# Patient Record
Sex: FEMALE | Race: WHITE | NOT HISPANIC OR LATINO | Employment: FULL TIME | URBAN - METROPOLITAN AREA
[De-identification: names, ages, dates, MRNs, and addresses within clinical notes are randomized per-mention and may not be internally consistent; named-entity substitution may affect disease eponyms.]

---

## 2017-11-15 LAB — MAMMOGRAM: NORMAL

## 2018-10-19 ENCOUNTER — OFFICE VISIT (OUTPATIENT)
Dept: DERMATOLOGY | Facility: CLINIC | Age: 53
End: 2018-10-19
Payer: COMMERCIAL

## 2018-10-19 DIAGNOSIS — L57.0 AK (ACTINIC KERATOSIS): ICD-10-CM

## 2018-10-19 DIAGNOSIS — D22.9 BENIGN NEVUS OF SKIN: ICD-10-CM

## 2018-10-19 DIAGNOSIS — D23.9 DERMATOFIBROMA: ICD-10-CM

## 2018-10-19 DIAGNOSIS — D48.5 NEOPLASM OF UNCERTAIN BEHAVIOR OF SKIN: ICD-10-CM

## 2018-10-19 DIAGNOSIS — D18.01 CHERRY ANGIOMA: ICD-10-CM

## 2018-10-19 DIAGNOSIS — L73.8 SENILE SEBACEOUS GLAND HYPERPLASIA: Primary | ICD-10-CM

## 2018-10-19 RX ORDER — ESTERIFIED ESTROGENS AND METHYLTESTOSTERONE .625; 1.25 MG/1; MG/1
1 TABLET ORAL DAILY
Refills: 3 | COMMUNITY
Start: 2018-10-10 | End: 2020-12-03

## 2018-10-19 RX ORDER — LOSARTAN POTASSIUM 50 MG/1
50 TABLET ORAL DAILY
Refills: 3 | COMMUNITY
Start: 2018-08-01 | End: 2020-12-03

## 2018-10-19 RX ORDER — LIDOCAINE HYDROCHLORIDE AND EPINEPHRINE 10; 10 MG/ML; UG/ML
1 INJECTION, SOLUTION INFILTRATION; PERINEURAL ONCE
Qty: 1 ML | Refills: 0 | OUTPATIENT
Start: 2018-10-19 | End: 2018-10-19

## 2018-10-19 RX ORDER — ESTRADIOL 0.5 MG/1
TABLET ORAL
Refills: 2 | COMMUNITY
Start: 2018-02-12 | End: 2020-12-03

## 2018-10-19 RX ORDER — TRETINOIN 0.25 MG/G
CREAM TOPICAL
Qty: 45 G | Refills: 3 | Status: SHIPPED | OUTPATIENT
Start: 2018-10-19 | End: 2020-10-23

## 2018-10-19 ASSESSMENT — PAIN SCALES - GENERAL
PAINLEVEL: NO PAIN (0)
PAINLEVEL: NO PAIN (0)

## 2018-10-19 NOTE — MR AVS SNAPSHOT
After Visit Summary   10/19/2018    Tia Pablo    MRN: [id number]           Patient Information     Date Of Birth          1965        Visit Information        Provider Department      10/19/2018 10:00 AM Brigid Del Toro PA-C [facility] Dermatology        Today's Diagnoses     Senile sebaceous gland hyperplasia    -  1    Benign nevus of skin        Neoplasm of uncertain behavior of skin        AK (actinic keratosis)          Care Instructions    Wound Care After a Biopsy    What is a skin biopsy?  A skin biopsy allows the doctor to examine a very small piece of tissue under the microscope to determine the diagnosis and the best treatment for the skin condition. A local anesthetic (numbing medicine)  is injected with a very small needle into the skin area to be tested. A small piece of skin is taken from the area. Sometimes a suture (stitch) is used.     What are the risks of a skin biopsy?  I will experience scar, bleeding, swelling, pain, crusting and redness. I may experience incomplete removal or recurrence. Risks of this procedure are excessive bleeding, bruising, infection, nerve damage, numbness, thick (hypertrophic or keloidal) scar and non-diagnostic biopsy.    How should I care for my wound for the first 24 hours?    Keep the wound dry and covered for 24 hours    If it bleeds, hold direct pressure on the area for 15 minutes. If bleeding does not stop then go to the emergency room    Avoid strenuous exercise the first 1-2 days or as your doctor instructs you    How should I care for the wound after 24 hours?    After 24 hours, remove the bandage    You may bathe or shower as normal    If you had a scalp biopsy, you can shampoo as usual and can use shower water to clean the biopsy site daily    Clean the wound twice a day with gentle soap and water    Do not scrub, be gentle    Apply white petroleum/Vaseline after cleaning the wound with a cotton swab or a clean finger, and keep the site  covered with a Bandaid /bandage. Bandages are not necessary with a scalp biopsy    If you are unable to cover the site with a Bandaid /bandage, re-apply ointment 2-3 times a day to keep the site moist. Moisture will help with healing    Avoid strenuous activity for first 1-2 days    Avoid lakes, rivers, pools, and oceans until the stitches are removed or the site is healed    How do I clean my wound?    Wash hands thoroughly with soap or use hand  before all wound care    Clean the wound with gentle soap and water    Apply white petroleum/Vaseline  to wound after it is clean    Replace the Bandaid /bandage to keep the wound covered for the first few days or as instructed by your doctor    If you had a scalp biopsy, warm shower water to the area on a daily basis should suffice    What should I use to clean my wound?     Cotton-tipped applicators (Qtips )    White petroleum jelly (Vaseline ). Use a clean new container and use Q-tips to apply.    Bandaids   as needed    Gentle soap     How should I care for my wound long term?    Do not get your wound dirty    Keep up with wound care for one week or until the area is healed.    A small scab will form and fall off by itself when the area is completely healed. The area will be red and will become pink in color as it heals. Sun protection is very important for how your scar will turn out. Sunscreen with an SPF 30 or greater is recommended once the area is healed.    You should have some soreness but it should be mild and slowly go away over several days. Talk to your doctor about using tylenol for pain,    When should I call my doctor?  If you have increased:     Pain or swelling    Pus or drainage (clear or slightly yellow drainage is ok)    Temperature over 100F    Spreading redness or warmth around wound    When will I hear about my results?  The biopsy results can take 2-3 weeks to come back. The clinic will call you with the results, send you a mychart  message, or have you schedule a follow-up clinic or phone time to discuss the results. Contact our clinics if you do not hear from us in 3 weeks.     Who should I call with questions?    [facility]: [phone]     [facility]: [phone]    For urgent needs outside of business hours call the [facility] at [phone] and ask for the dermatology resident on call    Cryotherapy    What is it?    Use of a very cold liquid, such as liquid nitrogen, to freeze and destroy abnormal skin cells that need to be removed    What should I expect?    Tenderness and redness    A small blister that might grow and fill with dark purple blood. There may be crusting.    More than one treatment may be needed if the lesions do not go away.    How do I care for the treated area?    Gently wash the area with your hands when bathing.    Use a thin layer of Vaseline to help with healing. You may use a Band-Aid.     The area should heal within 7-10 days and may leave behind a pink or lighter color.     Do not use an antibiotic or Neosporin ointment.     You may take acetaminophen (Tylenol) for pain.     Call your Doctor if you have:    Severe pain    Signs of infection (warmth, redness, cloudy yellow drainage, and or a bad smell)    Questions or concerns    Who should I call with questions?       [facility]: [phone]       [facility]: [phone]       For urgent needs outside of business hours call the [facility] at [phone]        and ask for the dermatology resident on call       OR             Follow-ups after your visit        Who to contact     Please call your clinic at [phone] to:    Ask questions about your health    Make or cancel appointments    Discuss your medicines    Learn about your test results    Speak to your doctor            Additional Information About Your  Visit        [employer] Information     [employer] is an electronic gateway that provides easy, online access to your medical records. With [employer], you can request a clinic appointment, read your test results, renew a prescription or communicate with your care team.     To sign up for [employer] visit the website at [email or web address]   You will be asked to enter the access code listed below, as well as some personal information. Please follow the directions to create your username and password.     Your access code is: [email or web address]  Expires: 2019  6:30 AM     Your access code will  in 90 days. If you need help or a new code, please contact your [facility] or call [phone] for assistance.        Care EveryWhere ID     This is your Care EveryWhere ID. This could be used by other organizations to access your [place] medical records  [id number]         Blood Pressure from Last 3 Encounters:   16 138/87   16 (!) 156/91    Weight from Last 3 Encounters:   16 70.1 kg (154 lb 9.6 oz)   16 68.5 kg (151 lb)              We Performed the Following     BIOPSY SKIN/SUBQ/MUC MEM, SINGLE LESION     Dermatological path order and indications     DESTRUCT PREMALIGNANT LESION, 2-14     DESTRUCT PREMALIGNANT LESION, FIRST          Today's Medication Changes          These changes are accurate as of 10/19/18 10:39 AM.  If you have any questions, ask your nurse or doctor.               Start taking these medicines.        Dose/Directions    lidocaine 1% with EPINEPHrine 1:100,000 1 %-1:650445 injection   Used for:  Neoplasm of uncertain behavior of skin   Started by:  Brigid Del Toro PA-C        Dose:  1 mL   Inject 1 mL into the skin once for 1 dose   Quantity:  1 mL   Refills:  0       tretinoin 0.025 % cream   Commonly known as:  RETIN-A   Used for:  Senile sebaceous gland hyperplasia   Started by:  Brigid Del Toro PA-C        Use every night   Quantity:   45 g   Refills:  3            Where to get your medicines      These medications were sent to [id number] [place] - [address]  [address], [place] [address]     Phone:  [phone]     tretinoin 0.025 % cream         Some of these will need a paper prescription and others can be bought over the counter.  Ask your nurse if you have questions.     You don't need a prescription for these medications     lidocaine 1% with EPINEPHrine 1:100,000 1 %-1:872341 injection                Primary Care Provider    None Specified       No primary provider on file.        Equal Access to Services     [facility]: Hadii ottoniel Thompson, waaxkori luana maríaadaha, qaybadilson kaalmakori gonsalves, juan alberto escalante . So [facility] [phone].    ATENCIÓN: Si habla español, tiene a moore disposición servicios gratuitos de asistencia lingüística. [facility] [phone].    We comply with applicable federal civil rights laws and [place] laws. We do not discriminate on the basis of race, color, national origin, age, disability, sex, sexual orientation, or gender identity.            Thank you!     Thank you for choosing [facility] DERMATOLOGY  for your care. Our goal is always to provide you with excellent care. Hearing back from our patients is one way we can continue to improve our services. Please take a few minutes to complete the written survey that you may receive in the mail after your visit with us. Thank you!             Your Updated Medication List - Protect others around you: Learn how to safely use, store and throw away your medicines at www.disposemymeds.org.          This list is accurate as of 10/19/18 10:39 AM.  Always use your most recent med list.                   Brand Name Dispense Instructions for use Diagnosis    DULoxetine 30 MG EC capsule    CYMBALTA     TAKE 1 CAPSULE BY MOUTH ONCE DAILY IN THE MORNING.        EST ESTROGENS-METHYLTEST HS 0.625-1.25 MG per tablet   Generic  drug:  estrogens-methylTESTOSTERone      Take 1 tablet by mouth daily        estradiol 0.5 MG tablet    ESTRACE     TAKE 1 TABLET BY MOUTH ONCE DAILY.        IBUPROFEN PO      Take 200 mg by mouth        lidocaine 1% with EPINEPHrine 1:100,000 1 %-1:858595 injection     1 mL    Inject 1 mL into the skin once for 1 dose    Neoplasm of uncertain behavior of skin       LORazepam 1 MG tablet    ATIVAN     TAKE ONE TABLET BY MOUTH ONCE DAILY AS NEEDED FOR ANXIETY/PANIC. MAX 1 TAB PER 24 HOURS.        losartan 50 MG tablet    COZAAR     Take 50 mg by mouth daily        RELPAX 40 MG tablet   Generic drug:  eletriptan           sertraline 50 MG tablet    ZOLOFT     Take 50 mg by mouth        traZODone 50 MG tablet    DESYREL     TAKE 1 TO 1 & 1/2 TABLETS BY MOUTH EVERY NIGHT AS NEEDED FOR SLEEP.        tretinoin 0.025 % cream    RETIN-A    45 g    Use every night    Senile sebaceous gland hyperplasia

## 2018-10-19 NOTE — NURSING NOTE
Dermatology Rooming Note    Tia Pablo's goals for this visit include:   Chief Complaint   Patient presents with     Skin Check     Tia is here today for a skin check- notes some areas of concern on her face.      MYNOR Elliott

## 2018-10-19 NOTE — PATIENT INSTRUCTIONS
Wound Care After a Biopsy    What is a skin biopsy?  A skin biopsy allows the doctor to examine a very small piece of tissue under the microscope to determine the diagnosis and the best treatment for the skin condition. A local anesthetic (numbing medicine)  is injected with a very small needle into the skin area to be tested. A small piece of skin is taken from the area. Sometimes a suture (stitch) is used.     What are the risks of a skin biopsy?  I will experience scar, bleeding, swelling, pain, crusting and redness. I may experience incomplete removal or recurrence. Risks of this procedure are excessive bleeding, bruising, infection, nerve damage, numbness, thick (hypertrophic or keloidal) scar and non-diagnostic biopsy.    How should I care for my wound for the first 24 hours?    Keep the wound dry and covered for 24 hours    If it bleeds, hold direct pressure on the area for 15 minutes. If bleeding does not stop then go to the emergency room    Avoid strenuous exercise the first 1-2 days or as your doctor instructs you    How should I care for the wound after 24 hours?    After 24 hours, remove the bandage    You may bathe or shower as normal    If you had a scalp biopsy, you can shampoo as usual and can use shower water to clean the biopsy site daily    Clean the wound twice a day with gentle soap and water    Do not scrub, be gentle    Apply white petroleum/Vaseline after cleaning the wound with a cotton swab or a clean finger, and keep the site covered with a Bandaid /bandage. Bandages are not necessary with a scalp biopsy    If you are unable to cover the site with a Bandaid /bandage, re-apply ointment 2-3 times a day to keep the site moist. Moisture will help with healing    Avoid strenuous activity for first 1-2 days    Avoid lakes, rivers, pools, and oceans until the stitches are removed or the site is healed    How do I clean my wound?    Wash hands thoroughly with soap or use hand  before all  wound care    Clean the wound with gentle soap and water    Apply white petroleum/Vaseline  to wound after it is clean    Replace the Bandaid /bandage to keep the wound covered for the first few days or as instructed by your doctor    If you had a scalp biopsy, warm shower water to the area on a daily basis should suffice    What should I use to clean my wound?     Cotton-tipped applicators (Qtips )    White petroleum jelly (Vaseline ). Use a clean new container and use Q-tips to apply.    Bandaids   as needed    Gentle soap     How should I care for my wound long term?    Do not get your wound dirty    Keep up with wound care for one week or until the area is healed.    A small scab will form and fall off by itself when the area is completely healed. The area will be red and will become pink in color as it heals. Sun protection is very important for how your scar will turn out. Sunscreen with an SPF 30 or greater is recommended once the area is healed.    You should have some soreness but it should be mild and slowly go away over several days. Talk to your doctor about using tylenol for pain,    When should I call my doctor?  If you have increased:     Pain or swelling    Pus or drainage (clear or slightly yellow drainage is ok)    Temperature over 100F    Spreading redness or warmth around wound    When will I hear about my results?  The biopsy results can take 2-3 weeks to come back. The clinic will call you with the results, send you a [employer] message, or have you schedule a follow-up clinic or phone time to discuss the results. Contact our clinics if you do not hear from us in 3 weeks.     Who should I call with questions?    [facility]: [phone]     [facility]: [phone]    For urgent needs outside of business hours call the [facility] at [phone] and ask for the dermatology resident on call    Cryotherapy    What is it?    Use  of a very cold liquid, such as liquid nitrogen, to freeze and destroy abnormal skin cells that need to be removed    What should I expect?    Tenderness and redness    A small blister that might grow and fill with dark purple blood. There may be crusting.    More than one treatment may be needed if the lesions do not go away.    How do I care for the treated area?    Gently wash the area with your hands when bathing.    Use a thin layer of Vaseline to help with healing. You may use a Band-Aid.     The area should heal within 7-10 days and may leave behind a pink or lighter color.     Do not use an antibiotic or Neosporin ointment.     You may take acetaminophen (Tylenol) for pain.     Call your Doctor if you have:    Severe pain    Signs of infection (warmth, redness, cloudy yellow drainage, and or a bad smell)    Questions or concerns    Who should I call with questions?       [facility]: [phone]       [facility]: [phone]       For urgent needs outside of business hours call the [facility] at [phone]        and ask for the dermatology resident on call       OR

## 2018-10-19 NOTE — PROGRESS NOTES
[facility] Dermatology Note      Dermatology Problem List:  1.Family Hx of skin cancer - unknown type  2. Actinic keratosis cryo on 10/19/2018 on the right jaw line, nose.   3. Sebaceous hyperplasia -Tretinoin 0.025% cream  4. NUB mid chest - DDx. BCC vs Follicular papule bx on 10/19/2018      Encounter Date: Oct 19, 2018    CC:  Chief Complaint   Patient presents with     Skin Check     Tia is here today for a skin check- notes some areas of concern on her face.          History of Present Illness:  Ms. Tia Pablo is a 52 year old female who is new to the dermatology clinic that is here for a some lesions of concern on the face as well as a full body skin check. At today's visit the patient says that she has two lesions on the chin that just showed up on the chin a year ago, then a couple more bumps around the nose, eyelid, and cheeks that have been there for around 5 years. None of these are painful or cause sx. She does mention a scaly lesion on the bridge of her nose that does not really go away with moisturizers. Patient was wondering what the best moisturizer to treat dryness and oiliness of her skin. She does use sunscreen.     Patient states that she doesn't have a history of skin cancer but has a family history of skin cancer (both parents - unsure what type). Patient reports that there are no painful, itchiness, or tenderness areas.      Past Medical History:   Patient Active Problem List   Diagnosis   (none) - all problems resolved or deleted     History reviewed. No pertinent past medical history.  History reviewed. No pertinent surgical history.    Social History:   reports that she has never smoked. She has never used smokeless tobacco. She reports that she drinks alcohol. She reports that she does not use illicit drugs.    Family History:  Family History   Problem Relation Age of Onset     Hypertension Mother      Hypertension Father      Skin Cancer Father      Melanoma No  family hx of        Medications:  Current Outpatient Prescriptions   Medication Sig Dispense Refill     EST ESTROGENS-METHYLTEST HS 0.625-1.25 MG per tablet Take 1 tablet by mouth daily  3     estradiol (ESTRACE) 0.5 MG tablet TAKE 1 TABLET BY MOUTH ONCE DAILY.  2     IBUPROFEN PO Take 200 mg by mouth       losartan (COZAAR) 50 MG tablet Take 50 mg by mouth daily  3     RELPAX 40 MG tablet   2     sertraline (ZOLOFT) 50 MG tablet Take 50 mg by mouth       DULoxetine (CYMBALTA) 30 MG capsule TAKE 1 CAPSULE BY MOUTH ONCE DAILY IN THE MORNING.  0     LORazepam (ATIVAN) 1 MG tablet TAKE ONE TABLET BY MOUTH ONCE DAILY AS NEEDED FOR ANXIETY/PANIC. MAX 1 TAB PER 24 HOURS.  0     traZODone (DESYREL) 50 MG tablet TAKE 1 TO 1 & 1/2 TABLETS BY MOUTH EVERY NIGHT AS NEEDED FOR SLEEP.  1       No Known Allergies    Review of Systems:  -Constitutional: The patient denies fatigue, fevers, chills, unintended weight loss, and night sweats.  -Skin: As above in HPI. No additional skin concerns.  -Heme/Lymph: no concerning bumps, no bleeding or bruising problems     Physical exam:  Vitals: There were no vitals taken for this visit.  GEN: This is a well developed, well-nourished female in no acute distress, in a pleasant mood.    SKIN: Full skin, which includes the head/face, both arms, chest, back, abdomen,both legs, genitalia and/or groin buttocks, digits and/or nails, was examined.  -Metz's skin type II, around 100 nevi   -There are erythematous macules with overyling adherent scale on the right jaw line, nose.   -There are yellow oily papules with central umbilication located on the left cheek, glabella, and forehead.  -There is a firm tan/flesh colored papule that dimples with lateral pressure on the right calf and left medial thigh.  -5 mm shinny pink papule on the mid chest with central telangiectasia    -There are dome shaped bright red papules on the trunk and extremities.   -No other lesions of concern on areas examined.        Impression/Plan:  1. Actinic keratosis - right jaw line and nose x 2    Cryotherapy procedure note: After verbal consent and discussion of risks and benefits including but no limited to dyspigmentation/scar, blister, and pain, 2 was(were) treated with 1-2mm freeze border for 2 cycles with liquid nitrogen. Post cryotherapy instructions were provided.     We will clinically monitor this area    ABCD's of melanoma were reviewed with patient.     Sunscreen: Apply 20 minutes prior to going outdoors and reapply every two hours, when wet or sweating. We recommend using an SPF 30 or higher, and to use one that is water resistant.       2. Dermal Nevi - face    Reassured the patient that these are benign     Explained the etiology    Discussed removal options with possibility of scar, patient deferred this for now       3. Sebaceous hyperplasia -face    Start Tretinoin 0.025% cream, apply every night  - side effects discussed    Explained the etiology     4. Dermatofibroma x 2 - lower legs    Reassured the patient that these are benign    Explained the etiology     5. Cherry Angioma     Reassured the patient that these are benign    No further intervention required     5.   Neoplasm of Uncertain Behavior: mid chest -DDx. BCC vs Follicular papule     Shave biopsy:  After discussion of benefits and risks including but not limited to bleeding/bruising, pain/swelling, infection, scar, incomplete removal, nerve damage/numbness, recurrence, and non-diagnostic biopsy, written consent, verbal consent and photographs were obtained. Time-out was performed. The area was cleaned with isopropyl alcohol.  was injected to obtain adequate anesthesia of the lesion on the mid chest. 0.5ml of 1% lidocaine with 1:100,000 epinephrine was injected to obtain adequate anesthesia. A  shave biopsy was performed. Hemostasis was achieved with aluminium chloride. Vaseline and a sterile dressing were applied. The patient tolerated the procedure and  no complications were noted. The patient was provided with verbal and written post care instructions.      We will clinically monitor this area.           CC Dr. Blanco on close of this encounter.  Follow-up in 1 year, earlier for new or changing lesions.       Staff Involved:    Scribe Disclosure  I, Beth Ivey, am serving as a scribe to document services personally performed by Brigid Del Toro PA-C, based on data collection and the provider's statements to me.     Provider Disclosure:   The documentation recorded by the scribe accurately reflects the services I personally performed and the decisions made by me.    All risks, benefits and alternatives were discussed with patient.  Patient is in agreement and understands the assessment and plan.  All questions were answered.    Brigid Del Toro PA-C  [facility]: Phone: [phone], Fax: [phone]

## 2018-10-19 NOTE — NURSING NOTE
Lidocaine-epinephrine 1-1:556121 % injection   0.5mL once for one use, starting 10/19/2018 ending 10/19/2018,  2mL disp, R-0, injection  Injected by MYNOR Elliott

## 2018-10-19 NOTE — LETTER
10/19/2018       RE: Tia Pablo  [address] [facility] [address]     Dear Colleague,    Thank you for referring your patient, Tia Pablo, to the [facility] DERMATOLOGY at [facility]. Please see a copy of my visit note below.    [facility] Dermatology Note      Dermatology Problem List:  1.Family Hx of skin cancer - unknown type  2. Actinic keratosis cryo on 10/19/2018 on the right jaw line, nose.   3. Sebaceous hyperplasia -Tretinoin 0.025% cream  4. NUB mid chest - DDx. BCC vs Follicular papule bx on 10/19/2018      Encounter Date: Oct 19, 2018    CC:  Chief Complaint   Patient presents with     Skin Check     Tia is here today for a skin check- notes some areas of concern on her face.          History of Present Illness:  Ms. Tia Pablo is a 52 year old female who is new to the dermatology clinic that is here for a some lesions of concern on the face as well as a full body skin check. At today's visit the patient says that she has two lesions on the chin that just showed up on the chin a year ago, then a couple more bumps around the nose, eyelid, and cheeks that have been there for around 5 years. None of these are painful or cause sx. She does mention a scaly lesion on the bridge of her nose that does not really go away with moisturizers. Patient was wondering what the best moisturizer to treat dryness and oiliness of her skin. She does use sunscreen.     Patient states that she doesn't have a history of skin cancer but has a family history of skin cancer (both parents - unsure what type). Patient reports that there are no painful, itchiness, or tenderness areas.      Past Medical History:   Patient Active Problem List   Diagnosis   (none) - all problems resolved or deleted     History reviewed. No pertinent past medical history.  History reviewed. No pertinent surgical history.    Social History:   reports that she has never smoked. She has  never used smokeless tobacco. She reports that she drinks alcohol. She reports that she does not use illicit drugs.    Family History:  Family History   Problem Relation Age of Onset     Hypertension Mother      Hypertension Father      Skin Cancer Father      Melanoma No family hx of        Medications:  Current Outpatient Prescriptions   Medication Sig Dispense Refill     EST ESTROGENS-METHYLTEST HS 0.625-1.25 MG per tablet Take 1 tablet by mouth daily  3     estradiol (ESTRACE) 0.5 MG tablet TAKE 1 TABLET BY MOUTH ONCE DAILY.  2     IBUPROFEN PO Take 200 mg by mouth       losartan (COZAAR) 50 MG tablet Take 50 mg by mouth daily  3     RELPAX 40 MG tablet   2     sertraline (ZOLOFT) 50 MG tablet Take 50 mg by mouth       DULoxetine (CYMBALTA) 30 MG capsule TAKE 1 CAPSULE BY MOUTH ONCE DAILY IN THE MORNING.  0     LORazepam (ATIVAN) 1 MG tablet TAKE ONE TABLET BY MOUTH ONCE DAILY AS NEEDED FOR ANXIETY/PANIC. MAX 1 TAB PER 24 HOURS.  0     traZODone (DESYREL) 50 MG tablet TAKE 1 TO 1 & 1/2 TABLETS BY MOUTH EVERY NIGHT AS NEEDED FOR SLEEP.  1       No Known Allergies    Review of Systems:  -Constitutional: The patient denies fatigue, fevers, chills, unintended weight loss, and night sweats.  -Skin: As above in HPI. No additional skin concerns.  -Heme/Lymph: no concerning bumps, no bleeding or bruising problems     Physical exam:  Vitals: There were no vitals taken for this visit.  GEN: This is a well developed, well-nourished female in no acute distress, in a pleasant mood.    SKIN: Full skin, which includes the head/face, both arms, chest, back, abdomen,both legs, genitalia and/or groin buttocks, digits and/or nails, was examined.  -Metz's skin type II, around 100 nevi   -There are erythematous macules with overyling adherent scale on the right jaw line, nose.   -There are yellow oily papules with central umbilication located on the left cheek, glabella, and forehead.  -There is a firm tan/flesh colored  papule that dimples with lateral pressure on the right calf and left medial thigh.  -5 mm shinny pink papule on the mid chest with central telangiectasia    -There are dome shaped bright red papules on the trunk and extremities.   -No other lesions of concern on areas examined.       Impression/Plan:  1. Actinic keratosis - right jaw line and nose x 2    Cryotherapy procedure note: After verbal consent and discussion of risks and benefits including but no limited to dyspigmentation/scar, blister, and pain, 2 was(were) treated with 1-2mm freeze border for 2 cycles with liquid nitrogen. Post cryotherapy instructions were provided.     We will clinically monitor this area    ABCD's of melanoma were reviewed with patient.     Sunscreen: Apply 20 minutes prior to going outdoors and reapply every two hours, when wet or sweating. We recommend using an SPF 30 or higher, and to use one that is water resistant.       2. Dermal Nevi - face    Reassured the patient that these are benign     Explained the etiology    Discussed removal options with possibility of scar, patient deferred this for now       3. Sebaceous hyperplasia -face    Start Tretinoin 0.025% cream, apply every night  - side effects discussed    Explained the etiology     4. Dermatofibroma x 2 - lower legs    Reassured the patient that these are benign    Explained the etiology     5. Cherry Angioma     Reassured the patient that these are benign    No further intervention required     5.   Neoplasm of Uncertain Behavior: mid chest -DDx. BCC vs Follicular papule     Shave biopsy:  After discussion of benefits and risks including but not limited to bleeding/bruising, pain/swelling, infection, scar, incomplete removal, nerve damage/numbness, recurrence, and non-diagnostic biopsy, written consent, verbal consent and photographs were obtained. Time-out was performed. The area was cleaned with isopropyl alcohol.  was injected to obtain adequate anesthesia of the  lesion on the mid chest. 0.5ml of 1% lidocaine with 1:100,000 epinephrine was injected to obtain adequate anesthesia. A  shave biopsy was performed. Hemostasis was achieved with aluminium chloride. Vaseline and a sterile dressing were applied. The patient tolerated the procedure and no complications were noted. The patient was provided with verbal and written post care instructions.      We will clinically monitor this area.    CC Dr. Blanco on close of this encounter.  Follow-up in 1 year, earlier for new or changing lesions.       Staff Involved:    Scribe Disclosure  I, Beth Ivey, am serving as a scribe to document services personally performed by Brigid Del Toro PA-C, based on data collection and the provider's statements to me.     Provider Disclosure:   The documentation recorded by the scribe accurately reflects the services I personally performed and the decisions made by me.    All risks, benefits and alternatives were discussed with patient.  Patient is in agreement and understands the assessment and plan.  All questions were answered.    Brigid Del Toro PA-C  [facility]: Phone: [phone], Fax: [phone]

## 2018-10-24 LAB — COPATH REPORT: NORMAL

## 2019-05-03 ENCOUNTER — TRANSFERRED RECORDS (OUTPATIENT)
Dept: HEALTH INFORMATION MANAGEMENT | Facility: CLINIC | Age: 54
End: 2019-05-03

## 2019-05-24 ENCOUNTER — TRANSFERRED RECORDS (OUTPATIENT)
Dept: HEALTH INFORMATION MANAGEMENT | Facility: CLINIC | Age: 54
End: 2019-05-24

## 2019-08-27 ENCOUNTER — TRANSFERRED RECORDS (OUTPATIENT)
Dept: HEALTH INFORMATION MANAGEMENT | Facility: CLINIC | Age: 54
End: 2019-08-27

## 2019-10-04 ENCOUNTER — TRANSFERRED RECORDS (OUTPATIENT)
Dept: HEALTH INFORMATION MANAGEMENT | Facility: CLINIC | Age: 54
End: 2019-10-04

## 2019-10-05 ENCOUNTER — TRANSFERRED RECORDS (OUTPATIENT)
Dept: HEALTH INFORMATION MANAGEMENT | Facility: CLINIC | Age: 54
End: 2019-10-05

## 2019-10-28 ENCOUNTER — DOCUMENTATION ONLY (OUTPATIENT)
Dept: CARE COORDINATION | Facility: CLINIC | Age: 54
End: 2019-10-28

## 2019-12-02 ENCOUNTER — OFFICE VISIT (OUTPATIENT)
Dept: DERMATOLOGY | Facility: CLINIC | Age: 54
End: 2019-12-02
Payer: COMMERCIAL

## 2019-12-02 DIAGNOSIS — L73.8 SENILE SEBACEOUS GLAND HYPERPLASIA: ICD-10-CM

## 2019-12-02 DIAGNOSIS — L82.1 SEBORRHEIC KERATOSIS: ICD-10-CM

## 2019-12-02 DIAGNOSIS — L82.0 SEBORRHEIC KERATOSES, INFLAMED: Primary | ICD-10-CM

## 2019-12-02 DIAGNOSIS — D18.01 CHERRY ANGIOMA: ICD-10-CM

## 2019-12-02 DIAGNOSIS — Z12.83 SKIN CANCER SCREENING: ICD-10-CM

## 2019-12-02 RX ORDER — TRETINOIN 0.25 MG/G
CREAM TOPICAL
Qty: 45 G | Refills: 3 | Status: SHIPPED | OUTPATIENT
Start: 2019-12-02 | End: 2020-10-23

## 2019-12-02 RX ORDER — BENZONATATE 100 MG/1
CAPSULE ORAL
Refills: 0 | COMMUNITY
Start: 2019-09-28 | End: 2020-12-03

## 2019-12-02 RX ORDER — HYDROCODONE BITARTRATE AND ACETAMINOPHEN 5; 325 MG/1; MG/1
TABLET ORAL
Refills: 0 | COMMUNITY
Start: 2019-11-21 | End: 2020-12-03

## 2019-12-02 RX ORDER — CHLORTHALIDONE 25 MG/1
TABLET ORAL
COMMUNITY
Start: 2018-08-01 | End: 2020-12-03

## 2019-12-02 RX ORDER — ERYTHROMYCIN 5 MG/G
OINTMENT OPHTHALMIC
Refills: 1 | COMMUNITY
Start: 2019-11-21 | End: 2020-12-03

## 2019-12-02 RX ORDER — VORTIOXETINE 10 MG/1
TABLET, FILM COATED ORAL
Refills: 1 | COMMUNITY
Start: 2019-06-28 | End: 2020-12-03

## 2019-12-02 RX ORDER — ESTRADIOL 10 UG/1
INSERT VAGINAL
COMMUNITY
Start: 2019-08-15 | End: 2021-01-28

## 2019-12-02 RX ORDER — CLONAZEPAM 0.5 MG/1
TABLET ORAL
COMMUNITY
Start: 2018-06-06 | End: 2020-12-03

## 2019-12-02 RX ORDER — CHLORTHALIDONE 25 MG/1
25 TABLET ORAL DAILY
Refills: 1 | COMMUNITY
Start: 2019-10-27 | End: 2021-01-28

## 2019-12-02 RX ORDER — AZITHROMYCIN 500 MG/1
TABLET, FILM COATED ORAL
Refills: 0 | COMMUNITY
Start: 2019-09-28 | End: 2020-12-03

## 2019-12-02 RX ORDER — CYCLOBENZAPRINE HCL 10 MG
10 TABLET ORAL
COMMUNITY
Start: 2018-01-25 | End: 2020-12-03

## 2019-12-02 RX ORDER — INFLUENZA A VIRUS A/VICTORIA/2454/2019 IVR-207 (H1N1) ANTIGEN (PROPIOLACTONE INACTIVATED), INFLUENZA A VIRUS A/HONG KONG/2671/2019 IVR-208 (H3N2) ANTIGEN (PROPIOLACTONE INACTIVATED), INFLUENZA B VIRUS B/VICTORIA/705/2018 BVR-11 ANTIGEN (PROPIOLACTONE INACTIVATED), INFLUENZA B VIRUS B/PHUKET/3073/2013 BVR-1B ANTIGEN (PROPIOLACTONE INACTIVATED) 15; 15; 15; 15 UG/.5ML; UG/.5ML; UG/.5ML; UG/.5ML
INJECTION, SUSPENSION INTRAMUSCULAR
Refills: 0 | COMMUNITY
Start: 2019-08-27 | End: 2021-06-10

## 2019-12-02 ASSESSMENT — PAIN SCALES - GENERAL: PAINLEVEL: NO PAIN (0)

## 2019-12-02 NOTE — NURSING NOTE
Dermatology Rooming Note    Tia Pablo's goals for this visit include:   Chief Complaint   Patient presents with     Skin Check     Tia is here today for a skin check- Tia notes some areas of concern.      MYNOR Elliott

## 2019-12-02 NOTE — LETTER
Date:December 3, 2019      Patient was self referred, no letter generated. Do not send.        [facility] Information

## 2019-12-02 NOTE — PROGRESS NOTES
[facility] Dermatology Note      Dermatology Problem List:  1. Family Hx of skin cancer - unknown type  2. Actinic keratosis cryo on 10/19/2018 on the right jaw line, nose.   3. Sebaceous hyperplasia/SKs -Tretinoin 0.025% cream  4. Benign lichenoid keratosis mid chest s/p bx on 10/19/2018      Encounter Date: Dec 2, 2019    CC:  Chief Complaint   Patient presents with     Skin Check     Tia is here today for a skin check- Tia notes some areas of concern.          History of Present Illness:  Ms. Tia Pablo is a 54 year old female who returns for an annual skin check. She has a new growth on the left anterior neck that is irritating when she wears jewelry. Overall she is doing well. She has not been using tretinoin which had previously been prescribed for sebaceous hyperplasia of the face. She notes new spots showing up on the face and she would like to prevent them. She does use sunscreen. Patient reports that there are no painful, itchiness, or tenderness areas. Recently had eye lid surgery with oculoplastics. The patient denies painful, itching, tingling or bleeding lesions unless otherwise noted.      Past Medical History:   Patient Active Problem List   Diagnosis   (none) - all problems resolved or deleted     No past medical history on file.  No past surgical history on file.    Social History:   reports that she has never smoked. She has never used smokeless tobacco. She reports current alcohol use. She reports that she does not use drugs.    Family History:  Family History   Problem Relation Age of Onset     Hypertension Mother      Hypertension Father      Skin Cancer Father      Melanoma No family hx of        Medications:  Current Outpatient Medications   Medication Sig Dispense Refill     AFLURIA QUADRIVALENT 0.5 ML injection TO BE ADMINISTERED BY PHARMACIST FOR IMMUNIZATION  0     amoxicillin-clavulanate (AUGMENTIN) 875-125 MG tablet TAKE 1 TABLET (875 MG) BY MOUTH TWICE A DAY.   0     azithromycin (ZITHROMAX) 500 MG tablet TAKE 1 TABLET BY MOUTH EVERY DAY FOR 3 DAYS  0     benzonatate (TESSALON) 100 MG capsule TAKE 1 CAPSULE BY MOUTH THREE TIMES A DAY AS NEEDED *DO NOT BREAK CHEW, DISSOLVE, CUT, OR CRUSH*  0     chlorthalidone (HYGROTON) 25 MG tablet TAKE 1 TABLET BY MOUTH EVERY DAY       chlorthalidone (HYGROTON) 25 MG tablet Take 25 mg by mouth daily  1     clonazePAM (KLONOPIN) 0.5 MG tablet Take 1 tablet by mouth 3 times daily if needed for Anxiety.       cyclobenzaprine (FLEXERIL) 10 MG tablet Take 10 mg by mouth       DULoxetine (CYMBALTA) 30 MG capsule TAKE 1 CAPSULE BY MOUTH ONCE DAILY IN THE MORNING.  0     erythromycin (ROMYCIN) 5 MG/GM ophthalmic ointment   1     EST ESTROGENS-METHYLTEST HS 0.625-1.25 MG per tablet Take 1 tablet by mouth daily  3     estradiol (ESTRACE) 0.5 MG tablet TAKE 1 TABLET BY MOUTH ONCE DAILY.  2     estradiol (YUVAFEM) 10 MCG TABS vaginal tablet INSERT 1 TABLET INTO THE VAGINA EVERY MONDAY AND THURSDAY.       HYDROcodone-acetaminophen (NORCO) 5-325 MG tablet   0     IBUPROFEN PO Take 200 mg by mouth       LORazepam (ATIVAN) 1 MG tablet TAKE ONE TABLET BY MOUTH ONCE DAILY AS NEEDED FOR ANXIETY/PANIC. MAX 1 TAB PER 24 HOURS.  0     losartan (COZAAR) 50 MG tablet Take 50 mg by mouth daily  3     RELPAX 40 MG tablet   2     sertraline (ZOLOFT) 50 MG tablet Take 50 mg by mouth       traZODone (DESYREL) 50 MG tablet TAKE 1 TO 1 & 1/2 TABLETS BY MOUTH EVERY NIGHT AS NEEDED FOR SLEEP.  1     tretinoin (RETIN-A) 0.025 % cream Use every night 45 g 3     TRINTELLIX 10 MG tablet TAKE 10 MG BY MOUTH ONCE DAILY. DISCONTINUE SERTRALINE.  1       No Known Allergies    Review of Systems:  -Constitutional: The patient denies fatigue, fevers, chills, unintended weight loss, and night sweats.  -Skin: As above in HPI. No additional skin concerns.  -Heme/Lymph: no concerning bumps, no bleeding or bruising problems     Physical exam:  Vitals: There were no vitals taken for  this visit.  GEN: This is a well developed, well-nourished female in no acute distress, in a pleasant mood.    SKIN: Full skin, which includes the head/face, both arms, chest, back, abdomen,both legs, genitalia and/or groin buttocks, digits and/or nails, was examined.  -Metz's skin type I, around 100 nevi   -There are red dome shaped symmetric papules scattered on the abdomen  -There are scattered light brown macules on sun exposed areas including chest, neck and arms.   -There is a tan to brown waxy, stuck on papule located on the left neck with surrounding erythema  -No other lesions of concern on areas examined.       Impression/Plan:  1. Inflamed seborrheic keratosis left anterior neck  Cryotherapy procedure note: After verbal consent and discussion of risks and benefits including but no limited to dyspigmentation/scar, blister, and pain, 1 was(were) treated with 1-2mm freeze border for 2 cycles with liquid nitrogen. Post cryotherapy instructions were provided.   Reassured benign    2. Multiple clinically benign nevi including cherry angiomas and previously discussed dermatofibromas of the lower legs    Reassured the patient that these are benign     Discussed removal options with possibility of scar, patient deferred this for now     3. SKs and SH -face    Start Tretinoin 0.025% cream, apply every night  - side effects discussed    edu on potential side effects      CC Dr. Blanco on close of this encounter.  Follow-up in 1 year, earlier for new or changing lesions.       Staff Involved:    Scribe Disclosure  I, Orlando Solis, am serving as a scribe to document services personally performed by Brigid Del Toro PA-C, based on data collection and the provider's statements to me.     Provider Disclosure:   The documentation recorded by the scribe accurately reflects the services I personally performed and the decisions made by me.    All risks, benefits and alternatives were discussed with patient.  Patient  is in agreement and understands the assessment and plan.  All questions were answered.    Brigid Del Toro PA-C  [facility]: Phone: [phone], Fax: [phone]

## 2019-12-02 NOTE — LETTER
12/2/2019       RE: Tia Pablo  [address] [address] [facility] [address]     Dear Colleague,    Thank you for referring your patient, Tia Pablo, to the [facility] DERMATOLOGY at [facility]. Please see a copy of my visit note below.    [facility] Dermatology Note      Dermatology Problem List:  1. Family Hx of skin cancer - unknown type  2. Actinic keratosis cryo on 10/19/2018 on the right jaw line, nose.   3. Sebaceous hyperplasia/SKs -Tretinoin 0.025% cream  4. Benign lichenoid keratosis mid chest s/p bx on 10/19/2018      Encounter Date: Dec 2, 2019    CC:  Chief Complaint   Patient presents with     Skin Check     Tia is here today for a skin check- Tia notes some areas of concern.          History of Present Illness:  Ms. Tia Pablo is a 54 year old female who returns for an annual skin check. She has a new growth on the left anterior neck that is irritating when she wears jewelry. Overall she is doing well. She has not been using tretinoin which had previously been prescribed for sebaceous hyperplasia of the face. She notes new spots showing up on the face and she would like to prevent them. She does use sunscreen. Patient reports that there are no painful, itchiness, or tenderness areas. Recently had eye lid surgery with oculoplastics. The patient denies painful, itching, tingling or bleeding lesions unless otherwise noted.      Past Medical History:   Patient Active Problem List   Diagnosis   (none) - all problems resolved or deleted     No past medical history on file.  No past surgical history on file.    Social History:   reports that she has never smoked. She has never used smokeless tobacco. She reports current alcohol use. She reports that she does not use drugs.    Family History:  Family History   Problem Relation Age of Onset     Hypertension Mother      Hypertension Father      Skin Cancer Father      Melanoma No family hx of         Medications:  Current Outpatient Medications   Medication Sig Dispense Refill     AFLURIA QUADRIVALENT 0.5 ML injection TO BE ADMINISTERED BY PHARMACIST FOR IMMUNIZATION  0     amoxicillin-clavulanate (AUGMENTIN) 875-125 MG tablet TAKE 1 TABLET (875 MG) BY MOUTH TWICE A DAY.  0     azithromycin (ZITHROMAX) 500 MG tablet TAKE 1 TABLET BY MOUTH EVERY DAY FOR 3 DAYS  0     benzonatate (TESSALON) 100 MG capsule TAKE 1 CAPSULE BY MOUTH THREE TIMES A DAY AS NEEDED *DO NOT BREAK CHEW, DISSOLVE, CUT, OR CRUSH*  0     chlorthalidone (HYGROTON) 25 MG tablet TAKE 1 TABLET BY MOUTH EVERY DAY       chlorthalidone (HYGROTON) 25 MG tablet Take 25 mg by mouth daily  1     clonazePAM (KLONOPIN) 0.5 MG tablet Take 1 tablet by mouth 3 times daily if needed for Anxiety.       cyclobenzaprine (FLEXERIL) 10 MG tablet Take 10 mg by mouth       DULoxetine (CYMBALTA) 30 MG capsule TAKE 1 CAPSULE BY MOUTH ONCE DAILY IN THE MORNING.  0     erythromycin (ROMYCIN) 5 MG/GM ophthalmic ointment   1     EST ESTROGENS-METHYLTEST HS 0.625-1.25 MG per tablet Take 1 tablet by mouth daily  3     estradiol (ESTRACE) 0.5 MG tablet TAKE 1 TABLET BY MOUTH ONCE DAILY.  2     estradiol (YUVAFEM) 10 MCG TABS vaginal tablet INSERT 1 TABLET INTO THE VAGINA EVERY MONDAY AND THURSDAY.       HYDROcodone-acetaminophen (NORCO) 5-325 MG tablet   0     IBUPROFEN PO Take 200 mg by mouth       LORazepam (ATIVAN) 1 MG tablet TAKE ONE TABLET BY MOUTH ONCE DAILY AS NEEDED FOR ANXIETY/PANIC. MAX 1 TAB PER 24 HOURS.  0     losartan (COZAAR) 50 MG tablet Take 50 mg by mouth daily  3     RELPAX 40 MG tablet   2     sertraline (ZOLOFT) 50 MG tablet Take 50 mg by mouth       traZODone (DESYREL) 50 MG tablet TAKE 1 TO 1 & 1/2 TABLETS BY MOUTH EVERY NIGHT AS NEEDED FOR SLEEP.  1     tretinoin (RETIN-A) 0.025 % cream Use every night 45 g 3     TRINTELLIX 10 MG tablet TAKE 10 MG BY MOUTH ONCE DAILY. DISCONTINUE SERTRALINE.  1       No Known Allergies    Review of  Systems:  -Constitutional: The patient denies fatigue, fevers, chills, unintended weight loss, and night sweats.  -Skin: As above in HPI. No additional skin concerns.  -Heme/Lymph: no concerning bumps, no bleeding or bruising problems     Physical exam:  Vitals: There were no vitals taken for this visit.  GEN: This is a well developed, well-nourished female in no acute distress, in a pleasant mood.    SKIN: Full skin, which includes the head/face, both arms, chest, back, abdomen,both legs, genitalia and/or groin buttocks, digits and/or nails, was examined.  -Metz's skin type I, around 100 nevi   -There are red dome shaped symmetric papules scattered on the abdomen  -There are scattered light brown macules on sun exposed areas including chest, neck and arms.   -There is a tan to brown waxy, stuck on papule located on the left neck with surrounding erythema  -No other lesions of concern on areas examined.       Impression/Plan:  1. Inflamed seborrheic keratosis left anterior neck  Cryotherapy procedure note: After verbal consent and discussion of risks and benefits including but no limited to dyspigmentation/scar, blister, and pain, 1 was(were) treated with 1-2mm freeze border for 2 cycles with liquid nitrogen. Post cryotherapy instructions were provided.   Reassured benign    2. Multiple clinically benign nevi including cherry angiomas and previously discussed dermatofibromas of the lower legs    Reassured the patient that these are benign     Discussed removal options with possibility of scar, patient deferred this for now     3. SKs and SH -face    Start Tretinoin 0.025% cream, apply every night  - side effects discussed    edu on potential side effects      CC Dr. Blanco on close of this encounter.  Follow-up in 1 year, earlier for new or changing lesions.       Staff Involved:    Scribe Disclosure  I, Orlando Solis, am serving as a scribe to document services personally performed by Brigid Del Toro PA-C,  based on data collection and the provider's statements to me.     Provider Disclosure:   The documentation recorded by the scribe accurately reflects the services I personally performed and the decisions made by me.    All risks, benefits and alternatives were discussed with patient.  Patient is in agreement and understands the assessment and plan.  All questions were answered.    Brigid Del Toro PA-C  [facility] [place]: Phone: [phone], Fax: [phone]                  Again, thank you for allowing me to participate in the care of your patient.      Sincerely,    Brigid Del Toro PA-C

## 2019-12-02 NOTE — PATIENT INSTRUCTIONS
Cryotherapy    What is it?    Use of a very cold liquid, such as liquid nitrogen, to freeze and destroy abnormal skin cells that need to be removed    What should I expect?    Tenderness and redness    A small blister that might grow and fill with dark purple blood. There may be crusting.    More than one treatment may be needed if the lesions do not go away.    How do I care for the treated area?    Gently wash the area with your hands when bathing.    Use a thin layer of Vaseline to help with healing. You may use a Band-Aid.     The area should heal within 7-10 days and may leave behind a pink or lighter color.     Do not use an antibiotic or Neosporin ointment.     You may take acetaminophen (Tylenol) for pain.     Call your Doctor if you have:    Severe pain    Signs of infection (warmth, redness, cloudy yellow drainage, and or a bad smell)    Questions or concerns    Who should I call with questions?       [facility]: [phone]       [facility]: [phone]       For urgent needs outside of business hours call the [facility] at [phone]        and ask for the dermatology resident on call

## 2020-01-07 ENCOUNTER — TRANSFERRED RECORDS (OUTPATIENT)
Dept: HEALTH INFORMATION MANAGEMENT | Facility: CLINIC | Age: 55
End: 2020-01-07

## 2020-02-21 ENCOUNTER — TRANSFERRED RECORDS (OUTPATIENT)
Dept: HEALTH INFORMATION MANAGEMENT | Facility: CLINIC | Age: 55
End: 2020-02-21

## 2020-04-30 ENCOUNTER — TRANSFERRED RECORDS (OUTPATIENT)
Dept: HEALTH INFORMATION MANAGEMENT | Facility: CLINIC | Age: 55
End: 2020-04-30

## 2020-06-22 ENCOUNTER — THERAPY VISIT (OUTPATIENT)
Dept: CHIROPRACTIC MEDICINE | Facility: CLINIC | Age: 55
End: 2020-06-22
Payer: COMMERCIAL

## 2020-06-22 DIAGNOSIS — R51.9 HEAD ACHE: ICD-10-CM

## 2020-06-22 DIAGNOSIS — M54.2 CERVICALGIA: ICD-10-CM

## 2020-06-22 DIAGNOSIS — M62.838 SPASM OF MUSCLE: ICD-10-CM

## 2020-06-22 DIAGNOSIS — M99.02 THORACIC SEGMENT DYSFUNCTION: ICD-10-CM

## 2020-06-22 DIAGNOSIS — M99.01 CERVICAL SEGMENT DYSFUNCTION: Primary | ICD-10-CM

## 2020-06-22 PROCEDURE — 99203 OFFICE O/P NEW LOW 30 MIN: CPT | Mod: 25 | Performed by: CHIROPRACTOR

## 2020-06-22 PROCEDURE — 97810 ACUP 1/> WO ESTIM 1ST 15 MIN: CPT | Performed by: CHIROPRACTOR

## 2020-06-22 PROCEDURE — 98940 CHIROPRACT MANJ 1-2 REGIONS: CPT | Mod: AT | Performed by: CHIROPRACTOR
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
## 2020-07-09 ENCOUNTER — THERAPY VISIT (OUTPATIENT)
Dept: CHIROPRACTIC MEDICINE | Facility: CLINIC | Age: 55
End: 2020-07-09
Payer: COMMERCIAL

## 2020-07-09 DIAGNOSIS — M99.02 THORACIC SEGMENT DYSFUNCTION: ICD-10-CM

## 2020-07-09 DIAGNOSIS — M99.01 CERVICAL SEGMENT DYSFUNCTION: Primary | ICD-10-CM

## 2020-07-09 DIAGNOSIS — M43.6 NECK STIFFNESS: ICD-10-CM

## 2020-07-09 DIAGNOSIS — R51.9 HEAD ACHE: ICD-10-CM

## 2020-07-09 DIAGNOSIS — M54.2 NECK PAIN: ICD-10-CM

## 2020-07-09 PROCEDURE — 98940 CHIROPRACT MANJ 1-2 REGIONS: CPT | Mod: AT | Performed by: CHIROPRACTOR

## 2020-07-09 PROCEDURE — 97810 ACUP 1/> WO ESTIM 1ST 15 MIN: CPT | Performed by: CHIROPRACTOR

## 2020-07-09 NOTE — PROGRESS NOTES
Visit #:  2 of 8, based on treatment plan    Subjective:  Tia Pablo is a 54 year old female who is seen in f/u up for:        Cervical segment dysfunction  Neck pain  Thoracic segment dysfunction  Neck stiffness  Head ache.     Since last visit on 6/22/2020,  Tia Pablo reports the following changes: Pain immediately after last treatment: 6/10 and their pain level today 6/10.  Tia reports that her neck felt a little better after last visit but then started stiffening up over the past week or so.  Her headaches have been about the same as her botox is wearing off.  She is having some right sided neck stiffness and some jaw stiffness from clenching her jaw at night.    Area of chief complaint:  Cervical :  Symptoms are graded at 6/10. The quality is described as stiff, achey.  Motion has been good but decreased over the past week. Patient feels that they are feeling about the same .        Objective:  The following was observed:    P: palpatory tendernessTraps R>>L    A: static palpation demonstrates intersegmental asymmetry , cervical, thoracic    R: motion palpation notes restricted motion, C1 , C6 , C7 , T1  and T2     T: The following soft tissue hypotonicities were observed:  Traps: right, ache and stiff, referred pain: no      Assessment:    Segmental spinal dysfunction/restrictions found at:  C1   C6   C7   T1   T2     Diagnoses:      1. Cervical segment dysfunction    2. Neck pain    3. Thoracic segment dysfunction    4. Neck stiffness    5. Head ache        Patient's condition:  Patient had restrictions pre-manipulation    Treatment effectiveness:  Post manipulation there is better intersegmental movement and Patient claims to feel looser post manipulation      Procedures:  CMT:  55307 Chiropractic manipulative treatment 1-2 regions performed   Cervical: Activator, C1, C6, C7 , Prone  Thoracic: Activator, T1, T2, Prone    Modalities:  56219: Acupuncture, for 15 minutes:  Points: GV20, GV16, LI4, SI15,  GB36, GB20, GB39  For 15 minutes    Therapeutic procedures:  None      Prognosis: Good    Progress towards Goals: Patient is making progress towards the goal     Response to Treatment:   Reduction in symptoms as reported by patient      Recommendations:    Instructions:  ice 20 minutes every other hour as needed    Follow-up:   Return to care in one week

## 2020-07-13 ENCOUNTER — THERAPY VISIT (OUTPATIENT)
Dept: CHIROPRACTIC MEDICINE | Facility: CLINIC | Age: 55
End: 2020-07-13
Payer: COMMERCIAL

## 2020-07-13 DIAGNOSIS — M99.01 CERVICAL SEGMENT DYSFUNCTION: Primary | ICD-10-CM

## 2020-07-13 DIAGNOSIS — M99.02 THORACIC SEGMENT DYSFUNCTION: ICD-10-CM

## 2020-07-13 DIAGNOSIS — M62.838 SPASM OF MUSCLE: ICD-10-CM

## 2020-07-13 DIAGNOSIS — R51.9 HEAD ACHE: ICD-10-CM

## 2020-07-13 DIAGNOSIS — M54.2 CERVICALGIA: ICD-10-CM

## 2020-07-13 PROCEDURE — 97810 ACUP 1/> WO ESTIM 1ST 15 MIN: CPT | Performed by: CHIROPRACTOR

## 2020-07-13 PROCEDURE — 98940 CHIROPRACT MANJ 1-2 REGIONS: CPT | Mod: AT | Performed by: CHIROPRACTOR

## 2020-07-13 NOTE — PROGRESS NOTES
Visit #:  3 of 8, based on treatment plan    Subjective:  Tia Pablo is a 54 year old female who is seen in f/u up for:        Cervical segment dysfunction  Cervicalgia  Thoracic segment dysfunction  Spasm of muscle  Head ache.     Since last visit on 7/92020,  Tia Pablo reports the following changes: Pain immediately after last treatment: 6/10 and their pain level today 6/10.  Tia reports that her neck felt a little better after last visit but then started stiffening up after lifting a heavy groecery basket a few days ago. Her right arm, shoulder and neck have been feeling stiff and sore since.  She is still having a dull headache as well.    Area of chief complaint:  Cervical :  Symptoms are graded at 6/10. The quality is described as stiff, achey.  Motion has been good but decreased over the past week. Patient feels that they are feeling about the same .        Objective:  The following was observed:    P: palpatory tendernessTraps R>>L    A: static palpation demonstrates intersegmental asymmetry , cervical, thoracic    R: motion palpation notes restricted motion, C1 , C6 , C7 , T1  and T2     T: The following soft tissue hypotonicities were observed:  Traps: right, ache and stiff, referred pain: no      Assessment:    Segmental spinal dysfunction/restrictions found at:  C1   C6   C7   T1   T2     Diagnoses:      1. Cervical segment dysfunction    2. Cervicalgia    3. Thoracic segment dysfunction    4. Spasm of muscle    5. Head ache        Patient's condition:  Patient had restrictions pre-manipulation    Treatment effectiveness:  Post manipulation there is better intersegmental movement and Patient claims to feel looser post manipulation      Procedures:  CMT:  50017 Chiropractic manipulative treatment 1-2 regions performed   Cervical: Activator, C1, C6, C7 , Prone  Thoracic: Activator, T1, T2, Prone    Modalities:  70703: Acupuncture, for 15 minutes:  Points: GV20, GV16, LI4, SI15, GB36, GB20, GB39  For  15 minutes    Therapeutic procedures:  None      Prognosis: Good    Progress towards Goals: Patient is making progress towards the goal     Response to Treatment:   Reduction in symptoms as reported by patient      Recommendations:    Instructions:  ice 20 minutes every other hour as needed    Follow-up:   Return to care in one week

## 2020-08-05 ENCOUNTER — OFFICE VISIT (OUTPATIENT)
Dept: PALLIATIVE MEDICINE | Facility: CLINIC | Age: 55
End: 2020-08-05
Payer: COMMERCIAL

## 2020-08-05 VITALS — SYSTOLIC BLOOD PRESSURE: 135 MMHG | HEART RATE: 78 BPM | DIASTOLIC BLOOD PRESSURE: 76 MMHG

## 2020-08-05 DIAGNOSIS — M79.18 MYOFASCIAL PAIN: Primary | ICD-10-CM

## 2020-08-05 PROCEDURE — 20553 NJX 1/MLT TRIGGER POINTS 3/>: CPT | Performed by: PSYCHIATRY & NEUROLOGY

## 2020-08-05 RX ORDER — BUPIVACAINE HYDROCHLORIDE 5 MG/ML
8 INJECTION, SOLUTION EPIDURAL; INTRACAUDAL ONCE
Status: COMPLETED | OUTPATIENT
Start: 2020-08-05 | End: 2020-08-05

## 2020-08-05 RX ORDER — METHOCARBAMOL 750 MG/1
TABLET, FILM COATED ORAL
COMMUNITY
Start: 2020-05-29 | End: 2021-06-02

## 2020-08-05 RX ORDER — MULTIVITAMIN WITH IRON
1 TABLET ORAL DAILY
COMMUNITY
End: 2020-12-03

## 2020-08-05 RX ADMIN — BUPIVACAINE HYDROCHLORIDE 40 MG: 5 INJECTION, SOLUTION EPIDURAL; INTRACAUDAL at 16:20

## 2020-08-05 ASSESSMENT — PAIN SCALES - GENERAL: PAINLEVEL: SEVERE PAIN (7)

## 2020-08-05 NOTE — PATIENT INSTRUCTIONS
[facility]   Post Procedure Instructions    Today you had:  trigger point injections         Medications used:  lidocaine   bupivicaine             Go to the emergency room if you develop any shortness of breath    Monitor the injection sites for signs and symptoms of infection-fever, chills, redness, swelling, warmth, or drainage to areas.    You may have soreness at injection sites for up to 24 hours.    You may apply ice to the painful areas to help minimize the discomfort of the needle pokes.    Do not apply heat to sites for at least 12 hours.    You may use anti-inflammatory medications or Tylenol for pain control if necessary    Pain Clinic phone number during work hours Monday-Friday:  [phone]    After hours provider line: [phone]

## 2020-08-05 NOTE — PROGRESS NOTES
Pre procedure Diagnosis: myofascial pain   Post procedure Diagnosis: Same  Procedure performed: trigger point injections  Anesthesia: none  Complications: none  Operators: Marleen Ga MD     Indications:   Tia Pablo is a 54 year old female with a history of headache, muscle tension, and shoulder pain.  She previously has been a patient at [facility], and is also more recently seen at [facility].  She gets botox but is looking at changing locations due to change in location of where she is seen.  Exam shows myofascial pain of the muscle groups listed below and they have tried conservative treatment including medications, botox, trigger point injections.    Options/alternatives, benefits and risks were discussed with the patient including bleeding, infection, tissue trauma and pnuemothorax.  Questions were answered to her satisfaction and she agrees to proceed. Voluntary informed consent was obtained and signed.     Vitals were reviewed: Yes  Allergies were reviewed:  Yes   Medications were reviewed:  Yes   Pre-procedure pain score: 7/10    Procedure:  After getting informed consent, a Pause for the Cause was performed.    Trigger points were identified by patient, and marked when appropriate.  The area was prepped with Chloroprep.    Using clean technique, injections were completed using a 25G, 1.5 inch needle.  After negative aspiration, injection was completed.  A total of 10 locations were injected.  When possible, tissue was retracted from the chest wall to avoid lung injury.    Muscle groups injected:  Bilateral trapezius  Bilateral suboccipital   Bilateral levator scapulae     Injection solution contained:  5ml of 1% lidocaine and 8ml of 0.5% bupivacaine.    Hemostasis was achieved, the area was cleaned, and bandaids were placed when appropriate.  The patient tolerated the procedure well.     Post-procedure pain score: 4/10  Follow-up includes:   -repeat prn, ok to double book   -will look at notes-  likely would need to get botox in location with EMG guidance and that can do massaters.  Will wait to review her notes.    Marleen Ga MD  [facility]

## 2020-08-05 NOTE — Clinical Note
I will be reviewing her notes- but likely she will need to get botox elsewhere for EMG guidance and also ability to do the massaters.    Marleen Ga MD  [facility]

## 2020-08-13 ENCOUNTER — MYC MEDICAL ADVICE (OUTPATIENT)
Dept: PALLIATIVE MEDICINE | Facility: CLINIC | Age: 55
End: 2020-08-13

## 2020-08-13 NOTE — TELEPHONE ENCOUNTER
They have the referral and should be calling you.  I have no information on timing.  In the meantime, I would continue to work with your current providers if you need an interrim plan.     I'll make sure that they know you are waiting and urgently want to hear from them.     Marleen Ga MD  [facility]

## 2020-08-13 NOTE — TELEPHONE ENCOUNTER
From: Tia Pablo      Created: 8/13/2020 1:36 AM        Dr. Ga,  Do you know if I will be contacted soon by the office you are referring me to? The Botox injections no longer work after approx 7 weeks. I have been in a lot of pain. My insurance currently covers 12 weeks. It will be 12 weeks at the end of August. I need help with other options. These past few weeks have been painful for me. The pain woke me up tonight, it's 1:30 am. I took the prescription Robaxin at 10:00pm and the pain woke me up. Nothing seems to help me completely. Please let me know when I schedule with the Dr. you are referring me to. Thank you, Tia Crum. [phone]        ----------------------------------------------  I have spoken with Dr. Franz at [facility] dept, as this patient has botox for headache, dystonia, and TMJ issues.     She would be best managed there.     I will send that to them now, they can let me know if anything else is needed.    ---------------------------------------------------------------------------  Routing to Dr Ga to see if we need to place a referral for this.     Milli Shaw, RICKEYN, RN  Care Coordinator  [facility] [place]

## 2020-08-17 NOTE — TELEPHONE ENCOUNTER
From: Tia Pablo      Created: 8/17/2020 8:45 AM        I called the [facility] this morning. They have not received any information from your office yet, regarding me. From looking at the website it looks like Dr. Simi Vazquez specializes in the area I fit into. Please let me know if you're office will be sending my information soon. Thank you, Tia Crum   [phone]           No retinal holes or tears seen on exam. Recommended OBSERVATION. We reviewed the signs and symptoms of retinal tear/retinal detachment and the importance of prompt evaluation should there be increasing floaters, new flashing lights, or decreasing peripheral vision in either eye at any time. Patient understands condition, prognosis and need for follow up care.

## 2020-08-18 NOTE — TELEPHONE ENCOUNTER
Tia,  I apologize they didn't reach out.  Dr. Vazquez and Dr. Franz both do this work.  Dr. Franz is who I reached out to, and she forwarded your information to her RN Coordinator, so it should have been taken care of. I can see that she forwarded previously, so I don't know what happened.     I just called them, and they were working on opening spots, as they were booked out until November.  They are going to reach out to you (hopefully today) to offer with either provider.    If for some reason they do not by Thursday, you can reach out again, and I will again call their office.     Marleen Ga MD  [facility]     If she doesn't hear from them, please reach out to Agueda Main RN  [phone] [OFFICE]

## 2020-09-01 ENCOUNTER — VIRTUAL VISIT (OUTPATIENT)
Dept: PHYSICAL MEDICINE AND REHAB | Facility: CLINIC | Age: 55
End: 2020-09-01
Payer: COMMERCIAL

## 2020-09-01 DIAGNOSIS — G24.3 SPASMODIC TORTICOLLIS: ICD-10-CM

## 2020-09-01 DIAGNOSIS — G24.1 GENETIC TORSION DYSTONIA: ICD-10-CM

## 2020-09-01 DIAGNOSIS — G24.4 OROMANDIBULAR DYSTONIA: Primary | ICD-10-CM
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
## 2020-09-03 DIAGNOSIS — G24.4 OROMANDIBULAR DYSTONIA: ICD-10-CM

## 2020-09-03 DIAGNOSIS — G24.3 SPASMODIC TORTICOLLIS: Primary | ICD-10-CM

## 2020-09-03 DIAGNOSIS — G24.1 GENETIC TORSION DYSTONIA: ICD-10-CM

## 2020-10-02 ENCOUNTER — OFFICE VISIT (OUTPATIENT)
Dept: PHYSICAL MEDICINE AND REHAB | Facility: CLINIC | Age: 55
End: 2020-10-02
Payer: COMMERCIAL

## 2020-10-02 DIAGNOSIS — G24.4 IDIOPATHIC OROFACIAL DYSTONIA: ICD-10-CM

## 2020-10-02 DIAGNOSIS — G24.1 GENETIC TORSION DYSTONIA: ICD-10-CM

## 2020-10-02 DIAGNOSIS — G24.3 CERVICAL DYSTONIA: ICD-10-CM

## 2020-10-02 DIAGNOSIS — M25.50 MULTIPLE JOINT PAIN: Primary | ICD-10-CM

## 2020-10-02 PROCEDURE — 64616 CHEMODENERV MUSC NECK DYSTON: CPT | Performed by: PHYSICAL MEDICINE & REHABILITATION

## 2020-10-02 PROCEDURE — 64612 DESTROY NERVE FACE MUSCLE: CPT | Performed by: PHYSICAL MEDICINE & REHABILITATION

## 2020-10-02 PROCEDURE — 64642 CHEMODENERV 1 EXTREMITY 1-4: CPT | Performed by: PHYSICAL MEDICINE & REHABILITATION

## 2020-10-02 PROCEDURE — 64643 CHEMODENERV 1 EXTREM 1-4 EA: CPT | Performed by: PHYSICAL MEDICINE & REHABILITATION

## 2020-10-02 PROCEDURE — 95874 GUIDE NERV DESTR NEEDLE EMG: CPT | Performed by: PHYSICAL MEDICINE & REHABILITATION

## 2020-10-02 NOTE — LETTER
10/2/2020       RE: Tia Pablo  [id number] [facility] [id number]     Dear Colleague,    Thank you for referring your patient, Tia Pablo, to the [facility] PHYSICAL MEDICINE AND REHABILITATION CLINIC [place] at [facility]. Please see a copy of my visit note below.    PM&R PROCEDURE NOTE    There were no vitals taken for this visit.     Current Outpatient Medications:      AFLURIA QUADRIVALENT 0.5 ML injection, TO BE ADMINISTERED BY PHARMACIST FOR IMMUNIZATION, Disp: , Rfl: 0     amoxicillin-clavulanate (AUGMENTIN) 875-125 MG tablet, TAKE 1 TABLET (875 MG) BY MOUTH TWICE A DAY., Disp: , Rfl: 0     azithromycin (ZITHROMAX) 500 MG tablet, TAKE 1 TABLET BY MOUTH EVERY DAY FOR 3 DAYS, Disp: , Rfl: 0     benzonatate (TESSALON) 100 MG capsule, TAKE 1 CAPSULE BY MOUTH THREE TIMES A DAY AS NEEDED *DO NOT BREAK CHEW, DISSOLVE, CUT, OR CRUSH*, Disp: , Rfl: 0     chlorthalidone (HYGROTON) 25 MG tablet, TAKE 1 TABLET BY MOUTH EVERY DAY, Disp: , Rfl:      chlorthalidone (HYGROTON) 25 MG tablet, Take 25 mg by mouth daily, Disp: , Rfl: 1     clonazePAM (KLONOPIN) 0.5 MG tablet, Take 1 tablet by mouth 3 times daily if needed for Anxiety., Disp: , Rfl:      cyclobenzaprine (FLEXERIL) 10 MG tablet, Take 10 mg by mouth, Disp: , Rfl:      DULoxetine (CYMBALTA) 30 MG capsule, TAKE 1 CAPSULE BY MOUTH ONCE DAILY IN THE MORNING., Disp: , Rfl: 0     erythromycin (ROMYCIN) 5 MG/GM ophthalmic ointment, , Disp: , Rfl: 1     EST ESTROGENS-METHYLTEST HS 0.625-1.25 MG per tablet, Take 1 tablet by mouth daily, Disp: , Rfl: 3     estradiol (ESTRACE) 0.5 MG tablet, TAKE 1 TABLET BY MOUTH ONCE DAILY., Disp: , Rfl: 2     estradiol (YUVAFEM) 10 MCG TABS vaginal tablet, INSERT 1 TABLET INTO THE VAGINA EVERY MONDAY AND THURSDAY., Disp: , Rfl:      HYDROcodone-acetaminophen (NORCO) 5-325 MG tablet, , Disp: , Rfl: 0     IBUPROFEN PO, Take 200 mg by mouth, Disp: , Rfl:      LORazepam (ATIVAN) 1 MG  "tablet, TAKE ONE TABLET BY MOUTH ONCE DAILY AS NEEDED FOR ANXIETY/PANIC. MAX 1 TAB PER 24 HOURS., Disp: , Rfl: 0     losartan (COZAAR) 50 MG tablet, Take 50 mg by mouth daily, Disp: , Rfl: 3     magnesium 250 MG tablet, Take 1 tablet by mouth daily, Disp: , Rfl:      methocarbamol (ROBAXIN) 750 MG tablet, , Disp: , Rfl:      RELPAX 40 MG tablet, , Disp: , Rfl: 2     sertraline (ZOLOFT) 50 MG tablet, Take 50 mg by mouth, Disp: , Rfl:      traZODone (DESYREL) 50 MG tablet, TAKE 1 TO 1 & 1/2 TABLETS BY MOUTH EVERY NIGHT AS NEEDED FOR SLEEP., Disp: , Rfl: 1     tretinoin (RETIN-A) 0.025 % cream, Use every night (Patient not taking: Reported on 8/5/2020), Disp: 45 g, Rfl: 3     tretinoin (RETIN-A) 0.025 % external cream, Use every night (Patient not taking: Reported on 8/5/2020), Disp: 45 g, Rfl: 3     TRINTELLIX 10 MG tablet, TAKE 10 MG BY MOUTH ONCE DAILY. DISCONTINUE SERTRALINE., Disp: , Rfl: 1    Current Facility-Administered Medications:      botulinum toxin type A (BOTOX) 100 units injection 400 Units, 400 Units, Intramuscular, Q90 Days, Standal, Simi Mccloud MD      No Known Allergies     PHYSICAL EXAM  Left laterollis with mild right rotation  Right shoulder elevation  Bilateral masseter hypertrophy    HPI  Patient denies new medical diagnoses, illnesses, hospitalizations, emergency room visits, and injuries since the previous injection with botulinum neurotoxin. She does note chronic \"ear ringing\" and \"electric\" feeling throughout her body.    RESPONSE TO PREVIOUS TREATMENT:  Patient Tia Pablo received 500 units of Botox on 4/30/20 at [facility] pain clinic.    Problems following the previous series of neurotoxin injections included:  No problems reported. Has never had issues with neck weakness in the past    Benefits by Patient Report:  Pain Improvement:  Yes  % Pain Improvement 90 %  Duration of Benefit:  7 weeks.    Dystonia and Spasticity Improvement:  Yes  % Dystonia Improvement: Unclear  Duration of " Benefit:  7 weeks.        BOTULINUM NEUROTOXIN INJECTION PROCEDURES:  VERIFICATION OF PATIENT IDENTIFICATION AND PROCEDURE   Initials   Patient Name pg   Patient  pg   Procedure Verified by: pg     Prior to the start of the procedure and with procedural staff participation, I verbally confirmed the patient s identity using two indicators, relevant allergies, that the procedure was appropriate and matched the consent or emergent situation, and that the correct equipment/implants were available. Immediately prior to starting the procedure I conducted the Time Out with the procedural staff and re-confirmed the patient s name, procedure, and site/side. (The Joint Commission universal protocol was followed.)  Yes    Sedation (Moderate or Deep): None    Above assessments performed by:  aDfne Henning, MARTA Crawford, Resident MD          The attending provider was present for the entire procedure documented below.    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    Her baseline symptoms have been recalcitrant to oral medications and conservative therapy.  She is here today for reinjection with Botox.    GOAL OF PROCEDURE:  The goal of this procedure is to improve increase active range of motion and decrease pain  associated with dystonic movements and spasticity.    TOTAL DOSE: 300 UNITS BOTOX  Dose Administered:  275 UNITS BOTOX   175 units Botox (Botulinum Toxin Type A)       2:1 Dilution    100 units Botox (Botulinum Toxin Type A)       1:1 Dilution     Diluent Used:  Preservative Free Normal Saline  Total Volume of Diluent Used:  4.5 ml  Lot # /C3 with Expiration Date:  2023    Was there drug waste? Yes  Amount of drug  waste (units): 25 units Botox.  Reason for waste:  Single use vial.     CONSENT:  The risks, benefits, and treatment options were discussed with Betty Iverson and she agreed to proceed.      Written consent was obtained by pg.     EQUIPMENT USED:  Needle-37mm stimulating/recording  EMG/NCS Machine    SKIN PREPARATION:  Skin preparation was performed using chloroprep.    GUIDANCE DESCRIPTION:  Electro-myographic guidance was necessary throughout the procedure to accurately identify all areas of dystonic and spastic muscles while avoiding injection of non-dystonic muscles and non-spastic muscles, neighboring nerves and nearby vascular structures.     AREA/MUSCLE INJECTED: 275 UNITS BOTOX = TOTAL DOSE    1. Posterior Head, Neck Muscles: 145 units of Botox, 2:1 dilution    Left rhomboid - 15 units of Botox at 3 site/s.     Right middle trapezius - 5 units of Botox at 1 site/s.   Left middle trapezius - 10 units of Botox at 1 site/s.     Left lateral trapezius - 25 units of Botox at 5 site/s.     Right splenius - 5 units of Botox at 1 site/s.   Left splenius - 20 units of Botox at 2 site/s.     Right Auricularis - 15 units of Botox at 3 site/s.   Left Auricularis - 15 units of Botox at 3 site/s.     Right occipitalis - 20 units of Botox at 3 site/s.   Left occipitalis - 20 units of Botox at 3 site/s.       2. Extremity Muscles: 30 units of Botox, 1:1 dilution    Right levator scapula - 15 units of Botox at 1 site/s.   Left levator scapula - 15 units of Botox at 1 site/s.     3. Jaw Muscles: 100 units of Botox, 1:1 dilution    Right masseter - 30 units of Botox at 1 site/s.   Left masseter - 30 units of Botox at 1 site/s.    Right temporalis - 20 units of Botox at 1 site/s.   Left temporalis - 20 units of Botox at 1 site/s.    RESPONSE TO PROCEDURE:  Tia Pablo tolerated the procedure well. She briefly got lightheaded midway through the procedure and recovered after laying down. She again got lightheaded, so the  remainder of the procedure was completed with patient lying on her side.  She was allowed to recover for an appropriate period of time and was discharged home in stable condition.     There was a small hematoma left masseter which was stabilized.     Follow Up  Tia Pablo was asked to follow up by phone in 7-14 days with PM&R care coordinators, to report her response to this series of injections.  Based on the patient's previous response to this therapy, Tia Pablo was rescheduled for the next series of injections in 12 weeks.    Referral placed to Rheumatology as she had multiple joint complaints at today's visit including pain in her 1st CMC joints. She has a family history of autoimmune disorders and perhaps inflammatory arthritis.       Again, thank you for allowing me to participate in the care of your patient.  Sincerely,    Simi Vazquez MD

## 2020-10-02 NOTE — LETTER
10/2/2020       RE: Tia Pablo  [id number] [facility] [id number]     Dear Colleague,    Thank you for referring your patient, Tia Pablo, to the [facility] PHYSICAL MEDICINE AND REHABILITATION CLIN[facility] [place] [facility] Please see a copy of my visit note below.    PM&R PROCEDURE NOTE    There were no vitals taken for this visit.       Current Outpatient Medications:      AFLURIA QUADRIVALENT 0.5 ML injection, TO BE ADMINISTERED BY PHARMACIST FOR IMMUNIZATION, Disp: , Rfl: 0     amoxicillin-clavulanate (AUGMENTIN) 875-125 MG tablet, TAKE 1 TABLET (875 MG) BY MOUTH TWICE A DAY., Disp: , Rfl: 0     azithromycin (ZITHROMAX) 500 MG tablet, TAKE 1 TABLET BY MOUTH EVERY DAY FOR 3 DAYS, Disp: , Rfl: 0     benzonatate (TESSALON) 100 MG capsule, TAKE 1 CAPSULE BY MOUTH THREE TIMES A DAY AS NEEDED *DO NOT BREAK CHEW, DISSOLVE, CUT, OR CRUSH*, Disp: , Rfl: 0     chlorthalidone (HYGROTON) 25 MG tablet, TAKE 1 TABLET BY MOUTH EVERY DAY, Disp: , Rfl:      chlorthalidone (HYGROTON) 25 MG tablet, Take 25 mg by mouth daily, Disp: , Rfl: 1     clonazePAM (KLONOPIN) 0.5 MG tablet, Take 1 tablet by mouth 3 times daily if needed for Anxiety., Disp: , Rfl:      cyclobenzaprine (FLEXERIL) 10 MG tablet, Take 10 mg by mouth, Disp: , Rfl:      DULoxetine (CYMBALTA) 30 MG capsule, TAKE 1 CAPSULE BY MOUTH ONCE DAILY IN THE MORNING., Disp: , Rfl: 0     erythromycin (ROMYCIN) 5 MG/GM ophthalmic ointment, , Disp: , Rfl: 1     EST ESTROGENS-METHYLTEST HS 0.625-1.25 MG per tablet, Take 1 tablet by mouth daily, Disp: , Rfl: 3     estradiol (ESTRACE) 0.5 MG tablet, TAKE 1 TABLET BY MOUTH ONCE DAILY., Disp: , Rfl: 2     estradiol (YUVAFEM) 10 MCG TABS vaginal tablet, INSERT 1 TABLET INTO THE VAGINA EVERY MONDAY AND THURSDAY., Disp: , Rfl:      HYDROcodone-acetaminophen (NORCO) 5-325 MG tablet, , Disp: , Rfl: 0     IBUPROFEN PO, Take 200 mg by mouth, Disp: , Rfl:      LORazepam (ATIVAN) 1  "MG tablet, TAKE ONE TABLET BY MOUTH ONCE DAILY AS NEEDED FOR ANXIETY/PANIC. MAX 1 TAB PER 24 HOURS., Disp: , Rfl: 0     losartan (COZAAR) 50 MG tablet, Take 50 mg by mouth daily, Disp: , Rfl: 3     magnesium 250 MG tablet, Take 1 tablet by mouth daily, Disp: , Rfl:      methocarbamol (ROBAXIN) 750 MG tablet, , Disp: , Rfl:      RELPAX 40 MG tablet, , Disp: , Rfl: 2     sertraline (ZOLOFT) 50 MG tablet, Take 50 mg by mouth, Disp: , Rfl:      traZODone (DESYREL) 50 MG tablet, TAKE 1 TO 1 & 1/2 TABLETS BY MOUTH EVERY NIGHT AS NEEDED FOR SLEEP., Disp: , Rfl: 1     tretinoin (RETIN-A) 0.025 % cream, Use every night (Patient not taking: Reported on 8/5/2020), Disp: 45 g, Rfl: 3     tretinoin (RETIN-A) 0.025 % external cream, Use every night (Patient not taking: Reported on 8/5/2020), Disp: 45 g, Rfl: 3     TRINTELLIX 10 MG tablet, TAKE 10 MG BY MOUTH ONCE DAILY. DISCONTINUE SERTRALINE., Disp: , Rfl: 1    Current Facility-Administered Medications:      botulinum toxin type A (BOTOX) 100 units injection 400 Units, 400 Units, Intramuscular, Q90 Days, Standal, Simi Mccloud MD      No Known Allergies     PHYSICAL EXAM  Left laterollis with mild right rotation  Right shoulder elevation  Bilateral masseter hypertrophy    HPI    Patient denies new medical diagnoses, illnesses, hospitalizations, emergency room visits, and injuries since the previous injection with botulinum neurotoxin. She does note chronic \"ear ringing\" and \"electric\" feeling throughout her body.    RESPONSE TO PREVIOUS TREATMENT:    Patient Tia Pablo received 500 units of Botox on 4/30/20 at [facility] pain clinic.    Problems following the previous series of neurotoxin injections included:  No problems reported. Has never had issues with neck weakness in the past    Benefits by Patient Report:    Pain Improvement:  Yes  % Pain Improvement 90 %  Duration of Benefit:  7 weeks.    Dystonia and Spasticity Improvement:  Yes  % Dystonia Improvement: " Unclear  Duration of Benefit:  7 weeks.    BOTULINUM NEUROTOXIN INJECTION PROCEDURES:    VERIFICATION OF PATIENT IDENTIFICATION AND PROCEDURE     Initials   Patient Name pg   Patient  pg   Procedure Verified by: pg     Prior to the start of the procedure and with procedural staff participation, I verbally confirmed the patient s identity using two indicators, relevant allergies, that the procedure was appropriate and matched the consent or emergent situation, and that the correct equipment/implants were available. Immediately prior to starting the procedure I conducted the Time Out with the procedural staff and re-confirmed the patient s name, procedure, and site/side. (The Joint Commission universal protocol was followed.)  Yes    Sedation (Moderate or Deep): None    Above assessments performed by:  MARTA Prieto, Resident MD          The attending provider was present for the entire procedure documented below.    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    Her baseline symptoms have been recalcitrant to oral medications and conservative therapy.  She is here today for reinjection with Botox.    GOAL OF PROCEDURE:  The goal of this procedure is to improve increase active range of motion and decrease pain  associated with dystonic movements and spasticity.    TOTAL DOSE: 300 UNITS BOTOX  Dose Administered:  275 UNITS BOTOX   175 units Botox (Botulinum Toxin Type A)       2:1 Dilution    100 units Botox (Botulinum Toxin Type A)       1:1 Dilution     Diluent Used:  Preservative Free Normal Saline  Total Volume of Diluent Used:  4.5 ml  Lot # /C3 with Expiration Date:  2023    Was there drug waste?  Yes  Amount of drug waste (units): 25 units Botox.  Reason for waste:  Single use vial.     CONSENT:  The risks, benefits, and treatment options were discussed with Betty Iverson and she agreed to proceed.      Written consent was obtained by pg.     EQUIPMENT USED:  Needle-37mm stimulating/recording  EMG/NCS Machine    SKIN PREPARATION:  Skin preparation was performed using chloroprep.    GUIDANCE DESCRIPTION:  Electro-myographic guidance was necessary throughout the procedure to accurately identify all areas of dystonic and spastic muscles while avoiding injection of non-dystonic muscles and non-spastic muscles, neighboring nerves and nearby vascular structures.     AREA/MUSCLE INJECTED: 275 UNITS BOTOX = TOTAL DOSE    1. Posterior Head, Neck Muscles: 145 units of Botox, 2:1 dilution    Left rhomboid - 15 units of Botox at 3 site/s.     Right middle trapezius - 5 units of Botox at 1 site/s.   Left middle trapezius - 10 units of Botox at 1 site/s.     Left lateral trapezius - 25 units of Botox at 5 site/s.     Right splenius - 5 units of Botox at 1 site/s.   Left splenius - 20 units of Botox at 2 site/s.     Right Auricularis - 15 units of Botox at 3 site/s.   Left Auricularis - 15 units of Botox at 3 site/s.     Right occipitalis - 20 units of Botox at 3 site/s.   Left occipitalis - 20 units of Botox at 3 site/s.       2. Extremity Muscles: 30 units of Botox, 1:1 dilution    Right levator scapula - 15 units of Botox at 1 site/s.   Left levator scapula - 15 units of Botox at 1 site/s.     3. Jaw Muscles: 100 units of Botox, 1:1 dilution    Right masseter - 30 units of Botox at 1 site/s.   Left masseter - 30 units of Botox at 1 site/s.    Right temporalis - 20 units of Botox at 1 site/s.   Left temporalis - 20 units of Botox at 1 site/s.    RESPONSE TO PROCEDURE:  Tia Pablo tolerated the procedure well. She briefly got lightheaded midway through the procedure and recovered after laying down. She again got  lightheaded, so the remainder of the procedure was completed with patient lying on her side.  She was allowed to recover for an appropriate period of time and was discharged home in stable condition.     There was a small hematoma left masseter which was stabilized.     Follow Up  Tia Pablo was asked to follow up by phone in 7-14 days with PM&R care coordinators, to report her response to this series of injections.  Based on the patient's previous response to this therapy, Tia Pablo was rescheduled for the next series of injections in 12 weeks.    Referral placed to Rheumatology as she had multiple joint complaints at today's visit including pain in her 1st CMC joints. She has a family history of autoimmune disorders and perhaps inflammatory arthritis.       Again, thank you for allowing me to participate in the care of your patient.      Sincerely,    Simi Vazquez MD

## 2020-10-02 NOTE — PROGRESS NOTES
PM&R PROCEDURE NOTE    There were no vitals taken for this visit.       Current Outpatient Medications:      AFLURIA QUADRIVALENT 0.5 ML injection, TO BE ADMINISTERED BY PHARMACIST FOR IMMUNIZATION, Disp: , Rfl: 0     amoxicillin-clavulanate (AUGMENTIN) 875-125 MG tablet, TAKE 1 TABLET (875 MG) BY MOUTH TWICE A DAY., Disp: , Rfl: 0     azithromycin (ZITHROMAX) 500 MG tablet, TAKE 1 TABLET BY MOUTH EVERY DAY FOR 3 DAYS, Disp: , Rfl: 0     benzonatate (TESSALON) 100 MG capsule, TAKE 1 CAPSULE BY MOUTH THREE TIMES A DAY AS NEEDED *DO NOT BREAK CHEW, DISSOLVE, CUT, OR CRUSH*, Disp: , Rfl: 0     chlorthalidone (HYGROTON) 25 MG tablet, TAKE 1 TABLET BY MOUTH EVERY DAY, Disp: , Rfl:      chlorthalidone (HYGROTON) 25 MG tablet, Take 25 mg by mouth daily, Disp: , Rfl: 1     clonazePAM (KLONOPIN) 0.5 MG tablet, Take 1 tablet by mouth 3 times daily if needed for Anxiety., Disp: , Rfl:      cyclobenzaprine (FLEXERIL) 10 MG tablet, Take 10 mg by mouth, Disp: , Rfl:      DULoxetine (CYMBALTA) 30 MG capsule, TAKE 1 CAPSULE BY MOUTH ONCE DAILY IN THE MORNING., Disp: , Rfl: 0     erythromycin (ROMYCIN) 5 MG/GM ophthalmic ointment, , Disp: , Rfl: 1     EST ESTROGENS-METHYLTEST HS 0.625-1.25 MG per tablet, Take 1 tablet by mouth daily, Disp: , Rfl: 3     estradiol (ESTRACE) 0.5 MG tablet, TAKE 1 TABLET BY MOUTH ONCE DAILY., Disp: , Rfl: 2     estradiol (YUVAFEM) 10 MCG TABS vaginal tablet, INSERT 1 TABLET INTO THE VAGINA EVERY MONDAY AND THURSDAY., Disp: , Rfl:      HYDROcodone-acetaminophen (NORCO) 5-325 MG tablet, , Disp: , Rfl: 0     IBUPROFEN PO, Take 200 mg by mouth, Disp: , Rfl:      LORazepam (ATIVAN) 1 MG tablet, TAKE ONE TABLET BY MOUTH ONCE DAILY AS NEEDED FOR ANXIETY/PANIC. MAX 1 TAB PER 24 HOURS., Disp: , Rfl: 0     losartan (COZAAR) 50 MG tablet, Take 50 mg by mouth daily, Disp: , Rfl: 3     magnesium 250 MG tablet, Take 1 tablet by mouth daily, Disp: , Rfl:      methocarbamol (ROBAXIN) 750 MG tablet, , Disp: , Rfl:       "RELPAX 40 MG tablet, , Disp: , Rfl: 2     sertraline (ZOLOFT) 50 MG tablet, Take 50 mg by mouth, Disp: , Rfl:      traZODone (DESYREL) 50 MG tablet, TAKE 1 TO 1 & 1/2 TABLETS BY MOUTH EVERY NIGHT AS NEEDED FOR SLEEP., Disp: , Rfl: 1     tretinoin (RETIN-A) 0.025 % cream, Use every night (Patient not taking: Reported on 2020), Disp: 45 g, Rfl: 3     tretinoin (RETIN-A) 0.025 % external cream, Use every night (Patient not taking: Reported on 2020), Disp: 45 g, Rfl: 3     TRINTELLIX 10 MG tablet, TAKE 10 MG BY MOUTH ONCE DAILY. DISCONTINUE SERTRALINE., Disp: , Rfl: 1    Current Facility-Administered Medications:      botulinum toxin type A (BOTOX) 100 units injection 400 Units, 400 Units, Intramuscular, Q90 Days, Standal, Simi Mccloud MD      No Known Allergies     PHYSICAL EXAM  Left laterollis with mild right rotation  Right shoulder elevation  Bilateral masseter hypertrophy    HPI    Patient denies new medical diagnoses, illnesses, hospitalizations, emergency room visits, and injuries since the previous injection with botulinum neurotoxin. She does note chronic \"ear ringing\" and \"electric\" feeling throughout her body.    RESPONSE TO PREVIOUS TREATMENT:    Patient Tia Pablo received 500 units of Botox on 20 at [facility] pain clinic.    Problems following the previous series of neurotoxin injections included:  No problems reported. Has never had issues with neck weakness in the past    Benefits by Patient Report:    Pain Improvement:  Yes  % Pain Improvement 90 %  Duration of Benefit:  7 weeks.    Dystonia and Spasticity Improvement:  Yes  % Dystonia Improvement: Unclear  Duration of Benefit:  7 weeks.    BOTULINUM NEUROTOXIN INJECTION PROCEDURES:    VERIFICATION OF PATIENT IDENTIFICATION AND PROCEDURE     Initials   Patient Name jmb   Patient  jmb   Procedure Verified by: becky     Prior to the start of the procedure and with procedural staff participation, I verbally confirmed the patient s " identity using two indicators, relevant allergies, that the procedure was appropriate and matched the consent or emergent situation, and that the correct equipment/implants were available. Immediately prior to starting the procedure I conducted the Time Out with the procedural staff and re-confirmed the patient s name, procedure, and site/side. (The Joint Commission universal protocol was followed.)  Yes    Sedation (Moderate or Deep): None    Above assessments performed by:  Farzad Crawford, Resident CAIN          The attending provider was present for the entire procedure documented below.    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    Her baseline symptoms have been recalcitrant to oral medications and conservative therapy.  She is here today for reinjection with Botox.    GOAL OF PROCEDURE:  The goal of this procedure is to improve increase active range of motion and decrease pain  associated with dystonic movements and spasticity.    TOTAL DOSE: 300 UNITS BOTOX  Dose Administered:  275 UNITS BOTOX   175 units Botox (Botulinum Toxin Type A)       2:1 Dilution    100 units Botox (Botulinum Toxin Type A)       1:1 Dilution     Diluent Used:  Preservative Free Normal Saline  Total Volume of Diluent Used:  4.5 ml  Lot # /C3 with Expiration Date:  06/2023    Was there drug waste? Yes  Amount of drug waste (units): 25 units Botox.  Reason for waste:  Single use vial.     CONSENT:  The risks, benefits, and treatment options were discussed with Betty Iverson and she agreed to proceed.      Written consent was obtained by becky.     EQUIPMENT USED:  Needle-37mm stimulating/recording  EMG/NCS Machine    SKIN PREPARATION:  Skin  preparation was performed using chloroprep.    GUIDANCE DESCRIPTION:  Electro-myographic guidance was necessary throughout the procedure to accurately identify all areas of dystonic and spastic muscles while avoiding injection of non-dystonic muscles and non-spastic muscles, neighboring nerves and nearby vascular structures.     AREA/MUSCLE INJECTED: 275 UNITS BOTOX = TOTAL DOSE    1. Posterior Head, Neck Muscles: 145 units of Botox, 2:1 dilution    Left rhomboid - 15 units of Botox at 3 site/s.     Right middle trapezius - 5 units of Botox at 1 site/s.   Left middle trapezius - 10 units of Botox at 1 site/s.     Left lateral trapezius - 25 units of Botox at 5 site/s.     Right splenius - 5 units of Botox at 1 site/s.   Left splenius - 20 units of Botox at 2 site/s.     Right Auricularis - 15 units of Botox at 3 site/s.   Left Auricularis - 15 units of Botox at 3 site/s.     Right occipitalis - 20 units of Botox at 3 site/s.   Left occipitalis - 20 units of Botox at 3 site/s.       2. Extremity Muscles: 30 units of Botox, 1:1 dilution    Right levator scapula - 15 units of Botox at 1 site/s.   Left levator scapula - 15 units of Botox at 1 site/s.     3. Jaw Muscles: 100 units of Botox, 1:1 dilution    Right masseter - 30 units of Botox at 1 site/s.   Left masseter - 30 units of Botox at 1 site/s.    Right temporalis - 20 units of Botox at 1 site/s.   Left temporalis - 20 units of Botox at 1 site/s.    RESPONSE TO PROCEDURE:  Tia Pablo tolerated the procedure well. She briefly got lightheaded midway through the procedure and recovered after laying down. She again got lightheaded, so the remainder of the procedure was completed with patient lying on her side.  She was allowed to recover for an appropriate period of time and was discharged home in stable condition.     There was a small hematoma left masseter which was stabilized.     Follow Up  Tia Pablo was asked to follow up by phone in 7-14 days with PM&R care  coordinators, to report her response to this series of injections.  Based on the patient's previous response to this therapy, Tia Pablo was rescheduled for the next series of injections in 12 weeks.    Referral placed to Rheumatology as she had multiple joint complaints at today's visit including pain in her 1st CMC joints. She has a family history of autoimmune disorders and perhaps inflammatory arthritis.

## 2020-10-15 ENCOUNTER — TELEPHONE (OUTPATIENT)
Dept: RHEUMATOLOGY | Facility: CLINIC | Age: 55
End: 2020-10-15

## 2020-10-15 NOTE — LETTER
[facility]  [address], [address]                        [place] [address]  Ph: [phone]                     Fax: [phone]       Date: October 19, 2020  Name: Tia Pablo  YOB: 1965  MRN: [id number]     Dear Referring Provider,  Thank you for the referral for Tia Pablo, 1965. After careful review by the Rheumatologist, your patient does not meet the criteria for an appointment. We encourage you to refer your patient to one of our community sites:    [facility]    Provider of your choice  Thank you for your support and understanding.    Sincerely,  The Division of Arthritis and Autoimmune Disorders

## 2020-10-15 NOTE — TELEPHONE ENCOUNTER
[facility] Call Center    Phone Message    May a detailed message be left on voicemail: yes     Reason for Call: Other: The pt is being ref'd here by an internal MD. She had medical info faxed in 2019 from Dr Oconnor and [facility]. Please call the pt with any questions. Thanks.     Action Taken: Message routed to:  [facility]    Travel Screening: Not Applicable

## 2020-10-19 NOTE — TELEPHONE ENCOUNTER
Patient is referred by Simi Vazquez MD for joint pain    Patient complains of: joint pain    Per referring upon exam:    NA    Has patient had imaging that pertains to referral? No    Has patient had labs that pertains to referral reason: No    Clinical review of patient's chart has been performed. Patient does not meet the criteria for an appointment.  A letter has been sent the referring encouraging them to send the patient to one of our community sites. Patient needs to follow up with their referring or PCP for further direction.       Minerva Coyne CMA   10/19/2020 2:43 PM

## 2020-10-23 ENCOUNTER — OFFICE VISIT (OUTPATIENT)
Dept: DERMATOLOGY | Facility: CLINIC | Age: 55
End: 2020-10-23
Payer: COMMERCIAL

## 2020-10-23 VITALS
HEART RATE: 73 BPM | OXYGEN SATURATION: 97 % | DIASTOLIC BLOOD PRESSURE: 78 MMHG | SYSTOLIC BLOOD PRESSURE: 152 MMHG | RESPIRATION RATE: 16 BRPM

## 2020-10-23 DIAGNOSIS — D48.5 NEOPLASM OF UNCERTAIN BEHAVIOR OF SKIN: Primary | ICD-10-CM

## 2020-10-23 DIAGNOSIS — L82.1 SEBORRHEIC KERATOSIS: ICD-10-CM

## 2020-10-23 DIAGNOSIS — D18.01 CHERRY ANGIOMA: ICD-10-CM

## 2020-10-23 DIAGNOSIS — L73.8 SENILE SEBACEOUS GLAND HYPERPLASIA: ICD-10-CM

## 2020-10-23 DIAGNOSIS — L81.4 LENTIGO: ICD-10-CM

## 2020-10-23 DIAGNOSIS — L82.0 INFLAMED SEBORRHEIC KERATOSIS: ICD-10-CM

## 2020-10-23 PROCEDURE — 11103 TANGNTL BX SKIN EA SEP/ADDL: CPT | Mod: 59 | Performed by: PHYSICIAN ASSISTANT

## 2020-10-23 PROCEDURE — 99203 OFFICE O/P NEW LOW 30 MIN: CPT | Mod: 25 | Performed by: PHYSICIAN ASSISTANT

## 2020-10-23 PROCEDURE — 88305 TISSUE EXAM BY PATHOLOGIST: CPT | Performed by: PATHOLOGY

## 2020-10-23 PROCEDURE — 17110 DESTRUCTION B9 LES UP TO 14: CPT | Performed by: PHYSICIAN ASSISTANT

## 2020-10-23 PROCEDURE — 11102 TANGNTL BX SKIN SINGLE LES: CPT | Mod: 59 | Performed by: PHYSICIAN ASSISTANT

## 2020-10-23 RX ORDER — TRETINOIN 0.25 MG/G
CREAM TOPICAL
Qty: 45 G | Refills: 3 | Status: SHIPPED | OUTPATIENT
Start: 2020-10-23 | End: 2021-06-16

## 2020-10-23 NOTE — PROGRESS NOTES
Tia Pablo is a 54 year old year old female patient here today for evaluation of moles on body. She notes that she has a few areas that will get irritation on her body. She denies any pain or bleeding. No history of blistering sunburns. She did do some tanning bed usage as a teenager.  Patient has no other skin complaints today.  Remainder of the HPI, Meds, PMH, Allergies, FH, and SH was reviewed in chart.    Pertinent Hx: history of AKs. Family history of NMSC  No past medical history on file.    No past surgical history on file.     Family History   Problem Relation Age of Onset     Hypertension Mother      Hypertension Father      Skin Cancer Father      Melanoma No family hx of        Social History     Socioeconomic History     Marital status:      Spouse name: Not on file     Number of children: Not on file     Years of education: Not on file     Highest education level: Not on file   Occupational History     Not on file   Social Needs     Financial resource strain: Not on file     Food insecurity     Worry: Not on file     Inability: Not on file     Transportation needs     Medical: Not on file     Non-medical: Not on file   Tobacco Use     Smoking status: Never Smoker     Smokeless tobacco: Never Used   Substance and Sexual Activity     Alcohol use: Yes     Comment: Occasionally     Drug use: No     Sexual activity: Not on file   Lifestyle     Physical activity     Days per week: Not on file     Minutes per session: Not on file     Stress: Not on file   Relationships     Social connections     Talks on phone: Not on file     Gets together: Not on file     Attends Synagogue service: Not on file     Active member of club or organization: Not on file     Attends meetings of clubs or organizations: Not on file     Relationship status: Not on file     Intimate partner violence     Fear of current or ex partner: Not on file     Emotionally abused: Not on file     Physically abused: Not on file     Forced  sexual activity: Not on file   Other Topics Concern     Parent/sibling w/ CABG, MI or angioplasty before 65F 55M? Not Asked   Social History Narrative     Not on file       Outpatient Encounter Medications as of 10/23/2020   Medication Sig Dispense Refill     chlorthalidone (HYGROTON) 25 MG tablet TAKE 1 TABLET BY MOUTH EVERY DAY       DULoxetine (CYMBALTA) 30 MG capsule TAKE 1 CAPSULE BY MOUTH ONCE DAILY IN THE MORNING.  0     estradiol (ESTRACE) 0.5 MG tablet TAKE 1 TABLET BY MOUTH ONCE DAILY.  2     IBUPROFEN PO Take 200 mg by mouth       losartan (COZAAR) 50 MG tablet Take 50 mg by mouth daily  3     magnesium 250 MG tablet Take 1 tablet by mouth daily       RELPAX 40 MG tablet   2     AFLURIA QUADRIVALENT 0.5 ML injection TO BE ADMINISTERED BY PHARMACIST FOR IMMUNIZATION  0     amoxicillin-clavulanate (AUGMENTIN) 875-125 MG tablet TAKE 1 TABLET (875 MG) BY MOUTH TWICE A DAY.  0     azithromycin (ZITHROMAX) 500 MG tablet TAKE 1 TABLET BY MOUTH EVERY DAY FOR 3 DAYS  0     benzonatate (TESSALON) 100 MG capsule TAKE 1 CAPSULE BY MOUTH THREE TIMES A DAY AS NEEDED *DO NOT BREAK CHEW, DISSOLVE, CUT, OR CRUSH*  0     chlorthalidone (HYGROTON) 25 MG tablet Take 25 mg by mouth daily  1     clonazePAM (KLONOPIN) 0.5 MG tablet Take 1 tablet by mouth 3 times daily if needed for Anxiety.       cyclobenzaprine (FLEXERIL) 10 MG tablet Take 10 mg by mouth       erythromycin (ROMYCIN) 5 MG/GM ophthalmic ointment   1     EST ESTROGENS-METHYLTEST HS 0.625-1.25 MG per tablet Take 1 tablet by mouth daily  3     estradiol (YUVAFEM) 10 MCG TABS vaginal tablet INSERT 1 TABLET INTO THE VAGINA EVERY MONDAY AND THURSDAY.       HYDROcodone-acetaminophen (NORCO) 5-325 MG tablet   0     LORazepam (ATIVAN) 1 MG tablet TAKE ONE TABLET BY MOUTH ONCE DAILY AS NEEDED FOR ANXIETY/PANIC. MAX 1 TAB PER 24 HOURS.  0     methocarbamol (ROBAXIN) 750 MG tablet        sertraline (ZOLOFT) 50 MG tablet Take 50 mg by mouth       traZODone (DESYREL) 50 MG  tablet TAKE 1 TO 1 & 1/2 TABLETS BY MOUTH EVERY NIGHT AS NEEDED FOR SLEEP.  1     tretinoin (RETIN-A) 0.025 % cream Use every night (Patient not taking: Reported on 10/23/2020) 45 g 3     tretinoin (RETIN-A) 0.025 % external cream Use every night (Patient not taking: Reported on 10/23/2020) 45 g 3     TRINTELLIX 10 MG tablet TAKE 10 MG BY MOUTH ONCE DAILY. DISCONTINUE SERTRALINE.  1     Facility-Administered Encounter Medications as of 10/23/2020   Medication Dose Route Frequency Provider Last Rate Last Dose     botulinum toxin type A (BOTOX) 100 units injection 400 Units  400 Units Intramuscular Q90 Days Simi Vazquez MD                 Review Of Systems  Skin: As above  Eyes: negative  Ears/Nose/Throat: negative  Respiratory: No shortness of breath, dyspnea on exertion, cough, or hemoptysis  Cardiovascular: negative  Gastrointestinal: negative  Genitourinary: negative  Musculoskeletal: negative  Neurologic: negative  Psychiatric: negative  Hematologic/Lymphatic/Immunologic: negative  Endocrine: negative      O:   NAD, WDWN, Alert & Oriented, Mood & Affect wnl, Vitals stable   Here today alone   BP (!) 152/78 (BP Location: Left arm, Patient Position: Sitting, Cuff Size: Adult Regular)   Pulse 73   Resp 16   SpO2 97%    General appearance normal   Vitals stable   Alert, oriented and in no acute distress      0.6 cm Red papule on right occipital scalp   0.5 cm Red papule on right mid back   Yellow lobulated papules on face    Stuck on papules and brown macules on trunk and ext   Red papules on trunk  Brown papules and macules with regular pigment network and borders     The remainder of skin exam is normal       Eyes: Conjunctivae/lids:Normal     ENT: Lips: normal    MSK:Normal    Cardiovascular: peripheral edema none    Pulm: Breathing Normal    Neuro/Psych: Orientation:Alert and Orientedx3 ; Mood/Affect:normal   A/P:  1. R/O angoima on right occipital scalp, and right mid back   TANGENTIAL BIOPSY SENT  OUT:  After consent, anesthesia with LEC and prep, tangential excision performed and specimen sent out for permanent section histology.  No complications and routine wound care. Patient told to call our office in 1-2 weeks for result.      2. Inflamed seborrheic keratosis on neck, sternum, left thigh, and left knee  LN2:  Treated with LN2 for 5s for 1-2 cycles. Warned risks of blistering, pain, pigment change, scarring, and incomplete resolution.  Advised patient to return if lesions do not completely resolve.  Wound care sheet given.  3. Sebaceous hyperplasia  Continue tretinoin.   4. Seborrheic keratosis, lentigo, angioma, benign nevi   BENIGN LESIONS DISCUSSED WITH PATIENT:  I discussed the specifics of tumor, prognosis, and genetics of benign lesions.  I explained that treatment of these lesions would be purely cosmetic and not medically neccessary.  I discussed with patient different removal options including excision, cautery and /or laser.      Nature and genetics of benign skin lesions dicussed with patient.  Signs and Symptoms of skin cancer discussed with patient.  ABCDEs of melanoma reviewed with patient.  Patient encouraged to perform monthly skin exams.  UV precautions reviewed with patient.   Risks of non-melanoma skin cancer discussed with patient   Return to clinic pending biopsy results.   Tia to follow up with Primary Care provider regarding elevated blood pressure.

## 2020-10-23 NOTE — NURSING NOTE
"Initial BP (!) 152/78 (BP Location: Left arm, Patient Position: Sitting, Cuff Size: Adult Regular)   Pulse 73   Resp 16   SpO2 97%  Estimated body mass index is 24.95 kg/m  as calculated from the following:    Height as of 11/18/16: 1.676 m (5' 6\").    Weight as of 11/18/16: 70.1 kg (154 lb 9.6 oz). .  Milli Escalante, [facility]    "

## 2020-10-23 NOTE — PATIENT INSTRUCTIONS
Wound Care Instructions     FOR SUPERFICIAL WOUNDS     [facility] [phone]    [facility] [phone]                       AFTER 24 HOURS YOU SHOULD REMOVE THE BANDAGE AND BEGIN DAILY DRESSING CHANGES AS FOLLOWS:     1) Remove Dressing.     2) Clean and dry the area with tap water using a Q-tip or sterile gauze pad.     3) Apply Vaseline, Aquaphor, Polysporin ointment or Bacitracin ointment over entire wound.  Do NOT use Neosporin ointment.     4) Cover the wound with a band-aid, or a sterile non-stick gauze pad and micropore paper tape      REPEAT THESE INSTRUCTIONS AT LEAST ONCE A DAY UNTIL THE WOUND HAS COMPLETELY HEALED.    It is an old wives tale that a wound heals better when it is exposed to air and allowed to dry out. The wound will heal faster with a better cosmetic result if it is kept moist with ointment and covered with a bandage.    **Do not let the wound dry out.**      Supplies Needed:      *Cotton tipped applicators (Q-tips)    *Polysporin Ointment or Bacitracin Ointment (NOT NEOSPORIN)    *Band-aids or non-stick gauze pads and micropore paper tape.      PATIENT INFORMATION:    During the healing process you will notice a number of changes. All wounds develop a small halo of redness surrounding the wound.  This means healing is occurring. Severe itching with extensive redness usually indicates sensitivity to the ointment or bandage tape used to dress the wound.  You should call our office if this develops.      Swelling  and/or discoloration around your surgical site is common, particularly when performed around the eye.    All wounds normally drain.  The larger the wound the more drainage there will be.  After 7-10 days, you will notice the wound beginning to shrink and new skin will begin to grow.  The wound is healed when you can see skin has formed over the entire area.  A healed wound has a healthy, shiny look to the surface and is red to dark pink in color  to normalize.  Wounds may take approximately 4-6 weeks to heal.  Larger wounds may take 6-8 weeks.  After the wound is healed you may discontinue dressing changes.    You may experience a sensation of tightness as your wound heals. This is normal and will gradually subside.    Your healed wound may be sensitive to temperature changes. This sensitivity improves with time, but if you re having a lot of discomfort, try to avoid temperature extremes.    Patients frequently experience itching after their wound appears to have healed because of the continue healing under the skin.  Plain Vaseline will help relieve the itching.        POSSIBLE COMPLICATIONS    BLEEDIN. Leave the bandage in place.  2. Use tightly rolled up gauze or a cloth to apply direct pressure over the bandage for 30  minutes.  3. Reapply pressure for an additional 30 minutes if necessary  4. Use additional gauze and tape to maintain pressure once the bleeding has stopped.    WOUND CARE INSTRUCTIONS   FOR CRYOSURGERY   This area treated with liquid nitrogen should form a blister (areas treated may or may not blister-skin may just turn dark and slough off). You do not need to bandage the area unless a blister forms and breaks (which may be a few days). When the blister breaks, begin daily dressing changes as follows:  1) Clean and dry the area with tap water using clean Q-tip or sterile gauze pad.   2) Apply Polysporin ointment or Bacitracin ointment over entire wound. Do NOT use Neosporin ointment.   3) Cover the wound with a band-aid or sterile non-stick gauze pad and micropore paper tape.   REPEAT THESE INSTRUCTIONS AT LEAST ONCE A DAY UNTIL THE WOUND HAS COMPLETELY HEALED.   It is an old wives tale that a wound heals better when it is exposed to air and allowed to dry out. The wound will heal faster with a better cosmetic result if it is kept moist with ointment and covered with a bandage.   Do not let the wound dry out.   IMPORTANT  INFORMATION ON REVERSE SIDE   Supplies Needed:   *Cotton tipped applicators (Q-tips)   *Polysporin ointment or Bacitracin ointment (NOT NEOSPORIN)   *Band-aids, or non stick gauze pads and micropore paper tape   PATIENT INFORMATION   During the healing process you will notice a number of changes. All wounds develop a small halo of redness surrounding the wound. This means healing is occurring. Severe itching with extensive redness usually indicates sensitivity to the ointment or bandage tape used to dress the wound. You should call our office if this develops.   Swelling and/or discoloration around your surgical site is common, particularly when performed around the eye.   All wounds normally drain. The larger the wound the more drainage there will be. After 7-10 days, you will notice the wound beginning to shrink and new skin will begin to grow. The wound is healed when you can see skin has formed over the entire area. A healed wound has a healthy, shiny look to the surface and is red to dark pink in color to normalize. Wounds may take approximately 4-6 weeks to heal. Larger wounds may take 6-8 weeks. After the wound is healed you may discontinue dressing changes.   You may experience a sensation of tightness as your wound heals. This is normal and will gradually subside.   Your healed wound may be sensitive to temperature changes. This sensitivity improves with time, but if you re having a lot of discomfort, try to avoid temperature extremes.   Patients frequently experience itching after their wound appears to have healed because of the continue healing under the skin. Plain Vaseline will help relieve the itching.

## 2020-10-23 NOTE — LETTER
10/23/2020         RE: Tia Pablo  [address] [facility] [id number]        Dear Colleague,    Thank you for referring your patient, Tia Pablo, to the [facility]. Please see a copy of my visit note below.    Tia Pablo is a 54 year old year old female patient here today for evaluation of moles on body. She notes that she has a few areas that will get irritation on her body. She denies any pain or bleeding. No history of blistering sunburns. She did do some tanning bed usage as a teenager.  Patient has no other skin complaints today.  Remainder of the HPI, Meds, PMH, Allergies, FH, and SH was reviewed in chart.    Pertinent Hx: history of AKs. Family history of NMSC  No past medical history on file.    No past surgical history on file.     Family History   Problem Relation Age of Onset     Hypertension Mother      Hypertension Father      Skin Cancer Father      Melanoma No family hx of        Social History     Socioeconomic History     Marital status:      Spouse name: Not on file     Number of children: Not on file     Years of education: Not on file     Highest education level: Not on file   Occupational History     Not on file   Social Needs     Financial resource strain: Not on file     Food insecurity     Worry: Not on file     Inability: Not on file     Transportation needs     Medical: Not on file     Non-medical: Not on file   Tobacco Use     Smoking status: Never Smoker     Smokeless tobacco: Never Used   Substance and Sexual Activity     Alcohol use: Yes     Comment: Occasionally     Drug use: No     Sexual activity: Not on file   Lifestyle     Physical activity     Days per week: Not on file     Minutes per session: Not on file     Stress: Not on file   Relationships     Social connections     Talks on phone: Not on file     Gets together: Not on file     Attends [employer] service: Not on file     Active member of club or organization: Not on file      Attends meetings of clubs or organizations: Not on file     Relationship status: Not on file     Intimate partner violence     Fear of current or ex partner: Not on file     Emotionally abused: Not on file     Physically abused: Not on file     Forced sexual activity: Not on file   Other Topics Concern     Parent/sibling w/ CABG, MI or angioplasty before 65F 55M? Not Asked   Social History Narrative     Not on file       Outpatient Encounter Medications as of 10/23/2020   Medication Sig Dispense Refill     chlorthalidone (HYGROTON) 25 MG tablet TAKE 1 TABLET BY MOUTH EVERY DAY       DULoxetine (CYMBALTA) 30 MG capsule TAKE 1 CAPSULE BY MOUTH ONCE DAILY IN THE MORNING.  0     estradiol (ESTRACE) 0.5 MG tablet TAKE 1 TABLET BY MOUTH ONCE DAILY.  2     IBUPROFEN PO Take 200 mg by mouth       losartan (COZAAR) 50 MG tablet Take 50 mg by mouth daily  3     magnesium 250 MG tablet Take 1 tablet by mouth daily       RELPAX 40 MG tablet   2     AFLURIA QUADRIVALENT 0.5 ML injection TO BE ADMINISTERED BY PHARMACIST FOR IMMUNIZATION  0     amoxicillin-clavulanate (AUGMENTIN) 875-125 MG tablet TAKE 1 TABLET (875 MG) BY MOUTH TWICE A DAY.  0     azithromycin (ZITHROMAX) 500 MG tablet TAKE 1 TABLET BY MOUTH EVERY DAY FOR 3 DAYS  0     benzonatate (TESSALON) 100 MG capsule TAKE 1 CAPSULE BY MOUTH THREE TIMES A DAY AS NEEDED *DO NOT BREAK CHEW, DISSOLVE, CUT, OR CRUSH*  0     chlorthalidone (HYGROTON) 25 MG tablet Take 25 mg by mouth daily  1     clonazePAM (KLONOPIN) 0.5 MG tablet Take 1 tablet by mouth 3 times daily if needed for Anxiety.       cyclobenzaprine (FLEXERIL) 10 MG tablet Take 10 mg by mouth       erythromycin (ROMYCIN) 5 MG/GM ophthalmic ointment   1     EST ESTROGENS-METHYLTEST HS 0.625-1.25 MG per tablet Take 1 tablet by mouth daily  3     estradiol (YUVAFEM) 10 MCG TABS vaginal tablet INSERT 1 TABLET INTO THE VAGINA EVERY MONDAY AND THURSDAY.       HYDROcodone-acetaminophen (NORCO) 5-325 MG tablet   0      LORazepam (ATIVAN) 1 MG tablet TAKE ONE TABLET BY MOUTH ONCE DAILY AS NEEDED FOR ANXIETY/PANIC. MAX 1 TAB PER 24 HOURS.  0     methocarbamol (ROBAXIN) 750 MG tablet        sertraline (ZOLOFT) 50 MG tablet Take 50 mg by mouth       traZODone (DESYREL) 50 MG tablet TAKE 1 TO 1 & 1/2 TABLETS BY MOUTH EVERY NIGHT AS NEEDED FOR SLEEP.  1     tretinoin (RETIN-A) 0.025 % cream Use every night (Patient not taking: Reported on 10/23/2020) 45 g 3     tretinoin (RETIN-A) 0.025 % external cream Use every night (Patient not taking: Reported on 10/23/2020) 45 g 3     TRINTELLIX 10 MG tablet TAKE 10 MG BY MOUTH ONCE DAILY. DISCONTINUE SERTRALINE.  1     Facility-Administered Encounter Medications as of 10/23/2020   Medication Dose Route Frequency Provider Last Rate Last Dose     botulinum toxin type A (BOTOX) 100 units injection 400 Units  400 Units Intramuscular Q90 Days Simi Vazquez MD                 Review Of Systems  Skin: As above  Eyes: negative  Ears/Nose/Throat: negative  Respiratory: No shortness of breath, dyspnea on exertion, cough, or hemoptysis  Cardiovascular: negative  Gastrointestinal: negative  Genitourinary: negative  Musculoskeletal: negative  Neurologic: negative  Psychiatric: negative  Hematologic/Lymphatic/Immunologic: negative  Endocrine: negative      O:   NAD, WDWN, Alert & Oriented, Mood & Affect wnl, Vitals stable   Here today alone   BP (!) 152/78 (BP Location: Left arm, Patient Position: Sitting, Cuff Size: Adult Regular)   Pulse 73   Resp 16   SpO2 97%    General appearance normal   Vitals stable   Alert, oriented and in no acute distress      0.6 cm Red papule on right occipital scalp   0.5 cm Red papule on right mid back   Yellow lobulated papules on face    Stuck on papules and brown macules on trunk and ext   Red papules on trunk  Brown papules and macules with regular pigment network and borders     The remainder of skin exam is normal       Eyes: Conjunctivae/lids:Normal     ENT:  Lips: normal    MSK:Normal    Cardiovascular: peripheral edema none    Pulm: Breathing Normal    Neuro/Psych: Orientation:Alert and Orientedx3 ; Mood/Affect:normal   A/P:  1. R/O angoima on right occipital scalp, and right mid back   TANGENTIAL BIOPSY SENT OUT:  After consent, anesthesia with LEC and prep, tangential excision performed and specimen sent out for permanent section histology.  No complications and routine wound care. Patient told to call our office in 1-2 weeks for result.      2. Inflamed seborrheic keratosis on neck, sternum, left thigh, and left knee  LN2:  Treated with LN2 for 5s for 1-2 cycles. Warned risks of blistering, pain, pigment change, scarring, and incomplete resolution.  Advised patient to return if lesions do not completely resolve.  Wound care sheet given.  3. Sebaceous hyperplasia  Continue tretinoin.   4. Seborrheic keratosis, lentigo, angioma, benign nevi   BENIGN LESIONS DISCUSSED WITH PATIENT:  I discussed the specifics of tumor, prognosis, and genetics of benign lesions.  I explained that treatment of these lesions would be purely cosmetic and not medically neccessary.  I discussed with patient different removal options including excision, cautery and /or laser.      Nature and genetics of benign skin lesions dicussed with patient.  Signs and Symptoms of skin cancer discussed with patient.  ABCDEs of melanoma reviewed with patient.  Patient encouraged to perform monthly skin exams.  UV precautions reviewed with patient.   Risks of non-melanoma skin cancer discussed with patient   Return to clinic pending biopsy results.   Tia to follow up with Primary Care provider regarding elevated blood pressure.          Again, thank you for allowing me to participate in the care of your patient.        Sincerely,        Queenie Zelaya PA-C

## 2020-10-27 LAB — COPATH REPORT: NORMAL

## 2020-11-04 NOTE — TELEPHONE ENCOUNTER
The pt called to see the status of the review. The review was done on 11.19 but the pt wasn't notified. I explained that the review was done and a letter was sent to Dr Vazquez. The pt will call her for info. Thanks.

## 2020-11-30 ENCOUNTER — ALLIED HEALTH/NURSE VISIT (OUTPATIENT)
Dept: PHYSICAL MEDICINE AND REHAB | Facility: CLINIC | Age: 55
End: 2020-11-30
Payer: COMMERCIAL

## 2020-11-30 VITALS
RESPIRATION RATE: 16 BRPM | DIASTOLIC BLOOD PRESSURE: 83 MMHG | TEMPERATURE: 97.8 F | BODY MASS INDEX: 26.52 KG/M2 | HEART RATE: 79 BPM | HEIGHT: 66 IN | OXYGEN SATURATION: 97 % | SYSTOLIC BLOOD PRESSURE: 131 MMHG | WEIGHT: 165 LBS

## 2020-11-30 DIAGNOSIS — M79.18 MYOFASCIAL PAIN: ICD-10-CM

## 2020-11-30 DIAGNOSIS — G24.1 GENETIC TORSION DYSTONIA: Primary | ICD-10-CM

## 2020-11-30 DIAGNOSIS — N31.9 BLADDER DYSFUNCTION: ICD-10-CM

## 2020-11-30 PROCEDURE — 99212 OFFICE O/P EST SF 10 MIN: CPT | Mod: GC | Performed by: PHYSICAL MEDICINE & REHABILITATION

## 2020-11-30 PROCEDURE — 20553 NJX 1/MLT TRIGGER POINTS 3/>: CPT | Mod: GC | Performed by: PHYSICAL MEDICINE & REHABILITATION

## 2020-11-30 RX ORDER — TRIAMCINOLONE ACETONIDE 40 MG/ML
40 INJECTION, SUSPENSION INTRA-ARTICULAR; INTRAMUSCULAR ONCE
Status: COMPLETED | OUTPATIENT
Start: 2020-11-30 | End: 2020-11-30

## 2020-11-30 RX ORDER — BUPIVACAINE HYDROCHLORIDE 5 MG/ML
6 INJECTION, SOLUTION EPIDURAL; INTRACAUDAL ONCE
Status: COMPLETED | OUTPATIENT
Start: 2020-11-30 | End: 2020-11-30

## 2020-11-30 RX ADMIN — BUPIVACAINE HYDROCHLORIDE 30 MG: 5 INJECTION, SOLUTION EPIDURAL; INTRACAUDAL at 20:39

## 2020-11-30 RX ADMIN — TRIAMCINOLONE ACETONIDE 40 MG: 40 INJECTION, SUSPENSION INTRA-ARTICULAR; INTRAMUSCULAR at 20:40

## 2020-11-30 ASSESSMENT — MIFFLIN-ST. JEOR: SCORE: 1360.19

## 2020-11-30 ASSESSMENT — PAIN SCALES - GENERAL: PAINLEVEL: SEVERE PAIN (7)

## 2020-11-30 NOTE — PROGRESS NOTES
"PM&R PROCEDURE NOTE    /83   Pulse 79   Temp 97.8  F (36.6  C)   Resp 16   Ht 1.676 m (5' 6\")   Wt 74.8 kg (165 lb)   SpO2 97%   BMI 26.63 kg/m         Current Outpatient Medications:      AFLURIA QUADRIVALENT 0.5 ML injection, TO BE ADMINISTERED BY PHARMACIST FOR IMMUNIZATION, Disp: , Rfl: 0     amoxicillin-clavulanate (AUGMENTIN) 875-125 MG tablet, TAKE 1 TABLET (875 MG) BY MOUTH TWICE A DAY., Disp: , Rfl: 0     azithromycin (ZITHROMAX) 500 MG tablet, TAKE 1 TABLET BY MOUTH EVERY DAY FOR 3 DAYS, Disp: , Rfl: 0     benzonatate (TESSALON) 100 MG capsule, TAKE 1 CAPSULE BY MOUTH THREE TIMES A DAY AS NEEDED *DO NOT BREAK CHEW, DISSOLVE, CUT, OR CRUSH*, Disp: , Rfl: 0     chlorthalidone (HYGROTON) 25 MG tablet, TAKE 1 TABLET BY MOUTH EVERY DAY, Disp: , Rfl:      chlorthalidone (HYGROTON) 25 MG tablet, Take 25 mg by mouth daily, Disp: , Rfl: 1     clonazePAM (KLONOPIN) 0.5 MG tablet, Take 1 tablet by mouth 3 times daily if needed for Anxiety., Disp: , Rfl:      cyclobenzaprine (FLEXERIL) 10 MG tablet, Take 10 mg by mouth, Disp: , Rfl:      DULoxetine (CYMBALTA) 30 MG capsule, TAKE 1 CAPSULE BY MOUTH ONCE DAILY IN THE MORNING., Disp: , Rfl: 0     erythromycin (ROMYCIN) 5 MG/GM ophthalmic ointment, , Disp: , Rfl: 1     EST ESTROGENS-METHYLTEST HS 0.625-1.25 MG per tablet, Take 1 tablet by mouth daily, Disp: , Rfl: 3     estradiol (ESTRACE) 0.5 MG tablet, TAKE 1 TABLET BY MOUTH ONCE DAILY., Disp: , Rfl: 2     estradiol (YUVAFEM) 10 MCG TABS vaginal tablet, INSERT 1 TABLET INTO THE VAGINA EVERY MONDAY AND THURSDAY., Disp: , Rfl:      HYDROcodone-acetaminophen (NORCO) 5-325 MG tablet, , Disp: , Rfl: 0     IBUPROFEN PO, Take 200 mg by mouth, Disp: , Rfl:      LORazepam (ATIVAN) 1 MG tablet, TAKE ONE TABLET BY MOUTH ONCE DAILY AS NEEDED FOR ANXIETY/PANIC. MAX 1 TAB PER 24 HOURS., Disp: , Rfl: 0     losartan (COZAAR) 50 MG tablet, Take 50 mg by mouth daily, Disp: , Rfl: 3     magnesium 250 MG tablet, Take 1 tablet " "by mouth daily, Disp: , Rfl:      methocarbamol (ROBAXIN) 750 MG tablet, , Disp: , Rfl:      RELPAX 40 MG tablet, , Disp: , Rfl: 2     sertraline (ZOLOFT) 50 MG tablet, Take 50 mg by mouth, Disp: , Rfl:      traZODone (DESYREL) 50 MG tablet, TAKE 1 TO 1 & 1/2 TABLETS BY MOUTH EVERY NIGHT AS NEEDED FOR SLEEP., Disp: , Rfl: 1     tretinoin (RETIN-A) 0.025 % external cream, Use every night, Disp: 45 g, Rfl: 3     TRINTELLIX 10 MG tablet, TAKE 10 MG BY MOUTH ONCE DAILY. DISCONTINUE SERTRALINE., Disp: , Rfl: 1    Current Facility-Administered Medications:      botulinum toxin type A (BOTOX) 100 units injection 400 Units, 400 Units, Intramuscular, Q90 Days, Simi Vazquez MD      No Known Allergies     PHYSICAL EXAM  Left laterollis with mild right rotation  Right shoulder elevation  Bilateral masseter hypertrophy    HPI    Patient denies new medical diagnoses, illnesses, hospitalizations, emergency room visits, and injuries since the previous injection with botulinum neurotoxin.     She does note chronic \"ear ringing\" and an \"electric\" feeling throughout her body. She also reports a sensation of pain and \"jumping\" of her muscles in her left hip/lower back and anterior thigh. She does take Robaxin occasionally for this.     RESPONSE TO PREVIOUS TREATMENT:    Patient Tia Pablo received 500 units of Botox on 4/30/20 at [facility] pain clinic. She received 275 units of Botox on 10/02/2020 which she did not believe was as helpful as her prior higher dose.  She brought in self-reports that she has increased tightness on the right side of her neck, clicking at the base of the head, right shoulder, jaw, lower the ear soreness.      Additionally, she reports after having her hysterectomy in 11/02/2017, she notes discomfort in the front of her left thigh, lower back, and butt.  She is concerned that symptoms may be associated with her dystonia.      Problems following the previous series of neurotoxin injections " included:  No problems reported. Has never had issues with neck weakness in the past.  Interested in re-trial of trigger ponit injection.     Benefits by Patient Report:    Pain Improvement:  Yes  % Pain Improvement 90 %  Duration of Benefit:  8 weeks.    Dystonia and Spasticity Improvement:  Yes  % Dystonia Improvement: TBD  Duration of Benefit:  8 weeks.    TRIGGER POINT INJECTION PROCEDURES:    VERIFICATION OF PATIENT IDENTIFICATION AND PROCEDURE     Initials   Patient Name mn   Patient  mn   Procedure Verified by: mn     Prior to the start of the procedure and with procedural staff participation, I verbally confirmed the patient s identity using two indicators, relevant allergies, that the procedure was appropriate and matched the consent or emergent situation, and that the correct equipment/implants were available. Immediately prior to starting the procedure I conducted the Time Out with the procedural staff and re-confirmed the patient s name, procedure, and site/side. (The Joint Commission universal protocol was followed.)  Yes    Sedation (Moderate or Deep): None    Above assessments performed by:  Andrei Mckeon MD  The attending provider was present for the entire procedure documented below.    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    Her baseline symptoms have been recalcitrant to oral medications and conservative therapy.  She is here today for reinjection with Bupivacaine, Lidocaine, and Kenalog.    GOAL OF PROCEDURE:  The goal of this procedure is to improve increase active range of motion and decrease pain  associated with dystonic movements and spasticity.    CONSENT:  The risks, benefits, and treatment  options were discussed with Andrei Mckeon MD and she agreed to proceed.      Written consent was obtained by mn.     EQUIPMENT USED:  Needle-30 Gauge needle       DRUG:   Triamcinolone 40mg (Lot# [id number], Exp. 06/2022; NDC 45974-72069-3)  Lidocaine 1% 10mg/mL (Lot# [id number];Exp. 05/2023; NDC 63290-141-44)  Bupivacaine 0.5% 50mg/mL (Lot#[id number], Exp. 10/2023; NDC 89089-499-21)  1 mL Kenalog + 2 mL Lidocaine + 6 mL Bupivacaine = 9 mL mixed    SKIN PREPARATION:  Skin preparation was performed using chloroprep.    GUIDANCE DESCRIPTION:  Electro-myographic guidance was necessary throughout the procedure to accurately identify all areas of dystonic and spastic muscles while avoiding injection of non-dystonic muscles and non-spastic muscles, neighboring nerves and nearby vascular structures.     AREA/MUSCLE INJECTED:   Right trapezius upper - 1 mL in 4 sites  Left trapezius upper - 1 mL in 4 sites    Right trapezius lateral - 1.5 mL in multiple sites  Left trapezius lateral - 1 mL in multiple sites    Right auricular posterior - 0.5 mL in 5 sites    Right masseter - 1 mL in 3 sites  Left masseter - 1 mL in 3 sites    Left anterior thigh - 1 mL in 4 sites  Left lower posterior hip - 1 mL in 5 sites    RESPONSE TO PROCEDURE:  Tia Pablo tolerated the procedure well. She briefly got lightheaded midway through the procedure and recovered after laying down.  She was allowed to recover for an appropriate period of time and was discharged home in stable condition.     Follow Up  Tia Pablo was asked to follow up by phone in 7-14 days with PM&R care coordinators, to report her response to this series of injections.      Referral to Urology for Urodynamics may be considered in future.   Referral to Neurology for Movement disorder clinic to address ongoing muscle spasms and electric shock sensation.     I spent a total of 10 minutes, face-to-face and managing the care of Tia Pablo, excluding the procedure. Over 50% of my  time was spent counseling the patient and coordinating care. Please see note for details.

## 2020-11-30 NOTE — NURSING NOTE
Chief Complaint   Patient presents with     Trigger Point Injection     [facility] INJECTION     Isaak Henderson

## 2020-12-01 NOTE — TELEPHONE ENCOUNTER
RECORDS RECEIVED FROM: Internal   Date of Appt: 12/11/20   NOTES (FOR ALL VISITS) STATUS DETAILS   OFFICE NOTE from referring provider Internal Dr Simi Vazquez @ [facility]:  11/30/20  10/2/20  9/1/20   OFFICE NOTE from other specialist Received Dr Salcedo @ [facility]:  5/24/19  5/3/19  12/22/16  10/19/16   DISCHARGE SUMMARY from hospital N/A    DISCHARGE REPORT from the ER N/A    OPERATIVE REPORT N/A    MEDICATION LIST Internal    IMAGING  (FOR ALL VISITS)     EMG N/A    EEG N/A    LUMBAR PUNCTURE N/A    JOHN SCAN N/A    DEXA SCAN *NEUROSURGERY* N/A    ULTRASOUND (CAROTID BILAT) *VASCULAR* N/A    MRI (HEAD, NECK, SPINE) Received [facility]:  MRI Lumbar Spine 10/5/19  MRI Cervical Spine 10/5/19  MRI Head 12/14/16   XRAY (SPINE) *NEUROSURGERY* N/A    CT (HEAD, NECK, SPINE) Received [facility]:  CT Cervical Spine 2/3/20      Action 12/1/20 MV 1.43pm   Action Taken Imaging request faxed to [facility] for:  MRI Lumbar Spine 10/5/19  MRI Cervical Spine 10/5/19  MRI Head 12/14/16    Imaging request faxed to [facility] for:  CT Cervical Spine 2/3/20     Imaging Received  12/2/20 MV 10.29am  [facility]   Image Type (x): Disc:   PACS: x   Exam Date/Name CT Cervical Spine 2/3/20 Comments: images resolved in PACS     Phone Call:  12/7/20 MV 11.48am   Contact Name [facility] Fall Called and left a voicemail to see if images were sent.      Phone Call:  12/8/20 MV 9.10am   Contact Name [facility] Called and left 2nd voicemail to see if images were sent.    **Looked at [facility]'s website and it states their clinic has a system outage so most clinics and services are closed.      Phone Call:  12/21/20 MV 9.25am   Contact Name [facility] Fall Spoke with Ramya and asked to  images and fax records. Per Ramya, images will be ready for pickup in an hour.     Action 12.21.20 MJ   Action Taken Received Cd from . Sent to [facility] for processing.

## 2020-12-03 ENCOUNTER — OFFICE VISIT (OUTPATIENT)
Dept: FAMILY MEDICINE | Facility: CLINIC | Age: 55
End: 2020-12-03
Payer: COMMERCIAL

## 2020-12-03 VITALS
TEMPERATURE: 98.5 F | SYSTOLIC BLOOD PRESSURE: 132 MMHG | BODY MASS INDEX: 26.68 KG/M2 | DIASTOLIC BLOOD PRESSURE: 76 MMHG | HEART RATE: 70 BPM | HEIGHT: 66 IN | WEIGHT: 166 LBS

## 2020-12-03 DIAGNOSIS — I10 ESSENTIAL HYPERTENSION: ICD-10-CM

## 2020-12-03 DIAGNOSIS — R05.8 POST-VIRAL COUGH SYNDROME: ICD-10-CM

## 2020-12-03 DIAGNOSIS — Z00.00 ROUTINE GENERAL MEDICAL EXAMINATION AT A HEALTH CARE FACILITY: Primary | ICD-10-CM

## 2020-12-03 DIAGNOSIS — G24.1 GENETIC TORSION DYSTONIA: ICD-10-CM

## 2020-12-03 LAB
ANION GAP SERPL CALCULATED.3IONS-SCNC: 4 MMOL/L (ref 3–14)
BUN SERPL-MCNC: 12 MG/DL (ref 7–30)
CALCIUM SERPL-MCNC: 9.8 MG/DL (ref 8.5–10.1)
CHLORIDE SERPL-SCNC: 102 MMOL/L (ref 94–109)
CO2 SERPL-SCNC: 32 MMOL/L (ref 20–32)
CREAT SERPL-MCNC: 0.71 MG/DL (ref 0.52–1.04)
GFR SERPL CREATININE-BSD FRML MDRD: >90 ML/MIN/{1.73_M2}
GLUCOSE SERPL-MCNC: 95 MG/DL (ref 70–99)
POTASSIUM SERPL-SCNC: 4.2 MMOL/L (ref 3.4–5.3)
SODIUM SERPL-SCNC: 138 MMOL/L (ref 133–144)
TSH SERPL DL<=0.005 MIU/L-ACNC: 1.32 MU/L (ref 0.4–4)

## 2020-12-03 PROCEDURE — 80048 BASIC METABOLIC PNL TOTAL CA: CPT | Performed by: PHYSICIAN ASSISTANT

## 2020-12-03 PROCEDURE — 36415 COLL VENOUS BLD VENIPUNCTURE: CPT | Performed by: PHYSICIAN ASSISTANT

## 2020-12-03 PROCEDURE — 90471 IMMUNIZATION ADMIN: CPT | Performed by: PHYSICIAN ASSISTANT

## 2020-12-03 PROCEDURE — 90714 TD VACC NO PRESV 7 YRS+ IM: CPT | Performed by: PHYSICIAN ASSISTANT

## 2020-12-03 PROCEDURE — 84443 ASSAY THYROID STIM HORMONE: CPT | Performed by: PHYSICIAN ASSISTANT

## 2020-12-03 PROCEDURE — 99386 PREV VISIT NEW AGE 40-64: CPT | Mod: 25 | Performed by: PHYSICIAN ASSISTANT

## 2020-12-03 PROCEDURE — 99213 OFFICE O/P EST LOW 20 MIN: CPT | Mod: 25 | Performed by: PHYSICIAN ASSISTANT

## 2020-12-03 RX ORDER — LOSARTAN POTASSIUM 50 MG/1
50 TABLET ORAL DAILY
Qty: 90 TABLET | Refills: 3 | Status: SHIPPED | OUTPATIENT
Start: 2020-12-03 | End: 2021-05-19

## 2020-12-03 RX ORDER — CHLORTHALIDONE 25 MG/1
25 TABLET ORAL DAILY
Qty: 90 TABLET | Refills: 3 | Status: SHIPPED | OUTPATIENT
Start: 2020-12-03 | End: 2022-01-20

## 2020-12-03 RX ORDER — ALBUTEROL SULFATE 90 UG/1
2 AEROSOL, METERED RESPIRATORY (INHALATION) EVERY 6 HOURS
Qty: 1 INHALER | Refills: 0 | Status: SHIPPED | OUTPATIENT
Start: 2020-12-03 | End: 2021-01-28

## 2020-12-03 RX ORDER — ESTRADIOL 0.5 MG/1
0.5 TABLET ORAL DAILY
Qty: 90 TABLET | Refills: 3 | Status: SHIPPED | OUTPATIENT
Start: 2020-12-03 | End: 2021-06-16

## 2020-12-03 ASSESSMENT — PAIN SCALES - GENERAL: PAINLEVEL: NO PAIN (0)

## 2020-12-03 ASSESSMENT — MIFFLIN-ST. JEOR: SCORE: 1364.72

## 2020-12-03 NOTE — PROGRESS NOTES
SUBJECTIVE:   CC: Tia Pablo is an 55 year old woman who presents for preventive health visit.       Patient has been advised of split billing requirements and indicates understanding: Yes  Healthy Habits:    Do you get at least three servings of calcium containing foods daily (dairy, green leafy vegetables, etc.)? yes    Amount of exercise or daily activities, outside of work: None recently     Problems taking medications regularly No    Medication side effects: No    Have you had an eye exam in the past two years? yes    Do you see a dentist twice per year? yes    Do you have sleep apnea, excessive snoring or daytime drowsiness?no      -Patient is here today to Establish Care with you.     -Wanting a recheck on her lungs. She did have a positive Covid test on 11/10/20.  Still has a cough - not worse but will feel tight    Sees specialists within the [facility] system so decided to switch to have everything all here    Today's PHQ-2 Score:   PHQ-2 ( 1999 [employer]) 12/3/2020 12/2/2019   Q1: Little interest or pleasure in doing things 0 0   Q2: Feeling down, depressed or hopeless 0 0   PHQ-2 Score 0 0   Q1: Little interest or pleasure in doing things - Not at all   Q2: Feeling down, depressed or hopeless - Not at all   PHQ-2 Score - 0       Abuse: Current or Past(Physical, Sexual or Emotional)- No  Do you feel safe in your environment? Yes    Have you ever done Advance Care Planning? (For example, a Health Directive, POLST, or a discussion with a medical provider or your loved ones about your wishes): No, advance care planning information given to patient to review.  Patient plans to discuss their wishes with loved ones or provider.      Social History     Tobacco Use     Smoking status: Never Smoker     Smokeless tobacco: Never Used   Substance Use Topics     Alcohol use: Yes     Comment: Occasionally     If you drink alcohol do you typically have >3 drinks per day or >7 drinks per week? No               "       Reviewed orders with patient.  Reviewed health maintenance and updated orders accordingly - Yes  BP Readings from Last 3 Encounters:   12/03/20 132/76   11/30/20 131/83   10/23/20 (!) 152/78    Wt Readings from Last 3 Encounters:   12/03/20 75.3 kg (166 lb)   11/30/20 74.8 kg (165 lb)   11/18/16 70.1 kg (154 lb 9.6 oz)                    Mammogram Screening: Patient over age 50, mutual decision to screen reflected in health maintenance.    Pertinent mammograms are reviewed under the imaging tab.  History of abnormal Pap smear: NO - age 30-65 PAP every 5 years with negative HPV co-testing recommended     Reviewed and updated as needed this visit by clinical staff  Tobacco  Allergies  Meds   Med Hx  Surg Hx  Fam Hx  Soc Hx        Reviewed and updated as needed this visit by Provider                    ROS:  CONSTITUTIONAL: NEGATIVE for fever, chills, change in weight  INTEGUMENTARY/SKIN: NEGATIVE for worrisome rashes, moles or lesions  EYES: NEGATIVE for vision changes or irritation  ENT: NEGATIVE for ear, mouth and throat problems  RESP: NEGATIVE for significant cough or SOB  BREAST: NEGATIVE for masses, tenderness or discharge  CV: NEGATIVE for chest pain, palpitations or peripheral edema  GI: NEGATIVE for nausea, abdominal pain, heartburn, or change in bowel habits  : NEGATIVE for unusual urinary or vaginal symptoms. No vaginal bleeding.  MUSCULOSKELETAL: NEGATIVE for significant arthralgias or myalgia  NEURO: NEGATIVE for weakness, dizziness or paresthesias  PSYCHIATRIC: NEGATIVE for changes in mood or affect     OBJECTIVE:   /76   Pulse 70   Temp 98.5  F (36.9  C) (Tympanic)   Ht 1.676 m (5' 6\")   Wt 75.3 kg (166 lb)   BMI 26.79 kg/m    EXAM:  GENERAL: healthy, alert and no distress  EYES: Eyes grossly normal to inspection, PERRL and conjunctivae and sclerae normal  HENT: ear canals and TM's normal, nose and mouth without ulcers or lesions  NECK: no adenopathy, no asymmetry, masses, or " scars and thyroid normal to palpation  RESP: lungs clear to auscultation - no rales, rhonchi or wheezes  BREAST: normal without masses, tenderness or nipple discharge and no palpable axillary masses or adenopathy  CV: regular rate and rhythm, normal S1 S2, no S3 or S4, no murmur, click or rub, no peripheral edema and peripheral pulses strong  ABDOMEN: soft, nontender, no hepatosplenomegaly, no masses and bowel sounds normal  MS: no gross musculoskeletal defects noted, no edema  SKIN: no suspicious lesions or rashes  NEURO: Normal strength and tone, mentation intact and speech normal  PSYCH: mentation appears normal, affect normal/bright    Diagnostic Test Results:  Results for orders placed or performed in visit on 12/03/20   Basic metabolic panel  (Ca, Cl, CO2, Creat, Gluc, K, Na, BUN)     Status: None   Result Value Ref Range    Sodium 138 133 - 144 mmol/L    Potassium 4.2 3.4 - 5.3 mmol/L    Chloride 102 94 - 109 mmol/L    Carbon Dioxide 32 20 - 32 mmol/L    Anion Gap 4 3 - 14 mmol/L    Glucose 95 70 - 99 mg/dL    Urea Nitrogen 12 7 - 30 mg/dL    Creatinine 0.71 0.52 - 1.04 mg/dL    GFR Estimate >90 >60 mL/min/[1.73_m2]    GFR Estimate If Black >90 >60 mL/min/[1.73_m2]    Calcium 9.8 8.5 - 10.1 mg/dL   TSH with free T4 reflex     Status: None   Result Value Ref Range    TSH 1.32 0.40 - 4.00 mU/L       ASSESSMENT/PLAN:   (Z00.00) Routine general medical examination at a health care facility  (primary encounter diagnosis)  Comment:   Plan: estradiol (ESTRACE) 0.5 MG tablet,         chlorthalidone (HYGROTON) 25 MG tablet,         losartan (COZAAR) 50 MG tablet, Basic metabolic        panel  (Ca, Cl, CO2, Creat, Gluc, K, Na, BUN),         TSH with free T4 reflex            (R05) Post-viral cough syndrome  Comment:   Plan: albuterol (PROAIR HFA/PROVENTIL HFA/VENTOLIN         HFA) 108 (90 Base) MCG/ACT inhaler            (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    Weight as of this encounter: 75.3 kg (166 lb).        She reports that she has never smoked. She has never used smokeless tobacco.      Counseling Resources:  ATP IV Guidelines  Pooled Cohorts Equation Calculator  Breast Cancer Risk Calculator  BRCA-Related Cancer Risk Assessment: FHS-7 Tool  FRAX Risk Assessment  ICSI Preventive Guidelines  Dietary Guidelines for Americans, 2010  USDA's MyPlate  ASA Prophylaxis  Lung CA Screening    Gracie Aleman PA-C  [facility]  "

## 2020-12-09 ENCOUNTER — THERAPY VISIT (OUTPATIENT)
Dept: CHIROPRACTIC MEDICINE | Facility: CLINIC | Age: 55
End: 2020-12-09
Payer: COMMERCIAL

## 2020-12-09 DIAGNOSIS — M54.2 NECK PAIN: ICD-10-CM

## 2020-12-09 DIAGNOSIS — M99.05 SEGMENTAL DYSFUNCTION OF PELVIC REGION: ICD-10-CM

## 2020-12-09 DIAGNOSIS — M99.03 SEGMENTAL DYSFUNCTION OF LUMBAR REGION: ICD-10-CM

## 2020-12-09 DIAGNOSIS — M54.50 BILATERAL LOW BACK PAIN WITHOUT SCIATICA, UNSPECIFIED CHRONICITY: ICD-10-CM

## 2020-12-09 DIAGNOSIS — M99.02 THORACIC SEGMENT DYSFUNCTION: ICD-10-CM

## 2020-12-09 DIAGNOSIS — M99.01 CERVICAL SEGMENT DYSFUNCTION: Primary | ICD-10-CM

## 2020-12-09 DIAGNOSIS — M62.838 SPASM OF MUSCLE: ICD-10-CM

## 2020-12-09 PROCEDURE — 97810 ACUP 1/> WO ESTIM 1ST 15 MIN: CPT | Performed by: CHIROPRACTOR

## 2020-12-09 PROCEDURE — 98941 CHIROPRACT MANJ 3-4 REGIONS: CPT | Mod: AT | Performed by: CHIROPRACTOR

## 2020-12-09 NOTE — PROGRESS NOTES
Visit #:  4    Subjective:  Tia Pablo is a 54 year old female who is seen in f/u up for:        Cervical segment dysfunction  Neck pain  Thoracic segment dysfunction  Spasm of muscle  Segmental dysfunction of lumbar region  Bilateral low back pain without sciatica, unspecified chronicity  Segmental dysfunction of pelvic region.     Since last visit on 7/13/2020,  Tia Pablo reports the following changes: Pain immediately after last treatment: 6/10 and their pain level today 4/10.  Tia reports that her neck is feeling very stiff and sore.  She feels that her botox injections are wearing off and she is not scheduled to have this done until January.  In an effort to get some relief she had some trigger point injections done last week.  She is still having a lot of pain and stiffness in her neck and feels clicking when she rotates her neck.  Tia also notes that he low back is feeling a little stiff and sore.    Area of chief complaint:  Cervical and lumbar:  Symptoms are graded at 6/10. The quality is described as stiff, achey.  Motion has been good but decreased over the past week. Patient feels that they are feeling about the same .        Objective:  The following was observed:    P: palpatory tendernessTraps R>>L    A: static palpation demonstrates intersegmental asymmetry , cervical, thoracic    R: motion palpation notes restricted motion, C1 , C6 , C7 , T1  and T2 , L4, L5, RPSIS    T: The following soft tissue hypotonicities were observed:  Traps: right, ache and stiff, referred pain: no      Assessment:    Segmental spinal dysfunction/restrictions found at:  C1   C6   C7   T1   T2    L4,   L5,   RPSIS    Diagnoses:      1. Cervical segment dysfunction    2. Neck pain    3. Thoracic segment dysfunction    4. Spasm of muscle    5. Segmental dysfunction of lumbar region    6. Bilateral low back pain without sciatica, unspecified chronicity    7. Segmental dysfunction of pelvic region        Patient's  condition:  Patient had restrictions pre-manipulation    Treatment effectiveness:  Post manipulation there is better intersegmental movement and Patient claims to feel looser post manipulation      Procedures:  CMT:  40582 Chiropractic manipulative treatment 1-2 regions performed   Cervical: Activator, C1, C6, C7 , Prone  Thoracic: Activator, T1, T2, Prone   Lumbar: activator, L4, L5, Prone  Pelvis: drop table, RPSIS, prone    Modalities:  36466: Acupuncture, for 15 minutes:  Points: GV20, GV16, LI4, SI15, GB36, GB20, GB39  For 15 minutes    Therapeutic procedures:  None      Prognosis: Good    Progress towards Goals: Patient is making progress towards the goal     Response to Treatment:   Reduction in symptoms as reported by patient      Recommendations:    Instructions:  ice 20 minutes every other hour as needed    Follow-up:   Return to care in one week

## 2020-12-10 ASSESSMENT — ENCOUNTER SYMPTOMS
TASTE DISTURBANCE: 0
MUSCLE CRAMPS: 1
NECK MASS: 0
TREMORS: 0
ARTHRALGIAS: 1
DISTURBANCES IN COORDINATION: 0
DIZZINESS: 0
ALTERED TEMPERATURE REGULATION: 0
PARALYSIS: 0
NECK PAIN: 1
MEMORY LOSS: 0
STIFFNESS: 1
JOINT SWELLING: 0
BACK PAIN: 1
MYALGIAS: 1
POLYPHAGIA: 0
INCREASED ENERGY: 1
TINGLING: 1
WEIGHT LOSS: 0
SINUS PAIN: 0
EYE PAIN: 0
WEIGHT GAIN: 0
FEVER: 0
EYE REDNESS: 0
NIGHT SWEATS: 0
MUSCLE WEAKNESS: 1
NUMBNESS: 0
SPEECH CHANGE: 0
SINUS CONGESTION: 0
EYE IRRITATION: 0
HEADACHES: 1
FATIGUE: 0
TROUBLE SWALLOWING: 0
WEAKNESS: 0
EYE WATERING: 0
CHILLS: 0
HOARSE VOICE: 0
SEIZURES: 0
DECREASED APPETITE: 0
HALLUCINATIONS: 0
SORE THROAT: 0
POLYDIPSIA: 0
SMELL DISTURBANCE: 0
LOSS OF CONSCIOUSNESS: 0
DOUBLE VISION: 0

## 2020-12-11 ENCOUNTER — OFFICE VISIT (OUTPATIENT)
Dept: NEUROLOGY | Facility: CLINIC | Age: 55
End: 2020-12-11
Payer: COMMERCIAL

## 2020-12-11 ENCOUNTER — PRE VISIT (OUTPATIENT)
Dept: NEUROLOGY | Facility: CLINIC | Age: 55
End: 2020-12-11

## 2020-12-11 VITALS
SYSTOLIC BLOOD PRESSURE: 145 MMHG | BODY MASS INDEX: 26.36 KG/M2 | HEART RATE: 75 BPM | HEIGHT: 66 IN | DIASTOLIC BLOOD PRESSURE: 83 MMHG | RESPIRATION RATE: 16 BRPM | WEIGHT: 164 LBS | OXYGEN SATURATION: 96 %

## 2020-12-11 DIAGNOSIS — M25.552 HIP PAIN, LEFT: ICD-10-CM

## 2020-12-11 DIAGNOSIS — G24.3 CERVICAL DYSTONIA: Primary | ICD-10-CM

## 2020-12-11 DIAGNOSIS — G47.63 BRUXISM, SLEEP-RELATED: ICD-10-CM

## 2020-12-11 DIAGNOSIS — M79.605 PAIN OF LEFT LOWER EXTREMITY: ICD-10-CM

## 2020-12-11 PROCEDURE — 99205 OFFICE O/P NEW HI 60 MIN: CPT | Performed by: PSYCHIATRY & NEUROLOGY

## 2020-12-11 ASSESSMENT — MIFFLIN-ST. JEOR: SCORE: 1355.65
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
## 2020-12-11 NOTE — NURSING NOTE
Chief Complaint   Patient presents with     Dystonia     Consult     [facility] NEW MOVEMENT DISORDER        Logan Troy, EMT
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
## 2020-12-12 ENCOUNTER — MYC MEDICAL ADVICE (OUTPATIENT)
Dept: FAMILY MEDICINE | Facility: CLINIC | Age: 55
End: 2020-12-12

## 2020-12-12 ENCOUNTER — HEALTH MAINTENANCE LETTER (OUTPATIENT)
Age: 55
End: 2020-12-12

## 2020-12-12 DIAGNOSIS — G24.1 GENETIC TORSION DYSTONIA: Primary | ICD-10-CM

## 2020-12-15 ENCOUNTER — TRANSFERRED RECORDS (OUTPATIENT)
Dept: HEALTH INFORMATION MANAGEMENT | Facility: CLINIC | Age: 55
End: 2020-12-15

## 2020-12-16 RX ORDER — CLONAZEPAM 0.5 MG/1
TABLET ORAL
Qty: 90 TABLET | Refills: 3
Start: 2020-12-16 | End: 2021-03-25

## 2020-12-16 SDOH — HEALTH STABILITY: MENTAL HEALTH: HOW OFTEN DO YOU HAVE 6 OR MORE DRINKS ON ONE OCCASION?: NOT ASKED

## 2020-12-16 SDOH — HEALTH STABILITY: MENTAL HEALTH: HOW MANY STANDARD DRINKS CONTAINING ALCOHOL DO YOU HAVE ON A TYPICAL DAY?: NOT ASKED

## 2020-12-16 SDOH — HEALTH STABILITY: MENTAL HEALTH: HOW OFTEN DO YOU HAVE A DRINK CONTAINING ALCOHOL?: NOT ASKED

## 2020-12-16 NOTE — PATIENT INSTRUCTIONS
Today you spoke with Dr. Platt. We discussed a plan to improve the pain your are experiencing from cervical dystonia. The plan we discussed is detailed below.     Plan:   - Start clonazepam 0.5 mg. Follow the titration table below:   - Week 1: start with 0.5 tab in the evenings   - Week 2: take 0.5 tab in am and 0.5 tab in the evenings   - Week 3: take 0.5 tab three times a day (am, noon, evenings)   - Week 4: take 0.5 tab in am and noon, and 1 tab in evening   - Week 5: take 1 tab in am, 0.5 tab at noon, and 1 tab in evening   - Week 6: take 1 tab thee times a day.    - Stop at dose that improves pain.  - EMG for evaluation of dystonia. To be done by Dr. Almanzar.  - Currently not interested in physical therapy for neck strengthening exercises and stretching   - Continue follow up with Dr. Vazquez, as scheduled.   - Patient is interested in decreasing dose of duloxetine. Defer to changes in duloxetine dose to Dr. Kirkland.    Patient to return in 8 weeks, for in-person, 30 minutes.

## 2020-12-18 RX ORDER — DULOXETIN HYDROCHLORIDE 20 MG/1
20 CAPSULE, DELAYED RELEASE ORAL DAILY
Qty: 90 CAPSULE | Refills: 0 | Status: SHIPPED | OUTPATIENT
Start: 2020-12-18 | End: 2021-01-28

## 2020-12-22 ENCOUNTER — ANCILLARY PROCEDURE (OUTPATIENT)
Dept: MAMMOGRAPHY | Facility: CLINIC | Age: 55
End: 2020-12-22
Payer: COMMERCIAL

## 2020-12-22 DIAGNOSIS — Z12.31 VISIT FOR SCREENING MAMMOGRAM: ICD-10-CM

## 2020-12-22 PROCEDURE — 77063 BREAST TOMOSYNTHESIS BI: CPT | Mod: TC | Performed by: RADIOLOGY

## 2020-12-22 PROCEDURE — 77067 SCR MAMMO BI INCL CAD: CPT | Mod: TC | Performed by: RADIOLOGY

## 2020-12-29 ENCOUNTER — OFFICE VISIT (OUTPATIENT)
Dept: PHYSICAL MEDICINE AND REHAB | Facility: CLINIC | Age: 55
End: 2020-12-29
Payer: COMMERCIAL

## 2020-12-29 VITALS
DIASTOLIC BLOOD PRESSURE: 84 MMHG | SYSTOLIC BLOOD PRESSURE: 138 MMHG | RESPIRATION RATE: 16 BRPM | HEART RATE: 72 BPM | OXYGEN SATURATION: 95 % | TEMPERATURE: 98.7 F

## 2020-12-29 DIAGNOSIS — G24.3 CERVICAL DYSTONIA: Primary | ICD-10-CM

## 2020-12-29 DIAGNOSIS — G24.4 IDIOPATHIC OROFACIAL DYSTONIA: ICD-10-CM

## 2020-12-29 DIAGNOSIS — G24.1 GENETIC TORSION DYSTONIA: ICD-10-CM

## 2020-12-29 PROCEDURE — 64616 CHEMODENERV MUSC NECK DYSTON: CPT | Mod: 50 | Performed by: PHYSICAL MEDICINE & REHABILITATION

## 2020-12-29 PROCEDURE — 64642 CHEMODENERV 1 EXTREMITY 1-4: CPT | Performed by: PHYSICAL MEDICINE & REHABILITATION

## 2020-12-29 PROCEDURE — 64612 DESTROY NERVE FACE MUSCLE: CPT | Mod: 50 | Performed by: PHYSICAL MEDICINE & REHABILITATION

## 2020-12-29 PROCEDURE — 95874 GUIDE NERV DESTR NEEDLE EMG: CPT | Performed by: PHYSICAL MEDICINE & REHABILITATION

## 2020-12-29 PROCEDURE — 64643 CHEMODENERV 1 EXTREM 1-4 EA: CPT | Performed by: PHYSICAL MEDICINE & REHABILITATION

## 2020-12-29 RX ORDER — DULOXETIN HYDROCHLORIDE 60 MG/1
CAPSULE, DELAYED RELEASE ORAL
COMMUNITY
Start: 2020-12-13 | End: 2021-01-28
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
## 2020-12-29 NOTE — NURSING NOTE
Chief Complaint   Patient presents with     Dystonia     Botox     ump return botox       Logan Troy, EMT
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
## 2021-01-11 ENCOUNTER — CARE COORDINATION (OUTPATIENT)
Dept: PHYSICAL MEDICINE AND REHAB | Facility: CLINIC | Age: 56
End: 2021-01-11

## 2021-01-11 NOTE — PROGRESS NOTES
"Patient called to report progress with previous injection, which was 2 weeks ago. She reports her symptoms are approximately 50% improved. Her main concerns are right lateral neck pain and bilateral posterior neck pain/tightness. Overall, her symptoms are much better. She denies side effects or problems afterwards, and would be willing to try to increase dose (she was at 500 units previously at another facility) next time to improve efficacy in her \"problem areas\".     Shruthi West, MS, PT  Care Coordinator, [facility]  Work Days:  Monday, Tuesday & Wednesday  [phone] (Clinical Phone)  [phone] (Clinical Fax)  [facility]  3rd Floor - Neuroscience Clinic  [address] [place] [id number]    "

## 2021-01-13 ENCOUNTER — MYC MEDICAL ADVICE (OUTPATIENT)
Dept: NEUROLOGY | Facility: CLINIC | Age: 56
End: 2021-01-13

## 2021-01-14 ENCOUNTER — OFFICE VISIT (OUTPATIENT)
Dept: NEUROLOGY | Facility: CLINIC | Age: 56
End: 2021-01-14
Attending: PSYCHIATRY & NEUROLOGY
Payer: COMMERCIAL

## 2021-01-14 DIAGNOSIS — M79.605 PAIN OF LEFT LOWER EXTREMITY: ICD-10-CM

## 2021-01-14 DIAGNOSIS — G47.63 BRUXISM, SLEEP-RELATED: ICD-10-CM

## 2021-01-14 DIAGNOSIS — G24.3 CERVICAL DYSTONIA: ICD-10-CM

## 2021-01-14 DIAGNOSIS — M25.552 HIP PAIN, LEFT: ICD-10-CM

## 2021-01-14 PROCEDURE — 95887 MUSC TST DONE W/N TST NONEXT: CPT | Performed by: PSYCHIATRY & NEUROLOGY

## 2021-01-14 PROCEDURE — 95885 MUSC TST DONE W/NERV TST LIM: CPT | Mod: 59 | Performed by: PSYCHIATRY & NEUROLOGY

## 2021-01-14 PROCEDURE — 95886 MUSC TEST DONE W/N TEST COMP: CPT | Performed by: PSYCHIATRY & NEUROLOGY

## 2021-01-14 PROCEDURE — 95908 NRV CNDJ TST 3-4 STUDIES: CPT | Performed by: PSYCHIATRY & NEUROLOGY

## 2021-01-14 NOTE — LETTER
1/14/2021       RE: Tia Pablo  [address] [facility] [address]     Dear Colleague,    Thank you for referring your patient, Tia Pablo, to the [facility] EMG CLINIC [place] at [facility]. Please see a copy of my visit note below.      [facility]  Electrodiagnostic Laboratory    Nerve Conduction & EMG Report          Patient:       Tia Pablo  Patient ID:    [id number]  Gender:        Female  YOB: 1965  Age:           55 Years 1 Months        History & Examination: The patient is a 55-year-old female referred by Dr. Platt for evaluation of left leg pain.  We are also asked to evaluate for left leg dystonia.  This is of most concern around the left hip and buttock region.  The patient also asked us to examine her neck for dystonia.      Techniques: Motor conduction studies were done with surface recording electrodes. Sensory conduction studies were performed with surface electrodes, unless indicated otherwise by (n), designating the use of subdermal recording electrodes. Temperature was monitored and recorded throughout the study. Upper extremities were maintained at a temperature of 32 degrees Centigrade or higher.  Lower extremities were maintained at a temperature of 31degrees Centigrade or higher. EMG was done with a concentric needle electrode.        Results: Left sural sensory nerve action potential is normal.  The left superficial peroneal sensory nerve action potential is normal.  The left peroneal motor conduction study is normal.  The left tibial motor conduction study is normal.  Needle exam of the left leg and buttock region was normal.  No dystonic spasms were seen.  It should be noted that the patient was asymptomatic throughout the period of left leg examination.  In the neck fibrillation was seen and there was 1 area of myokymia in the right trapezius.  Motor unit potentials in the neck were short  duration and low amplitude.  They demonstrated rapid recruitment.  This is assumed to be secondary to the recent botulinum toxin injection.  In one area of the high right cervical paraspinal region there was continuous motor unit potential activity which could be consistent with dystonia.      Interpretation: The EMG is abnormal.  There are EMG changes in neck muscles consistent with recent botulinum toxin injections.  There are no EMG changes in the left leg which would indicate a left lumbar or sacral radiculopathy or left leg mononeuropathy.  Concerning dystonia there was no evidence of dystonia in the left leg or buttock region.  It should be noted however that the patient was asymptomatic during the period of his left leg exam.  There was some evidence of dystonic-appearing activity in the right paracervical region.  This is consistent with the established diagnosis of cervical dystonia.        EMG Physician: Farzad Almanzar MD           Sensory NCS      Nerve / Sites Rec. Site Onset Peak NP Amp Ref. PP Amp Dist Marco Ref. Temp     ms ms  V  V  V cm m/s m/s  C   L SURAL - Lat Mall      Calf Ankle 2.97 3.65 20.1 8.0 21.6 14 47.2 38.0 31.5   L SUP PERONEAL      Lat Leg Reyes 2.71 3.80 10.5  17.7 12.5 46.2 38.0 31.4       Motor NCS      Nerve / Sites Rec. Site Lat Ref. Amp Ref. Rel Amp Dist Marco Ref. Dur. Area Temp.     ms ms mV mV % cm m/s m/s ms %  C   L DEEP PERONEAL - EDB      Ankle EDB 4.48 6.00 9.9 2.5 100 8   5.31 100 31.1      FibHead EDB 9.79  9.2  92.4 26 48.9 38.0 5.63 96.8 31.1      Pop Fos EDB 11.56  8.7  87 8 45.2 38.0 5.78 92.9 31.1   L TIBIAL - AH      Ankle AH 3.44 6.00 17.5 4.0 100 8   5.68 100 31.2      Pop Fos AH 10.94  15.0  85.8 35 46.7 38.0 6.30 84.8 31.2       Needle EMG (W)      EMG Summary Table     Spontaneous Recruitment MUAP    IA Fib PSW Fasc H.F. Pattern Amp Dur. PPP   L. TIB ANTERIOR N None None None None N N N N   L. GASTROCN (MED) N None None None None N N N N   L. TIB POSTERIOR N  None None None None N N N N   L. VAST LATERALIS N None None None None N N N N   L. GLUTEUS MED N None None None None N N N N   L.  Trapezius 1+ 1+ None None None rapid -1 -1 +1   R. Trapezius 1+ 1+, myokymia None None None rapid -1 -1 +1   R. High cervical PSP 1+ 1+ None None None Continuous activity c/w dystonia -1 -1 +1   L. GLUTEUS MAX N None None None None N N N N                      Again, thank you for allowing me to participate in the care of your patient.      Sincerely,    Farzad Almanzar MD

## 2021-01-14 NOTE — PROGRESS NOTES
[facility]  Electrodiagnostic Laboratory    Nerve Conduction & EMG Report          Patient:       Tia Pablo  Patient ID:    [id number]  Gender:        Female  YOB: 1965  Age:           55 Years 1 Months        History & Examination: The patient is a 55-year-old female referred by Dr. Platt for evaluation of left leg pain.  We are also asked to evaluate for left leg dystonia.  This is of most concern around the left hip and buttock region.  The patient also asked us to examine her neck for dystonia.      Techniques: Motor conduction studies were done with surface recording electrodes. Sensory conduction studies were performed with surface electrodes, unless indicated otherwise by (n), designating the use of subdermal recording electrodes. Temperature was monitored and recorded throughout the study. Upper extremities were maintained at a temperature of 32 degrees Centigrade or higher.  Lower extremities were maintained at a temperature of 31degrees Centigrade or higher. EMG was done with a concentric needle electrode.        Results: Left sural sensory nerve action potential is normal.  The left superficial peroneal sensory nerve action potential is normal.  The left peroneal motor conduction study is normal.  The left tibial motor conduction study is normal.  Needle exam of the left leg and buttock region was normal.  No dystonic spasms were seen.  It should be noted that the patient was asymptomatic throughout the period of left leg examination.  In the neck fibrillation was seen and there was 1 area of myokymia in the right trapezius.  Motor unit potentials in the neck were short duration and low amplitude.  They demonstrated rapid recruitment.  This is assumed to be secondary to the recent botulinum toxin injection.  In one area of the high right cervical paraspinal region there was continuous motor unit potential activity which could be consistent with  dystonia.      Interpretation: The EMG is abnormal.  There are EMG changes in neck muscles consistent with recent botulinum toxin injections.  There are no EMG changes in the left leg which would indicate a left lumbar or sacral radiculopathy or left leg mononeuropathy.  Concerning dystonia there was no evidence of dystonia in the left leg or buttock region.  It should be noted however that the patient was asymptomatic during the period of his left leg exam.  There was some evidence of dystonic-appearing activity in the right paracervical region.  This is consistent with the established diagnosis of cervical dystonia.        EMG Physician: Farzad Almanzar MD           Sensory NCS      Nerve / Sites Rec. Site Onset Peak NP Amp Ref. PP Amp Dist Marco Ref. Temp     ms ms  V  V  V cm m/s m/s  C   L SURAL - Lat Mall      Calf Ankle 2.97 3.65 20.1 8.0 21.6 14 47.2 38.0 31.5   L SUP PERONEAL      Lat Leg Reyes 2.71 3.80 10.5  17.7 12.5 46.2 38.0 31.4       Motor NCS      Nerve / Sites Rec. Site Lat Ref. Amp Ref. Rel Amp Dist Marco Ref. Dur. Area Temp.     ms ms mV mV % cm m/s m/s ms %  C   L DEEP PERONEAL - EDB      Ankle EDB 4.48 6.00 9.9 2.5 100 8   5.31 100 31.1      FibHead EDB 9.79  9.2  92.4 26 48.9 38.0 5.63 96.8 31.1      Pop Fos EDB 11.56  8.7  87 8 45.2 38.0 5.78 92.9 31.1   L TIBIAL - AH      Ankle AH 3.44 6.00 17.5 4.0 100 8   5.68 100 31.2      Pop Fos AH 10.94  15.0  85.8 35 46.7 38.0 6.30 84.8 31.2       Needle EMG (W)      EMG Summary Table     Spontaneous Recruitment MUAP    IA Fib PSW Fasc H.F. Pattern Amp Dur. PPP   L. TIB ANTERIOR N None None None None N N N N   L. GASTROCN (MED) N None None None None N N N N   L. TIB POSTERIOR N None None None None N N N N   L. VAST LATERALIS N None None None None N N N N   L. GLUTEUS MED N None None None None N N N N   L.  Trapezius 1+ 1+ None None None rapid -1 -1 +1   R. Trapezius 1+ 1+, myokymia None None None rapid -1 -1 +1   R. High cervical PSP 1+ 1+ None None None  Continuous activity c/w dystonia -1 -1 +1   L. GLUTEUS MAX N None None None None N N N N

## 2021-01-27 ENCOUNTER — MYC MEDICAL ADVICE (OUTPATIENT)
Dept: FAMILY MEDICINE | Facility: CLINIC | Age: 56
End: 2021-01-27

## 2021-01-27 DIAGNOSIS — G24.1 GENETIC TORSION DYSTONIA: ICD-10-CM

## 2021-01-28 RX ORDER — DULOXETIN HYDROCHLORIDE 20 MG/1
40 CAPSULE, DELAYED RELEASE ORAL DAILY
Qty: 180 CAPSULE | Refills: 0 | Status: SHIPPED | OUTPATIENT
Start: 2021-01-28 | End: 2021-06-02

## 2021-02-03 ENCOUNTER — ALLIED HEALTH/NURSE VISIT (OUTPATIENT)
Dept: PHYSICAL MEDICINE AND REHAB | Facility: CLINIC | Age: 56
End: 2021-02-03
Payer: COMMERCIAL

## 2021-02-03 ENCOUNTER — MEDICAL CORRESPONDENCE (OUTPATIENT)
Dept: HEALTH INFORMATION MANAGEMENT | Facility: CLINIC | Age: 56
End: 2021-02-03

## 2021-02-03 VITALS
SYSTOLIC BLOOD PRESSURE: 132 MMHG | DIASTOLIC BLOOD PRESSURE: 83 MMHG | HEART RATE: 77 BPM | OXYGEN SATURATION: 95 % | RESPIRATION RATE: 16 BRPM

## 2021-02-03 DIAGNOSIS — M79.18 MYOFASCIAL PAIN: Primary | ICD-10-CM

## 2021-02-03 PROCEDURE — 20553 NJX 1/MLT TRIGGER POINTS 3/>: CPT | Performed by: PHYSICAL MEDICINE & REHABILITATION

## 2021-02-03 NOTE — PROGRESS NOTES
PM&R PROCEDURE NOTE    /83   Pulse 77   Resp 16   SpO2 95%      Current Outpatient Medications:      AFLURIA QUADRIVALENT 0.5 ML injection, TO BE ADMINISTERED BY PHARMACIST FOR IMMUNIZATION, Disp: , Rfl: 0     chlorthalidone (HYGROTON) 25 MG tablet, Take 1 tablet (25 mg) by mouth daily, Disp: 90 tablet, Rfl: 3     clonazePAM (KLONOPIN) 0.5 MG tablet, Week 1: start with 0.5 tab in the evenings. Week 2: take 0.5 tab in am and 0.5 tab in the evenings. Week 3: take 0.5 tab three times a day (am, noon, evenings). Week 4: take 0.5 tab in am and noon, and 1 tab in evening. Week 5: take 1 tab in am, 0.5 tab at noon, and 1 tab in evening. Week 6: take 1 tab thee times a day. Stop at dose that improves pain., Disp: 90 tablet, Rfl: 3     DULoxetine (CYMBALTA) 20 MG capsule, Take 2 capsules (40 mg) by mouth daily, Disp: 180 capsule, Rfl: 0     estradiol (ESTRACE) 0.5 MG tablet, Take 1 tablet (0.5 mg) by mouth daily, Disp: 90 tablet, Rfl: 3     IBUPROFEN PO, Take 200 mg by mouth, Disp: , Rfl:      losartan (COZAAR) 50 MG tablet, Take 1 tablet (50 mg) by mouth daily, Disp: 90 tablet, Rfl: 3     methocarbamol (ROBAXIN) 750 MG tablet, , Disp: , Rfl:      RELPAX 40 MG tablet, , Disp: , Rfl: 2     tretinoin (RETIN-A) 0.025 % external cream, Use every night, Disp: 45 g, Rfl: 3    Current Facility-Administered Medications:      botulinum toxin type A (BOTOX) 100 units injection 400 Units, 400 Units, Intramuscular, Q90 Days, Simi Vazquez MD     bupivacaine (MARCAINE) 0.5% preservative free injection, 3 mL, Intradermal, Once, Simi Vazquez MD     lidocaine 1 % 2 mL, 2 mL, Intradermal, Once, Simi Vazquez MD      No Known Allergies     PHYSICAL EXAM  Left laterollis with mild right rotation  Right shoulder elevation  Multiple areas of myofascial pain affecting right upper trapezius      HPI    Patient denies new medical diagnoses, illnesses, hospitalizations, emergency room visits, and injuries  "since the previous injection with botulinum neurotoxin.     She continues to report right sided neck and ear pain as well as jaw R>L. She endorses \"tightening\" of jaw muscles bilaterally, without grinding or clenching. She is concerned that there is something else wrong with her, as she feels \"buzzing\" internally on a regular basis.     RESPONSE TO PREVIOUS TREATMENT:    Patient Tia Pablo received 375 units of Botox on 20, which did not provide much benefit (she was getting a dose of 500 units previously at a different clinic).    Problems following the previous series of neurotoxin injections included:  No problems reported. Has never had issues with neck weakness in the past.  Interested in re-trial of trigger ponit injection for pain reduction today.     Benefits by Patient Report:    Pain Improvement:  minimal  % Pain Improvement: unable to quantify  Duration of Benefit:  N/A.    Dystonia and Spasticity Improvement:  Minimal  % Dystonia Improvement: unable to quantify  Duration of Benefit:  N/A.    TRIGGER POINT INJECTION PROCEDURES:    VERIFICATION OF PATIENT IDENTIFICATION AND PROCEDURE     Initials   Patient Name [facility]   Patient  [facility]   Procedure Verified by: karol     Prior to the start of the procedure and with procedural staff participation, I verbally confirmed the patient s identity using two indicators, relevant allergies, that the procedure was appropriate and matched the consent or emergent situation, and that the correct equipment/implants were available. Immediately prior to starting the procedure I conducted the Time Out with the procedural staff and re-confirmed the patient s name, procedure, and site/side. (The Joint Commission universal protocol was followed.)  Yes    Sedation (Moderate or Deep): None    Above assessments performed by:    Shruthi West, PT Care Coordinator    Simi Vazquez MD       INDICATIONS FOR PROCEDURES:  Tia Pablo is a 55 year old patient with segmental " dystonia and spasticity affecting the head, neck and shoulder girdle musculature and jaw muscles secondary to a diagnosis of spasmodic torticollis and oromandibular dystonia with associated pain and loss of joint motion.    Her baseline symptoms have been recalcitrant to oral medications and conservative therapy.  She is here today for trigger point injections with Bupivacaine and Lidocaine. No steroid will be utilized today as she had trigger point injections with Kenalog on 11/30/2020.    GOAL OF PROCEDURE:  The goal of this procedure is to improve increase active range of motion and decrease pain  associated with dystonic movements and spasticity.    CONSENT:  The risks, benefits, and treatment options were discussed with Tia Pablo and she agreed to proceed.      Written consent was obtained by [facility].     EQUIPMENT USED:  Needle-30 Gauge needle    DRUG:   Lidocaine 1% 10mg/mL (Lot# [id number]; Exp. 01/2024; NDC 87767-254-44) = 2 ml  Bupivacaine 0.5% 50mg/mL (Lot# [id number], Exp. 11/2023; NDC 30855-761-42) = 3 ml    SKIN PREPARATION:  Skin preparation was performed using chloroprep.    GUIDANCE DESCRIPTION:  Guidance was not utilized for this procedure     AREA/MUSCLE INJECTED:   Right splenius capitis - 1 mL at 4 sites    Right trapezius lateral - 2 mL at 10 sites  Left trapezius lateral - 0.6 mL at 2 sites    Right anterior scalene - 0.4 ml at 2 sites    Right auricular posterior - 0.2 mL in 2 sites    Right masseter - 0.4 mL at 1 sites  Left masseter - 0.4 mL at 1 sites      RESPONSE TO PROCEDURE:  Tia Pablo tolerated the procedure well. She was allowed to recover for an appropriate period of time and was discharged home in stable condition.     FOLLOW UP:   Tia Pablo will monitor her  response to today's trigger point injections and report at her next scheduled Botox appointment or via phone/MyChart.       PLAN:   She has not yet experienced optimal symptomatic relief of her cervical dystonia or the pain  "associated with her dystonia with Botox injections, however she was previously receiving 500 units of Botox and we are far under that dose at this point.  I am hopeful that once the dose is escalated to a more therapeutic level for her, she will began to experience significant relief of her pain and dystonic pulling. Plan will be to increase dose of Botox at next visit.     With regards to the \"buzzing\" sensation she feels throughout her body, I would like her to follow-up with Dr. Platt on this, as it sounds like a more generalized dystonia, and perhaps systemic medications will be a better treatment option for these symptoms.      "

## 2021-02-08 RX ORDER — BUPIVACAINE HYDROCHLORIDE 5 MG/ML
3 INJECTION, SOLUTION EPIDURAL; INTRACAUDAL ONCE
Status: COMPLETED | OUTPATIENT
Start: 2021-02-08 | End: 2021-02-08

## 2021-02-08 RX ADMIN — BUPIVACAINE HYDROCHLORIDE 15 MG: 5 INJECTION, SOLUTION EPIDURAL; INTRACAUDAL at 11:03

## 2021-02-09 ASSESSMENT — ENCOUNTER SYMPTOMS
MYALGIAS: 1
HEADACHES: 1
BACK PAIN: 1
NECK PAIN: 1
MUSCLE WEAKNESS: 1
ARTHRALGIAS: 1

## 2021-02-12 ENCOUNTER — OFFICE VISIT (OUTPATIENT)
Dept: NEUROLOGY | Facility: CLINIC | Age: 56
End: 2021-02-12
Payer: COMMERCIAL

## 2021-02-12 VITALS
OXYGEN SATURATION: 97 % | SYSTOLIC BLOOD PRESSURE: 144 MMHG | RESPIRATION RATE: 16 BRPM | DIASTOLIC BLOOD PRESSURE: 87 MMHG | HEART RATE: 79 BPM

## 2021-02-12 DIAGNOSIS — G47.63 BRUXISM, SLEEP-RELATED: Primary | ICD-10-CM

## 2021-02-12 DIAGNOSIS — M54.2 CERVICALGIA: ICD-10-CM

## 2021-02-12 DIAGNOSIS — M25.552 HIP PAIN, LEFT: ICD-10-CM

## 2021-02-12 DIAGNOSIS — G24.3 CERVICAL DYSTONIA: ICD-10-CM

## 2021-02-12 PROCEDURE — 99215 OFFICE O/P EST HI 40 MIN: CPT | Performed by: PSYCHIATRY & NEUROLOGY

## 2021-02-12 PROCEDURE — 99417 PROLNG OP E/M EACH 15 MIN: CPT | Performed by: PSYCHIATRY & NEUROLOGY

## 2021-02-12 ASSESSMENT — PAIN SCALES - GENERAL: PAINLEVEL: SEVERE PAIN (7)

## 2021-02-12 NOTE — NURSING NOTE
Chief Complaint   Patient presents with     RECHECK     Dystonia     [facility] RETURN MOVEMENT DISORDER 8 WK F/U        Logan Troy, EMT
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
## 2021-02-12 NOTE — PATIENT INSTRUCTIONS
Plan:  - Take clonazepam 0.5 mg. Follow the titration table below:              - Week 1: start with 0.5 tab in the evenings              - Week 2: take 0.5 tab in am and 0.5 tab in the evenings              - Week 3: take 0.5 tab three times a day (am, noon, evenings)              - Week 4: take 0.5 tab in am and noon, and 1 tab in evening              - Week 5: take 1 tab in am, 0.5 tab at noon, and 1 tab in evening              - Week 6: take 1 tab thee times a day.               - Stop at dose that improves pain.  - Dentistry referral to TMJ clinic   - Agree with PT for hip/pelvic/SI joint pain  - Discussed Trihexyphenidyl as an alternative medical therapy option but patient would like to try clonazepam scheduled   - Continue neck strengthening exercises and stretching daily  - Continue follow up with Dr. Vazquez, as scheduled

## 2021-02-23 ENCOUNTER — TRANSFERRED RECORDS (OUTPATIENT)
Dept: HEALTH INFORMATION MANAGEMENT | Facility: CLINIC | Age: 56
End: 2021-02-23

## 2021-03-08 ENCOUNTER — TELEPHONE (OUTPATIENT)
Dept: NEUROLOGY | Facility: CLINIC | Age: 56
End: 2021-03-08

## 2021-03-08 NOTE — TELEPHONE ENCOUNTER
[facility] Call Center    Phone Message    May a detailed message be left on voicemail: yes     Reason for Call: Other: Pt calling in with questions about her CYMBALTA and would rather discuss in detail with nurse, please call back to discuss further, she would like to note she thinks she is having depression and would like to know if she should increase CYMBALTA or if she should be on a different medication     Action Taken: Message routed to:  [facility] neuro     Travel Screening: Not Applicable

## 2021-03-09 ENCOUNTER — OFFICE VISIT (OUTPATIENT)
Dept: FAMILY MEDICINE | Facility: CLINIC | Age: 56
End: 2021-03-09
Payer: COMMERCIAL

## 2021-03-09 ENCOUNTER — ANCILLARY PROCEDURE (OUTPATIENT)
Dept: GENERAL RADIOLOGY | Facility: CLINIC | Age: 56
End: 2021-03-09
Attending: PHYSICIAN ASSISTANT
Payer: COMMERCIAL

## 2021-03-09 VITALS
BODY MASS INDEX: 26.68 KG/M2 | DIASTOLIC BLOOD PRESSURE: 74 MMHG | SYSTOLIC BLOOD PRESSURE: 132 MMHG | TEMPERATURE: 98.3 F | WEIGHT: 166 LBS | HEART RATE: 72 BPM | HEIGHT: 66 IN

## 2021-03-09 DIAGNOSIS — F32.0 CURRENT MILD EPISODE OF MAJOR DEPRESSIVE DISORDER WITHOUT PRIOR EPISODE (H): Primary | ICD-10-CM

## 2021-03-09 DIAGNOSIS — M25.561 ACUTE PAIN OF RIGHT KNEE: ICD-10-CM

## 2021-03-09 DIAGNOSIS — G24.1 GENETIC TORSION DYSTONIA: ICD-10-CM

## 2021-03-09 PROCEDURE — 99214 OFFICE O/P EST MOD 30 MIN: CPT | Performed by: PHYSICIAN ASSISTANT

## 2021-03-09 PROCEDURE — 73562 X-RAY EXAM OF KNEE 3: CPT | Mod: RT | Performed by: RADIOLOGY

## 2021-03-09 RX ORDER — BUPROPION HYDROCHLORIDE 150 MG/1
150 TABLET ORAL EVERY MORNING
Qty: 30 TABLET | Refills: 0 | Status: SHIPPED | OUTPATIENT
Start: 2021-03-09 | End: 2021-03-26

## 2021-03-09 ASSESSMENT — PAIN SCALES - GENERAL: PAINLEVEL: NO PAIN (0)

## 2021-03-09 ASSESSMENT — MIFFLIN-ST. JEOR: SCORE: 1364.72

## 2021-03-09 NOTE — PATIENT INSTRUCTIONS
START wellbutrin 150mg once daily    Continue the cymbalta same dose     Check back with me in 2-3 weeks (my chart or telephone visit okay)

## 2021-03-09 NOTE — TELEPHONE ENCOUNTER
Cymbalta is being prescribed by Gracie Aleman PA-C. I will forward this message to her to discuss with Tia.

## 2021-03-09 NOTE — PROGRESS NOTES
"    Assessment & Plan       (F32.0) Current mild episode of major depressive disorder without prior episode (H)  (primary encounter diagnosis)  Comment: mood struggling more. On cymbalta which was started for pain issues (neck dystonia) but now that she has been getting injections for that, pain is better. Not happy with weight gain and sexual side effects from the medication and worried about that worsening  Discussed trial of wellbutrin. Would leave cymbalta unchanged for now and add wellbutrin. If mood improved with wellbutrin could try tapering off cymbalta  Plan: buPROPion (WELLBUTRIN XL) 150 MG 24 hr tablet            (M25.561) Acute pain of right knee  Comment: reviewed xrays with patient. 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    KENDAL Evans [facility] ELI Weber is a 55 year old who presents for the following health issues     HPI       Medication Followup of Duloxetine 20 mg capsule     Taking Medication as prescribed: yes    Side Effects:  None    Medication Helping Symptoms:  She would like to discuss the dosing today.      -She would like her left ear looked at. Having some pain and crackling noises.     -She is also having some right knee pain. Has been off and on since January. Feels like it may give out on her sometimes.     More mood symptoms - feeling more down  Having some side effects from cymbalta which concerns her should the dose be increased        Review of Systems   Constitutional, HEENT, cardiovascular, pulmonary, gi and gu systems are negative, except as otherwise noted.      Objective    /74   Pulse 72   Temp 98.3  F (36.8  C) (Tympanic)   Ht 1.676 m (5' 6\")   Wt 75.3 kg (166 lb)   BMI 26.79 kg/m    Body mass index is 26.79 kg/m .  Physical " Exam   GENERAL: healthy, alert and no distress  EYES: Eyes grossly normal to inspection  EARS: normal ear canals, TM's normal  RESP: lungs clear to auscultation - no rales, rhonchi or wheezes  CV: regular rates and rhythm, normal S1 S2, no S3 or S4 and no murmur, click or rub  PSYCH: mentation appears normal, affect normal/bright    Diagnostic Tests:   Xray, knee: no acute pathology and joint space actually fairly well preserved on independent review. Final read by radiology pending

## 2021-03-14 DIAGNOSIS — G24.1 GENETIC TORSION DYSTONIA: ICD-10-CM

## 2021-03-15 RX ORDER — DULOXETIN HYDROCHLORIDE 20 MG/1
CAPSULE, DELAYED RELEASE ORAL
Qty: 90 CAPSULE | OUTPATIENT
Start: 2021-03-15

## 2021-03-24 ENCOUNTER — MYC MEDICAL ADVICE (OUTPATIENT)
Dept: FAMILY MEDICINE | Facility: CLINIC | Age: 56
End: 2021-03-24

## 2021-03-24 ENCOUNTER — OFFICE VISIT (OUTPATIENT)
Dept: PHYSICAL MEDICINE AND REHAB | Facility: CLINIC | Age: 56
End: 2021-03-24
Payer: COMMERCIAL

## 2021-03-24 VITALS
TEMPERATURE: 98.5 F | RESPIRATION RATE: 16 BRPM | HEART RATE: 71 BPM | OXYGEN SATURATION: 95 % | SYSTOLIC BLOOD PRESSURE: 135 MMHG | DIASTOLIC BLOOD PRESSURE: 84 MMHG

## 2021-03-24 DIAGNOSIS — G24.4 IDIOPATHIC OROFACIAL DYSTONIA: ICD-10-CM

## 2021-03-24 DIAGNOSIS — G24.3 CERVICAL DYSTONIA: Primary | ICD-10-CM

## 2021-03-24 DIAGNOSIS — G24.1 GENETIC TORSION DYSTONIA: ICD-10-CM

## 2021-03-24 DIAGNOSIS — F32.0 CURRENT MILD EPISODE OF MAJOR DEPRESSIVE DISORDER WITHOUT PRIOR EPISODE (H): ICD-10-CM

## 2021-03-24 PROCEDURE — 95874 GUIDE NERV DESTR NEEDLE EMG: CPT | Performed by: PHYSICAL MEDICINE & REHABILITATION

## 2021-03-24 PROCEDURE — 64642 CHEMODENERV 1 EXTREMITY 1-4: CPT | Performed by: PHYSICAL MEDICINE & REHABILITATION

## 2021-03-24 PROCEDURE — 64616 CHEMODENERV MUSC NECK DYSTON: CPT | Mod: 50 | Performed by: PHYSICAL MEDICINE & REHABILITATION

## 2021-03-24 PROCEDURE — 64643 CHEMODENERV 1 EXTREM 1-4 EA: CPT | Performed by: PHYSICAL MEDICINE & REHABILITATION

## 2021-03-24 PROCEDURE — 64612 DESTROY NERVE FACE MUSCLE: CPT | Mod: 50 | Performed by: PHYSICAL MEDICINE & REHABILITATION

## 2021-03-24 NOTE — LETTER
3/24/2021       RE: Tia Pablo  [address] [facility] [address]     Dear Colleague,    Thank you for referring your patient, Tia Pablo, to the [facility] PHYSICAL MEDICINE AND REHABILITATION CLINIC [place] at [facility]. Please see a copy of my visit note below.    BOTULINUM TOXIN PROCEDURE NOTE    No chief complaint on file.    /84   Pulse 71   Temp 98.5  F (36.9  C)   Resp 16   SpO2 95%     Current Outpatient Medications:      AFLURIA QUADRIVALENT 0.5 ML injection, TO BE ADMINISTERED BY PHARMACIST FOR IMMUNIZATION, Disp: , Rfl: 0     buPROPion (WELLBUTRIN XL) 150 MG 24 hr tablet, Take 1 tablet (150 mg) by mouth every morning, Disp: 30 tablet, Rfl: 0     chlorthalidone (HYGROTON) 25 MG tablet, Take 1 tablet (25 mg) by mouth daily, Disp: 90 tablet, Rfl: 3     clonazePAM (KLONOPIN) 0.5 MG tablet, Week 1: start with 0.5 tab in the evenings. Week 2: take 0.5 tab in am and 0.5 tab in the evenings. Week 3: take 0.5 tab three times a day (am, noon, evenings). Week 4: take 0.5 tab in am and noon, and 1 tab in evening. Week 5: take 1 tab in am, 0.5 tab at noon, and 1 tab in evening. Week 6: take 1 tab thee times a day. Stop at dose that improves pain., Disp: 90 tablet, Rfl: 3     DULoxetine (CYMBALTA) 20 MG capsule, Take 2 capsules (40 mg) by mouth daily, Disp: 180 capsule, Rfl: 0     estradiol (ESTRACE) 0.5 MG tablet, Take 1 tablet (0.5 mg) by mouth daily, Disp: 90 tablet, Rfl: 3     IBUPROFEN PO, Take 200 mg by mouth, Disp: , Rfl:      losartan (COZAAR) 50 MG tablet, Take 1 tablet (50 mg) by mouth daily, Disp: 90 tablet, Rfl: 3     methocarbamol (ROBAXIN) 750 MG tablet, , Disp: , Rfl:      RELPAX 40 MG tablet, , Disp: , Rfl: 2     tretinoin (RETIN-A) 0.025 % external cream, Use every night, Disp: 45 g, Rfl: 3    Current Facility-Administered Medications:      botulinum toxin type A (BOTOX) 100 units injection 400 Units, 400 Units,  Intramuscular, Q90 Days, Simi Vazquez MD     No Known Allergies     PHYSICAL EXAM:    HEAD, NECK AND TRUNK PATTERN:     Left laterollis with mild right rotation  Right shoulder elevation  Bilateral masseter hypertrophy    HPI:    Patient denies new medical diagnoses, illnesses, hospitalizations, emergency room visits, and injuries since the previous injection with botulinum neurotoxin.     Patient reports she has headaches almost every day. She was treated for migraines in the past.     We reviewed the recommended safety guidelines for  Botox from any vaccine injection, such as the seasonal flu vaccine, by a minimum of 10-14 days with Tia Pablo. She acknowledged understanding.    RESPONSE TO PREVIOUS TREATMENT:    Tia Pablo received 375 units of Botox on 2020, which was an increase from 275 units previously (dose has been as high as 500 units at [facility]). She received trigger point injections on 2/3/2021.    Problems following the previous series of neurotoxin injections included:  No problems reported    BENEFITS BY PATIENT REPORT:    Pain Improvement: Yes.  Percent Improvement: 70 %    Duration of Benefit:  4-5 weeks. When pain returns, it is usually in the jaw, accompanied by increased jaw clenching.    Dystonia Improvement: Yes.  Percent Improvement: 70 %    Duration of Benefit:  4-5 weeks.      BOTULINUM NEUROTOXIN INJECTION PROCEDURES:    VERIFICATION OF PATIENT IDENTIFICATION AND PROCEDURE     Initials   Patient Name karol   Patient  karol   Procedure Verified by: karol     Prior to the start of the procedure and with procedural staff participation, I verbally confirmed the patient s identity using two indicators, relevant allergies, that the procedure was appropriate and matched the consent or emergent situation, and that the correct equipment/implants were available. Immediately prior to starting the procedure I conducted the Time Out with the procedural  staff and re-confirmed the patient s name, procedure, and site/side. 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    Her baseline symptoms have been recalcitrant to oral medications and conservative therapy.  She is here today for an injection of Botox.      GOAL OF PROCEDURE:  The goal of this procedure is to increase active range of motion and decrease pain  associated with dystonic movements and spasticity.    TOTAL DOSE: 400 UNITS BOTOX  Dose Administered:  400 units Botox    Diluent Used:  Preservative Free Normal Saline  Total Volume of Diluent Used:  4  Lot # /[id number] with Expiration Date:  12/2023  NDC #: Botox 100u ([address]-04)    Medication guide was offered to patient and was declined.    CONSENT:  The risks, benefits, and treatment options were discussed with Tia Pablo and she agreed to proceed.      Written consent was obtained by [facility].     EQUIPMENT USED:  Needle-37mm stimulating/recording  EMG/NCS Machine    SKIN PREPARATION:  Skin preparation was performed using an alcohol wipe.    GUIDANCE DESCRIPTION:  Electro-myographic guidance was necessary throughout the procedure to accurately identify all areas of dystonic and spastic muscles while avoiding injection of non-dystonic muscles and non-spastic muscles, neighboring nerves and nearby vascular structures.     AREA/MUSCLE INJECTED: 400 UNITS BOTOX = TOTAL DOSE     1. Neck & Head Muscles: 160 units of Botox, 1:1 dilution     Right middle trapezius - 5 units of Botox at 1 site/s.   Left middle  trapezius - 10 units of Botox at 1 site/s.      Right Lateral Trapezius - 15 units of Botox at 3 site/s.   Left lateral trapezius - 15 units of Botox at 5 site/s.      Right splenius - 5 units of Botox at 1 site/s.   Left splenius - 20 units of Botox at 2 site/s.      Right Auricularis - 25 units of Botox at 3 site/s.   Left Auricularis - 25 units of Botox at 3 site/s.      Right occipitalis - 10 units of Botox at 3 site/s.   Left occipitalis - 10 units of Botox at 3 site/s.     Right Sternocleidomastoid - 10 units of Botox at 1 site/s.    Right Middle Scalene - 10 units of Botox at 1 site/s.        2. Upper Extremity Muscles: 80 units of Botox, 1:1 dilution     Right levator scapula - 25 units of Botox at 2 site/s.   Left levator scapula - 25 units of Botox at 2 site/s.      Right rhomboid - 5 units of Botox at 1 site/s.    Left Rhomboid - 25 units of Botox at 3 site/s.        3. Jaw Muscles: 160 units of Botox, 1:1 dilution     Right masseter - 50 units of Botox at 2 site/s.   Left masseter - 50 units of Botox at 2 site/s.     Right temporalis - 30 units of Botox at 5 site/s.               Left temporalis - 30 units of Botox at 5 site/s.      RESPONSE TO PROCEDURE:  Tia Pablo tolerated the procedure well and there were no immediate complications.  She was allowed to recover for an appropriate period of time and was discharged home in stable condition.    FOLLOW UP:  Tia Pablo was asked to follow up by phone in 7-14 days with Stacia Rose RN, Care Coordinator or Shruthi West PT, Care Coordinator, to report her response to this series of injections.  Based on the patient's previous response to this therapy, Tia Pablo was rescheduled for the next series of injections in 12 weeks.    PLAN (Medication Changes, Therapy Orders, Work or Disability Issues, etc.):  Increased dose today from 375 units to 400 units to improve efficacy and prolong duration - she was previously up to 500 units of Botox at  [facility] pain clinic, which provided her with more adequate benefit. Patient will monitor response to today's injections and report. Suspect that increased dose will provide better relief of symptoms related to her dystonia. She will be evaluated for migraines at a separate appointment.    Again, thank you for allowing me to participate in the care of your patient.      Sincerely,    iSmi Vazquez MD

## 2021-03-24 NOTE — PROGRESS NOTES
BOTULINUM TOXIN PROCEDURE NOTE    No chief complaint on file.    /84   Pulse 71   Temp 98.5  F (36.9  C)   Resp 16   SpO2 95%     Current Outpatient Medications:      AFLURIA QUADRIVALENT 0.5 ML injection, TO BE ADMINISTERED BY PHARMACIST FOR IMMUNIZATION, Disp: , Rfl: 0     buPROPion (WELLBUTRIN XL) 150 MG 24 hr tablet, Take 1 tablet (150 mg) by mouth every morning, Disp: 30 tablet, Rfl: 0     chlorthalidone (HYGROTON) 25 MG tablet, Take 1 tablet (25 mg) by mouth daily, Disp: 90 tablet, Rfl: 3     clonazePAM (KLONOPIN) 0.5 MG tablet, Week 1: start with 0.5 tab in the evenings. Week 2: take 0.5 tab in am and 0.5 tab in the evenings. Week 3: take 0.5 tab three times a day (am, noon, evenings). Week 4: take 0.5 tab in am and noon, and 1 tab in evening. Week 5: take 1 tab in am, 0.5 tab at noon, and 1 tab in evening. Week 6: take 1 tab thee times a day. Stop at dose that improves pain., Disp: 90 tablet, Rfl: 3     DULoxetine (CYMBALTA) 20 MG capsule, Take 2 capsules (40 mg) by mouth daily, Disp: 180 capsule, Rfl: 0     estradiol (ESTRACE) 0.5 MG tablet, Take 1 tablet (0.5 mg) by mouth daily, Disp: 90 tablet, Rfl: 3     IBUPROFEN PO, Take 200 mg by mouth, Disp: , Rfl:      losartan (COZAAR) 50 MG tablet, Take 1 tablet (50 mg) by mouth daily, Disp: 90 tablet, Rfl: 3     methocarbamol (ROBAXIN) 750 MG tablet, , Disp: , Rfl:      RELPAX 40 MG tablet, , Disp: , Rfl: 2     tretinoin (RETIN-A) 0.025 % external cream, Use every night, Disp: 45 g, Rfl: 3    Current Facility-Administered Medications:      botulinum toxin type A (BOTOX) 100 units injection 400 Units, 400 Units, Intramuscular, Q90 Days, Standal, Simi Mccloud MD     No Known Allergies     PHYSICAL EXAM:    HEAD, NECK AND TRUNK PATTERN:     Left laterollis with mild right rotation  Right shoulder elevation  Bilateral masseter hypertrophy    HPI:    Patient denies new medical diagnoses, illnesses, hospitalizations, emergency room visits, and injuries  since the previous injection with botulinum neurotoxin.     Patient reports she has headaches almost every day. She was treated for migraines in the past.     We reviewed the recommended safety guidelines for  Botox from any vaccine injection, such as the seasonal flu vaccine, by a minimum of 10-14 days with Tia Pablo. She acknowledged understanding.    RESPONSE TO PREVIOUS TREATMENT:    Tia Pablo received 375 units of Botox on 2020, which was an increase from 275 units previously (dose has been as high as 500 units at [facility]). She received trigger point injections on 2/3/2021.    Problems following the previous series of neurotoxin injections included:  No problems reported    BENEFITS BY PATIENT REPORT:    Pain Improvement: Yes.  Percent Improvement: 70 %    Duration of Benefit:  4-5 weeks. When pain returns, it is usually in the jaw, accompanied by increased jaw clenching.    Dystonia Improvement: Yes.  Percent Improvement: 70 %    Duration of Benefit:  4-5 weeks.      BOTULINUM NEUROTOXIN INJECTION PROCEDURES:    VERIFICATION OF PATIENT IDENTIFICATION AND PROCEDURE     Initials   Patient Name karol   Patient  karol   Procedure Verified by: karol     Prior to the start of the procedure and with procedural staff participation, I verbally confirmed the patient s identity using two indicators, relevant allergies, that the procedure was appropriate and matched the consent or emergent situation, and that the correct equipment/implants were available. Immediately prior to starting the procedure I conducted the Time Out with the procedural staff and re-confirmed the patient s name, procedure, and site/side. (The Joint Commission universal protocol was followed.)  Yes    Sedation (Moderate or Deep): None      Above assessments performed by:  Shruthi West, PT, Care Coordinator    Simi Vazquez MD       INDICATION/S FOR PROCEDURE/S:  Tia Pablo is a 55 year old patient with  segmental dystonia and spasticity affecting the  head, neck and shoulder girdle musculature secondary to a diagnosis of spasmodic torticollis and oromandibular dystonia with associated  pain and loss of joint motion.    Her baseline symptoms have been recalcitrant to oral medications and conservative therapy.  She is here today for an injection of Botox.      GOAL OF PROCEDURE:  The goal of this procedure is to increase active range of motion and decrease pain  associated with dystonic movements and spasticity.    TOTAL DOSE: 400 UNITS BOTOX  Dose Administered:  400 units Botox    Diluent Used:  Preservative Free Normal Saline  Total Volume of Diluent Used:  4  Lot # /[id number] with Expiration Date:  12/2023  NDC #: Botox 100u ([address]-23)    Medication guide was offered to patient and was declined.    CONSENT:  The risks, benefits, and treatment options were discussed with Tia Pablo and she agreed to proceed.      Written consent was obtained by [facility].     EQUIPMENT USED:  Needle-37mm stimulating/recording  EMG/NCS Machine    SKIN PREPARATION:  Skin preparation was performed using an alcohol wipe.    GUIDANCE DESCRIPTION:  Electro-myographic guidance was necessary throughout the procedure to accurately identify all areas of dystonic and spastic muscles while avoiding injection of non-dystonic muscles and non-spastic muscles, neighboring nerves and nearby vascular structures.     AREA/MUSCLE INJECTED: 400 UNITS BOTOX = TOTAL DOSE     1. Neck & Head Muscles: 160 units of Botox, 1:1 dilution     Right middle trapezius - 5 units of Botox at 1 site/s.   Left middle trapezius - 10 units of Botox at 1 site/s.      Right Lateral Trapezius - 15 units of Botox at 3 site/s.   Left lateral trapezius - 15 units of Botox at 5 site/s.      Right splenius - 5 units of Botox at 1 site/s.   Left splenius - 20 units of Botox at 2 site/s.      Right Auricularis - 25 units of Botox at 3 site/s.   Left Auricularis - 25 units of Botox  at 3 site/s.      Right occipitalis - 10 units of Botox at 3 site/s.   Left occipitalis - 10 units of Botox at 3 site/s.     Right Sternocleidomastoid - 10 units of Botox at 1 site/s.    Right Middle Scalene - 10 units of Botox at 1 site/s.        2. Upper Extremity Muscles: 80 units of Botox, 1:1 dilution     Right levator scapula - 25 units of Botox at 2 site/s.   Left levator scapula - 25 units of Botox at 2 site/s.      Right rhomboid - 5 units of Botox at 1 site/s.    Left Rhomboid - 25 units of Botox at 3 site/s.        3. Jaw Muscles: 160 units of Botox, 1:1 dilution     Right masseter - 50 units of Botox at 2 site/s.   Left masseter - 50 units of Botox at 2 site/s.     Right temporalis - 30 units of Botox at 5 site/s.               Left temporalis - 30 units of Botox at 5 site/s.      RESPONSE TO PROCEDURE:  Tia Pablo tolerated the procedure well and there were no immediate complications.  She was allowed to recover for an appropriate period of time and was discharged home in stable condition.    FOLLOW UP:  Tia Pablo was asked to follow up by phone in 7-14 days with Stacia Rose RN, Care Coordinator or Shruthi West PT, Care Coordinator, to report her response to this series of injections.  Based on the patient's previous response to this therapy, Tia Pablo was rescheduled for the next series of injections in 12 weeks.    PLAN (Medication Changes, Therapy Orders, Work or Disability Issues, etc.):  Increased dose today from 375 units to 400 units to improve efficacy and prolong duration - she was previously up to 500 units of Botox at [facility], which provided her with more adequate benefit. Patient will monitor response to today's injections and report. Suspect that increased dose will provide better relief of symptoms related to her dystonia. She will be evaluated for migraines at a separate appointment.

## 2021-03-26 RX ORDER — BUPROPION HYDROCHLORIDE 150 MG/1
150 TABLET ORAL EVERY MORNING
Qty: 90 TABLET | Refills: 1 | Status: SHIPPED | OUTPATIENT
Start: 2021-03-26 | End: 2021-06-16

## 2021-04-22 ENCOUNTER — MEDICAL CORRESPONDENCE (OUTPATIENT)
Dept: HEALTH INFORMATION MANAGEMENT | Facility: CLINIC | Age: 56
End: 2021-04-22

## 2021-04-30 ENCOUNTER — ALLIED HEALTH/NURSE VISIT (OUTPATIENT)
Dept: PHYSICAL MEDICINE AND REHAB | Facility: CLINIC | Age: 56
End: 2021-04-30
Payer: COMMERCIAL

## 2021-04-30 VITALS
HEART RATE: 78 BPM | OXYGEN SATURATION: 98 % | RESPIRATION RATE: 16 BRPM | TEMPERATURE: 98.4 F | SYSTOLIC BLOOD PRESSURE: 121 MMHG | DIASTOLIC BLOOD PRESSURE: 78 MMHG

## 2021-04-30 DIAGNOSIS — G43.719 CHRONIC MIGRAINE WITHOUT AURA, INTRACTABLE, WITHOUT STATUS MIGRAINOSUS: Primary | ICD-10-CM

## 2021-04-30 DIAGNOSIS — G57.02 PIRIFORMIS SYNDROME, LEFT: ICD-10-CM

## 2021-04-30 DIAGNOSIS — M79.18 MYOFASCIAL PAIN: ICD-10-CM

## 2021-04-30 PROCEDURE — 99214 OFFICE O/P EST MOD 30 MIN: CPT | Mod: 24 | Performed by: PHYSICAL MEDICINE & REHABILITATION

## 2021-04-30 PROCEDURE — 20553 NJX 1/MLT TRIGGER POINTS 3/>: CPT | Performed by: PHYSICAL MEDICINE & REHABILITATION

## 2021-04-30 ASSESSMENT — PAIN SCALES - GENERAL: PAINLEVEL: SEVERE PAIN (6)

## 2021-04-30 NOTE — PROGRESS NOTES
"[facility]  Physical Medicine & Rehabilitation Consultation    Tia Pablo   YOB: 1965  MRN: [id number]   Date of Service: 04/30/21    Chief complaint:  Chronic migraine headache evaluation and trigger point injections    HPI:  Tia Pablo has a history of headaches dating back to high school. She has been receiving Botox injections for oromandibular and cervical dystonia, however, she has not received official approval for Botox for management of her chronic migraine headaches. She was having Botox for her migraine headaches at outside clinic prior to transferring to [facility]. This was extremely beneficial.     Headache Family History:  history of migraine headaches in mother and maternal grandmother    Current Headache Pattern:      Frequency (How many headache days per month?): Prior to Botox injections, she was experiencing 25-30 headache days per month. When she was having Botox injections for headache, she experienced 4-8 headache days per month. Currently, she is not receiving any formal Botox for migraine headache, although she is receiving Botox for management of cervical and oromandibular dystonia.      Duration of Headache:  Usually a few hours until she takes her Relpax, which seems to be helpful at aborting her migraine. She can take up to two in one day. She uses Relpax more often when the Botox wears off.      Aura: Sometimes see \"sparkles\" in vision     Associated Symptoms:  nausea, vomiting, light sensitivity and sound sensitivity    Migraine Complications:  No new stroke-like symptoms, loss of vision or speech, numbness or weakness       Description of Headache Pain & Location:  bilateral in the temporal area, frontal area, and base of skull. The worst area are the corrugators. Feels like an \"ax is in her head\".      Level of Pain during usual or 'typical' headache:  3/10 - she can still function through this level " of pain      Level of Pain during the worst headache:  7/10 - requires laying in a dark room.     Do headaches interfere with or prevent usual activities or diminish patient's productivity at home or work?  Yes    Non-Pharmacologic Treatments Tried:  (such as food trigger elimination diets, biofeedback, relaxation, physical therapy, chiropractic, etc.)  Neck exercise program, Aerobic exercise program, Trial of ice or heat to neck, Physical therapy, Accupuncture and Triger point injection, yoga.    Medications Tried:    Rescue Meds:  APAP/ASA/Caf     NSAIDs: Ibuprofen, naproxen  Triptan's: Maxalt / Maxalt MLT (Rizatriptan) PO, Imitrex (Sumatriptan) SQ  Adjunctive Therapy: None  Prophylactic Drug Treatment: Antiepileptics - Neurontin (Gabapentin)  Topamax (Topiramate) - caused cognitive difficulties  Beta - Blockers - None - BP runs low  Ca++ Channel Blockers - None  Muscle Relaxants - Flexeril  Tricyclics - Elavil (Amitriptyline), Pamelor (Nortriptyline)      Current Outpatient Medications   Medication     AFLURIA QUADRIVALENT 0.5 ML injection     buPROPion (WELLBUTRIN XL) 150 MG 24 hr tablet     chlorthalidone (HYGROTON) 25 MG tablet     clonazePAM (KLONOPIN) 0.5 MG tablet     DULoxetine (CYMBALTA) 20 MG capsule     estradiol (ESTRACE) 0.5 MG tablet     IBUPROFEN PO     losartan (COZAAR) 50 MG tablet     methocarbamol (ROBAXIN) 750 MG tablet     RELPAX 40 MG tablet     tretinoin (RETIN-A) 0.025 % external cream     Current Facility-Administered Medications   Medication     botulinum toxin type A (BOTOX) 100 units injection 400 Units       Physical Examination:  VS:  /78   Pulse 78   Temp 98.4  F (36.9  C)   Resp 16   SpO2 98%     Procedure:     Drug:   Bupivacaine 0.5%: 9 ml (Lot: [id number], Exp: 2023, NDC: 75648-306-49)  Lidocaine 1%: 1 ml (Lot: [id number], Exp: 2024, NDC: 06363-135-46)  Kenalo mg = 1 ml (Lot: [id number], Exp: 10/2022, NDC: 701210-1049-10)     Prior to the start of the procedure and  with procedural staff participation, I verbally confirmed the patient s identity using two indicators, relevant allergies, that the procedure was appropriate and matched the consent or emergent situation, and that the correct equipment/implants were available. Immediately prior to starting the procedure I conducted the Time Out with the procedural staff and re-confirmed the patient s name, procedure, and site/side. (The Joint Commission universal protocol was followed.)  Yes    Sedation (Moderate or Deep): None      Trigger Point Injections    Areas were prepped with ChloraPrep.  Using standard precautions a 25 mm EMG needle with EMG guidance was used to inject a 10 ml mixture of 1% lidocaine, 0.5% bupivacaine and Kenalog into the upper trapezius, rhomboid, levator scapulae, and masseter muscle(s) bilaterally for a total of 30 injection sites, and a 1 ml mixture into the left gluteus lai region for a total of 3 site/s.      Tia Pablo tolerated the procedure well without any immediate complications. She was allowed to recover for an appropriate period of time and was discharged home in stable condition.  Patient will follow-up regarding response to this procedure.      Assessment/Plan:    Tia Pablo is a 55 year old female with a history of cervical dystonia, oromandibular dystonia and chronic migraine headaches who presents to clinic for formal migraine headache evaluation for Botox and trigger point injections.       She has tried a number of pharmacologic and non-pharmacologic treatments to manage her chronic migraine headaches with limited success. She is a good candidate for trial of Botox for management of chronic migraine headache, and therefore a prior authorization will be requested for an additional 200 units of Botox for management of her chronic migraine headaches.    Additionally, patient has numerous complaints about pain in her left gluteal/piriformis region. We did some trigger point  injections to the affected area today, however, I explained to her that it would be more beneficial for her to have her piriformis injected under fluroscopy with one of our pain physicians. A referral was placed to Dr. Richter in [facility] for consideration of piriformis injection or any other modality he believes is appropriate for management of this issue.     Simi Vazquez MD       I spent a total of 30 minutes, face-to-face and managing the care of Tia Pablo, excluding the procedure. Over 50% of my time was spent counseling the patient and coordinating care. Please see note for details.

## 2021-05-04 RX ORDER — BUPIVACAINE HYDROCHLORIDE 5 MG/ML
10 INJECTION, SOLUTION EPIDURAL; INTRACAUDAL ONCE
Status: COMPLETED | OUTPATIENT
Start: 2021-05-04 | End: 2021-05-04

## 2021-05-04 RX ORDER — TRIAMCINOLONE ACETONIDE 40 MG/ML
40 INJECTION, SUSPENSION INTRA-ARTICULAR; INTRAMUSCULAR ONCE
Status: COMPLETED | OUTPATIENT
Start: 2021-05-04 | End: 2021-05-04

## 2021-05-04 RX ADMIN — BUPIVACAINE HYDROCHLORIDE 50 MG: 5 INJECTION, SOLUTION EPIDURAL; INTRACAUDAL at 17:08

## 2021-05-04 RX ADMIN — TRIAMCINOLONE ACETONIDE 40 MG: 40 INJECTION, SUSPENSION INTRA-ARTICULAR; INTRAMUSCULAR at 17:09

## 2021-05-07 ENCOUNTER — TELEPHONE (OUTPATIENT)
Dept: PHYSICAL MEDICINE AND REHAB | Facility: CLINIC | Age: 56
End: 2021-05-07

## 2021-05-07 NOTE — TELEPHONE ENCOUNTER
Spoke with Dr. Vazquez and she spoke with Dr. Richter about having this patient see him for a piriformis injection. Writer was told no referral was needed. Scheduled patient for appointment and she has a  just in case her leg gets numb from injection.       Michelle aKt RN, BSN, PHN

## 2021-05-19 DIAGNOSIS — Z00.00 ROUTINE GENERAL MEDICAL EXAMINATION AT A HEALTH CARE FACILITY: ICD-10-CM

## 2021-05-19 DIAGNOSIS — I10 ESSENTIAL HYPERTENSION: ICD-10-CM

## 2021-05-19 RX ORDER — LOSARTAN POTASSIUM 50 MG/1
50 TABLET ORAL DAILY
Qty: 90 TABLET | Refills: 1 | Status: SHIPPED | OUTPATIENT
Start: 2021-05-19 | End: 2021-11-09

## 2021-05-19 NOTE — TELEPHONE ENCOUNTER
Pharmacy states system deleted an order for this medication that was previously sent.  Please resend.    MARCIE SHIELDS

## 2021-05-24 ENCOUNTER — TELEPHONE (OUTPATIENT)
Dept: PHYSICAL MEDICINE AND REHAB | Facility: CLINIC | Age: 56
End: 2021-05-24

## 2021-05-24 NOTE — TELEPHONE ENCOUNTER
Health Call Center    Phone Message    May a detailed message be left on voicemail: yes     Reason for Call: Other: Tia calling to request a call back to discuss if she would be able to get a sooner appointment than 6/14/21 for her injection. If not, she is wondering if she sould be able to have her botox appt (6/16/21) and her injection on the same day. Please csall Tia at your earliest convenience to discuss.     Action Taken: Message routed to:  [facility] ([facility]    Travel Screening: Not Applicable

## 2021-05-24 NOTE — TELEPHONE ENCOUNTER
VM left asking for return call with any questions/concerns.    At this moment, there are no available changes we can make to accomodate the patient coming in the same day. There are also no appts sooner then the one scheduled with Dr. Richter.

## 2021-06-02 DIAGNOSIS — M62.838 MUSCLE SPASM: Primary | ICD-10-CM

## 2021-06-02 RX ORDER — METHOCARBAMOL 750 MG/1
750 TABLET, FILM COATED ORAL 3 TIMES DAILY PRN
Qty: 120 TABLET | Refills: 1 | Status: SHIPPED | OUTPATIENT
Start: 2021-06-02 | End: 2021-12-13

## 2021-06-08 ENCOUNTER — MYC MEDICAL ADVICE (OUTPATIENT)
Dept: FAMILY MEDICINE | Facility: CLINIC | Age: 56
End: 2021-06-08

## 2021-06-10 ENCOUNTER — OFFICE VISIT (OUTPATIENT)
Dept: FAMILY MEDICINE | Facility: CLINIC | Age: 56
End: 2021-06-10
Payer: COMMERCIAL

## 2021-06-10 VITALS
TEMPERATURE: 97.9 F | SYSTOLIC BLOOD PRESSURE: 130 MMHG | BODY MASS INDEX: 25.28 KG/M2 | DIASTOLIC BLOOD PRESSURE: 88 MMHG | OXYGEN SATURATION: 98 % | WEIGHT: 156.6 LBS | HEART RATE: 82 BPM

## 2021-06-10 DIAGNOSIS — F32.0 CURRENT MILD EPISODE OF MAJOR DEPRESSIVE DISORDER WITHOUT PRIOR EPISODE (H): Primary | ICD-10-CM

## 2021-06-10 DIAGNOSIS — G24.1 GENETIC TORSION DYSTONIA: ICD-10-CM

## 2021-06-10 PROCEDURE — 99214 OFFICE O/P EST MOD 30 MIN: CPT | Performed by: FAMILY MEDICINE

## 2021-06-10 RX ORDER — BUPROPION HYDROCHLORIDE 300 MG/1
300 TABLET ORAL EVERY MORNING
Qty: 30 TABLET | Refills: 0 | Status: SHIPPED | OUTPATIENT
Start: 2021-06-10 | End: 2021-07-09

## 2021-06-10 ASSESSMENT — PATIENT HEALTH QUESTIONNAIRE - PHQ9
SUM OF ALL RESPONSES TO PHQ QUESTIONS 1-9: 5
5. POOR APPETITE OR OVEREATING: NOT AT ALL

## 2021-06-10 ASSESSMENT — ANXIETY QUESTIONNAIRES
2. NOT BEING ABLE TO STOP OR CONTROL WORRYING: SEVERAL DAYS
5. BEING SO RESTLESS THAT IT IS HARD TO SIT STILL: NOT AT ALL
6. BECOMING EASILY ANNOYED OR IRRITABLE: MORE THAN HALF THE DAYS
3. WORRYING TOO MUCH ABOUT DIFFERENT THINGS: SEVERAL DAYS
1. FEELING NERVOUS, ANXIOUS, OR ON EDGE: NEARLY EVERY DAY
7. FEELING AFRAID AS IF SOMETHING AWFUL MIGHT HAPPEN: SEVERAL DAYS
GAD7 TOTAL SCORE: 8

## 2021-06-10 NOTE — NURSING NOTE
"Initial /88   Pulse 82   Temp 97.9  F (36.6  C) (Tympanic)   Wt 71 kg (156 lb 9.6 oz)   SpO2 98%   Breastfeeding No   BMI 25.28 kg/m   Estimated body mass index is 25.28 kg/m  as calculated from the following:    Height as of 3/9/21: 1.676 m (5' 6\").    Weight as of this encounter: 71 kg (156 lb 9.6 oz). .      "

## 2021-06-10 NOTE — PATIENT INSTRUCTIONS
We decided to increase the wellbutrin today to 300mg once daily.  This should be effective within 4-5 weeks.  I would recommend we follow up in that time frame.  If you feel it makes anxiety or agitation worse please let me know right away.    I'll review your chart and we can decied if medical cannabis seems like a good option.  I will reach out to you through your MyChart with any questions    Okay to begin taking the estradiol every other day.  We can discuss this when we follow up in 1 month regarding your mood.
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
## 2021-06-11 ASSESSMENT — ANXIETY QUESTIONNAIRES: GAD7 TOTAL SCORE: 8

## 2021-06-14 ENCOUNTER — MYC MEDICAL ADVICE (OUTPATIENT)
Dept: FAMILY MEDICINE | Facility: CLINIC | Age: 56
End: 2021-06-14

## 2021-06-14 DIAGNOSIS — F32.0 CURRENT MILD EPISODE OF MAJOR DEPRESSIVE DISORDER WITHOUT PRIOR EPISODE (H): Primary | ICD-10-CM

## 2021-06-15 DIAGNOSIS — G24.3 CERVICAL DYSTONIA: ICD-10-CM

## 2021-06-15 DIAGNOSIS — G43.719 CHRONIC MIGRAINE WITHOUT AURA, INTRACTABLE, WITHOUT STATUS MIGRAINOSUS: Primary | ICD-10-CM

## 2021-06-15 DIAGNOSIS — G24.4 OROMANDIBULAR DYSTONIA: ICD-10-CM

## 2021-06-16 ENCOUNTER — TELEPHONE (OUTPATIENT)
Dept: PHYSICAL MEDICINE AND REHAB | Facility: CLINIC | Age: 56
End: 2021-06-16

## 2021-06-16 DIAGNOSIS — G24.4 OROMANDIBULAR DYSTONIA: ICD-10-CM

## 2021-06-16 DIAGNOSIS — G24.3 CERVICAL DYSTONIA: ICD-10-CM

## 2021-06-16 DIAGNOSIS — G43.719 CHRONIC MIGRAINE WITHOUT AURA, INTRACTABLE, WITHOUT STATUS MIGRAINOSUS: ICD-10-CM

## 2021-06-16 NOTE — TELEPHONE ENCOUNTER
[facility] Call Center    Phone Message    May a detailed message be left on voicemail: yes     Reason for Call: Other: Tia calling to inquire if there are any sooner appointments available for her injection that is scheduled on 6/24/21. Please call Tia at your earliest convenience to discuss.     Action Taken: Message routed to:  [facility] ([facility]    Travel Screening: Not Applicable

## 2021-06-16 NOTE — TELEPHONE ENCOUNTER
[facility] Call Center    Phone Message    May a detailed message be left on voicemail: yes     Reason for Call: Other: Tia calling back. She would like to know if her insurance is going to cover 06/17 apt. Please call her back to discuss.      Action Taken: Message routed to:  [facility]    Travel Screening: Not Applicable

## 2021-06-16 NOTE — TELEPHONE ENCOUNTER
Patient left VM on writers voice mail requesting a work in appointment 06/17/21. Patient was already scheduled for 6/17/21. Closing encounter.       Michelle Kat RN, BSN, PHN

## 2021-06-16 NOTE — TELEPHONE ENCOUNTER
Spoke with the patient in regards to appt. Unsure if PA needed. MD will complete assessment before any injections. She thought SIJ injection vs Piriformis injection. No further questions.

## 2021-06-16 NOTE — TELEPHONE ENCOUNTER
Spoke with the patient in regards to scheduling. She c/o pain and has been waiting for her appt which has been rescheduled due to clinic reasons. Informed the patient that a message would be sent to MD to review for approval if appropriate to add to clinic sooner.

## 2021-06-17 ENCOUNTER — OFFICE VISIT (OUTPATIENT)
Dept: PHYSICAL MEDICINE AND REHAB | Facility: CLINIC | Age: 56
End: 2021-06-17
Payer: COMMERCIAL

## 2021-06-17 VITALS
OXYGEN SATURATION: 98 % | HEART RATE: 85 BPM | SYSTOLIC BLOOD PRESSURE: 147 MMHG | WEIGHT: 156.2 LBS | BODY MASS INDEX: 25.21 KG/M2 | DIASTOLIC BLOOD PRESSURE: 84 MMHG

## 2021-06-17 DIAGNOSIS — M76.32 IT BAND SYNDROME, LEFT: ICD-10-CM

## 2021-06-17 DIAGNOSIS — G57.02 PIRIFORMIS SYNDROME, LEFT: ICD-10-CM

## 2021-06-17 DIAGNOSIS — M79.18 CHRONIC GLUTEAL PAIN: Primary | ICD-10-CM

## 2021-06-17 DIAGNOSIS — G89.29 CHRONIC GLUTEAL PAIN: Primary | ICD-10-CM

## 2021-06-17 DIAGNOSIS — M47.816 LUMBAR SPONDYLOSIS: ICD-10-CM

## 2021-06-17 PROCEDURE — 99215 OFFICE O/P EST HI 40 MIN: CPT | Mod: 24 | Performed by: PHYSICAL MEDICINE & REHABILITATION

## 2021-06-17 PROCEDURE — 20552 NJX 1/MLT TRIGGER POINT 1/2: CPT | Performed by: PHYSICAL MEDICINE & REHABILITATION

## 2021-06-17 RX ORDER — TRIAMCINOLONE ACETONIDE 40 MG/ML
40 INJECTION, SUSPENSION INTRA-ARTICULAR; INTRAMUSCULAR ONCE
Status: DISCONTINUED | OUTPATIENT
Start: 2021-06-17 | End: 2022-02-22

## 2021-06-17 ASSESSMENT — PAIN SCALES - GENERAL: PAINLEVEL: EXTREME PAIN (8)

## 2021-06-17 NOTE — PROGRESS NOTES
Patient seen at the request of Dr. Simi Vazquez for an opinion and evaluation of piriformis syndrome.      HISTORY OF PRESENT ILLNESS:  Tia Pablo is a 55 year old female who presents with a chief complaint of left gluteal pain.     Pain began fairly soon following robot-assisted, laparoscopic total hysterectomy (11/2017). She has been through 2 courses of physical therapy (2018, 2019); most recently she has been going to physical therapy for cervical dystonia within the last two months. Her therapist has shown some piriformis stretches which does help to some degree. She did see a chiropractor after initial PT which did not help.     The pain is localized to the left gluteal region with radiating symptoms to the anterolateral thigh to the level of the knee; she denies specific numbness/tingling; described as a relatively constant ache in nature. Pain is reported as 8/10 at rest today. Symptoms are worse with generalized activity, tends to be worse when sitting (she can sit 1hr before needing to stand/walk due to pain); and improved with heating pad and stretching. she does report significant pain-related sleep disturbance.     Functional limitations: She feels that she is no longer as active due to pain and symptoms. She had previously gone to body and weight lifting class. Would also like to use treadmill more but unable to do pain. Her work-day is also impacted. 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        PRIOR INJURIES/TREATMENT:   Ice/Heat: heat pad with temporary relief   Physical Therapy:   Prior courses of physical therapy       - Current Pain Medications -   Robaxin 750mg TID  Clonazepam 0.5mg TID  Advil prn - minimal relief if any  Tylenol prn - minimal relief if any  Diclofenac gel prn - without relief     Prior Procedures:  Date      Procedure     Improvement (%)  None         Prior Related Surgery:    11/02/2017 - ROBOTIC ASSISTED LAPAROSCOPIC TOTAL  HYSTERECTOMY BILATERAL SALPINGOOOPHORECTOMY, cystoscopy (Dr. Yesi Washington)  Other (acupuncture, OMT, CMM, TENS, DME, etc.):   CMM without relief     Specialists Seen - (with most recent, available notes and clinic visits reviewed)   1. PM&R - Dr. Vazquez   2. Neurology - Dr. Platt     IMAGING - reviewed   2020 - XR Pelvis and L spine ([facility])  Essentially normal.     Review Of Systems:  I am responding to those symptoms which are directly relevant to the specific indication for my consultation. I recommend that the patient follow up with their primary or referring provider to pursue any other symptoms which may be of concern.       Medical History:  She  has a past medical history of Anxiety, Depression, and HTN (hypertension).     She  has a past surgical history that includes  section; Hysterectomy; and REPAIR TEND/MUS/FLEX FOREARM/WRIST, PRIM, EACH.      Family History  Her family history includes Autoimmune Disease in her mother; Heart Disease in her father; Hyperlipidemia in her mother; Hypertension in her father and mother; Skin Cancer in her father.     Social History:  Work: Full time for [employer]. 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 She has never used smokeless tobacco. She reports current alcohol use. She reports that she does not use drugs.        Current Medications:   She has a current medication list which includes the following prescription(s): bupropion, chlorthalidone, clonazepam, losartan, methocarbamol, and relpax, and the following Facility-Administered Medications: botulinum toxin type a and botulinum toxin type a.       Allergies:   No Known Allergies    PHYSICAL EXAMINATION:  BP (!) 147/84   Pulse 85   Wt 70.9 kg (156 lb 3.2 oz)   SpO2 98%   BMI 25.21 kg/m     General: Pleasant, straightforward, WDWN individual.  Mental Status: Pleasant, direct, flat affect  Resp: breathing is unlabored  without audible wheeze  Vascular: Palpable pedal pulses, no cyanosis, no venous stasis changes  Heme: no visible ecchymosis or erythema on extremities  Skin: No notable rash    Neurologic:  Strength: All major muscle groups of the bilateral lower extremities have normal and symmetric muscle strength     Sensation: SILT in lower extremities bilaterally L3-S1     DTRs: bilateral lower extremity stretch reflexes are equal and symmetric   Clonus: none    Gait: reveals normal emily and stride     Musculoskeletal:  Lumbar Spine: Mildly reduced  ROM and pain with end-range flex/ext,  L-S jct diffusely tender to palpation, facet loading with generalized pain. Str Leg Raise negative    Left SI joint maneuvers: TTP SIJ (sacral sulcus/PSIS), Jerod (pt pointing) pos, Hilario equivocal, Gaenslen generalized, Yeoman's neg    Left hip w/ full ROM. Pain with piriformis stretch. Tender diffusely along TFL/GMed/GMax and more focally to piriformis muscle. Gluteal insertion/ GT bursa w/ some tenderness to palpation Pos Michaela's    ASSESSMENT:  Tia Pablo is a pleasant 55 year old female who presents with:    #. Chronic gluteal pain  #. Piriformis syndrome, left    #. It band syndrome, left  #. Lumbar spondylosis      PLAN:  - Sonographically-guided left piriformis muscle injection performed today (see detailed procedure note)  - Consider MRI L spine +/- pelvis in the future.   - Considering Medical marijuana (CBD) for modulation of chronic pain which may be reasonable  - She will message me x2-4 weeks and update on reponse to today's procedure.     Ready to learn, no apparent learning barriers.  Education provided on treatment plan according to patient's preferred learning style.  Patient verbalizes understanding.   __________________________________  Isabel Richter MD  Physical Medicine & Rehabilitation        65 minutes spent on the date of the encounter doing chart review, review of outside records, review of test results,  patient visit and documentation

## 2021-06-17 NOTE — PROCEDURES
PROCEDURE NOTE: Piriformis Muscle Injection Under Ultrasound Guidance    PROCEDURE DATE: 6/17/2021    PATIENT NAME: Tia Pablo  YOB: 1965    ATTENDING PHYSICIAN: Isabel Richter MD  FELLOW/RESIDENT PHYSICIAN: None     PREOPERATIVE DIAGNOSIS:   1. Chronic gluteal pain    2. Piriformis syndrome, left    3. It band syndrome, left    4. Lumbar spondylosis       POSTOPERATIVE DIAGNOSIS: same    PROCEDURE PERFORMED: Left Piriformis Muscle Injection Under Ultrasound Guidance    ULTRASOUND WAS USED.    INDICATIONS FOR THE PROCEDURE:  Tia Pablo is a 55 year old female who presents with piriformis syndrome.     PROCEDURE AND FINDINGS:  She was greeted in the clinic. The risk, benefits and alternatives to the procedure were again reviewed with her and informed consent was obtained and the patient agreed to proceed. A time-out was performed. Following review alternatives, benefits and risks, the procedure was carried out under sterile prep with sterile gel. The use of direct sonographic guidance was used to ensure accurate placement of the needle (rather than non-guided injection) and required to minimize the risk of bleeding or injury to nearby neurovascular structures.     A 5-1MHz ultrasound transducer was used to visualize the relevant structures and determine the optimal needle path for the procedure.  2mL 1% lidocaine was infiltrated at the needle insertion site subcutaneously. Then, a 22 gauge 5-inch Quincke spinal needle was advanced from lateral to medial utilizing an in-plane approach in long axis, under continuous ultrasound guidance to the piriformis muscle. After negative aspiration, slow injection of the treatment solution 4mL total consisting of 1mL Kenalog (40mg/mL) and 3mL of 1% Lidocaine was instilled into affected area. The tip of the needle was visualized throughout the procedure. The remainder of the single-use vials were discarded.      Before the procedure, she reported a pain score  of 8/10. After the procedure, she reported a pain score of 6/10.    She tolerated the procedure well, was discharged home in stable condition.     Follow up will be determined after response to today's procedure.     COMPLICATIONS: None    COMMENTS: None

## 2021-06-17 NOTE — NURSING NOTE
Chief Complaint   Patient presents with     Consult     Left buttock pain radiating down into thigh     Joao Douglass, CMA

## 2021-06-17 NOTE — LETTER
6/17/2021       RE: iTa Pablo  [id number] [facility] [id number]     Dear Colleague,    Thank you for referring your patient, Tia Pablo, to the [facility] [place] at [facility]. Please see a copy of my visit note below.    Patient seen at the request of Dr. Simi Vazquez for an opinion and evaluation of piriformis syndrome.      HISTORY OF PRESENT ILLNESS:  Tia Pablo is a 55 year old female who presents with a chief complaint of left gluteal pain.     Pain began fairly soon following robot-assisted, laparoscopic total hysterectomy (11/2017). She has been through 2 courses of physical therapy (2018, 2019); most recently she has been going to physical therapy for cervical dystonia within the last two months. Her therapist has shown some piriformis stretches which does help to some degree. She did see a chiropractor after initial PT which did not help.     The pain is localized to the left gluteal region with radiating symptoms to the anterolateral thigh to the level of the knee; she denies specific numbness/tingling; described as a relatively constant ache in nature. Pain is reported as 8/10 at rest today. Symptoms are worse with generalized activity, tends to be worse when sitting (she can sit 1hr before needing to stand/walk due to pain); and improved with heating pad and stretching. she does report significant pain-related sleep disturbance.     Functional limitations: She feels that she is no longer as active due to pain and symptoms. She had previously gone to body and weight lifting class. Would also like to use treadmill more but unable to do pain. Her work-day is also impacted. 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        PRIOR INJURIES/TREATMENT:   Ice/Heat: heat pad with temporary relief   Physical Therapy:   Prior courses of physical therapy       -  Current Pain Medications -   Robaxin 750mg TID  Clonazepam 0.5mg TID  Advil prn - minimal relief if any  Tylenol prn - minimal relief if any  Diclofenac gel prn - without relief     Prior Procedures:  Date      Procedure     Improvement (%)  None         Prior Related Surgery:    2017 - ROBOTIC ASSISTED LAPAROSCOPIC TOTAL HYSTERECTOMY BILATERAL SALPINGOOOPHORECTOMY, cystoscopy (Padmini, Dr. Key)  Other (acupuncture, OMT, CMM, TENS, DME, etc.):   CMM without relief     Specialists Seen - (with most recent, available notes and clinic visits reviewed)   1. PM&R - Dr. Vazquez   2. Neurology - Dr. Platt     IMAGING - reviewed   2020 - XR Pelvis and L spine ([facility])  Essentially normal.     Review Of Systems:  I am responding to those symptoms which are directly relevant to the specific indication for my consultation. I recommend that the patient follow up with their primary or referring provider to pursue any other symptoms which may be of concern.       Medical History:  She  has a past medical history of Anxiety, Depression, and HTN (hypertension).     She  has a past surgical history that includes  section; Hysterectomy; and REPAIR TEND/MUS/FLEX FOREARM/WRIST, PRIM, EACH.      Family History  Her family history includes Autoimmune Disease in her mother; Heart Disease in her father; Hyperlipidemia in her mother; Hypertension in her father and mother; Skin Cancer in her father.     Social History:  Work: Full time for [employer]. 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 She has never used smokeless tobacco. She reports current alcohol use. She reports that she does not use drugs.        Current Medications:   She has a current medication list which includes the following prescription(s): bupropion, chlorthalidone, clonazepam, losartan, methocarbamol, and relpax, and the following Facility-Administered Medications:  botulinum toxin type a and botulinum toxin type a.       Allergies:   No Known Allergies    PHYSICAL EXAMINATION:  BP (!) 147/84   Pulse 85   Wt 70.9 kg (156 lb 3.2 oz)   SpO2 98%   BMI 25.21 kg/m     General: Pleasant, straightforward, WDWN individual.  Mental Status: Pleasant, direct, flat affect  Resp: breathing is unlabored without audible wheeze  Vascular: Palpable pedal pulses, no cyanosis, no venous stasis changes  Heme: no visible ecchymosis or erythema on extremities  Skin: No notable rash    Neurologic:  Strength: All major muscle groups of the bilateral lower extremities have normal and symmetric muscle strength     Sensation: SILT in lower extremities bilaterally L3-S1     DTRs: bilateral lower extremity stretch reflexes are equal and symmetric   Clonus: none    Gait: reveals normal emily and stride     Musculoskeletal:  Lumbar Spine: Mildly reduced  ROM and pain with end-range flex/ext,  L-S jct diffusely tender to palpation, facet loading with generalized pain. Str Leg Raise negative    Left SI joint maneuvers: TTP SIJ (sacral sulcus/PSIS), Jerod (pt pointing) pos, Hilario equivocal, Gaenslen generalized, Yeoman's neg    Left hip w/ full ROM. Pain with piriformis stretch. Tender diffusely along TFL/GMed/GMax and more focally to piriformis muscle. Gluteal insertion/ GT bursa w/ some tenderness to palpation Pos Michaela's    ASSESSMENT:  Tia Pablo is a pleasant 55 year old female who presents with:    #. Chronic gluteal pain  #. Piriformis syndrome, left    #. It band syndrome, left  #. Lumbar spondylosis      PLAN:  - Sonographically-guided left piriformis muscle injection performed today (see detailed procedure note)  - Consider MRI L spine +/- pelvis in the future.   - Considering Medical marijuana (CBD) for modulation of chronic pain which may be reasonable  - She will message me x2-4 weeks and update on reponse to today's procedure.     Ready to learn, no apparent learning barriers.   Education provided on treatment plan according to patient's preferred learning style.  Patient verbalizes understanding.   __________________________________  Isabel Richter MD  Physical Medicine & Rehabilitation        65 minutes spent on the date of the encounter doing chart review, review of outside records, review of test results, patient visit and documentation           Again, thank you for allowing me to participate in the care of your patient.      Sincerely,    Isabel Richter MD

## 2021-06-18 ENCOUNTER — OFFICE VISIT (OUTPATIENT)
Dept: PHYSICAL MEDICINE AND REHAB | Facility: CLINIC | Age: 56
End: 2021-06-18
Payer: COMMERCIAL

## 2021-06-18 DIAGNOSIS — G24.4 OROMANDIBULAR DYSTONIA: Primary | ICD-10-CM

## 2021-06-18 DIAGNOSIS — G24.3 SPASMODIC TORTICOLLIS: ICD-10-CM

## 2021-06-18 DIAGNOSIS — G43.719 CHRONIC MIGRAINE WITHOUT AURA, INTRACTABLE, WITHOUT STATUS MIGRAINOSUS: ICD-10-CM

## 2021-06-18 PROCEDURE — 95874 GUIDE NERV DESTR NEEDLE EMG: CPT | Performed by: PHYSICAL MEDICINE & REHABILITATION

## 2021-06-18 PROCEDURE — 64643 CHEMODENERV 1 EXTREM 1-4 EA: CPT | Performed by: PHYSICAL MEDICINE & REHABILITATION

## 2021-06-18 PROCEDURE — 64642 CHEMODENERV 1 EXTREMITY 1-4: CPT | Performed by: PHYSICAL MEDICINE & REHABILITATION

## 2021-06-18 PROCEDURE — 99212 OFFICE O/P EST SF 10 MIN: CPT | Mod: 24 | Performed by: PHYSICAL MEDICINE & REHABILITATION

## 2021-06-18 PROCEDURE — 64612 DESTROY NERVE FACE MUSCLE: CPT | Mod: RT | Performed by: PHYSICAL MEDICINE & REHABILITATION

## 2021-06-18 PROCEDURE — 64646 CHEMODENERV TRUNK MUSC 1-5: CPT | Performed by: PHYSICAL MEDICINE & REHABILITATION

## 2021-06-18 PROCEDURE — 64616 CHEMODENERV MUSC NECK DYSTON: CPT | Mod: RT | Performed by: PHYSICAL MEDICINE & REHABILITATION

## 2021-06-18 NOTE — LETTER
6/18/2021       RE: Tia Pablo  [address] [facility] [address]     Dear Colleague,    Thank you for referring your patient, Tia Pablo, to the [facility] [place] at [facility]. Please see a copy of my visit note below.    BOTULINUM TOXIN PROCEDURE NOTE    Chief Complaint   Patient presents with     RECHECK     BOTOX INJECTION     There were no vitals taken for this visit.    Current Outpatient Medications:      buPROPion (WELLBUTRIN XL) 300 MG 24 hr tablet, Take 1 tablet (300 mg) by mouth every morning, Disp: 30 tablet, Rfl: 0     chlorthalidone (HYGROTON) 25 MG tablet, Take 1 tablet (25 mg) by mouth daily, Disp: 90 tablet, Rfl: 3     clonazePAM (KLONOPIN) 0.5 MG tablet, Take 1 tablet by mouth three times daily, Disp: 270 tablet, Rfl: 1     losartan (COZAAR) 50 MG tablet, Take 1 tablet (50 mg) by mouth daily, Disp: 90 tablet, Rfl: 1     methocarbamol (ROBAXIN) 750 MG tablet, Take 1 tablet (750 mg) by mouth 3 times daily as needed for muscle spasms, Disp: 120 tablet, Rfl: 1     RELPAX 40 MG tablet, Take 40 mg by mouth at onset of headache , Disp: , Rfl: 2    Current Facility-Administered Medications:      botulinum toxin type A (BOTOX) 100 units injection 200 Units, 200 Units, Intramuscular, Q90 Days, Simi Vazquez MD     [START ON 6/29/2021] botulinum toxin type A (BOTOX) 100 units injection 400 Units, 400 Units, Intramuscular, Q90 Days, Simi Vazquez MD     lidocaine 1 % 5 mL, 5 mL, Injection, Once, Isabel Richter MD     triamcinolone (KENALOG-40) injection 40 mg, 40 mg, Intramuscular, Once, Isabel Richter MD     No Known Allergies     PHYSICAL EXAM:    HEAD, NECK AND TRUNK PATTERN:     Left laterollis with mild right rotation  Right shoulder elevation  Bilateral masseter hypertrophy    HPI:    Patient denies new medical diagnoses, illnesses, hospitalizations, emergency  room visits, and injuries since the previous injection with botulinum neurotoxin.     She is currently taking clonazepam 0.5 mg bid, but would like to start weaning off of this medication. She would like to consider medical cannabis, which would hopefully allow her to wean off of the clonazepam.     She had a left piriformis injection with Dr. Richter on 2021, and she states that so far her pain is significantly better.     We reviewed the recommended safety guidelines for  Botox from any vaccine injection, such as the seasonal flu vaccine, by a minimum of 10-14 days with Tia Pablo. She acknowledged understanding.    RESPONSE TO PREVIOUS TREATMENT:    Tia Pablo received 400 units of Botox on 2021, which was an increase from 375 units previously (dose has been as high as 500 units at [facility] pain clinic).     Problems following the previous series of neurotoxin injections included:  No problems reported    BENEFITS BY PATIENT REPORT:    Pain Improvement: Yes.  Percent Improvement: 80 %    Duration of Benefit:  7 weeks. When pain returns, it is usually in the jaw, accompanied by increased jaw clenching.    Dystonia Improvement: Yes.  Percent Improvement: 80 %    Duration of Benefit:  7 weeks, followed by a gradual reduction in benefit.      BOTULINUM NEUROTOXIN INJECTION PROCEDURES:    VERIFICATION OF PATIENT IDENTIFICATION AND PROCEDURE     Initials   Patient Name ses   Patient  ses   Procedure Verified by: ses     Prior to the start of the procedure and with procedural staff participation, I verbally confirmed the patient s identity using two indicators, relevant allergies, that the procedure was appropriate and matched the consent or emergent situation, and that the correct equipment/implants were available. Immediately prior to starting the procedure I conducted the Time Out with the procedural staff and re-confirmed the patient s name, procedure, and site/side. (The Joint  Commission universal protocol was followed.)  Yes    Sedation (Moderate or Deep): None      Above assessments performed by:    Simi Vazquez MD         INDICATION/S FOR PROCEDURE/S:  Tia Pablo is a 55 year old patient with segmental dystonia and spasticity affecting the  head, neck and shoulder girdle musculature secondary to a diagnosis of spasmodic torticollis and oromandibular dystonia with associated  pain and loss of joint motion.    Her baseline symptoms have been recalcitrant to oral medications and conservative therapy.  She is here today for an injection of Botox.      GOAL OF PROCEDURE:  The goal of this procedure is to increase active range of motion and decrease pain  associated with dystonic movements and spasticity.    TOTAL DOSE: 400 UNITS BOTOX  Dose Administered:  400 units Botox    Diluent Used:  Preservative Free Normal Saline  Total Volume of Diluent Used:  See dilutional details below  Lot # /C3 with Expiration Date:  12/2023  NDC #: Botox 100u ([address]-65)    Medication guide was offered to patient and was declined.    CONSENT:  The risks, benefits, and treatment options were discussed with Tia Pablo and she agreed to proceed.      Written consent was obtained by [facility].     EQUIPMENT USED:  Needle-37mm stimulating/recording  EMG/NCS Machine    SKIN PREPARATION:  Skin preparation was performed using an alcohol wipe.    GUIDANCE DESCRIPTION:  Electro-myographic guidance was necessary throughout the procedure to accurately identify all areas of dystonic and spastic muscles while avoiding injection of non-dystonic muscles and non-spastic muscles, neighboring nerves and nearby vascular structures.     AREA/MUSCLE INJECTED: 400 UNITS BOTOX = TOTAL DOSE     1. Neck Muscles: 125 units of Botox, 1:1 Dilution     Right middle trapezius - 5 units of Botox at 1 site/s.   Left middle trapezius - 5 units of Botox at 1 site/s.      Right Lateral Trapezius - 10 units of Botox at 3 site/s.    Left lateral trapezius - 10 units of Botox at 5 site/s.      Right splenius - 5 units of Botox at 1 site/s.   Left splenius - 5 units of Botox at 1 site/s.      Right Auricularis - 15 units of Botox at 3 site/s.   Left Auricularis - 15 units of Botox at 3 site/s.      Right occipitalis - 10 units of Botox at 3 site/s.   Left occipitalis - 10 units of Botox at 3 site/s.     Right Sternocleidomastoid - 10 units of Botox at 1 site/s.    Right Middle Scalene - 15 units of Botox at 1 site/s.     Right Platysma - 5 units of Botox at 2 site/s.  Left Platysma - 5 units of Botox at 2 site/s.      2. Upper Extremity & Trunk Muscles: 110 units of Botox, 1:1 Dilution     Right levator scapula - 25 units of Botox at 2 site/s.   Left levator scapula - 25 units of Botox at 2 site/s.      Right Rhomboid - 15 units of Botox at 1 site/s.    Left Rhomboid - 15 units of Botox at 3 site/s.       Right Latissimus Dorsi - 15 units of Botox at 3 site/s.    Left Latissimus Dorsi - 15 units of Botox at 3 site/s.      3. Jaw Muscles: 130 units of Botox, 1:1 Dilution     Right masseter - 30 units of Botox at 2 site/s.   Left masseter - 30 units of Botox at 2 site/s.     Right temporalis - 30 units of Botox at 5 site/s.               Left temporalis - 30 units of Botox at 5 site/s.     Right medial pterygoid - 5 units of Botox at 1 site/s.    Left medial pterygoid - 5 units of Botox at 1 site/s.     4. Facial Muscles: 35 units of Botox, 2:1 Dilution     Right Frontalis - 10 units of Botox at 1 site/s.    Left Frontalis - 10 units of Botox at 1 site/s.      Procerus - 5 units of Botox at 1 site/s.       Right  - 5 units of Botox at 1 site/s.    Left  - 5 units of Botox at 1 site/s.       RESPONSE TO PROCEDURE:  Tia Pablo tolerated the procedure well and there were no immediate complications.  She was allowed to recover for an appropriate period of time and was discharged home in stable condition.    FOLLOW UP:  Tia MANUEL  Samy was asked to follow up by phone in 7-14 days with Stacia Rose RN, Care Coordinator or Shruthi West PT, Care Coordinator, to report her response to this series of injections.  Based on the patient's previous response to this therapy, Tia Pablo was rescheduled for the next series of injections in 12 weeks.    PLAN (Medication Changes, Therapy Orders, Work or Disability Issues, etc.):     Patient has been authorized for 400 units of Botox, but given that she has had higher doses of Botox in the past (up to 500 units at [facility]), I would like to request higher dose for next appointment. She has widespread dystonia affecting her neck, jaw, shoulder, upper extremity and trunk musculature, as well as chronic migraine headaches. For this reason, she requires higher doses of Botox for optimization of her conditions. Plan will be to ask insurer for up to 500 units of Botox - 200 units for migraine headache and 300 units for cervical, oromandibular and genetic torsion dystonia.      Certified patient for medical cannabis today, with hope that she may be able to successfully wean off the clonazepam. We discussed that since she is already down to one tablet (0.5 mg) bid, she can drop down to one tablet daily for a week and then discontinue.       I spent a total of 10 minutes, face-to-face and managing the care of Tia Pablo, excluding the procedure. Over 50% of my time was spent counseling the patient and coordinating care. Please see note for details.         Again, thank you for allowing me to participate in the care of your patient.      Sincerely,    Simi Vazquez MD

## 2021-06-18 NOTE — PROGRESS NOTES
BOTULINUM TOXIN PROCEDURE NOTE    Chief Complaint   Patient presents with     RECHECK     BOTOX INJECTION     There were no vitals taken for this visit.    Current Outpatient Medications:      buPROPion (WELLBUTRIN XL) 300 MG 24 hr tablet, Take 1 tablet (300 mg) by mouth every morning, Disp: 30 tablet, Rfl: 0     chlorthalidone (HYGROTON) 25 MG tablet, Take 1 tablet (25 mg) by mouth daily, Disp: 90 tablet, Rfl: 3     clonazePAM (KLONOPIN) 0.5 MG tablet, Take 1 tablet by mouth three times daily, Disp: 270 tablet, Rfl: 1     losartan (COZAAR) 50 MG tablet, Take 1 tablet (50 mg) by mouth daily, Disp: 90 tablet, Rfl: 1     methocarbamol (ROBAXIN) 750 MG tablet, Take 1 tablet (750 mg) by mouth 3 times daily as needed for muscle spasms, Disp: 120 tablet, Rfl: 1     RELPAX 40 MG tablet, Take 40 mg by mouth at onset of headache , Disp: , Rfl: 2    Current Facility-Administered Medications:      botulinum toxin type A (BOTOX) 100 units injection 200 Units, 200 Units, Intramuscular, Q90 Days, Simi Vazquez MD     [START ON 6/29/2021] botulinum toxin type A (BOTOX) 100 units injection 400 Units, 400 Units, Intramuscular, Q90 Days, Simi Vazquez MD     lidocaine 1 % 5 mL, 5 mL, Injection, Once, Isabel Richter MD     triamcinolone (KENALOG-40) injection 40 mg, 40 mg, Intramuscular, Once, Isabel Richter MD     No Known Allergies     PHYSICAL EXAM:    HEAD, NECK AND TRUNK PATTERN:     Left laterollis with mild right rotation  Right shoulder elevation  Bilateral masseter hypertrophy    HPI:    Patient denies new medical diagnoses, illnesses, hospitalizations, emergency room visits, and injuries since the previous injection with botulinum neurotoxin.     She is currently taking clonazepam 0.5 mg bid, but would like to start weaning off of this medication. She would like to consider medical cannabis, which would hopefully allow her to wean off of the clonazepam.     She had a left piriformis injection with  Dr. Richter on 2021, and she states that so far her pain is significantly better.     We reviewed the recommended safety guidelines for  Botox from any vaccine injection, such as the seasonal flu vaccine, by a minimum of 10-14 days with Tia Pablo. She acknowledged understanding.    RESPONSE TO PREVIOUS TREATMENT:    Tia Pablo received 400 units of Botox on 2021, which was an increase from 375 units previously (dose has been as high as 500 units at [facility]).     Problems following the previous series of neurotoxin injections included:  No problems reported    BENEFITS BY PATIENT REPORT:    Pain Improvement: Yes.  Percent Improvement: 80 %    Duration of Benefit:  7 weeks. When pain returns, it is usually in the jaw, accompanied by increased jaw clenching.    Dystonia Improvement: Yes.  Percent Improvement: 80 %    Duration of Benefit:  7 weeks, followed by a gradual reduction in benefit.      BOTULINUM NEUROTOXIN INJECTION PROCEDURES:    VERIFICATION OF PATIENT IDENTIFICATION AND PROCEDURE     Initials   Patient Name ses   Patient  ses   Procedure Verified by: ses     Prior to the start of the procedure and with procedural staff participation, I verbally confirmed the patient s identity using two indicators, relevant allergies, that the procedure was appropriate and matched the consent or emergent situation, and that the correct equipment/implants were available. Immediately prior to starting the procedure I conducted the Time Out with the procedural staff and re-confirmed the patient s name, procedure, and site/side. 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    Her baseline symptoms have been recalcitrant to oral medications and conservative therapy.  She is here today for an injection of Botox.      GOAL OF PROCEDURE:  The goal of this procedure is to increase active range of motion and decrease pain  associated with dystonic movements and spasticity.    TOTAL DOSE: 400 UNITS BOTOX  Dose Administered:  400 units Botox    Diluent Used:  Preservative Free Normal Saline  Total Volume of Diluent Used:  See dilutional details below  Lot # /C3 with Expiration Date:  12/2023  NDC #: Botox 100u ([address]-01)    Medication guide was offered to patient and was declined.    CONSENT:  The risks, benefits, and treatment options were discussed with Tia Pablo and she agreed to proceed.      Written consent was obtained by [facility].     EQUIPMENT USED:  Needle-37mm stimulating/recording  EMG/NCS Machine    SKIN PREPARATION:  Skin preparation was performed using an alcohol wipe.    GUIDANCE DESCRIPTION:  Electro-myographic guidance was necessary throughout the procedure to accurately identify all areas of dystonic and spastic muscles while avoiding injection of non-dystonic muscles and non-spastic muscles, neighboring nerves and nearby vascular structures.     AREA/MUSCLE INJECTED: 400 UNITS BOTOX = TOTAL DOSE     1. Neck Muscles: 125 units of Botox, 1:1 Dilution     Right middle trapezius - 5 units of Botox at 1 site/s.   Left middle trapezius - 5 units of Botox at 1 site/s.      Right Lateral Trapezius - 10 units of Botox at 3 site/s.   Left lateral trapezius - 10 units of Botox at 5 site/s.      Right splenius - 5 units of Botox at 1 site/s.   Left splenius - 5 units of Botox at 1 site/s.      Right Auricularis - 15 units of Botox at 3 site/s.   Left Auricularis - 15 units of Botox at 3 site/s.      Right occipitalis - 10 units of Botox at 3 site/s.   Left occipitalis - 10 units  of Botox at 3 site/s.     Right Sternocleidomastoid - 10 units of Botox at 1 site/s.    Right Middle Scalene - 15 units of Botox at 1 site/s.     Right Platysma - 5 units of Botox at 2 site/s.  Left Platysma - 5 units of Botox at 2 site/s.      2. Upper Extremity & Trunk Muscles: 110 units of Botox, 1:1 Dilution     Right levator scapula - 25 units of Botox at 2 site/s.   Left levator scapula - 25 units of Botox at 2 site/s.      Right Rhomboid - 15 units of Botox at 1 site/s.    Left Rhomboid - 15 units of Botox at 3 site/s.       Right Latissimus Dorsi - 15 units of Botox at 3 site/s.    Left Latissimus Dorsi - 15 units of Botox at 3 site/s.      3. Jaw Muscles: 130 units of Botox, 1:1 Dilution     Right masseter - 30 units of Botox at 2 site/s.   Left masseter - 30 units of Botox at 2 site/s.     Right temporalis - 30 units of Botox at 5 site/s.               Left temporalis - 30 units of Botox at 5 site/s.     Right medial pterygoid - 5 units of Botox at 1 site/s.    Left medial pterygoid - 5 units of Botox at 1 site/s.     4. Facial Muscles: 35 units of Botox, 2:1 Dilution     Right Frontalis - 10 units of Botox at 1 site/s.    Left Frontalis - 10 units of Botox at 1 site/s.      Procerus - 5 units of Botox at 1 site/s.       Right  - 5 units of Botox at 1 site/s.    Left  - 5 units of Botox at 1 site/s.       RESPONSE TO PROCEDURE:  Tia Pablo tolerated the procedure well and there were no immediate complications.  She was allowed to recover for an appropriate period of time and was discharged home in stable condition.    FOLLOW UP:  Tia Pablo was asked to follow up by phone in 7-14 days with Stacia Rose RN, Care Coordinator or Shruthi West PT, Care Coordinator, to report her response to this series of injections.  Based on the patient's previous response to this therapy, Tia Pablo was rescheduled for the next series of injections in 12 weeks.    PLAN (Medication  Changes, Therapy Orders, Work or Disability Issues, etc.):     Patient has been authorized for 400 units of Botox, but given that she has had higher doses of Botox in the past (up to 500 units at [facility]), I would like to request higher dose for next appointment. She has widespread dystonia affecting her neck, jaw, shoulder, upper extremity and trunk musculature, as well as chronic migraine headaches. For this reason, she requires higher doses of Botox for optimization of her conditions. Plan will be to ask insurer for up to 500 units of Botox - 200 units for migraine headache and 300 units for cervical, oromandibular and genetic torsion dystonia.      Certified patient for medical cannabis today, with hope that she may be able to successfully wean off the clonazepam. We discussed that since she is already down to one tablet (0.5 mg) bid, she can drop down to one tablet daily for a week and then discontinue.       I spent a total of 10 minutes, face-to-face and managing the care of Tia Pablo, excluding the procedure. Over 50% of my time was spent counseling the patient and coordinating care. Please see note for details.

## 2021-06-20 ENCOUNTER — MYC MEDICAL ADVICE (OUTPATIENT)
Dept: FAMILY MEDICINE | Facility: CLINIC | Age: 56
End: 2021-06-20

## 2021-06-24 ENCOUNTER — OFFICE VISIT (OUTPATIENT)
Dept: FAMILY MEDICINE | Facility: CLINIC | Age: 56
End: 2021-06-24
Payer: COMMERCIAL

## 2021-06-24 VITALS
HEART RATE: 80 BPM | TEMPERATURE: 98.3 F | DIASTOLIC BLOOD PRESSURE: 80 MMHG | OXYGEN SATURATION: 97 % | WEIGHT: 153.6 LBS | BODY MASS INDEX: 24.79 KG/M2 | SYSTOLIC BLOOD PRESSURE: 132 MMHG

## 2021-06-24 DIAGNOSIS — R05.9 COUGH: Primary | ICD-10-CM

## 2021-06-24 DIAGNOSIS — N95.1 VASOMOTOR SYMPTOMS DUE TO MENOPAUSE: ICD-10-CM

## 2021-06-24 PROCEDURE — 99213 OFFICE O/P EST LOW 20 MIN: CPT | Performed by: FAMILY MEDICINE

## 2021-06-24 RX ORDER — ESTRADIOL 0.5 MG/1
0.5 TABLET ORAL EVERY OTHER DAY
COMMUNITY
End: 2021-07-13

## 2021-06-24 NOTE — PROGRESS NOTES
"    A/P:      ICD-10-CM    1. Cough  R05    2. Vasomotor symptoms due to menopause  N95.1      Patient Instructions   The cough could be related to allergy or a viral illness.  I would lean toward a viral illness at this time causing the fatigue and cough.  Please begin the flonase as we discussed, 2 sprays in each nostril once daily.  We could consider a cough medicine for symptoms relief as well if you would like, just let me know if you change your mind.    I would expect the flonase to be helping your postnasal drainage and cough in a week or so.      If you develop a fever, worsening or more productive cough or chest pain and shortness of breath please seek additional evaluation      reviewed wean of estradiol.  Pt will resume wean when she is ready      Subjective   Tia is a 55 year old who presents for the following health issues   HPI     * follow up - weaning off estradiol    Stopped the estradiol completely but then restarted every other night due to night sweats.  Doing better now, plans to continue the wean    Acute Illness  Acute illness concerns: cough   Onset/Duration: 2 weeks   Symptoms:  Fever: no  Chills/Sweats: YES- sweats - menopausal, better on higher estrogen dose.  Headache (location?): YES  Sinus Pressure: no  Conjunctivitis:  no  Ear Pain: no, hears crackling, feels fluid  Rhinorrhea: no  Congestion: YES- mild  Sore Throat: no  Cough: YES  Wheeze: no  Decreased Appetite: YES- loss of taste and smell since she had COVID Nov 2020  Nausea: no  Vomiting: no  Diarrhea: no  Dysuria/Freq.: no  Dysuria or Hematuria: no  Fatigue/Achiness: YES  Sick/Strep Exposure: no  Therapies tried and outcome: none    Feeling fatigued and \"run down\".  Has been coughing for a few weeks.  Not having sinus pressure or a lot of nasal congestion.  Does have some postnasal drainage.  Has not had a h/o of allergy in the past.  No fever.  No ill contacts.  No treatment tried.    Cough is the same day and night.  Cough " is not productive.    No previous h/o similar cough.      *  Struggling with loss of taste since Covid.  Things have a bad taste and smell.  Wonders if there is anything she can do about this.      Review of Systems   Constitutional, HEENT, cardiovascular, pulmonary, gi and gu systems are negative, except as otherwise noted.      Objective    /80   Pulse 80   Temp 98.3  F (36.8  C) (Tympanic)   Wt 69.7 kg (153 lb 9.6 oz)   SpO2 97%   Breastfeeding No   BMI 24.79 kg/m    Body mass index is 24.79 kg/m .  Physical Exam   PE:  VS as above   Gen:  WN/WD/WH female in NAD   HEENT:  NC/AT, conjunctiva wnl, TM's wnl jose pearly gray with good light reflex, oropharynx clear without  Exudate/erythema   Neck:  supple, no LAD appreciated   Heart:  RRR without murmur, nl S1, S2, no rubs or gallops   Lungs CTA jose without rales/ronchi/wheezes   Ext:  No pedal edema        Epic reviewed

## 2021-06-24 NOTE — PATIENT INSTRUCTIONS
The cough could be related to allergy or a viral illness.  I would lean toward a viral illness at this time causing the fatigue and cough.  Please begin the flonase as we discussed, 2 sprays in each nostril once daily.  We could consider a cough medicine for symptoms relief as well if you would like, just let me know if you change your mind.    I would expect the flonase to be helping your postnasal drainage and cough in a week or so.      If you develop a fever, worsening or more productive cough or chest pain and shortness of breath please seek additional evaluation

## 2021-06-28 PROBLEM — N95.1 VASOMOTOR SYMPTOMS DUE TO MENOPAUSE: Status: ACTIVE | Noted: 2021-06-28

## 2021-07-09 DIAGNOSIS — F32.0 CURRENT MILD EPISODE OF MAJOR DEPRESSIVE DISORDER WITHOUT PRIOR EPISODE (H): ICD-10-CM

## 2021-07-09 RX ORDER — BUPROPION HYDROCHLORIDE 300 MG/1
TABLET ORAL
Qty: 90 TABLET | Refills: 1 | Status: SHIPPED | OUTPATIENT
Start: 2021-07-09 | End: 2021-07-19 | Stop reason: DRUGHIGH

## 2021-07-09 NOTE — TELEPHONE ENCOUNTER
"Requested Prescriptions   Pending Prescriptions Disp Refills     buPROPion (WELLBUTRIN XL) 300 MG 24 hr tablet [Pharmacy Med Name: BUPROPION HCL  MG TABLET] 30 tablet 0     Sig: TAKE 1 TABLET BY MOUTH EVERY MORNING       SSRIs Protocol Failed - 7/9/2021 12:36 AM        Failed - PHQ-9 score less than 5 in past 6 months     Please review last PHQ-9 score.           Passed - Medication is Bupropion     If the medication is Bupropion (Wellbutrin), and the patient is taking for smoking cessation; OK to refill.          Passed - Medication is active on med list        Passed - Patient is age 18 or older        Passed - No active pregnancy on record        Passed - No positive pregnancy test in last 12 months        Passed - Recent (6 mo) or future (30 days) visit within the authorizing provider's specialty     Patient had office visit in the last 6 months or has a visit in the next 30 days with authorizing provider or within the authorizing provider's specialty.  See \"Patient Info\" tab in inbasket, or \"Choose Columns\" in Meds & Orders section of the refill encounter.                 "

## 2021-07-10 ENCOUNTER — MYC MEDICAL ADVICE (OUTPATIENT)
Dept: PHYSICAL MEDICINE AND REHAB | Facility: CLINIC | Age: 56
End: 2021-07-10

## 2021-07-12 ENCOUNTER — TELEPHONE (OUTPATIENT)
Dept: FAMILY MEDICINE | Facility: CLINIC | Age: 56
End: 2021-07-12

## 2021-07-12 DIAGNOSIS — G43.719 INTRACTABLE CHRONIC MIGRAINE WITHOUT AURA AND WITHOUT STATUS MIGRAINOSUS: Primary | ICD-10-CM

## 2021-07-12 NOTE — TELEPHONE ENCOUNTER
Routing to provider to advise; please see patient's [employer] message update about her pain. She said the TPI lasted a few weeks and she got about 80% relief at it's best. She is wondering what the next steps are.     Michelle Kat RN, BSN, PHN

## 2021-07-13 DIAGNOSIS — G43.719 INTRACTABLE CHRONIC MIGRAINE WITHOUT AURA AND WITHOUT STATUS MIGRAINOSUS: ICD-10-CM

## 2021-07-13 RX ORDER — ELETRIPTAN HYDROBROMIDE 40 MG/1
40 TABLET, FILM COATED ORAL
Qty: 12 TABLET | Refills: 3 | Status: SHIPPED | OUTPATIENT
Start: 2021-07-13 | End: 2022-12-07

## 2021-07-13 RX ORDER — ELETRIPTAN HYDROBROMIDE 40 MG/1
40 TABLET, FILM COATED ORAL
Qty: 12 TABLET | Refills: 3 | Status: CANCELLED | OUTPATIENT
Start: 2021-07-13

## 2021-07-13 NOTE — NURSING NOTE
Tia Pablo's goals for this visit include: discuss multiple joint pain  She requests these members of her care team be copied on today's visit information:     PCP: Gracie Aleman    Referring Provider:  No referring provider defined for this encounter.    There were no vitals taken for this visit.    Do you need any medication refills at today's visit? None      MYNOR Herrera [facility]

## 2021-07-13 NOTE — TELEPHONE ENCOUNTER
eletriptan (RELPAX) 40 MG tablet 12 tablet 3 7/13/2021  --   Sig - Route: Take 1 tablet (40 mg) by mouth at onset of headache for migraine May repeat in 2 hours. Max 2 tablets/24 hours. - Oral   Sent to pharmacy as: Eletriptan Hydrobromide 40 MG Oral Tablet (RELPAX)   Class: E-Prescribe   Order: [id number]   E-Prescribing Status: Receipt confirmed by pharmacy (7/13/2021  7:09 AM CDT)   Printout Tracking    External Result Report   Medication Administration Instructions    May repeat in 2 hours. Max 2 tablets/24 hours.   [place]    [facility] [address] [place] [address]

## 2021-07-14 DIAGNOSIS — G57.02 PIRIFORMIS SYNDROME, LEFT: ICD-10-CM

## 2021-07-14 DIAGNOSIS — M62.838 MUSCLE SPASM: Primary | ICD-10-CM

## 2021-07-14 NOTE — TELEPHONE ENCOUNTER
I think we can try to get PA for botox for piriformis (please include muscle spasm dx also). Hopefully this can provide some longer-term benefit.      Orders placed for Botox in encounter from 07/14/21. Notified patient via [employer].      Michelle Kat RN, BSN, PHN

## 2021-07-14 NOTE — PROGRESS NOTES
RHEUMATOLOGY INITIAL CONSULT NOTE    Chief Complaint:    Chief Complaint   Patient presents with     Joint Pain     discuss multiple joint pain     Consult       Reason for consult: Gracie Aleman from  has requested consultation for this patient for polyarthralgia    History of Present Illness:    Tia Pablo is a 55 year old female with pmhx HTN, anxiety, depression, cervical dystonia, is referred to rheumatology clinic for polyarthralgia. She reports getting botox injections for her cervical dystonia (neck, back) but has not noticed too much improvement. She has gotten injection for his piriformis syndrome which has helped. She reports muscle pain over her neck and upper back all the time, she states she hurts all day long and at night. Pain bothers her at night and interrupts her sleep. She does not have significant joint pain. Her muscle pain is the same all day, does not notice any aggravating factors. She has had this pain for many years. Activity makes her pain worse. She has not been sleeping very well lately. She states she averages around 6 hours of sleep. She has tried duloxetine in the past, which was changed to wellbutrin. She was on duloxetine and clonazepam (prescribed by neurology) but she did not like taking clonazepam due to associated side effects. She is not sure why duloxetine was stopped. She reports also trying gabapentin in the past but does not remember if it helped her. She had COVID-19 in November 2020. She reports nonrestorative sleep. Sees PM&R for chronic gluteal pain, piriformis syndrome, IT band syndrome and lumbar spondylosis. Denies prolonged AM stiffness. Denies joint swelling.    She used to go on the treadmill prior to COVID-19 but it closed so she has not been exercising regularly. She reports fatigue, +brain fog. Denies fevers, chills, weight loss, night sweats, vision changes, eye pain/redness, dry eyes, +dry mouth, denies oral/nasal ulcers, hair loss/thinning, rash,  raynaud's, +non-productive cough -feels like it is from seasonal allergies, denies SOB, pleurisy, chest pain, heartburn, difficulty swallowing, abdominal pain, diarrhea, hematochezia, melena, dysuria, +chronic back pain for many years -has pain all the time, has had some relief with back pain with botox but it returns once botox wears off, denies enthesitis, dactylitis, numbness, weakness, tingling. No hx of IBD. No hx of blood clots. Denies hx of miscarriages.    Pertinent labs and imaging: (per chart review in [facility] and care everywhere)    Labs:   2019: negative RF, ARIS, SSA, SSB, Sm, Scl-70, SM/RNP, chromatin, ribosomal P, centromere, Mady 1, dsDNA, SM      Past Medical History:    Past Medical History:   Diagnosis Date     Anxiety      Depression      HTN (hypertension)      Past Surgical History:   Past Surgical History:   Procedure Laterality Date     AS REPAIR TEND/MUS/FLEX FOREARM/WRIST, PRIM, EACH        SECTION       HYSTERECTOMY       Family History:   Denies family hx of RA, SLE, Sjogren's syndrome, PsA, ankylosing spondylitis, psoriasis, vasculitis, gout, pseudogout    Mother: scleroderma with inflammatory arthritis     Social History:     Alcohol use: on average, 1 drink/month  Tobacco use: never  Occupational hx: works in [employer]    Allergies   Allergen Reactions     Seasonal Allergies       Immunization History   Administered Date(s) Administered     COVID-19,PF,Olivia 03/10/2021     FLU 6-35 months 2019     Flu, Unspecified 10/09/2014     HepA-Adult 2012     Influenza (H1N1) 2009     Influenza (IIV3) PF 2000, 10/04/2001, 10/24/2006, 10/27/2008, 10/01/2009     Influenza Intranasal Vaccine 2010, 09/15/2011, 10/29/2012     Influenza Intranasal Vaccine 4 valent 2010, 09/15/2011, 10/29/2012, 2013     Influenza Vaccine IM > 6 months Valent IIV4 2015, 2017, 10/08/2018, 2019, 2019, 2020     Influenza,INJ,MDCK,PF,Quad >4yrs  "10/08/2018     TD (ADULT, 7+) 12/03/2020     TDAP Vaccine (Adacel) 11/17/2010     Zoster vaccine recombinant adjuvanted (SHINGRIX) 12/20/2019, 07/27/2020       Medications:  Current Outpatient Medications   Medication Sig Dispense Refill     buPROPion (WELLBUTRIN XL) 300 MG 24 hr tablet TAKE 1 TABLET BY MOUTH EVERY MORNING 90 tablet 1     chlorthalidone (HYGROTON) 25 MG tablet Take 1 tablet (25 mg) by mouth daily 90 tablet 3     eletriptan (RELPAX) 40 MG tablet Take 1 tablet (40 mg) by mouth at onset of headache for migraine May repeat in 2 hours. Max 2 tablets/24 hours. 12 tablet 3     losartan (COZAAR) 50 MG tablet Take 1 tablet (50 mg) by mouth daily 90 tablet 1     methocarbamol (ROBAXIN) 750 MG tablet Take 1 tablet (750 mg) by mouth 3 times daily as needed for muscle spasms 120 tablet 1     RELPAX 40 MG tablet Take 40 mg by mouth at onset of headache   2         PHYSICAL EXAMINATION:   Vital signs:  BP (!) 148/83 (BP Location: Left arm, Patient Position: Sitting, Cuff Size: Adult Regular)   Pulse 84   Ht 1.676 m (5' 6\")   Wt 68.7 kg (151 lb 6.4 oz)   SpO2 98%   BMI 24.44 kg/m      MSK: no joint tenderness, no joint effusions, no synovitis of any joints, +tenderness over paraspinal muscles of neck and upper back, no spinal tenderness, no SI joint tenderness, no dactylitis, no enthesitis  Skin: no rashes  HEENT: MMM, no oral ulcers/lesions, no alopecia  CV: RRR  Pulm: CTAB, non-labored respirations, no c/r/w  GI: +BS, soft, no TTP  Neuro: A&O x3, no focal deficits    Labs:      I have reviewed all pertinent investigations including labs, including outside records if relevant    CMP  Recent Labs   Lab Test 12/03/20  1544      POTASSIUM 4.2   CHLORIDE 102   CO2 32   ANIONGAP 4   GLC 95   BUN 12   CR 0.71   GFRESTIMATED >90   GFRESTBLACK >90   OLEKSANDR 9.8     Calcium/VitaminD  Recent Labs   Lab Test 12/03/20  1544   OLEKSANDR 9.8     TSH/T4  Recent Labs   Lab Test 12/03/20  1544   TSH 1.32     Imaging:      I have " reviewed all pertinent investigations including imaging, including outside records if relevant    XR R knee (3/9/21)  IMPRESSION: Nonspecific right knee joint effusion. Joint space widths  appear within normal limits.    Assessment / Plan:    Tia Pablo is a 55 year old female with pmhx HTN, anxiety, depression, cervical dystonia, is referred to rheumatology clinic for myalgias over neck and upper back since many years. Denies significant polyarthralgia, AM stiffness or joint swelling. Any prior notes, outside records, laboratory results, and imaging studies were reviewed if relevant. Pertinent work-up thus far includes negative RF, ARIS, SSA, SSB, Sm, Scl-70, SM/RNP, chromatin, ribosomal P, centromere, Mady 1, dsDNA, SM. MR pelvis in 2019 from [facility] neurological clinic negative for sacroiliitis. MR L spine 2019 unremarkable. No evidence of synovitis on exam today. She has paraspinal tenderness over upper back. She has had a negative ARIS back in 2003 with repeat extensive rheumatologic work-up returning negative in 2019. She does not have specific s/s of autoimmune CTD. In the setting of non-restorative sleep, brain fog, there may be a component of fibromyalgia or chronic pain syndrome. She recalls being on duloxetine in the past but it was started in combination with clonazepam and she is unsure whether duloxetine helped or why it was discontinued. She has a family hx of scleroderma but does not have any features of autoimmune CTD and serologies have bene negative in the past.    Discussed clinical features of fibromyalgia in detail.  Fibromyalgia is a chronic pain syndrome.  Underlying causes of fibromyalgia may be numerous.  An underlying nonrestorative sleep pattern, mental and physical stressors play a role in pain perception. Discussed relationship of pain with sleep.  The brain functions best with a normal restful sleep that includes REM sleep.  Discussed that a nonrestorative sleep pattern may cause a  decrease in pain tolerance.  Discussed that over time, this builds up and causes a cycling effect on pain.  This pain is not easily curable with pain medications or narcotics.  It is important to decrease stress levels and improving sleep patterns/hygiene.  A restorative sleep pattern is important in improving the perception of pain.FDA recommended medications for fibromyalgia include pregabalin, duloxetine, and milnacipran.  Other medications which have been used in fibromyalgia include amitriptyline and gabapentin.  Exercise and a healthy life-style is important in management of this syndrome.      1) Myalgia  -has not had a CK level checked. Will check CK today as well as CCP to complete rheumatologic work-up    2) Chronic pain  -pain clinic referral placed  -she is advised to follow-up with PCP and discuss treatment options for fibromyalgia         Ms. Pablo verbalized agreement with and understanding of the rationale for the diagnosis and treatment plan.  All questions were answered to best of my ability and the patient's satisfaction. Ms. Pablo was advised to contact the clinic with any questions that may arise after the clinic visit.        Chart documentation done in part with Dragon Voice recognition Software. Although reviewed after completion, some word and grammatical error may remain.      RTC ROMEO Merida MD

## 2021-07-15 ENCOUNTER — OFFICE VISIT (OUTPATIENT)
Dept: RHEUMATOLOGY | Facility: CLINIC | Age: 56
End: 2021-07-15
Payer: COMMERCIAL

## 2021-07-15 ENCOUNTER — MYC MEDICAL ADVICE (OUTPATIENT)
Dept: PHYSICAL MEDICINE AND REHAB | Facility: CLINIC | Age: 56
End: 2021-07-15

## 2021-07-15 VITALS
DIASTOLIC BLOOD PRESSURE: 83 MMHG | HEIGHT: 66 IN | BODY MASS INDEX: 24.33 KG/M2 | OXYGEN SATURATION: 98 % | HEART RATE: 84 BPM | SYSTOLIC BLOOD PRESSURE: 148 MMHG | WEIGHT: 151.4 LBS

## 2021-07-15 DIAGNOSIS — G89.29 OTHER CHRONIC PAIN: Primary | ICD-10-CM

## 2021-07-15 DIAGNOSIS — M25.50 MULTIPLE JOINT PAIN: ICD-10-CM

## 2021-07-15 LAB — CK SERPL-CCNC: 110 U/L

## 2021-07-15 PROCEDURE — 82550 ASSAY OF CK (CPK): CPT | Performed by: STUDENT IN AN ORGANIZED HEALTH CARE EDUCATION/TRAINING PROGRAM

## 2021-07-15 PROCEDURE — 99203 OFFICE O/P NEW LOW 30 MIN: CPT | Performed by: STUDENT IN AN ORGANIZED HEALTH CARE EDUCATION/TRAINING PROGRAM

## 2021-07-15 PROCEDURE — 86200 CCP ANTIBODY: CPT | Performed by: STUDENT IN AN ORGANIZED HEALTH CARE EDUCATION/TRAINING PROGRAM

## 2021-07-15 PROCEDURE — 36415 COLL VENOUS BLD VENIPUNCTURE: CPT | Performed by: STUDENT IN AN ORGANIZED HEALTH CARE EDUCATION/TRAINING PROGRAM

## 2021-07-15 ASSESSMENT — PAIN SCALES - GENERAL: PAINLEVEL: EXTREME PAIN (8)

## 2021-07-15 ASSESSMENT — MIFFLIN-ST. JEOR: SCORE: 1298.5

## 2021-07-15 NOTE — PATIENT INSTRUCTIONS
1. Please get labs today  2. Please follow-up with primary care provider regarding your symptoms  3. Follow-up as needed

## 2021-07-15 NOTE — PROGRESS NOTES
"Tia Pablo's goals for this visit include: Discover cause of joint pain. Questions about clonazepam      She requests these members of her care team be copied on today's visit information: PCP     BP (!) 148/83 (BP Location: Left arm, Patient Position: Sitting, Cuff Size: Adult Regular)   Pulse 84   Ht 1.676 m (5' 6\")   Wt 68.7 kg (151 lb 6.4 oz)   SpO2 98%   BMI 24.44 kg/m      Do you need any medication refills at today's visit? No    Erik Arteaga, EMT  Clinic Support  [facility]    [phone]    Employed by [facility] [employer]      "

## 2021-07-16 ENCOUNTER — MYC MEDICAL ADVICE (OUTPATIENT)
Dept: FAMILY MEDICINE | Facility: CLINIC | Age: 56
End: 2021-07-16

## 2021-07-16 ENCOUNTER — TELEPHONE (OUTPATIENT)
Dept: FAMILY MEDICINE | Facility: CLINIC | Age: 56
End: 2021-07-16

## 2021-07-16 DIAGNOSIS — F32.0 CURRENT MILD EPISODE OF MAJOR DEPRESSIVE DISORDER WITHOUT PRIOR EPISODE (H): Primary | ICD-10-CM

## 2021-07-16 LAB — CCP AB SER IA-ACNC: 3.8 U/ML

## 2021-07-16 RX ORDER — BUPROPION HYDROCHLORIDE 150 MG/1
150 TABLET ORAL EVERY MORNING
Qty: 30 TABLET | Refills: 0 | Status: SHIPPED | OUTPATIENT
Start: 2021-07-16 | End: 2021-08-13

## 2021-07-16 NOTE — TELEPHONE ENCOUNTER
Reason for Call:  Other prescription    Detailed comments: Patient would like to talk with nurse about side effects on the Wellbutrin    Phone Number Patient can be reached at: Home number on file [phone] (home)    Best Time: any    Can we leave a detailed message on this number? YES    Call taken on 7/16/2021 at 8:30 AM by Krystal Vinson

## 2021-07-16 NOTE — TELEPHONE ENCOUNTER
Call placed to patient.  Reports side effects of dry mouth, intermittent nausea,headache, insomnia.  Patient saw rheumatologist yesterday and would like Dr Stone to review notes and relay any thoughts regarding plan.  Patient states she has discontinued Clonazepam per pain management providers.  This medication was helping her sleep, this may add to the insomnia she is experiencing related to Wellbutrin, per patient.   Advised patient to schedule a virtual visit with Dr Stone to address her questions and concerns.  Telephone visit scheduled for Monday.  Viviana Clemens RN

## 2021-07-18 ENCOUNTER — MYC MEDICAL ADVICE (OUTPATIENT)
Dept: PHYSICAL MEDICINE AND REHAB | Facility: CLINIC | Age: 56
End: 2021-07-18

## 2021-07-19 ENCOUNTER — VIRTUAL VISIT (OUTPATIENT)
Dept: FAMILY MEDICINE | Facility: CLINIC | Age: 56
End: 2021-07-19
Payer: COMMERCIAL

## 2021-07-19 DIAGNOSIS — F32.0 CURRENT MILD EPISODE OF MAJOR DEPRESSIVE DISORDER WITHOUT PRIOR EPISODE (H): Primary | ICD-10-CM

## 2021-07-19 PROCEDURE — 99213 OFFICE O/P EST LOW 20 MIN: CPT | Mod: TEL | Performed by: FAMILY MEDICINE

## 2021-07-19 RX ORDER — DULOXETIN HYDROCHLORIDE 20 MG/1
20 CAPSULE, DELAYED RELEASE ORAL DAILY
Qty: 30 CAPSULE | Refills: 0 | Status: SHIPPED | OUTPATIENT
Start: 2021-07-19 | End: 2021-07-28

## 2021-07-19 ASSESSMENT — PATIENT HEALTH QUESTIONNAIRE - PHQ9
5. POOR APPETITE OR OVEREATING: NEARLY EVERY DAY
SUM OF ALL RESPONSES TO PHQ QUESTIONS 1-9: 12

## 2021-07-19 ASSESSMENT — ANXIETY QUESTIONNAIRES
5. BEING SO RESTLESS THAT IT IS HARD TO SIT STILL: MORE THAN HALF THE DAYS
6. BECOMING EASILY ANNOYED OR IRRITABLE: MORE THAN HALF THE DAYS
3. WORRYING TOO MUCH ABOUT DIFFERENT THINGS: MORE THAN HALF THE DAYS
1. FEELING NERVOUS, ANXIOUS, OR ON EDGE: SEVERAL DAYS
GAD7 TOTAL SCORE: 11
IF YOU CHECKED OFF ANY PROBLEMS ON THIS QUESTIONNAIRE, HOW DIFFICULT HAVE THESE PROBLEMS MADE IT FOR YOU TO DO YOUR WORK, TAKE CARE OF THINGS AT HOME, OR GET ALONG WITH OTHER PEOPLE: VERY DIFFICULT
2. NOT BEING ABLE TO STOP OR CONTROL WORRYING: SEVERAL DAYS
7. FEELING AFRAID AS IF SOMETHING AWFUL MIGHT HAPPEN: NOT AT ALL

## 2021-07-19 NOTE — RESULT ENCOUNTER NOTE
Dear Tia,     Labs are negative -antibody for rheumatoid arthritis and muscle enzyme level are negative. These labs are reassuring.    Please let us know if you have any questions or concerns.    Regards,  Ashanti Merida MD

## 2021-07-19 NOTE — PROGRESS NOTES
Tia is a 55 year old who is being evaluated via a billable telephone visit.      What phone number would you like to be contacted at? [phone]  How would you like to obtain your AVS? Serafin     2:41 PM      A/P:      ICD-10-CM    1. Current mild episode of major depressive disorder without prior episode (H)  F32.0 DULoxetine (CYMBALTA) 20 MG capsule     Patient Instructions   We discussed a few changes in your medications for mood today which will hopefully make a good difference for you.      Please go back to the 150mg of wellbutrin since the higher dose is causing side effects.    We decided to go back on the duloxetine as you did have improvement in your pain on that medication.  Hopefully the wellbutrin will be helpful for any sexual side effects.    We are starting on the lowest dose of the duloxetine.  We can increase that dose in a few weeks if needed    Pt has pain clinic follow up in 8/5 and will reach out to the pharmacist regarding her medical marijuana as well.    Subjective   Tia is a 55 year old who presents for the following health issues     HPI     Depression and Anxiety Follow-Up    How are you doing with your depression since your last visit? Worsened for the last few weeks     How are you doing with your anxiety since your last visit?  No change    Are you having other symptoms that might be associated with depression or anxiety? Yes:  pain     Have you had a significant life event? No     Do you have any concerns with your use of alcohol or other drugs? No    Social History     Tobacco Use     Smoking status: Never Smoker     Smokeless tobacco: Never Used   Substance Use Topics     Alcohol use: Yes     Comment: Occasionally, 1-2 glasses of wine on weekends     Drug use: No     PHQ 11/3/2016 6/10/2021 7/19/2021   PHQ-9 Total Score 8 5 12   Q9: Thoughts of better off dead/self-harm past 2 weeks Not at all Not at all Not at all     PATRICK-7 SCORE 11/3/2016 6/10/2021 7/19/2021   Total Score  13 8 11         Suicide Assessment Five-step Evaluation and Treatment (SAFE-T)         Having a lot of pain in her neck an back due to her dystonia.  Just had her botox injection so concerned that she is still having so much pain.  worried the botox might no longer be working vs stopping the duloxetine has had an impact on her pain.    In general also feels like she is having side effects form the higher dose of wellbutrin.  Does not feel this is helping her mood or anxiety at this point    Has tried venlafaxine, fluoxetine, sertraline and duloxetine in the past.  Stopped all due to side effects.  At this point would prefer to go back on the duloxetine for her mood as she felt it was helpful for her pain.  Feels the side effects are manageable.    Was referred to pain by rheum.  Has an appointment upcoming on 8/5.  Has gotten her medical marijuana as well but has not found that helpful.      Review of Systems   Constitutional, HEENT, cardiovascular, pulmonary, gi and gu systems are negative, except as otherwise noted.      Objective           Vitals:  No vitals were obtained today due to virtual visit.    Physical Exam   healthy, alert and no distress  PSYCH: Alert and oriented times 3; coherent speech, normal   rate and volume, able to articulate logical thoughts, able   to abstract reason, no tangential thoughts, no hallucinations   or delusions  Her affect is normal  RESP: No cough, no audible wheezing, able to talk in full sentences  Remainder of exam unable to be completed due to telephone visits    Epic reviewed    3:05 PM          Phone call duration: 14 minutes

## 2021-07-19 NOTE — PATIENT INSTRUCTIONS
We discussed a few changes in your medications for mood today which will hopefully make a good difference for you.      Please go back to the 150mg of wellbutrin since the higher dose is causing side effects.    We decided to go back on the duloxetine as you did have improvement in your pain on that medication.  Hopefully the wellbutrin will be helpful for any sexual side effects.    We are starting on the lowest dose of the duloxetine.  We can increase that dose in a few weeks if needed

## 2021-07-20 DIAGNOSIS — G24.1 GENETIC TORSION DYSTONIA: Primary | ICD-10-CM

## 2021-07-20 RX ORDER — CLONAZEPAM 0.5 MG/1
0.5 TABLET ORAL 3 TIMES DAILY
Qty: 90 TABLET | Refills: 0 | Status: SHIPPED | OUTPATIENT
Start: 2021-07-20 | End: 2022-03-24

## 2021-07-20 ASSESSMENT — ANXIETY QUESTIONNAIRES: GAD7 TOTAL SCORE: 11

## 2021-07-20 NOTE — TELEPHONE ENCOUNTER
From Dr. Platt's office visit note 12/11/20:    Plan:   - Start clonazepam 0.5 mg. Follow the titration table below:              - Week 1: start with 0.5 tab in the evenings              - Week 2: take 0.5 tab in am and 0.5 tab in the evenings              - Week 3: take 0.5 tab three times a day (am, noon, evenings)              - Week 4: take 0.5 tab in am and noon, and 1 tab in evening              - Week 5: take 1 tab in am, 0.5 tab at noon, and 1 tab in evening              - Week 6: take 1 tab thee times a day.               - Stop at dose that improves pain.    Dr. Platt returns 7/22,  writer will keon up prescription for Dr. Bazan to sign.

## 2021-07-20 NOTE — TELEPHONE ENCOUNTER
[facility] Call Center    Phone Message    May a detailed message be left on voicemail: yes     Reason for Call: Medication Refill Request    Has the patient contacted the pharmacy for the refill? Yes   Name of medication being requested: clonazepam 0.5 mg for 90 day supply  Provider who prescribed the medication: Dr. Platt  Pharmacy: [facility]  Date medication is needed:  Pt needs to take it 3x/day and she only has enough for today.   Thank you.         Action Taken: Message routed to:  [facility] ([facility]    Travel Screening: Not Applicable

## 2021-07-22 ENCOUNTER — MYC MEDICAL ADVICE (OUTPATIENT)
Dept: PHYSICAL MEDICINE AND REHAB | Facility: CLINIC | Age: 56
End: 2021-07-22

## 2021-07-22 ASSESSMENT — ENCOUNTER SYMPTOMS
SEIZURES: 0
TROUBLE SWALLOWING: 1
INSOMNIA: 1
DISTURBANCES IN COORDINATION: 0
WEAKNESS: 1
MYALGIAS: 1
NUMBNESS: 0
MUSCLE CRAMPS: 1
MUSCLE WEAKNESS: 1
SORE THROAT: 0
DOUBLE VISION: 0
EYE PAIN: 0
BACK PAIN: 1
PANIC: 0
DEPRESSION: 1
ARTHRALGIAS: 0
TREMORS: 0
HOARSE VOICE: 0
JOINT SWELLING: 0
PARALYSIS: 0
SINUS CONGESTION: 0
LOSS OF CONSCIOUSNESS: 0
SPEECH CHANGE: 0
DECREASED CONCENTRATION: 1
DIZZINESS: 0
NECK PAIN: 1
TINGLING: 1
MEMORY LOSS: 1
STIFFNESS: 0
NECK MASS: 0
EYE WATERING: 0
HEADACHES: 1
EYE REDNESS: 0
EYE IRRITATION: 1
SINUS PAIN: 0
SMELL DISTURBANCE: 1
TASTE DISTURBANCE: 1
NERVOUS/ANXIOUS: 1

## 2021-07-26 ENCOUNTER — OFFICE VISIT (OUTPATIENT)
Dept: ANESTHESIOLOGY | Facility: CLINIC | Age: 56
End: 2021-07-26
Attending: STUDENT IN AN ORGANIZED HEALTH CARE EDUCATION/TRAINING PROGRAM
Payer: COMMERCIAL

## 2021-07-26 ENCOUNTER — MYC MEDICAL ADVICE (OUTPATIENT)
Dept: FAMILY MEDICINE | Facility: CLINIC | Age: 56
End: 2021-07-26

## 2021-07-26 VITALS
BODY MASS INDEX: 23.78 KG/M2 | DIASTOLIC BLOOD PRESSURE: 84 MMHG | SYSTOLIC BLOOD PRESSURE: 153 MMHG | HEART RATE: 76 BPM | HEIGHT: 66 IN | OXYGEN SATURATION: 95 % | WEIGHT: 148 LBS

## 2021-07-26 DIAGNOSIS — F32.0 CURRENT MILD EPISODE OF MAJOR DEPRESSIVE DISORDER WITHOUT PRIOR EPISODE (H): ICD-10-CM

## 2021-07-26 DIAGNOSIS — G89.29 OTHER CHRONIC PAIN: ICD-10-CM

## 2021-07-26 PROCEDURE — 99204 OFFICE O/P NEW MOD 45 MIN: CPT | Performed by: ANESTHESIOLOGY

## 2021-07-26 ASSESSMENT — ENCOUNTER SYMPTOMS
NERVOUS/ANXIOUS: 1
TREMORS: 0
EYE PAIN: 0
MEMORY LOSS: 1
BACK PAIN: 1
DIZZINESS: 0
TINGLING: 1
DEPRESSION: 1
MYALGIAS: 1
LOSS OF CONSCIOUSNESS: 0
DOUBLE VISION: 0
SEIZURES: 0
SINUS PAIN: 0
SORE THROAT: 0
INSOMNIA: 1
NECK PAIN: 1
WEAKNESS: 1
HEADACHES: 1
SPEECH CHANGE: 0
EYE REDNESS: 0

## 2021-07-26 ASSESSMENT — MIFFLIN-ST. JEOR: SCORE: 1283.07

## 2021-07-26 ASSESSMENT — PAIN SCALES - GENERAL: PAINLEVEL: SEVERE PAIN (7)

## 2021-07-26 NOTE — PATIENT INSTRUCTIONS
Treatment planning:    Recommendations will be written in the providers note for your Primary Care provider (OR other providers in your care team) to review and make changes to your therapies based on their discretion.      Recommended Follow up:      Follow up as needed.       Please call [phone], option #1 to schedule your clinic appointment if you don't already have an appointment scheduled.        To speak with a nurse, schedule/reschedule/cancel a clinic appointment, or request a medication refill call: [phone], option #1.    You can also reach us by [employer]: [email or web address]
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
## 2021-07-27 ENCOUNTER — MYC MEDICAL ADVICE (OUTPATIENT)
Dept: PHYSICAL MEDICINE AND REHAB | Facility: CLINIC | Age: 56
End: 2021-07-27

## 2021-07-27 NOTE — TELEPHONE ENCOUNTER
Pt has called again.  And would like this medication below for muscle pain.    Ph[facility], [facility].    Eve Good  CSS Float

## 2021-07-28 ENCOUNTER — MYC MEDICAL ADVICE (OUTPATIENT)
Dept: FAMILY MEDICINE | Facility: CLINIC | Age: 56
End: 2021-07-28

## 2021-07-28 DIAGNOSIS — N95.1 VASOMOTOR SYMPTOMS DUE TO MENOPAUSE: Primary | ICD-10-CM

## 2021-07-28 RX ORDER — DULOXETIN HYDROCHLORIDE 20 MG/1
40 CAPSULE, DELAYED RELEASE ORAL DAILY
Qty: 60 CAPSULE | Refills: 2 | Status: SHIPPED | OUTPATIENT
Start: 2021-07-28 | End: 2021-08-04 | Stop reason: DRUGHIGH

## 2021-07-28 RX ORDER — DULOXETIN HYDROCHLORIDE 60 MG/1
60 CAPSULE, DELAYED RELEASE ORAL DAILY
Qty: 90 CAPSULE | Refills: 1 | Status: SHIPPED | OUTPATIENT
Start: 2021-07-28 | End: 2021-08-13

## 2021-07-28 NOTE — TELEPHONE ENCOUNTER
Patient states pharmacist told her she is on 60mg duloxetine, not 40mg. Patient started crying saying Dr. Stone will be mad at her because she requested the wrong dose. [facility] Patient did not  the previous order sent.    Thank you,    Monique Raygoza, WAYNE Anaya

## 2021-07-29 NOTE — TELEPHONE ENCOUNTER
Placed patient on Dr. Richter's schedule for work in appointment on 8/4/21 at 11:00 am.       Michelle Kat RN, BSN, PHN

## 2021-07-29 NOTE — TELEPHONE ENCOUNTER
Patient is scheduled for piriformis injection on 8/4/21 at 11:00 am. Closing encounter.       Michelle Kat RN, BSN, PHN

## 2021-08-03 ENCOUNTER — NURSE TRIAGE (OUTPATIENT)
Dept: NURSING | Facility: CLINIC | Age: 56
End: 2021-08-03

## 2021-08-03 ENCOUNTER — TELEPHONE (OUTPATIENT)
Dept: FAMILY MEDICINE | Facility: CLINIC | Age: 56
End: 2021-08-03

## 2021-08-03 NOTE — TELEPHONE ENCOUNTER
Panel Management Review      Patient has the following on her problem list:     Hypertension   Last three blood pressure readings:  BP Readings from Last 3 Encounters:   07/26/21 (!) 153/84   07/15/21 (!) 148/83   06/24/21 132/80     Blood pressure: FAILED    HTN Guidelines:  Less than 140/90      Composite cancer screening  Chart review shows that this patient is due/due soon for the following None  Summary:    Patient is due/failing the following:   BP CHECK    Action needed:   Patient needs nurse only appointment.    Type of outreach:    [employer] message.    Questions for provider review:    None                                                                                                                                    Natividad Rendon CMA       Chart routed to none .

## 2021-08-04 ENCOUNTER — TELEPHONE (OUTPATIENT)
Dept: PHYSICAL MEDICINE AND REHAB | Facility: CLINIC | Age: 56
End: 2021-08-04

## 2021-08-04 NOTE — TELEPHONE ENCOUNTER
[facility] Call Center    Phone Message    May a detailed message be left on voicemail: yes     Reason for Call: Other: Pt called to advise she was admitted to hospital last night for allergic reaction to bee stings.      Pt was given Benedryl, Epinephrine, Pepcid and Methylprednisolone, Naci and sodiom chloride. Pt was advised to call to see if she can still be seen by Dr. Richter today.    Please call Pt back to discuss and advise.    Action Taken: Message routed to:  [facility] ([facility] PMR    Travel Screening: Not Applicable

## 2021-08-04 NOTE — TELEPHONE ENCOUNTER
Pt called in states the she had bee sting today.  The sting was 45 min ago.  The under her right arm.  The sting is on 4 places.  The swelling is not.  The whole body has bumps.  There is sever itching.  Has pain.  The breathing is okay.  Pt has history of sever allergy reaction in the past.  No abdominal pain,   Eye lid is swelling.  New rash everywhere on her body.  The disposition is ED.  Care advice given per protocol.  Patient agrees with care advice given.   Agreed to call back if he has additional symptoms or questions.      Keith Dillard [place] Nurse Advisor 8/3/2021 8:34 PM        Reason for Disposition    [1] Hives, itching, or swelling elsewhere on body (i.e., not a site of sting) AND [2] started within 2 hours of sting (Exception: only at site of sting)    Additional Information    Negative: [1] Life-threatening reaction (anaphylaxis) in the past to sting AND [2] < 2 hours since sting    Negative: Attacked by swarm of bees now    Negative: Passed out (i.e., lost consciousness, collapsed and was not responding)    Negative: Wheezing, stridor, or difficulty breathing    Negative: [1] Hoarseness or cough AND [2] sudden onset following sting    Negative: [1] Tightness in the throat or chest AND [2] sudden onset following sting    Negative: [1] Swollen tongue or difficulty swallowing AND [2] sudden onset following sting    Negative: Sounds like a life-threatening emergency to the triager    Negative: Not a bee, wasp, hornet, or yellow jacket sting    Negative: Ring stuck on swollen finger (or toe) following bee sting    Protocols used: BEE OR YELLOW JACKET STING-A-

## 2021-08-04 NOTE — TELEPHONE ENCOUNTER
Spoke with Dr. Richter and discussed between the reaction and steroids given, patient should wait on the Botox injection. Patient was rescheduled by writer for 2 weeks from 08/04/21.       Michelle Kat RN, BSN, PHN

## 2021-08-05 ENCOUNTER — MYC MEDICAL ADVICE (OUTPATIENT)
Dept: FAMILY MEDICINE | Facility: CLINIC | Age: 56
End: 2021-08-05

## 2021-08-10 DIAGNOSIS — F32.0 CURRENT MILD EPISODE OF MAJOR DEPRESSIVE DISORDER WITHOUT PRIOR EPISODE (H): ICD-10-CM

## 2021-08-10 DIAGNOSIS — N95.1 VASOMOTOR SYMPTOMS DUE TO MENOPAUSE: ICD-10-CM

## 2021-08-11 ENCOUNTER — MYC MEDICAL ADVICE (OUTPATIENT)
Dept: FAMILY MEDICINE | Facility: CLINIC | Age: 56
End: 2021-08-11

## 2021-08-11 ENCOUNTER — ALLIED HEALTH/NURSE VISIT (OUTPATIENT)
Dept: NURSING | Facility: CLINIC | Age: 56
End: 2021-08-11
Payer: COMMERCIAL

## 2021-08-11 VITALS — DIASTOLIC BLOOD PRESSURE: 73 MMHG | SYSTOLIC BLOOD PRESSURE: 121 MMHG | HEART RATE: 75 BPM

## 2021-08-11 DIAGNOSIS — Z91.038 ALLERGY TO INSECT BITES: Primary | ICD-10-CM

## 2021-08-11 DIAGNOSIS — Z01.30 BLOOD PRESSURE CHECK: Primary | ICD-10-CM

## 2021-08-11 PROCEDURE — 99207 PR NO CHARGE NURSE ONLY: CPT

## 2021-08-11 NOTE — TELEPHONE ENCOUNTER
Routing refill request to provider for review/approval because:  PHQ9: 12 on 7/2021   Last recorded blood pressure de snot meet RN protocol parameters    Mak Neal RN

## 2021-08-11 NOTE — PROGRESS NOTES
Tia Pablo is a 55 year old patient who comes in today for a Blood Pressure check.  Initial BP:  /73 (BP Location: Left arm, Cuff Size: Adult Large)   Pulse 75      75  Disposition: follow-up as previously indicated by provider and results routed to provider

## 2021-08-12 ENCOUNTER — MYC REFILL (OUTPATIENT)
Dept: FAMILY MEDICINE | Facility: CLINIC | Age: 56
End: 2021-08-12

## 2021-08-12 DIAGNOSIS — Z91.038 ALLERGY TO INSECT BITES: Primary | ICD-10-CM

## 2021-08-12 DIAGNOSIS — F32.0 CURRENT MILD EPISODE OF MAJOR DEPRESSIVE DISORDER WITHOUT PRIOR EPISODE (H): ICD-10-CM

## 2021-08-13 RX ORDER — DULOXETIN HYDROCHLORIDE 60 MG/1
60 CAPSULE, DELAYED RELEASE ORAL DAILY
Qty: 90 CAPSULE | Refills: 1 | Status: SHIPPED | OUTPATIENT
Start: 2021-08-13 | End: 2022-02-08

## 2021-08-13 RX ORDER — EPINEPHRINE 0.3 MG/.3ML
0.3 INJECTION SUBCUTANEOUS ONCE
Qty: 0.6 ML | Refills: 0 | Status: SHIPPED | OUTPATIENT
Start: 2021-08-13 | End: 2021-08-13

## 2021-08-13 RX ORDER — DULOXETIN HYDROCHLORIDE 20 MG/1
CAPSULE, DELAYED RELEASE ORAL
Qty: 30 CAPSULE | Refills: 0 | OUTPATIENT
Start: 2021-08-13

## 2021-08-13 RX ORDER — BUPROPION HYDROCHLORIDE 150 MG/1
150 TABLET ORAL EVERY MORNING
Qty: 30 TABLET | Refills: 0 | OUTPATIENT
Start: 2021-08-13

## 2021-08-13 RX ORDER — BUPROPION HYDROCHLORIDE 150 MG/1
TABLET ORAL
Qty: 90 TABLET | Refills: 0 | Status: SHIPPED | OUTPATIENT
Start: 2021-08-13 | End: 2021-11-08

## 2021-08-13 NOTE — TELEPHONE ENCOUNTER
Patient requesting refill of bupropion and epi pen to [facility]. Pharmacist told her she needs 2 epi pens on hand.    Thank you,    Monique Raygoza, WAYNE Anaya

## 2021-08-13 NOTE — TELEPHONE ENCOUNTER
Patient returned call  Spoke with [facility]  Patient reports she is currently taking Cymbalta 60mg daily   Will forward to Dr. Stone to review    Mak Neal RN

## 2021-08-13 NOTE — TELEPHONE ENCOUNTER
Call placed to patient.  Voicemail message identified patient.  Left message requesting call back to clarify what dose she is currently taking of Cymbalta before we can authorize the refill request.  Call back number given.  Viviana Clemens RN

## 2021-08-13 NOTE — TELEPHONE ENCOUNTER
Routing refill request to provider for review/approval because:  Drug not active on patient's medication list  PHQ9: 12 on 7/2021    Mak Neal RN

## 2021-08-18 ENCOUNTER — OFFICE VISIT (OUTPATIENT)
Dept: PHYSICAL MEDICINE AND REHAB | Facility: CLINIC | Age: 56
End: 2021-08-18
Payer: COMMERCIAL

## 2021-08-18 VITALS
OXYGEN SATURATION: 96 % | RESPIRATION RATE: 16 BRPM | SYSTOLIC BLOOD PRESSURE: 137 MMHG | DIASTOLIC BLOOD PRESSURE: 81 MMHG | HEART RATE: 70 BPM

## 2021-08-18 DIAGNOSIS — M62.838 MUSCLE SPASM: Primary | ICD-10-CM

## 2021-08-18 DIAGNOSIS — G57.02 PIRIFORMIS SYNDROME, LEFT: ICD-10-CM

## 2021-08-18 PROCEDURE — 76942 ECHO GUIDE FOR BIOPSY: CPT | Mod: 26 | Performed by: PHYSICAL MEDICINE & REHABILITATION

## 2021-08-18 PROCEDURE — 64646 CHEMODENERV TRUNK MUSC 1-5: CPT | Mod: GC | Performed by: PHYSICAL MEDICINE & REHABILITATION

## 2021-08-18 RX ORDER — EPINEPHRINE 0.3 MG/.3ML
0.3 INJECTION SUBCUTANEOUS
COMMUNITY
Start: 2021-08-03 | End: 2022-11-02

## 2021-08-18 ASSESSMENT — PAIN SCALES - GENERAL: PAINLEVEL: SEVERE PAIN (6)

## 2021-08-18 NOTE — LETTER
8/18/2021       RE: Tia Pablo  [address] [facility] [id number]     Dear Colleague,    Thank you for referring your patient, Tia Pablo, to the [facility] [place] [facility]. Please see a copy of my visit note below.    PROCEDURE NOTE: Piriformis Muscle Botulinum Toxin-A (Botox) Injection Under Ultrasound Guidance    PROCEDURE DATE: 8/18/2021    PATIENT NAME: Tia Pablo  YOB: 1965    ATTENDING PHYSICIAN: Isabel Richter MD  FELLOW/RESIDENT PHYSICIAN: None     PREOPERATIVE DIAGNOSIS:   1. Muscle spasm    2. Piriformis syndrome, left       POSTOPERATIVE DIAGNOSIS: same    PROCEDURE PERFORMED: Left Piriformis Muscle Botox Injection Under Ultrasound Guidance    ULTRASOUND WAS USED.    INDICATIONS FOR THE PROCEDURE:  Tia Pablo is a 55 year old female who presents with piriformis syndrome.     PROCEDURE AND FINDINGS:  She was greeted in the clinic. The risk, benefits and alternatives to the procedure were again reviewed with her and informed consent was obtained and the patient agreed to proceed. A time-out was performed. Following review alternatives, benefits and risks, the procedure was carried out under sterile prep with sterile gel. The use of direct sonographic guidance was used to ensure accurate placement of the needle (rather than non-guided injection) and required to minimize the risk of bleeding or injury to nearby neurovascular structures.     A 5-1MHz ultrasound transducer was used to visualize the relevant structures and determine the optimal needle path for the procedure.  2mL 1% lidocaine was infiltrated at the needle insertion site subcutaneously. Then, a 22 gauge 5-inch Quincke spinal needle was advanced from lateral to medial utilizing an in-plane approach in long axis, under continuous ultrasound guidance to the piriformis muscle on the left. After negative  aspiration, slow injection of the treatment solution 2mL total consisting of 100 units of Botox reconstituted in 2mL preservative free normal saline was instilled into affected area x2 locations along the piriformis muscle. The tip of the needle was visualized throughout the procedure. The remainder of the single-use vials were discarded.      She tolerated the procedure well, was discharged home in stable condition.     Follow-up will be in clinic x3 months.     COMPLICATIONS: None    COMMENTS: Reports 80% improvement but limited duration of benefit from prior piriformis muscle injection.            Again, thank you for allowing me to participate in the care of your patient.      Sincerely,    Isabel Richter MD

## 2021-08-18 NOTE — PROGRESS NOTES
PROCEDURE NOTE: Piriformis Muscle Botulinum Toxin-A (Botox) Injection Under Ultrasound Guidance    PROCEDURE DATE: 8/18/2021    PATIENT NAME: Tia Pablo  YOB: 1965    ATTENDING PHYSICIAN: Isabel Richter MD  FELLOW/RESIDENT PHYSICIAN: None     PREOPERATIVE DIAGNOSIS:   1. Muscle spasm    2. Piriformis syndrome, left       POSTOPERATIVE DIAGNOSIS: same    PROCEDURE PERFORMED: Left Piriformis Muscle Botox Injection Under Ultrasound Guidance    ULTRASOUND WAS USED.    INDICATIONS FOR THE PROCEDURE:  Tia Pablo is a 55 year old female who presents with piriformis syndrome.     PROCEDURE AND FINDINGS:  She was greeted in the clinic. The risk, benefits and alternatives to the procedure were again reviewed with her and informed consent was obtained and the patient agreed to proceed. A time-out was performed. Following review alternatives, benefits and risks, the procedure was carried out under sterile prep with sterile gel. The use of direct sonographic guidance was used to ensure accurate placement of the needle (rather than non-guided injection) and required to minimize the risk of bleeding or injury to nearby neurovascular structures.     A 5-1MHz ultrasound transducer was used to visualize the relevant structures and determine the optimal needle path for the procedure.  2mL 1% lidocaine was infiltrated at the needle insertion site subcutaneously. Then, a 22 gauge 5-inch Quincke spinal needle was advanced from lateral to medial utilizing an in-plane approach in long axis, under continuous ultrasound guidance to the piriformis muscle on the left. After negative aspiration, slow injection of the treatment solution 2mL total consisting of 100 units of Botox reconstituted in 2mL preservative free normal saline was instilled into affected area x2 locations along the piriformis muscle. The tip of the needle was visualized throughout the procedure. The remainder of the single-use vials were discarded.       She tolerated the procedure well, was discharged home in stable condition.     Follow-up will be in clinic x3 months.     COMPLICATIONS: None    COMMENTS: Reports 80% improvement but limited duration of benefit from prior piriformis muscle injection.

## 2021-08-20 ENCOUNTER — MYC MEDICAL ADVICE (OUTPATIENT)
Dept: PHYSICAL MEDICINE AND REHAB | Facility: CLINIC | Age: 56
End: 2021-08-20

## 2021-08-28 ENCOUNTER — MYC MEDICAL ADVICE (OUTPATIENT)
Dept: FAMILY MEDICINE | Facility: CLINIC | Age: 56
End: 2021-08-28

## 2021-09-07 ENCOUNTER — MYC MEDICAL ADVICE (OUTPATIENT)
Dept: PHYSICAL MEDICINE AND REHAB | Facility: CLINIC | Age: 56
End: 2021-09-07

## 2021-09-10 ENCOUNTER — ALLIED HEALTH/NURSE VISIT (OUTPATIENT)
Dept: PHYSICAL MEDICINE AND REHAB | Facility: CLINIC | Age: 56
End: 2021-09-10
Payer: COMMERCIAL

## 2021-09-10 VITALS — RESPIRATION RATE: 16 BRPM | TEMPERATURE: 97.9 F | HEART RATE: 83 BPM | OXYGEN SATURATION: 96 %

## 2021-09-10 DIAGNOSIS — M79.18 MYOFASCIAL PAIN: Primary | ICD-10-CM

## 2021-09-10 PROCEDURE — 20553 NJX 1/MLT TRIGGER POINTS 3/>: CPT | Performed by: PHYSICAL MEDICINE & REHABILITATION

## 2021-09-10 RX ORDER — BUPIVACAINE HYDROCHLORIDE 2.5 MG/ML
7 INJECTION, SOLUTION EPIDURAL; INFILTRATION; INTRACAUDAL ONCE
Status: COMPLETED | OUTPATIENT
Start: 2021-09-10 | End: 2021-09-10

## 2021-09-10 RX ORDER — TRIAMCINOLONE ACETONIDE 40 MG/ML
40 INJECTION, SUSPENSION INTRA-ARTICULAR; INTRAMUSCULAR ONCE
Status: COMPLETED | OUTPATIENT
Start: 2021-09-10 | End: 2021-09-10

## 2021-09-10 RX ADMIN — BUPIVACAINE HYDROCHLORIDE 17.5 MG: 2.5 INJECTION, SOLUTION EPIDURAL; INFILTRATION; INTRACAUDAL at 13:12

## 2021-09-10 RX ADMIN — TRIAMCINOLONE ACETONIDE 40 MG: 40 INJECTION, SUSPENSION INTRA-ARTICULAR; INTRAMUSCULAR at 13:12

## 2021-09-10 ASSESSMENT — PAIN SCALES - GENERAL: PAINLEVEL: SEVERE PAIN (7)

## 2021-09-10 NOTE — PROGRESS NOTES
PM&R PROCEDURE NOTE      Current Outpatient Medications:      buPROPion (WELLBUTRIN XL) 150 MG 24 hr tablet, TAKE 1 TABLET BY MOUTH EVERY MORNING, Disp: 90 tablet, Rfl: 0     chlorthalidone (HYGROTON) 25 MG tablet, Take 1 tablet (25 mg) by mouth daily, Disp: 90 tablet, Rfl: 3     clonazePAM (KLONOPIN) 0.5 MG tablet, Take 1 tablet (0.5 mg) by mouth 3 times daily in the morning, at noon and in the evening. (Patient taking differently: Take 0.5 mg by mouth 3 times daily as needed in the morning, at noon and in the evening.), Disp: 90 tablet, Rfl: 0     DULoxetine (CYMBALTA) 60 MG capsule, Take 1 capsule (60 mg) by mouth daily, Disp: 90 capsule, Rfl: 1     eletriptan (RELPAX) 40 MG tablet, Take 1 tablet (40 mg) by mouth at onset of headache for migraine May repeat in 2 hours. Max 2 tablets/24 hours., Disp: 12 tablet, Rfl: 3     EPINEPHrine (ANY BX GENERIC EQUIV) 0.3 MG/0.3ML injection 2-pack, Inject 0.3 mg into the muscle, Disp: , Rfl:      losartan (COZAAR) 50 MG tablet, Take 1 tablet (50 mg) by mouth daily, Disp: 90 tablet, Rfl: 1     methocarbamol (ROBAXIN) 750 MG tablet, Take 1 tablet (750 mg) by mouth 3 times daily as needed for muscle spasms, Disp: 120 tablet, Rfl: 1    Current Facility-Administered Medications:      botulinum toxin type A (BOTOX) 100 units injection 100 Units, 100 Units, Intramuscular, Once, Isabel Richter MD     botulinum toxin type A (BOTOX) 100 units injection 100 Units, 100 Units, Intramuscular, Q90 Days, Isabel Richter MD, 100 Units at 08/18/21 1256     botulinum toxin type A (BOTOX) 100 units injection 200 Units, 200 Units, Intramuscular, Q90 Days, Simi Vazquez MD     botulinum toxin type A (BOTOX) 100 units injection 400 Units, 400 Units, Intramuscular, Q90 Days, Simi Vazquez MD     lidocaine 1 % 5 mL, 5 mL, Injection, Once, Isabel Richter MD     triamcinolone (KENALOG-40) injection 40 mg, 40 mg, Intramuscular, Once, Isabel Richter MD        Allergies    Allergen Reactions     Bee Venom Anaphylaxis     Seasonal Allergies         PHYSICAL EXAM    VS: Pulse 83   Temp 97.9  F (36.6  C)   Resp 16   SpO2 96%    Left laterollis with mild right rotation  Right shoulder elevation  Multiple areas of myofascial pain affecting right upper trapezius  Hypertonicity noted in bilateral upper trapezius muscles      HPI    Patient denies new medical diagnoses, illnesses, hospitalizations, emergency room visits, and injuries since the previous injection with botulinum neurotoxin.     She is here today for trigger point injections for myofascial pain. She is overdue for her Botox, but is opting to wait for Botox until 21 with her son's upcoming wedding, as she does not want to risk having any side effects for that weekend. Today, she reports that she has pain all over her head, jaw, neck and mid back bilaterally, R>L. She is hopeful to get some trigger points injection to alleviate the pain till her next Botox injection.    RESPONSE TO PREVIOUS TREATMENT:    Patient Tia Pablo received 400 units of Botox on 21 which helped the pain but it wore off at  8th weeks.    Problems following the previous series of neurotoxin injections included:  No problems reported.       TRIGGER POINT INJECTION PROCEDURES:    VERIFICATION OF PATIENT IDENTIFICATION AND PROCEDURE     Initials   Patient Name karol   Patient  karol   Procedure Verified by: karol     Prior to the start of the procedure and with procedural staff participation, I verbally confirmed the patient s identity using two indicators, relevant allergies, that the procedure was appropriate and matched the consent or emergent situation, and that the correct equipment/implants were available. Immediately prior to starting the procedure I conducted the Time Out with the procedural staff and re-confirmed the patient s name, procedure, and site/side. 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    Her baseline symptoms have been recalcitrant to oral medications and conservative therapy.  She is here today for trigger point injections.    GOAL OF PROCEDURE:  The goal of this procedure is to improve increase active range of motion and decrease pain  associated with dystonic movements and spasticity.    CONSENT:  The risks, benefits, and treatment options were discussed with Tia Pablo and she agreed to proceed.      Written consent was obtained by [facility].     EQUIPMENT USED:  Needle-30 Gauge needle    DRUG:   Lidocaine 1% 10mg/mL (Lot# [id number]; Exp. 06/2024; NDC 35339-796-98) = 2 ml  Bupivacaine 0.25% 25mg/10mL (Lot# [id number], Exp. 3/2023; NDC 4022-0449-76) = 7 ml  Triamcinolone 40mg/ml (Lot# [id number], Exp 1/2023, NDC [address]-1) = 1 ml    SKIN PREPARATION:  Skin preparation was performed using chloroprep.    GUIDANCE DESCRIPTION:  Guidance was not utilized for this procedure     AREA/MUSCLE INJECTED:   Right splenius capitis - 1 mL at 6 site/s.   Left splenius capitis - 1 ml at 6 site/s.     Right trapezius lateral - 2 mL at 10 sites  Left trapezius lateral - 0.6 mL at 2 sites    Right anterior scalene - 0.4 ml at 2 sites    Right levator scapula - 1 ml at 3 site/s.     Right auricular posterior - 0.2 mL in 2 sites    Right masseter - 0.4 mL at 1 sites  Left masseter - 0.4 mL at 1 sites    Right rhomboid major - 0.5 ml at 5 site/s.   Left  rhomboid major - 0.5 ml at 5 site/s.       RESPONSE TO PROCEDURE:  Tia Pablo tolerated the procedure well. She was allowed to recover for an appropriate period of time and was discharged  home in stable condition.     FOLLOW UP:   Tia Pablo will monitor her response to today's trigger point injections and report at her next scheduled Botox appointment or via phone/MyChart.       PLAN:   She will continue monitoring her symptoms.

## 2021-09-13 ENCOUNTER — VIRTUAL VISIT (OUTPATIENT)
Dept: FAMILY MEDICINE | Facility: CLINIC | Age: 56
End: 2021-09-13
Payer: COMMERCIAL

## 2021-09-13 DIAGNOSIS — G24.1 GENETIC TORSION DYSTONIA: Primary | ICD-10-CM

## 2021-09-13 PROCEDURE — 99213 OFFICE O/P EST LOW 20 MIN: CPT | Mod: TEL | Performed by: FAMILY MEDICINE

## 2021-09-13 RX ORDER — GABAPENTIN 300 MG/1
CAPSULE ORAL
Qty: 90 CAPSULE | Refills: 0 | Status: SHIPPED | OUTPATIENT
Start: 2021-09-13 | End: 2021-12-07

## 2021-09-13 NOTE — PROGRESS NOTES
"Tia is a 55 year old who is being evaluated via a billable telephone visit.      What phone number would you like to be contacted at? [phone]  How would you like to obtain your AVS? 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  You'll gradually increase the medication to 3 times daily over the next week.  Drowsiness is the most common side effect, please let me know if that is a problem for you.    We should follow up in 1 month to see how things are going with your pain.  We can consider a dose increase at that time if needed.                Subjective   Tia is a 55 year old who presents for the following health issues     HPI     * discuss pain management - was recommended to consider gabapentin and would like to begin that medication.    Trying to cut back on how often she needs Botox due to both side effects and cost.  Hoping this will help her to space out botox injections to every 12 weeks.    Remains concerned that the botox seems to wear off after 7-8 weeks and then takes a week or 2 to start working once she gets her injection.      Has been trying trigger point injections as well which she finds helpful while trying to \"get through\" to next botox appointment.    Hoping the addition of gabapentin to the duloxetine will make a good difference and even out her pain control.     Hoping that the gabapentin will help with neck/head and upper back/shoulder pain related to her dystonia.        Review of Systems   Constitutional, HEENT, cardiovascular, pulmonary, gi and gu systems are negative, except as otherwise noted.      Objective           Vitals:  No vitals were obtained today due to virtual visit.    Physical Exam   healthy, alert and no distress  PSYCH: Alert and oriented times 3; coherent speech, normal   rate and volume, able to articulate " logical thoughts, able   to abstract reason, no tangential thoughts, no hallucinations   or delusions  Her affect is normal  RESP: No cough, no audible wheezing, able to talk in full sentences  Remainder of exam unable to be completed due to telephone visits    Epic reviewed            3:06 PM      Phone call duration: 13 minutes

## 2021-09-13 NOTE — PATIENT INSTRUCTIONS
We discussed adding the gabapentin for your pain.  You'll gradually increase the medication to 3 times daily over the next week.  Drowsiness is the most common side effect, please let me know if that is a problem for you.    We should follow up in 1 month to see how things are going with your pain.  We can consider a dose increase at that time if needed.

## 2021-09-26 ENCOUNTER — HEALTH MAINTENANCE LETTER (OUTPATIENT)
Age: 56
End: 2021-09-26

## 2021-10-18 ENCOUNTER — MYC MEDICAL ADVICE (OUTPATIENT)
Dept: FAMILY MEDICINE | Facility: CLINIC | Age: 56
End: 2021-10-18

## 2021-11-03 ENCOUNTER — OFFICE VISIT (OUTPATIENT)
Dept: PHYSICAL MEDICINE AND REHAB | Facility: CLINIC | Age: 56
End: 2021-11-03
Payer: COMMERCIAL

## 2021-11-03 VITALS
HEART RATE: 89 BPM | SYSTOLIC BLOOD PRESSURE: 140 MMHG | RESPIRATION RATE: 16 BRPM | OXYGEN SATURATION: 96 % | DIASTOLIC BLOOD PRESSURE: 84 MMHG | TEMPERATURE: 99.3 F

## 2021-11-03 DIAGNOSIS — G24.4 OROMANDIBULAR DYSTONIA: Primary | ICD-10-CM

## 2021-11-03 DIAGNOSIS — G43.719 CHRONIC MIGRAINE WITHOUT AURA, INTRACTABLE, WITHOUT STATUS MIGRAINOSUS: ICD-10-CM

## 2021-11-03 DIAGNOSIS — G24.3 SPASMODIC TORTICOLLIS: ICD-10-CM

## 2021-11-03 PROCEDURE — 64643 CHEMODENERV 1 EXTREM 1-4 EA: CPT | Mod: 59 | Performed by: PHYSICAL MEDICINE & REHABILITATION

## 2021-11-03 PROCEDURE — 64616 CHEMODENERV MUSC NECK DYSTON: CPT | Mod: 59 | Performed by: PHYSICAL MEDICINE & REHABILITATION

## 2021-11-03 PROCEDURE — 64612 DESTROY NERVE FACE MUSCLE: CPT | Mod: 59 | Performed by: PHYSICAL MEDICINE & REHABILITATION

## 2021-11-03 PROCEDURE — 95874 GUIDE NERV DESTR NEEDLE EMG: CPT | Performed by: PHYSICAL MEDICINE & REHABILITATION

## 2021-11-03 PROCEDURE — 96372 THER/PROPH/DIAG INJ SC/IM: CPT | Performed by: PHYSICAL MEDICINE & REHABILITATION

## 2021-11-03 PROCEDURE — 64642 CHEMODENERV 1 EXTREMITY 1-4: CPT | Mod: 59 | Performed by: PHYSICAL MEDICINE & REHABILITATION

## 2021-11-03 NOTE — PROGRESS NOTES
BOTULINUM TOXIN PROCEDURE NOTE    Chief Complaint   Patient presents with     Botox       Current Outpatient Medications:      buPROPion (WELLBUTRIN XL) 150 MG 24 hr tablet, TAKE 1 TABLET BY MOUTH EVERY MORNING, Disp: 90 tablet, Rfl: 0     chlorthalidone (HYGROTON) 25 MG tablet, Take 1 tablet (25 mg) by mouth daily, Disp: 90 tablet, Rfl: 3     clonazePAM (KLONOPIN) 0.5 MG tablet, Take 1 tablet (0.5 mg) by mouth 3 times daily in the morning, at noon and in the evening. (Patient taking differently: Take 0.5 mg by mouth 3 times daily as needed in the morning, at noon and in the evening.), Disp: 90 tablet, Rfl: 0     DULoxetine (CYMBALTA) 60 MG capsule, Take 1 capsule (60 mg) by mouth daily, Disp: 90 capsule, Rfl: 1     eletriptan (RELPAX) 40 MG tablet, Take 1 tablet (40 mg) by mouth at onset of headache for migraine May repeat in 2 hours. Max 2 tablets/24 hours., Disp: 12 tablet, Rfl: 3     EPINEPHrine (ANY BX GENERIC EQUIV) 0.3 MG/0.3ML injection 2-pack, Inject 0.3 mg into the muscle, Disp: , Rfl:      gabapentin (NEURONTIN) 300 MG capsule, Take 1 capsule at bedtime for 3 nights then 1 capsule AM and bedtime for 3 days then 1 capsule 3 times daily., Disp: 90 capsule, Rfl: 0     losartan (COZAAR) 50 MG tablet, Take 1 tablet (50 mg) by mouth daily, Disp: 90 tablet, Rfl: 1     methocarbamol (ROBAXIN) 750 MG tablet, Take 1 tablet (750 mg) by mouth 3 times daily as needed for muscle spasms, Disp: 120 tablet, Rfl: 1    Current Facility-Administered Medications:      botulinum toxin type A (BOTOX) 100 units injection 100 Units, 100 Units, Intramuscular, Once, Isabel Richter MD     botulinum toxin type A (BOTOX) 100 units injection 100 Units, 100 Units, Intramuscular, Q90 Days, Isabel Richter MD, 100 Units at 08/18/21 1256     botulinum toxin type A (BOTOX) 100 units injection 200 Units, 200 Units, Intramuscular, Q90 Days, Simi Vazquez MD     botulinum toxin type A (BOTOX) 100 units injection 400 Units, 400  Units, Intramuscular, Q90 Days, Simi Vazquez MD     lidocaine 1 % 5 mL, 5 mL, Injection, Once, Isabel Richter MD     triamcinolone (KENALOG-40) injection 40 mg, 40 mg, Intramuscular, Once, Isabel Richter MD     Allergies   Allergen Reactions     Bee Venom Anaphylaxis     Seasonal Allergies         PHYSICAL EXAM:    VS: BP (!) 140/84   Pulse 89   Temp 99.3  F (37.4  C)   Resp 16   SpO2 96%    HEAD, NECK AND TRUNK PATTERN:     Left laterollis with mild right rotation  Right shoulder elevation  Bilateral masseter hypertrophy    HPI:    Patient denies new medical diagnoses, illnesses, hospitalizations, emergency room visits, and injuries since the previous injection with botulinum neurotoxin.     We reviewed the recommended safety guidelines for  Botox from any vaccine injection, such as the seasonal flu vaccine, by a minimum of 10-14 days with Tia Pablo. She acknowledged understanding. She did have her COVID booster yesterday, but would like to proceed with the injections as planned.     RESPONSE TO PREVIOUS TREATMENT:    Tia Pablo received 400 units of Botox on 2021.    Problems following the previous series of neurotoxin injections included:  Difficulty chewing for about a month, followed by full resolution.     BENEFITS BY PATIENT REPORT:    Pain Improvement: Yes.  Percent Improvement: 80 %    Duration of Benefit:  7 weeks. When pain returns, it is usually in the jaw, accompanied by increased jaw clenching.    Dystonia Improvement: Yes.  Percent Improvement: 80 %    Duration of Benefit:  7 weeks, followed by a gradual reduction in benefit.      BOTULINUM NEUROTOXIN INJECTION PROCEDURES:    VERIFICATION OF PATIENT IDENTIFICATION AND PROCEDURE     Initials   Patient Name ses   Patient  ses   Procedure Verified by: ses     Prior to the start of the procedure and with procedural staff participation, I verbally confirmed the patient s identity using two indicators, relevant  allergies, that the procedure was appropriate and matched the consent or emergent situation, and that the correct equipment/implants were available. Immediately prior to starting the procedure I conducted the Time Out with the procedural staff and re-confirmed the patient s name, procedure, and site/side. (The Joint Commission universal protocol was followed.)  Yes    Sedation (Moderate or Deep): None      Above assessments performed by:    Simi Vazquez MD       INDICATION/S FOR PROCEDURE/S:  Tia Pablo is a 55 year old patient with segmental dystonia and spasticity affecting the  head, neck and shoulder girdle musculature secondary to a diagnosis of spasmodic torticollis and oromandibular dystonia with associated  pain and loss of joint motion.    Her baseline symptoms have been recalcitrant to oral medications and conservative therapy.  She is here today for an injection of Botox.      GOAL OF PROCEDURE:  The goal of this procedure is to increase active range of motion and decrease pain  associated with dystonic movements and spasticity.    TOTAL DOSE: 500 UNITS BOTOX  Dose Administered:  450 units Botox    Diluent Used:  Preservative Free Normal Saline  Total Volume of Diluent Used:  See dilutional details below  Lot # [id number] with Expiration Date:  02/2024  NDC #: Botox 100u ([address]-37)    Was there drug waste? Yes  Amount of drug waste (mL): 50 units Botox.  Reason for waste:  Single use vial  Multi-dose vial: Yes    Simi Vazquez MD  November 3, 2021     Medication guide was offered to patient and was declined.    CONSENT:  The risks, benefits, and treatment options were discussed with Tia Pablo and she agreed to proceed.      Written consent was obtained by [facility] [place].     EQUIPMENT USED:  Needle-37mm stimulating/recording  EMG/NCS Machine    SKIN PREPARATION:  Skin preparation was performed using an alcohol wipe.    GUIDANCE DESCRIPTION:  Electro-myographic guidance was necessary  throughout the procedure to accurately identify all areas of dystonic and spastic muscles while avoiding injection of non-dystonic muscles and non-spastic muscles, neighboring nerves and nearby vascular structures.     AREA/MUSCLE INJECTED: 450 UNITS BOTOX = TOTAL DOSE     1. Neck Muscles: 125 units of Botox, 1:1 Dilution     Right middle trapezius - 5 units of Botox at 1 site/s.   Left middle trapezius - 5 units of Botox at 1 site/s.      Right Lateral Trapezius - 10 units of Botox at 3 site/s.   Left lateral trapezius - 10 units of Botox at 5 site/s.      Right splenius - 5 units of Botox at 1 site/s.   Left splenius - 5 units of Botox at 1 site/s.      Right Auricularis - 15 units of Botox at 3 site/s.   Left Auricularis - 15 units of Botox at 3 site/s.      Right occipitalis - 10 units of Botox at 3 site/s.   Left occipitalis - 10 units of Botox at 3 site/s.     Right Sternocleidomastoid - 10 units of Botox at 1 site/s.    Right Middle Scalene - 15 units of Botox at 1 site/s.     Right Platysma - 5 units of Botox at 2 site/s.  Left Platysma - 5 units of Botox at 2 site/s.      2. Upper Extremity & Trunk Muscles: 110 units of Botox, 1:1 Dilution     Right levator scapula - 25 units of Botox at 2 site/s.   Left levator scapula - 25 units of Botox at 2 site/s.      Right Rhomboid - 20 units of Botox at 1 site/s.    Left Rhomboid - 20 units of Botox at 3 site/s.       Right Latissimus Dorsi - 10 units of Botox at 3 site/s.    Left Latissimus Dorsi - 10 units of Botox at 3 site/s.      3. Jaw Muscles: 180 units of Botox, 1:1 Dilution     Right masseter - 30 units of Botox at 2 site/s.   Left masseter - 30 units of Botox at 2 site/s.     Right temporalis - 50 units of Botox at 5 site/s.               Left temporalis - 50 units of Botox at 5 site/s.     Right medial pterygoid - 10 units of Botox at 1 site/s.    Left medial pterygoid - 10 units of Botox at 1 site/s.     4. Facial Muscles: 35 units of Botox, 2:1  Dilution     Right Frontalis - 10 units of Botox at 1 site/s.    Left Frontalis - 10 units of Botox at 1 site/s.      Procerus - 5 units of Botox at 1 site/s.       Right  - 5 units of Botox at 1 site/s.    Left  - 5 units of Botox at 1 site/s.       RESPONSE TO PROCEDURE:  Tia Pablo tolerated the procedure well and there were no immediate complications.  She was allowed to recover for an appropriate period of time and was discharged home in stable condition.    FOLLOW UP:  Tia Pablo was asked to follow up by phone in 7-14 days with Stacia Rose RN, Care Coordinator or Shruthi West PT, Care Coordinator, to report her response to this series of injections.  Based on the patient's previous response to this therapy, Tia Pablo was rescheduled for the next series of injections in 12 weeks.    PLAN (Medication Changes, Therapy Orders, Work or Disability Issues, etc.): Dose of Botox was increased from 400 units to 450 units in hopes of increasing effectiveness and prolonging duration of benefit of injections.

## 2021-11-06 DIAGNOSIS — F32.0 CURRENT MILD EPISODE OF MAJOR DEPRESSIVE DISORDER WITHOUT PRIOR EPISODE (H): ICD-10-CM

## 2021-11-08 RX ORDER — BUPROPION HYDROCHLORIDE 150 MG/1
TABLET ORAL
Qty: 90 TABLET | Refills: 0 | Status: SHIPPED | OUTPATIENT
Start: 2021-11-08 | End: 2022-01-07

## 2021-11-08 NOTE — TELEPHONE ENCOUNTER
Routing refill request to provider for review/approval because:  PHQ9: 12 on 7/2021    Mak Neal RN

## 2021-11-09 DIAGNOSIS — Z00.00 ROUTINE GENERAL MEDICAL EXAMINATION AT A HEALTH CARE FACILITY: ICD-10-CM

## 2021-11-09 DIAGNOSIS — I10 ESSENTIAL HYPERTENSION: ICD-10-CM

## 2021-11-09 RX ORDER — LOSARTAN POTASSIUM 50 MG/1
TABLET ORAL
Qty: 90 TABLET | Refills: 1 | Status: SHIPPED | OUTPATIENT
Start: 2021-11-09 | End: 2022-04-21

## 2021-11-09 NOTE — TELEPHONE ENCOUNTER
Routing refill request to provider for review/approval because:  BP failed parameters.Viviana Clemens RN

## 2021-11-17 ENCOUNTER — OFFICE VISIT (OUTPATIENT)
Dept: PHYSICAL MEDICINE AND REHAB | Facility: CLINIC | Age: 56
End: 2021-11-17
Payer: COMMERCIAL

## 2021-11-17 VITALS
SYSTOLIC BLOOD PRESSURE: 133 MMHG | TEMPERATURE: 98.7 F | DIASTOLIC BLOOD PRESSURE: 85 MMHG | RESPIRATION RATE: 16 BRPM | OXYGEN SATURATION: 97 % | HEART RATE: 82 BPM

## 2021-11-17 DIAGNOSIS — G57.02 PIRIFORMIS SYNDROME, LEFT: ICD-10-CM

## 2021-11-17 DIAGNOSIS — M62.838 MUSCLE SPASM: Primary | ICD-10-CM

## 2021-11-17 PROCEDURE — 20552 NJX 1/MLT TRIGGER POINT 1/2: CPT | Performed by: PHYSICAL MEDICINE & REHABILITATION

## 2021-11-17 PROCEDURE — 99213 OFFICE O/P EST LOW 20 MIN: CPT | Mod: 25 | Performed by: PHYSICAL MEDICINE & REHABILITATION

## 2021-11-17 PROCEDURE — 76942 ECHO GUIDE FOR BIOPSY: CPT | Mod: 26 | Performed by: PHYSICAL MEDICINE & REHABILITATION

## 2021-11-17 ASSESSMENT — PAIN SCALES - GENERAL: PAINLEVEL: MODERATE PAIN (4)

## 2021-11-17 NOTE — PROGRESS NOTES
PM&R Follow-Up Visit -     Date of Initial Visit: 2021  LOV: 2021  TD: 2021     Recall: Tia Pablo is a 55 year old female who follows up for left piriformis syndrome/muscle pain and spasm    INTERVAL HISTORY:  Patient was last seen in clinic Aug 18, 2021. At that visit, she underwent left US-guided piriformis muscle botulinum toxin injection. She notes approximately 70% improvement in pain and symptoms. Pain is improved and she is able to use her treadmill more.     PRIOR INJURIES/TREATMENT:   Ice/Heat: heat pad with temporary relief   Physical Therapy:   Prior courses of physical therapy       - Current Pain Medications -   Robaxin 750mg TID  Clonazepam 0.5mg TID  Advil prn - minimal relief if any  Tylenol prn - minimal relief if any  Diclofenac gel prn - without relief     Prior Procedures:  Botox for cervical dystonia   Date      Procedure     Improvement (%)  21   L piriformis CSI (US)   80% imp  21   L piriformis Botox (US)  70% imp         Prior Related Surgery:    2017 - ROBOTIC ASSISTED LAPAROSCOPIC TOTAL HYSTERECTOMY BILATERAL SALPINGOOOPHORECTOMY, cystoscopy (Dr. Yesi Washington)  Other (acupuncture, OMT, CMM, TENS, DME, etc.):   CMM without relief     Specialists Seen - (with most recent, available notes and clinic visits reviewed)   1. PM&R - Dr. Vazquez   2. Neurology - Dr. Platt     IMAGING - reviewed   2020 - XR Pelvis and L spine ([facility])  Essentially normal.     Review Of Systems:  I am responding to those symptoms which are directly relevant to the specific indication for my consultation. I recommend that the patient follow up with their primary or referring provider to pursue any other symptoms which may be of concern.       Medical History:  She  has a past medical history of Anxiety, Depression, and HTN (hypertension).     She  has a past surgical history that includes  section; Hysterectomy; and REPAIR TEND/MUS/FLEX FOREARM/WRIST, PRIM,  EACH.      Family History  Her family history includes Autoimmune Disease in her mother; Heart Disease in her father; Hyperlipidemia in her mother; Hypertension in her father and mother; Skin Cancer in her father.     Social History:  Work: Full time for [employer]. 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 She has never used smokeless tobacco. She reports current alcohol use. She reports that she does not use drugs.        Current Medications:   She has a current medication list which includes the following prescription(s): bupropion, chlorthalidone, clonazepam, duloxetine, eletriptan, epinephrine, gabapentin, losartan, and methocarbamol, and the following Facility-Administered Medications: botulinum toxin type a, botulinum toxin type a, botulinum toxin type a, botulinum toxin type a, lidocaine, and triamcinolone.       Allergies:   No Known Allergies    PHYSICAL EXAMINATION:  /85   Pulse 82   Temp 98.7  F (37.1  C)   Resp 16   SpO2 97%    General: Pleasant, straightforward, WDWN individual.  Mental Status: Pleasant, direct, flat affect  Resp: breathing is unlabored without audible wheeze  Vascular: Palpable pedal pulses, no cyanosis, no venous stasis changes  Heme: no visible ecchymosis or erythema on extremities  Skin: No notable rash    Neurologic:  Strength: All major muscle groups of the bilateral lower extremities have normal and symmetric muscle strength      Gait: reveals normal emily and stride     ASSESSMENT:  Tia Pablo is a pleasant 55 year old female who presents with:    #. Chronic gluteal pain  #. Piriformis syndrome, left    #. It band syndrome, left  #. Lumbar spondylosis      PLAN:  - Repeat sonographically-guided left piriformis muscle Botox injection today (see detailed procedure note)  - RTC x4 months. We will attempt to space out Botox injection as able.     Ready to learn, no apparent learning  barriers.  Education provided on treatment plan according to patient's preferred learning style.  Patient verbalizes understanding.   __________________________________  Isabel Richter MD  Physical Medicine & Rehabilitation

## 2021-11-17 NOTE — LETTER
11/17/2021       RE: Tia Pablo  [address] [facility] [id number]     Dear Colleague,    Thank you for referring your patient, Tia Pablo, to the [facility] [place] [facility]. Please see a copy of my visit note below.    PM&R Follow-Up Visit -     Date of Initial Visit: 06/14/2021  LOV: 08/18/2021  TD: 11/17/2021     Recall: Tia Pablo is a 55 year old female who follows up for left piriformis syndrome/muscle pain and spasm    INTERVAL HISTORY:  Patient was last seen in clinic Aug 18, 2021. At that visit, she underwent left US-guided piriformis muscle botulinum toxin injection. She notes approximately 70% improvement in pain and symptoms. Pain is improved and she is able to use her treadmill more.     PRIOR INJURIES/TREATMENT:   Ice/Heat: heat pad with temporary relief   Physical Therapy:   Prior courses of physical therapy       - Current Pain Medications -   Robaxin 750mg TID  Clonazepam 0.5mg TID  Advil prn - minimal relief if any  Tylenol prn - minimal relief if any  Diclofenac gel prn - without relief     Prior Procedures:  Botox for cervical dystonia   Date      Procedure     Improvement (%)  06/17/21   L piriformis CSI (US)   80% imp  08/18/21   L piriformis Botox (US)  70% imp         Prior Related Surgery:    11/02/2017 - ROBOTIC ASSISTED LAPAROSCOPIC TOTAL HYSTERECTOMY BILATERAL SALPINGOOOPHORECTOMY, cystoscopy (Dr. Yesi Washington)  Other (acupuncture, OMT, CMM, TENS, DME, etc.):   CMM without relief     Specialists Seen - (with most recent, available notes and clinic visits reviewed)   1. PM&R - Dr. Vazquez   2. Neurology - Dr. Platt     IMAGING - reviewed   02/03/2020 - XR Pelvis and L spine ([facility])  Essentially normal.     Review Of Systems:  I am responding to those symptoms which are directly relevant to the specific indication for my consultation. I recommend that the patient  follow up with their primary or referring provider to pursue any other symptoms which may be of concern.       Medical History:  She  has a past medical history of Anxiety, Depression, and HTN (hypertension).     She  has a past surgical history that includes  section; Hysterectomy; and REPAIR TEND/MUS/FLEX FOREARM/WRIST, PRIM, EACH.      Family History  Her family history includes Autoimmune Disease in her mother; Heart Disease in her father; Hyperlipidemia in her mother; Hypertension in her father and mother; Skin Cancer in her father.     Social History:  Work: Full time for [employer]. 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 She has never used smokeless tobacco. She reports current alcohol use. She reports that she does not use drugs.        Current Medications:   She has a current medication list which includes the following prescription(s): bupropion, chlorthalidone, clonazepam, duloxetine, eletriptan, epinephrine, gabapentin, losartan, and methocarbamol, and the following Facility-Administered Medications: botulinum toxin type a, botulinum toxin type a, botulinum toxin type a, botulinum toxin type a, lidocaine, and triamcinolone.       Allergies:   No Known Allergies    PHYSICAL EXAMINATION:  /85   Pulse 82   Temp 98.7  F (37.1  C)   Resp 16   SpO2 97%    General: Pleasant, straightforward, WDWN individual.  Mental Status: Pleasant, direct, flat affect  Resp: breathing is unlabored without audible wheeze  Vascular: Palpable pedal pulses, no cyanosis, no venous stasis changes  Heme: no visible ecchymosis or erythema on extremities  Skin: No notable rash    Neurologic:  Strength: All major muscle groups of the bilateral lower extremities have normal and symmetric muscle strength      Gait: reveals normal emily and stride     ASSESSMENT:  Tia Pablo is a pleasant 55 year old female who presents with:    #.  Chronic gluteal pain  #. Piriformis syndrome, left    #. It band syndrome, left  #. Lumbar spondylosis      PLAN:  - Repeat sonographically-guided left piriformis muscle Botox injection today (see detailed procedure note)  - RTC x4 months. We will attempt to space out Botox injection as able.     Ready to learn, no apparent learning barriers.  Education provided on treatment plan according to patient's preferred learning style.  Patient verbalizes understanding.   __________________________________  Isabel Richter MD  Physical Medicine & Rehabilitation             Again, thank you for allowing me to participate in the care of your patient.      Sincerely,    Isabel Richter MD

## 2021-11-17 NOTE — PROCEDURES
PROCEDURE NOTE: Piriformis Muscle Botulinum Toxin-A (Botox) Injection Under Ultrasound Guidance    PROCEDURE DATE: 11/17/2021     PATIENT NAME: Tia Pablo  YOB: 1965    ATTENDING PHYSICIAN: Isabel Richter MD  FELLOW/RESIDENT PHYSICIAN: None     PREOPERATIVE DIAGNOSIS:   1. Muscle spasm    2. Piriformis syndrome, left    POSTOPERATIVE DIAGNOSIS: same    PROCEDURE PERFORMED: Left Piriformis Muscle Botox Injection Under Ultrasound Guidance    ULTRASOUND WAS USED.    INDICATIONS FOR THE PROCEDURE:  Tia Pablo is a 55 year old female who presents with piriformis syndrome.     PROCEDURE AND FINDINGS:  She was greeted in the clinic. The risk, benefits and alternatives to the procedure were again reviewed with her and informed consent was obtained and the patient agreed to proceed. A time-out was performed. Following review alternatives, benefits and risks, the procedure was carried out under sterile prep with sterile gel. The use of direct sonographic guidance was used to ensure accurate placement of the needle (rather than non-guided injection) and required to minimize the risk of bleeding or injury to nearby neurovascular structures.     A 5-1MHz ultrasound transducer was used to visualize the relevant structures and determine the optimal needle path for the procedure.  2mL 1% lidocaine was infiltrated at the needle insertion site subcutaneously. Then, a 22 gauge 5-inch Quincke spinal needle was advanced from lateral to medial utilizing an in-plane approach in long axis, under continuous ultrasound guidance to the piriformis muscle on the left. After negative aspiration, slow injection of the treatment solution 2mL total consisting of 100 units of Botox reconstituted in 2mL preservative free normal saline was instilled into affected area x2 locations along the piriformis muscle. The tip of the needle was visualized throughout the procedure. The remainder of the single-use vials were discarded.       She tolerated the procedure well, was discharged home in stable condition.     Follow-up will be in clinic x4 months.     COMPLICATIONS: None    COMMENTS: None

## 2021-12-05 DIAGNOSIS — F32.0 CURRENT MILD EPISODE OF MAJOR DEPRESSIVE DISORDER WITHOUT PRIOR EPISODE (H): ICD-10-CM

## 2021-12-05 DIAGNOSIS — M62.838 MUSCLE SPASM: ICD-10-CM

## 2021-12-05 DIAGNOSIS — G24.1 GENETIC TORSION DYSTONIA: ICD-10-CM

## 2021-12-06 ENCOUNTER — MYC MEDICAL ADVICE (OUTPATIENT)
Dept: FAMILY MEDICINE | Facility: CLINIC | Age: 56
End: 2021-12-06
Payer: COMMERCIAL

## 2021-12-06 DIAGNOSIS — G24.1 GENETIC TORSION DYSTONIA: ICD-10-CM

## 2021-12-07 RX ORDER — GABAPENTIN 300 MG/1
CAPSULE ORAL
Qty: 90 CAPSULE | Refills: 1 | Status: SHIPPED | OUTPATIENT
Start: 2021-12-07 | End: 2021-12-10

## 2021-12-07 RX ORDER — GABAPENTIN 300 MG/1
CAPSULE ORAL
Qty: 90 CAPSULE | Refills: 0 | OUTPATIENT
Start: 2021-12-07

## 2021-12-07 RX ORDER — BUPROPION HYDROCHLORIDE 150 MG/1
TABLET ORAL
Qty: 90 TABLET | Refills: 0 | OUTPATIENT
Start: 2021-12-07

## 2021-12-07 NOTE — TELEPHONE ENCOUNTER
Routing refill request to provider for review/approval because:  Phq9=12  Viviana Clemens RN

## 2021-12-07 NOTE — TELEPHONE ENCOUNTER
Please call pt.  I believe she is taking the duloxetine 60mg in which case she would not need the 20mg refilled.    Sally Stone, DO

## 2021-12-07 NOTE — TELEPHONE ENCOUNTER
METHOCARBAMOL 750 MG TABLET     Last Written Prescription Date:  6/2/21  Last Fill Quantity: 120,   # refills: 1  Last Office Visit : 11/3/21  Future Office visit:  2/9/22 [place]    Routing refill request to provider for review/approval because:   METHOCARBAMOL 750 MG TABLET not on the PMR    Health refill protocol

## 2021-12-08 ENCOUNTER — ANCILLARY PROCEDURE (OUTPATIENT)
Dept: MAMMOGRAPHY | Facility: CLINIC | Age: 56
End: 2021-12-08
Attending: FAMILY MEDICINE
Payer: COMMERCIAL

## 2021-12-08 DIAGNOSIS — Z12.31 VISIT FOR SCREENING MAMMOGRAM: ICD-10-CM

## 2021-12-08 PROCEDURE — 77067 SCR MAMMO BI INCL CAD: CPT | Mod: TC | Performed by: RADIOLOGY

## 2021-12-08 PROCEDURE — 77063 BREAST TOMOSYNTHESIS BI: CPT | Mod: TC | Performed by: RADIOLOGY

## 2021-12-08 RX ORDER — DULOXETIN HYDROCHLORIDE 20 MG/1
CAPSULE, DELAYED RELEASE ORAL
Qty: 30 CAPSULE | Refills: 0 | OUTPATIENT
Start: 2021-12-08

## 2021-12-08 NOTE — TELEPHONE ENCOUNTER
Spoke with patient. She takes 60 mg duloxetine and she has a refill so she does not need 20 mg order.  Benny Pedroza RN

## 2021-12-10 RX ORDER — GABAPENTIN 300 MG/1
300 CAPSULE ORAL 3 TIMES DAILY
Qty: 270 CAPSULE | Refills: 1 | Status: SHIPPED | OUTPATIENT
Start: 2021-12-10 | End: 2022-03-24

## 2021-12-13 RX ORDER — METHOCARBAMOL 750 MG/1
750 TABLET, FILM COATED ORAL 3 TIMES DAILY PRN
Qty: 90 TABLET | Refills: 2 | Status: SHIPPED | OUTPATIENT
Start: 2021-12-13 | End: 2022-03-24

## 2022-01-04 ENCOUNTER — MYC MEDICAL ADVICE (OUTPATIENT)
Dept: FAMILY MEDICINE | Facility: CLINIC | Age: 57
End: 2022-01-04
Payer: COMMERCIAL

## 2022-01-04 DIAGNOSIS — G24.1 GENETIC TORSION DYSTONIA: Primary | ICD-10-CM

## 2022-01-04 DIAGNOSIS — F32.0 CURRENT MILD EPISODE OF MAJOR DEPRESSIVE DISORDER WITHOUT PRIOR EPISODE (H): ICD-10-CM

## 2022-01-05 NOTE — TELEPHONE ENCOUNTER
Dr. Stone, Please see this patient's mychart question on this encounter regarding her medication side effects and forgetfulness. Would you like her to make a virtual visit or an inperson visit or??  Thank you.  Benny Pedroza RN

## 2022-01-06 DIAGNOSIS — F32.0 CURRENT MILD EPISODE OF MAJOR DEPRESSIVE DISORDER WITHOUT PRIOR EPISODE (H): ICD-10-CM

## 2022-01-07 RX ORDER — BUPROPION HYDROCHLORIDE 150 MG/1
TABLET ORAL
Qty: 90 TABLET | Refills: 0 | Status: SHIPPED | OUTPATIENT
Start: 2022-01-07 | End: 2022-04-21

## 2022-01-07 NOTE — TELEPHONE ENCOUNTER
Routing refill request to provider for review/approval because:  PHQ9=12  Viviana Clemens RN

## 2022-01-07 NOTE — TELEPHONE ENCOUNTER
Call placed to patient   Relayed Dr. Stone's message    Patient states she was recently approved by the [facility] for medical THC & CBD - states her symptoms have been managed well with starting the medical THC & CBD    Patient wanting to decrease Duloxetine dosing   Requesting to wean down on the current Duloxetine dose     Will forward to Dr. Stone to review    Mak Neal RN

## 2022-01-07 NOTE — TELEPHONE ENCOUNTER
Please call pt.  I see that she is taking duloxetine 60mg.  Does she need the 20mg refilled?    Sally Stone, DO

## 2022-01-10 ENCOUNTER — TELEPHONE (OUTPATIENT)
Dept: FAMILY MEDICINE | Facility: CLINIC | Age: 57
End: 2022-01-10
Payer: COMMERCIAL

## 2022-01-10 NOTE — TELEPHONE ENCOUNTER
MTM referral from: [facility] visit (referral by provider)    MTM referral outreach attempt #2 on January 10, 2022 at 1:23 PM      Outcome: Patient not reachable after several attempts, will route to MTM Pharmacist/Provider as an FYI.  MT scheduling number is [phone].  Thank you for the referral.    Josh Neri, MTM coordinator    Pt is private pay.

## 2022-01-11 RX ORDER — DULOXETIN HYDROCHLORIDE 20 MG/1
CAPSULE, DELAYED RELEASE ORAL
Qty: 30 CAPSULE | Refills: 0 | Status: SHIPPED | OUTPATIENT
Start: 2022-01-11 | End: 2022-02-08

## 2022-01-11 NOTE — TELEPHONE ENCOUNTER
So she is still taking 60mg?  She needs to send an Evisit or schedule a virtual visit to discuss weaning so we can have an appropriate plan.  .    Sally Stone, DO

## 2022-01-11 NOTE — TELEPHONE ENCOUNTER
DEB to Dr. Stone: Patient said that she is staking 60 mg duloxetine but would like to wean off. Advised her to initiate an evisit or schedule virtual visit. She said that she would initiate an evisit.  Benny Pedroza RN

## 2022-01-16 ENCOUNTER — HEALTH MAINTENANCE LETTER (OUTPATIENT)
Age: 57
End: 2022-01-16

## 2022-02-03 ENCOUNTER — MYC MEDICAL ADVICE (OUTPATIENT)
Dept: PHYSICAL MEDICINE AND REHAB | Facility: CLINIC | Age: 57
End: 2022-02-03
Payer: COMMERCIAL

## 2022-02-04 NOTE — TELEPHONE ENCOUNTER
Patient called, there is a change in her schedule and would like to her original botox appt on 2/9, is that date still available for patient? Please message back via [employer] to update or call her to discuss.

## 2022-02-05 DIAGNOSIS — F32.0 CURRENT MILD EPISODE OF MAJOR DEPRESSIVE DISORDER WITHOUT PRIOR EPISODE (H): ICD-10-CM

## 2022-02-08 RX ORDER — DULOXETIN HYDROCHLORIDE 20 MG/1
CAPSULE, DELAYED RELEASE ORAL
Qty: 30 CAPSULE | Refills: 0 | Status: SHIPPED | OUTPATIENT
Start: 2022-02-08 | End: 2022-03-24

## 2022-02-08 NOTE — TELEPHONE ENCOUNTER
Routing refill request to provider for review/approval because:  Patient was advised to initiate evisit on 1/11/22 to discuss weaning of this medication and I do not see an evisit to address plan.  Viviana Clemens RN

## 2022-02-14 ASSESSMENT — ENCOUNTER SYMPTOMS
WEAKNESS: 1
ABDOMINAL PAIN: 0
FREQUENCY: 1
ARTHRALGIAS: 1
FEVER: 0
BREAST MASS: 0
JOINT SWELLING: 0
HEADACHES: 1
NERVOUS/ANXIOUS: 1
PALPITATIONS: 0
COUGH: 0
NAUSEA: 0
DYSURIA: 0
HEARTBURN: 0
HEMATOCHEZIA: 0
SHORTNESS OF BREATH: 0
DIARRHEA: 0
SORE THROAT: 0
HEMATURIA: 0
MYALGIAS: 1
EYE PAIN: 0
CHILLS: 0
CONSTIPATION: 0
PARESTHESIAS: 0
DIZZINESS: 0

## 2022-02-18 ENCOUNTER — OFFICE VISIT (OUTPATIENT)
Dept: FAMILY MEDICINE | Facility: CLINIC | Age: 57
End: 2022-02-18
Payer: COMMERCIAL

## 2022-02-18 ENCOUNTER — MYC MEDICAL ADVICE (OUTPATIENT)
Dept: FAMILY MEDICINE | Facility: CLINIC | Age: 57
End: 2022-02-18

## 2022-02-18 VITALS
HEIGHT: 66 IN | HEART RATE: 72 BPM | BODY MASS INDEX: 24.08 KG/M2 | WEIGHT: 149.8 LBS | OXYGEN SATURATION: 98 % | DIASTOLIC BLOOD PRESSURE: 88 MMHG | TEMPERATURE: 97.8 F | SYSTOLIC BLOOD PRESSURE: 138 MMHG

## 2022-02-18 DIAGNOSIS — Z11.59 ENCOUNTER FOR HEPATITIS C SCREENING TEST FOR LOW RISK PATIENT: ICD-10-CM

## 2022-02-18 DIAGNOSIS — U09.9 POST-ACUTE SEQUELAE OF COVID-19 (PASC): ICD-10-CM

## 2022-02-18 DIAGNOSIS — L65.9 HAIR LOSS: ICD-10-CM

## 2022-02-18 DIAGNOSIS — G24.1 GENETIC TORSION DYSTONIA: ICD-10-CM

## 2022-02-18 DIAGNOSIS — Z13.6 CARDIOVASCULAR SCREENING; LDL GOAL LESS THAN 130: ICD-10-CM

## 2022-02-18 DIAGNOSIS — Z00.00 ROUTINE GENERAL MEDICAL EXAMINATION AT A HEALTH CARE FACILITY: Primary | ICD-10-CM

## 2022-02-18 DIAGNOSIS — Z11.4 SCREENING FOR HIV (HUMAN IMMUNODEFICIENCY VIRUS): ICD-10-CM

## 2022-02-18 DIAGNOSIS — I10 ESSENTIAL HYPERTENSION: ICD-10-CM

## 2022-02-18 LAB
ALBUMIN SERPL-MCNC: 4.2 G/DL (ref 3.4–5)
ALP SERPL-CCNC: 71 U/L (ref 40–150)
ALT SERPL W P-5'-P-CCNC: 30 U/L (ref 0–50)
ANION GAP SERPL CALCULATED.3IONS-SCNC: 2 MMOL/L (ref 3–14)
AST SERPL W P-5'-P-CCNC: 25 U/L (ref 0–45)
BILIRUB SERPL-MCNC: 0.8 MG/DL (ref 0.2–1.3)
BUN SERPL-MCNC: 16 MG/DL (ref 7–30)
CALCIUM SERPL-MCNC: 9.7 MG/DL (ref 8.5–10.1)
CHLORIDE BLD-SCNC: 100 MMOL/L (ref 94–109)
CHOLEST SERPL-MCNC: 244 MG/DL
CO2 SERPL-SCNC: 31 MMOL/L (ref 20–32)
CREAT SERPL-MCNC: 0.66 MG/DL (ref 0.52–1.04)
FASTING STATUS PATIENT QL REPORTED: YES
FERRITIN SERPL-MCNC: 63 NG/ML (ref 8–252)
GFR SERPL CREATININE-BSD FRML MDRD: >90 ML/MIN/1.73M2
GLUCOSE BLD-MCNC: 95 MG/DL (ref 70–99)
HDLC SERPL-MCNC: 87 MG/DL
LDLC SERPL CALC-MCNC: 145 MG/DL
NONHDLC SERPL-MCNC: 157 MG/DL
POTASSIUM BLD-SCNC: 4 MMOL/L (ref 3.4–5.3)
PROT SERPL-MCNC: 7.6 G/DL (ref 6.8–8.8)
SODIUM SERPL-SCNC: 133 MMOL/L (ref 133–144)
TRIGL SERPL-MCNC: 62 MG/DL
TSH SERPL DL<=0.005 MIU/L-ACNC: 1.53 MU/L (ref 0.4–4)

## 2022-02-18 PROCEDURE — 86803 HEPATITIS C AB TEST: CPT | Performed by: FAMILY MEDICINE

## 2022-02-18 PROCEDURE — 99213 OFFICE O/P EST LOW 20 MIN: CPT | Mod: 25 | Performed by: FAMILY MEDICINE

## 2022-02-18 PROCEDURE — 87389 HIV-1 AG W/HIV-1&-2 AB AG IA: CPT | Performed by: FAMILY MEDICINE

## 2022-02-18 PROCEDURE — 36415 COLL VENOUS BLD VENIPUNCTURE: CPT | Performed by: FAMILY MEDICINE

## 2022-02-18 PROCEDURE — 80061 LIPID PANEL: CPT | Performed by: FAMILY MEDICINE

## 2022-02-18 PROCEDURE — 84443 ASSAY THYROID STIM HORMONE: CPT | Performed by: FAMILY MEDICINE

## 2022-02-18 PROCEDURE — 99396 PREV VISIT EST AGE 40-64: CPT | Performed by: FAMILY MEDICINE

## 2022-02-18 PROCEDURE — 80053 COMPREHEN METABOLIC PANEL: CPT | Performed by: FAMILY MEDICINE

## 2022-02-18 PROCEDURE — 82728 ASSAY OF FERRITIN: CPT | Performed by: FAMILY MEDICINE

## 2022-02-18 ASSESSMENT — ENCOUNTER SYMPTOMS
JOINT SWELLING: 0
COUGH: 0
HEARTBURN: 0
HEADACHES: 1
HEMATOCHEZIA: 0
FREQUENCY: 1
MYALGIAS: 1
BREAST MASS: 0
DIARRHEA: 0
NAUSEA: 0
PALPITATIONS: 0
EYE PAIN: 0
ARTHRALGIAS: 1
SHORTNESS OF BREATH: 0
ABDOMINAL PAIN: 0
HEMATURIA: 0
DIZZINESS: 0
PARESTHESIAS: 0
NERVOUS/ANXIOUS: 1
CHILLS: 0
CONSTIPATION: 0
SORE THROAT: 0
DYSURIA: 0
FEVER: 0
WEAKNESS: 1

## 2022-02-18 NOTE — PATIENT INSTRUCTIONS
Unfortunately there are not any really good options for the persistent covid symptoms.  Taking good care of your physical health is most important with adequate sleep, good nutrition and regular exercise.      We'll do some additional blood work today for the hair loss to make sure we don't see anything additional underlying that.      Preventive Health Recommendations  Female Ages 50 - 64    Yearly exam: See your health care provider every year in order to  o Review health changes.   o Discuss preventive care.    o Review your medicines if your doctor has prescribed any.      Get a Pap test every three years (unless you have an abnormal result and your provider advises testing more often).    If you get Pap tests with HPV test, you only need to test every 5 years, unless you have an abnormal result.     You do not need a Pap test if your uterus was removed (hysterectomy) and you have not had cancer.    You should be tested each year for STDs (sexually transmitted diseases) if you're at risk.     Have a mammogram every 1 to 2 years.    Have a colonoscopy at age 50, or have a yearly FIT test (stool test). These exams screen for colon cancer.      Have a cholesterol test every 5 years, or more often if advised.    Have a diabetes test (fasting glucose) every three years. If you are at risk for diabetes, you should have this test more often.     If you are at risk for osteoporosis (brittle bone disease), think about having a bone density scan (DEXA).    Shots: Get a flu shot each year. Get a tetanus shot every 10 years.    Nutrition:     Eat at least 5 servings of fruits and vegetables each day.    Eat whole-grain bread, whole-wheat pasta and brown rice instead of white grains and rice.    Get adequate Calcium and Vitamin D.     Lifestyle    Exercise at least 150 minutes a week (30 minutes a day, 5 days a week). This will help you control your weight and prevent disease.    Limit alcohol to one drink per day.    No  smoking.     Wear sunscreen to prevent skin cancer.     See your dentist every six months for an exam and cleaning.    See your eye doctor every 1 to 2 years.

## 2022-02-18 NOTE — NURSING NOTE
"Initial /88   Pulse 72   Temp 97.8  F (36.6  C) (Tympanic)   Ht 1.676 m (5' 6\")   Wt 67.9 kg (149 lb 12.8 oz)   SpO2 98%   Breastfeeding No   BMI 24.18 kg/m   Estimated body mass index is 24.18 kg/m  as calculated from the following:    Height as of this encounter: 1.676 m (5' 6\").    Weight as of this encounter: 67.9 kg (149 lb 12.8 oz). .      "

## 2022-02-18 NOTE — PROGRESS NOTES
"   SUBJECTIVE:   CC: Tia Pablo is an 56 year old woman who presents for preventive health visit.       Patient has been advised of split billing requirements and indicates understanding: Yes     Healthy Habits:     Getting at least 3 servings of Calcium per day:  NO    Bi-annual eye exam:  Yes    Dental care twice a year:  Yes    Sleep apnea or symptoms of sleep apnea:  None    Diet:  Regular (no restrictions)    Frequency of exercise:  4-5 days/week    Duration of exercise:  Greater than 60 minutes    Taking medications regularly:  Yes    Medication side effects:  Other    PHQ-2 Total Score: 1    Additional concerns today:  Yes    Concerns:  * post covid symptoms - loss of taste and smell, hair loss, insomnia, joint pain and brain fog    Had Covid in Nov 2020.  Still experiencing loss of taste and smell.    Feels like she has a lot of hair loss and \"brain fog\".  Feels this is quite noticeable.     Sister in law recommended tumeric and quercetin.  Wondering if these are okay.  Wonders if thre is anything else she can do.    *  Cervix was removed with hyst - pap no longer indicated.    Today's PHQ-2 Score:   PHQ-2 ( 1999 Pfizer) 2/14/2022   Q1: Little interest or pleasure in doing things 1   Q2: Feeling down, depressed or hopeless 0   PHQ-2 Score 1   PHQ-2 Total Score (12-17 Years)- Positive if 3 or more points; Administer PHQ-A if positive -   Q1: Little interest or pleasure in doing things Several days   Q2: Feeling down, depressed or hopeless Not at all   PHQ-2 Score 1       Abuse: Current or Past (Physical, Sexual or Emotional) - No  Do you feel safe in your environment? Yes        Social History     Tobacco Use     Smoking status: Never Smoker     Smokeless tobacco: Never Used   Substance Use Topics     Alcohol use: Yes     Comment: Occasionally, 1-2 glasses of wine on weekends         Alcohol Use 2/14/2022   Prescreen: >3 drinks/day or >7 drinks/week? No       Reviewed orders with patient.  Reviewed health " maintenance and updated orders accordingly - Yes      Breast Cancer Screening:    FHS-7:   Breast CA Risk Assessment (FHS-7) 2021   Did any of your first-degree relatives have breast or ovarian cancer? No No   Did any of your relatives have bilateral breast cancer? No No   Did any man in your family have breast cancer? No No   Did any woman in your family have breast and ovarian cancer? No No   Did any woman in your family have breast cancer before age 50 y? No No   Do you have 2 or more relatives with breast and/or ovarian cancer? No No   Do you have 2 or more relatives with breast and/or bowel cancer? No No       Mammogram Screening: Recommended mammography every 1-2 years with patient discussion and risk factor consideration  Pertinent mammograms are reviewed under the imaging tab.    History of abnormal Pap smear: Status post benign hysterectomy. Health Maintenance and Surgical History updated.     Reviewed and updated as needed this visit by clinical staff   Tobacco  Allergies  Meds   Med Hx  Surg Hx  Fam Hx  Soc Hx        Reviewed and updated as needed this visit by Provider   Tobacco  Allergies  Meds   Med Hx  Surg Hx  Fam Hx  Soc Hx       Past Medical History:   Diagnosis Date     Anxiety      Depression      HTN (hypertension)       Past Surgical History:   Procedure Laterality Date     AS REPAIR TEND/MUS/FLEX FOREARM/WRIST, PRIM, EACH        SECTION       HYSTERECTOMY         Review of Systems   Constitutional: Negative for chills and fever.   HENT: Negative for congestion, ear pain, hearing loss and sore throat.    Eyes: Positive for visual disturbance. Negative for pain.   Respiratory: Negative for cough and shortness of breath.    Cardiovascular: Negative for chest pain, palpitations and peripheral edema.   Gastrointestinal: Negative for abdominal pain, constipation, diarrhea, heartburn, hematochezia and nausea.   Breasts:  Negative for tenderness, breast mass and  "discharge.   Genitourinary: Positive for frequency and urgency. Negative for dysuria, genital sores, hematuria, pelvic pain, vaginal bleeding and vaginal discharge.   Musculoskeletal: Positive for arthralgias and myalgias. Negative for joint swelling.   Skin: Negative for rash.   Neurological: Positive for weakness and headaches. Negative for dizziness and paresthesias.   Psychiatric/Behavioral: Negative for mood changes. The patient is nervous/anxious.      Continues to be frustrated by her pain management.  Gets Botox injections for her dystonia which help but struggles a lot as they are wearing off.  Really does not want to take a lot of medicine and worries about side effects.  When her injections are wearing off and pain is worse she wants to try things but then would prefer to be off when better.    Currently has been weaning her duloxetine off.  Not sure if she wants to continue to stop.  Hard to tell if it is effective for her pain or mood.     OBJECTIVE:   /88   Pulse 72   Temp 97.8  F (36.6  C) (Tympanic)   Ht 1.676 m (5' 6\")   Wt 67.9 kg (149 lb 12.8 oz)   SpO2 98%   Breastfeeding No   BMI 24.18 kg/m    Physical Exam  GENERAL APPEARANCE: healthy, alert and no distress  EYES: Eyes grossly normal to inspection, PERRL and conjunctivae and sclerae normal  NECK: no adenopathy, no asymmetry, masses, or scars and thyroid normal to palpation  RESP: lungs clear to auscultation - no rales, rhonchi or wheezes  BREAST: normal without masses, tenderness or nipple discharge and no palpable axillary masses or adenopathy  CV: regular rate and rhythm, normal S1 S2, no S3 or S4, no murmur, click or rub, no peripheral edema and peripheral pulses strong  ABDOMEN: soft, nontender, no hepatosplenomegaly, no masses and bowel sounds normal  MS: no musculoskeletal defects are noted and gait is age appropriate without ataxia  NEURO: Normal strength and tone, sensory exam grossly normal, mentation intact and speech " "normal  PSYCH: mentation appears normal, anxious and tearful    Diagnostic Test Results:  Labs reviewed in Epic    ASSESSMENT/PLAN:       ICD-10-CM    1. Routine general medical examination at a health care facility  Z00.00    2. Essential hypertension  I10 Comprehensive metabolic panel (BMP + Alb, Alk Phos, ALT, AST, Total. Bili, TP)     Comprehensive metabolic panel (BMP + Alb, Alk Phos, ALT, AST, Total. Bili, TP)   3. Hair loss  L65.9 Ferritin     TSH with free T4 reflex     Ferritin     TSH with free T4 reflex   4. 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 CARDIOVASCULAR SCREENING; LDL GOAL LESS THAN 130  Z13.6 Lipid panel reflex to direct LDL Fasting     Lipid panel reflex to direct LDL Fasting   7. Screening for HIV (human immunodeficiency virus)  Z11.4 HIV Antigen Antibody Combo     HIV Antigen Antibody Combo   8. Encounter for hepatitis C screening test for low risk patient  Z11.59 Hepatitis C Screen Reflex to HCV RNA Quant and Genotype     Hepatitis C Screen Reflex to HCV RNA Quant and Genotype     HTN:  BP well controlled, continue meds    Hair loss:  ?if due to illness.  Labs as ordered    Post covid symptoms:  Reviewed anticipated course and lack of specific therapy.  F/u prn    Dystonia/pain:  Support offered.  Reviewed that medicines for pain take time to work and it is important to take into account the waxing and waning of her pain with regards to Botox timing as well.  She plans to review further with her PMR doctor who does her injections    Patient has been advised of split billing requirements and indicates understanding: Yes    COUNSELING:  Reviewed preventive health counseling, as reflected in patient instructions    Estimated body mass index is 24.18 kg/m  as calculated from the following:    Height as of this encounter: 1.676 m (5' 6\").    Weight as of this encounter: 67.9 kg (149 lb 12.8 oz).        She reports that she has never smoked. She " has never used smokeless tobacco.      Counseling Resources:  ATP IV Guidelines  Pooled Cohorts Equation Calculator  Breast Cancer Risk Calculator  BRCA-Related Cancer Risk Assessment: FHS-7 Tool  FRAX Risk Assessment  ICSI Preventive Guidelines  Dietary Guidelines for Americans, 2010  USDA's MyPlate  ASA Prophylaxis  Lung CA Screening    Sally Stone DO  [facility]

## 2022-02-20 LAB
HCV AB SERPL QL IA: NONREACTIVE
HIV 1+2 AB+HIV1 P24 AG SERPL QL IA: NONREACTIVE

## 2022-02-23 ENCOUNTER — OFFICE VISIT (OUTPATIENT)
Dept: PHYSICAL MEDICINE AND REHAB | Facility: CLINIC | Age: 57
End: 2022-02-23
Payer: COMMERCIAL

## 2022-02-23 VITALS
DIASTOLIC BLOOD PRESSURE: 84 MMHG | TEMPERATURE: 98 F | SYSTOLIC BLOOD PRESSURE: 131 MMHG | OXYGEN SATURATION: 99 % | RESPIRATION RATE: 16 BRPM | HEART RATE: 89 BPM

## 2022-02-23 DIAGNOSIS — G24.3 SPASMODIC TORTICOLLIS: Primary | ICD-10-CM

## 2022-02-23 DIAGNOSIS — G43.719 CHRONIC MIGRAINE WITHOUT AURA, INTRACTABLE, WITHOUT STATUS MIGRAINOSUS: ICD-10-CM

## 2022-02-23 DIAGNOSIS — G24.4 OROMANDIBULAR DYSTONIA: ICD-10-CM

## 2022-02-23 PROCEDURE — 95874 GUIDE NERV DESTR NEEDLE EMG: CPT | Performed by: PHYSICAL MEDICINE & REHABILITATION

## 2022-02-23 PROCEDURE — 64612 DESTROY NERVE FACE MUSCLE: CPT | Mod: 59 | Performed by: PHYSICAL MEDICINE & REHABILITATION

## 2022-02-23 PROCEDURE — 96372 THER/PROPH/DIAG INJ SC/IM: CPT | Performed by: PHYSICAL MEDICINE & REHABILITATION

## 2022-02-23 PROCEDURE — 64616 CHEMODENERV MUSC NECK DYSTON: CPT | Mod: 59 | Performed by: PHYSICAL MEDICINE & REHABILITATION

## 2022-02-23 ASSESSMENT — PAIN SCALES - GENERAL: PAINLEVEL: SEVERE PAIN (7)

## 2022-02-23 NOTE — LETTER
2/23/2022       RE: Tia Pablo  [address] [id number]     Dear Colleague,    Thank you for referring your patient, Tia Pablo, to the [facility] [place] at [facility]. Please see a copy of my visit note below.    BOTULINUM TOXIN PROCEDURE NOTE    Chief Complaint   Patient presents with     Botox     [facility] RETURN BOTOX       Current Outpatient Medications:      buPROPion (WELLBUTRIN XL) 150 MG 24 hr tablet, TAKE 1 TABLET BY MOUTH EVERY DAY IN THE MORNING, Disp: 90 tablet, Rfl: 0     chlorthalidone (HYGROTON) 25 MG tablet, Take 1 tablet (25 mg) by mouth daily, Disp: 90 tablet, Rfl: 0     clonazePAM (KLONOPIN) 0.5 MG tablet, Take 1 tablet (0.5 mg) by mouth 3 times daily in the morning, at noon and in the evening. (Patient taking differently: Take 0.5 mg by mouth 3 times daily as needed in the morning, at noon and in the evening.), Disp: 90 tablet, Rfl: 0     DULoxetine (CYMBALTA) 20 MG capsule, TAKE 1 CAPSULE BY MOUTH EVERY DAY, Disp: 30 capsule, Rfl: 0     eletriptan (RELPAX) 40 MG tablet, Take 1 tablet (40 mg) by mouth at onset of headache for migraine May repeat in 2 hours. Max 2 tablets/24 hours., Disp: 12 tablet, Rfl: 3     EPINEPHrine (ANY BX GENERIC EQUIV) 0.3 MG/0.3ML injection 2-pack, Inject 0.3 mg into the muscle, Disp: , Rfl:      gabapentin (NEURONTIN) 300 MG capsule, Take 1 capsule (300 mg) by mouth 3 times daily (Patient taking differently: Take 300 mg by mouth 3 times daily Uses prn), Disp: 270 capsule, Rfl: 1     losartan (COZAAR) 50 MG tablet, TAKE 1 TABLET BY MOUTH EVERY DAY, Disp: 90 tablet, Rfl: 1     methocarbamol (ROBAXIN) 750 MG tablet, TAKE 1 TABLET (750 MG) BY MOUTH 3 TIMES DAILY AS NEEDED FOR MUSCLE SPASMS, Disp: 90 tablet, Rfl: 2    Current Facility-Administered Medications:      botulinum toxin type A (BOTOX) 100 units injection 100 Units, 100 Units, Intramuscular,  Q90 Days, Isabel Richter MD, 100 Units at 21 1256     botulinum toxin type A (BOTOX) 100 units injection 600 Units, 600 Units, Intramuscular, Q90 Days, Simi Vazquez MD     Allergies   Allergen Reactions     Bee Venom Anaphylaxis     Erythromycin Hives and Itching     Seasonal Allergies         PHYSICAL EXAM:    VS: /84   Pulse 89   Temp 98  F (36.7  C)   Resp 16   SpO2 99%    HEAD, NECK AND TRUNK PATTERN:     Left laterollis with mild right rotation  Right shoulder elevation      HPI:    Patient denies new medical diagnoses, illnesses, hospitalizations, emergency room visits, and injuries since the previous injection with botulinum neurotoxin.     We reviewed the recommended safety guidelines for  Botox from any vaccine injection, such as the seasonal flu vaccine, by a minimum of 10-14 days with Tia Pablo. She acknowledged understanding..     RESPONSE TO PREVIOUS TREATMENT:    Tia Pablo received 450 units of Botox on 11/3/2021.    Problems following the previous series of neurotoxin injections included:  Jaw weakness, manifested as difficulty chewing. Unclear how long this lasted for, but she believes it may have lasted at least a month.    BENEFITS BY PATIENT REPORT:    Pain Improvement: Yes.  Percent Improvement: 80 %    Duration of Benefit:  7 weeks. When pain returns, it is usually in the jaw, accompanied by increased jaw clenching. She also has more pain and stiffness in her neck and shoulder muscles.     Dystonia Improvement: Yes.  Percent Improvement: 80 %    Duration of Benefit: 12 weeks, followed by a gradual reduction in benefit. She is now noticing some mouth puckering, which is a new form of facial dystonia for her.       BOTULINUM NEUROTOXIN INJECTION PROCEDURES:    VERIFICATION OF PATIENT IDENTIFICATION AND PROCEDURE     Initials   Patient Name ses   Patient  ses   Procedure Verified by: ses     Prior to the start of the procedure and with procedural  staff participation, I verbally confirmed the patient s identity using two indicators, relevant allergies, that the procedure was appropriate and matched the consent or emergent situation, and that the correct equipment/implants were available. Immediately prior to starting the procedure I conducted the Time Out with the procedural staff and re-confirmed the patient s name, procedure, and site/side. (The Joint Commission universal protocol was followed.)  Yes    Sedation (Moderate or Deep): None      Above assessments performed by:    Simi Vazquez MD       INDICATION/S FOR PROCEDURE/S:  Tia Pablo is a 56 year old patient with segmental dystonia and spasticity affecting the  head, neck and shoulder girdle musculature secondary to a diagnosis of spasmodic torticollis and oromandibular dystonia with associated  pain and loss of joint motion.    Her baseline symptoms have been recalcitrant to oral medications and conservative therapy.  She is here today for an injection of Botox.      GOAL OF PROCEDURE:  The goal of this procedure is to increase active range of motion and decrease pain  associated with dystonic movements and spasticity.    TOTAL DOSE: 500 UNITS BOTOX  Dose Administered:  450 units Botox    Diluent Used:  Preservative Free Normal Saline  Total Volume of Diluent Used:  See dilutional details below  Lot # [id number] with Expiration Date:  02/2024  NDC #: Botox 100u ([address]-80)    Was there drug waste? Yes  Amount of drug waste (mL): 50 units Botox.  Reason for waste:  Single use vial  Multi-dose vial: Yes    Simi Vazquez MD  February 23, 2022    Medication guide was offered to patient and was declined.    CONSENT:  The risks, benefits, and treatment options were discussed with Tia Pablo and she agreed to proceed.      Written consent was obtained by [facility].     EQUIPMENT USED:  Needle-37mm stimulating/recording  EMG/NCS Machine    SKIN PREPARATION:  Skin preparation was performed  using an alcohol wipe.    GUIDANCE DESCRIPTION:  Electro-myographic guidance was necessary throughout the procedure to accurately identify all areas of dystonic and spastic muscles while avoiding injection of non-dystonic muscles and non-spastic muscles, neighboring nerves and nearby vascular structures.     AREA/MUSCLE INJECTED: 450 UNITS BOTOX = TOTAL DOSE     1. Neck Muscles: 135 units of Botox, 1:1 Dilution     Right middle trapezius - 5 units of Botox at 1 site/s.   Left middle trapezius - 5 units of Botox at 1 site/s.      Right Lateral Trapezius - 15 units of Botox at 3 site/s.   Left lateral trapezius - 15 units of Botox at 5 site/s.      Right splenius - 5 units of Botox at 1 site/s.   Left splenius - 5 units of Botox at 1 site/s.      Right Auricularis - 15 units of Botox at 3 site/s.   Left Auricularis - 15 units of Botox at 3 site/s.      Right occipitalis - 10 units of Botox at 3 site/s.   Left occipitalis - 10 units of Botox at 3 site/s.     Right Sternocleidomastoid - 10 units of Botox at 1 site/s.    Right Middle Scalene - 15 units of Botox at 1 site/s.     Right Platysma - 5 units of Botox at 2 site/s.  Left Platysma - 5 units of Botox at 2 site/s.      2. Upper Extremity & Trunk Muscles: 110 units of Botox, 1:1 Dilution     Right levator scapula - 25 units of Botox at 2 site/s.   Left levator scapula - 25 units of Botox at 2 site/s.      Right Rhomboid - 20 units of Botox at 1 site/s.    Left Rhomboid - 20 units of Botox at 3 site/s.       Right Latissimus Dorsi - 10 units of Botox at 3 site/s.    Left Latissimus Dorsi - 10 units of Botox at 3 site/s.      3. Jaw Muscles: 160 units of Botox, 1:1 Dilution      Right masseter - 20 units of Botox at 2 site/s.   Left masseter - 20 units of Botox at 2 site/s.     Right temporalis - 50 units of Botox at 5 site/s.               Left temporalis - 50 units of Botox at 5 site/s.     Right medial pterygoid - 10 units of Botox at 1 site/s.    Left medial  pterygoid - 10 units of Botox at 1 site/s.     4. Facial Muscles: 45 units of Botox, 2:1 Dilution     Right Frontalis - 10 units of Botox at 1 site/s.    Left Frontalis - 10 units of Botox at 1 site/s.      Procerus - 5 units of Botox at 1 site/s.       Right  - 5 units of Botox at 1 site/s.    Left  - 5 units of Botox at 1 site/s.      Right Orbicularis Oris (superior) - 2.5 units of Botox at 1 site/s.    Left Orbicularis Oris (superior) - 2.5 units of Botox at 1 site/s.     Right Mentalis - 2.5 units of Botox at 1 site/s.    Left Mentalis - 2.5 units of Botox at 1 site/s.       RESPONSE TO PROCEDURE:  Tia Pablo tolerated the procedure well and there were no immediate complications.  She was allowed to recover for an appropriate period of time and was discharged home in stable condition.    FOLLOW UP: Tia Pablo was asked to follow up by phone in 7-14 days with Stacia Rose RN, Care Coordinator or Shruthi West PT, Care Coordinator, to report her response to this series of injections.  Based on the patient's previous response to this therapy, Tia Pablo was rescheduled for the next series of injections in 12 weeks.    PLAN (Medication Changes, Therapy Orders, Work or Disability Issues, etc.): Dose of Botox into the jaw musculature decreased slightly due to jaw weakness, and additional Botox placed into the orbicularis oris and mentalis for mouth puckering however, no other changes made to Botox dose or distribution today. Patient will continue to monitor response to Botox and report at next appointment.

## 2022-02-23 NOTE — PROGRESS NOTES
BOTULINUM TOXIN PROCEDURE NOTE    Chief Complaint   Patient presents with     Botox     [facility] RETURN BOTOX       Current Outpatient Medications:      buPROPion (WELLBUTRIN XL) 150 MG 24 hr tablet, TAKE 1 TABLET BY MOUTH EVERY DAY IN THE MORNING, Disp: 90 tablet, Rfl: 0     chlorthalidone (HYGROTON) 25 MG tablet, Take 1 tablet (25 mg) by mouth daily, Disp: 90 tablet, Rfl: 0     clonazePAM (KLONOPIN) 0.5 MG tablet, Take 1 tablet (0.5 mg) by mouth 3 times daily in the morning, at noon and in the evening. (Patient taking differently: Take 0.5 mg by mouth 3 times daily as needed in the morning, at noon and in the evening.), Disp: 90 tablet, Rfl: 0     DULoxetine (CYMBALTA) 20 MG capsule, TAKE 1 CAPSULE BY MOUTH EVERY DAY, Disp: 30 capsule, Rfl: 0     eletriptan (RELPAX) 40 MG tablet, Take 1 tablet (40 mg) by mouth at onset of headache for migraine May repeat in 2 hours. Max 2 tablets/24 hours., Disp: 12 tablet, Rfl: 3     EPINEPHrine (ANY BX GENERIC EQUIV) 0.3 MG/0.3ML injection 2-pack, Inject 0.3 mg into the muscle, Disp: , Rfl:      gabapentin (NEURONTIN) 300 MG capsule, Take 1 capsule (300 mg) by mouth 3 times daily (Patient taking differently: Take 300 mg by mouth 3 times daily Uses prn), Disp: 270 capsule, Rfl: 1     losartan (COZAAR) 50 MG tablet, TAKE 1 TABLET BY MOUTH EVERY DAY, Disp: 90 tablet, Rfl: 1     methocarbamol (ROBAXIN) 750 MG tablet, TAKE 1 TABLET (750 MG) BY MOUTH 3 TIMES DAILY AS NEEDED FOR MUSCLE SPASMS, Disp: 90 tablet, Rfl: 2    Current Facility-Administered Medications:      botulinum toxin type A (BOTOX) 100 units injection 100 Units, 100 Units, Intramuscular, Q90 Days, Isabel Richter MD, 100 Units at 08/18/21 1256     botulinum toxin type A (BOTOX) 100 units injection 600 Units, 600 Units, Intramuscular, Q90 Days, Simi Vazquez MD     Allergies   Allergen Reactions     Bee Venom Anaphylaxis     Erythromycin Hives and Itching     Seasonal Allergies         PHYSICAL EXAM:    VS: BP  131/84   Pulse 89   Temp 98  F (36.7  C)   Resp 16   SpO2 99%    HEAD, NECK AND TRUNK PATTERN:     Left laterollis with mild right rotation  Right shoulder elevation      HPI:    Patient denies new medical diagnoses, illnesses, hospitalizations, emergency room visits, and injuries since the previous injection with botulinum neurotoxin.     We reviewed the recommended safety guidelines for  Botox from any vaccine injection, such as the seasonal flu vaccine, by a minimum of 10-14 days with Tia Pablo. She acknowledged understanding..     RESPONSE TO PREVIOUS TREATMENT:    Tia Pablo received 450 units of Botox on 11/3/2021.    Problems following the previous series of neurotoxin injections included:  Jaw weakness, manifested as difficulty chewing. Unclear how long this lasted for, but she believes it may have lasted at least a month.    BENEFITS BY PATIENT REPORT:    Pain Improvement: Yes.  Percent Improvement: 80 %    Duration of Benefit:  7 weeks. When pain returns, it is usually in the jaw, accompanied by increased jaw clenching. She also has more pain and stiffness in her neck and shoulder muscles.     Dystonia Improvement: Yes.  Percent Improvement: 80 %    Duration of Benefit: 12 weeks, followed by a gradual reduction in benefit. She is now noticing some mouth puckering, which is a new form of facial dystonia for her.       BOTULINUM NEUROTOXIN INJECTION PROCEDURES:    VERIFICATION OF PATIENT IDENTIFICATION AND PROCEDURE     Initials   Patient Name ses   Patient  ses   Procedure Verified by: tl     Prior to the start of the procedure and with procedural staff participation, I verbally confirmed the patient s identity using two indicators, relevant allergies, that the procedure was appropriate and matched the consent or emergent situation, and that the correct equipment/implants were available. Immediately prior to starting the procedure I conducted the Time Out with the procedural staff  and re-confirmed the patient s name, procedure, and site/side. (The Joint Commission universal protocol was followed.)  Yes    Sedation (Moderate or Deep): None      Above assessments performed by:    Simi Vazquez MD       INDICATION/S FOR PROCEDURE/S:  Tia Pablo is a 56 year old patient with segmental dystonia and spasticity affecting the  head, neck and shoulder girdle musculature secondary to a diagnosis of spasmodic torticollis and oromandibular dystonia with associated  pain and loss of joint motion.    Her baseline symptoms have been recalcitrant to oral medications and conservative therapy.  She is here today for an injection of Botox.      GOAL OF PROCEDURE:  The goal of this procedure is to increase active range of motion and decrease pain  associated with dystonic movements and spasticity.    TOTAL DOSE: 500 UNITS BOTOX  Dose Administered:  450 units Botox    Diluent Used:  Preservative Free Normal Saline  Total Volume of Diluent Used:  See dilutional details below  Lot # [id number] with Expiration Date:  02/2024  NDC #: Botox 100u ([address]-07)    Was there drug waste? Yes  Amount of drug waste (mL): 50 units Botox.  Reason for waste:  Single use vial  Multi-dose vial: Yes    Simi Vazquez MD  February 23, 2022    Medication guide was offered to patient and was declined.    CONSENT:  The risks, benefits, and treatment options were discussed with Tia Pablo and she agreed to proceed.      Written consent was obtained by [facility].     EQUIPMENT USED:  Needle-37mm stimulating/recording  EMG/NCS Machine    SKIN PREPARATION:  Skin preparation was performed using an alcohol wipe.    GUIDANCE DESCRIPTION:  Electro-myographic guidance was necessary throughout the procedure to accurately identify all areas of dystonic and spastic muscles while avoiding injection of non-dystonic muscles and non-spastic muscles, neighboring nerves and nearby vascular structures.     AREA/MUSCLE INJECTED: 450 UNITS  BOTOX = TOTAL DOSE     1. Neck Muscles: 135 units of Botox, 1:1 Dilution     Right middle trapezius - 5 units of Botox at 1 site/s.   Left middle trapezius - 5 units of Botox at 1 site/s.      Right Lateral Trapezius - 15 units of Botox at 3 site/s.   Left lateral trapezius - 15 units of Botox at 5 site/s.      Right splenius - 5 units of Botox at 1 site/s.   Left splenius - 5 units of Botox at 1 site/s.      Right Auricularis - 15 units of Botox at 3 site/s.   Left Auricularis - 15 units of Botox at 3 site/s.      Right occipitalis - 10 units of Botox at 3 site/s.   Left occipitalis - 10 units of Botox at 3 site/s.     Right Sternocleidomastoid - 10 units of Botox at 1 site/s.    Right Middle Scalene - 15 units of Botox at 1 site/s.     Right Platysma - 5 units of Botox at 2 site/s.  Left Platysma - 5 units of Botox at 2 site/s.      2. Upper Extremity & Trunk Muscles: 110 units of Botox, 1:1 Dilution     Right levator scapula - 25 units of Botox at 2 site/s.   Left levator scapula - 25 units of Botox at 2 site/s.      Right Rhomboid - 20 units of Botox at 1 site/s.    Left Rhomboid - 20 units of Botox at 3 site/s.       Right Latissimus Dorsi - 10 units of Botox at 3 site/s.    Left Latissimus Dorsi - 10 units of Botox at 3 site/s.      3. Jaw Muscles: 160 units of Botox, 1:1 Dilution      Right masseter - 20 units of Botox at 2 site/s.   Left masseter - 20 units of Botox at 2 site/s.     Right temporalis - 50 units of Botox at 5 site/s.               Left temporalis - 50 units of Botox at 5 site/s.     Right medial pterygoid - 10 units of Botox at 1 site/s.    Left medial pterygoid - 10 units of Botox at 1 site/s.     4. Facial Muscles: 45 units of Botox, 2:1 Dilution     Right Frontalis - 10 units of Botox at 1 site/s.    Left Frontalis - 10 units of Botox at 1 site/s.      Procerus - 5 units of Botox at 1 site/s.       Right  - 5 units of Botox at 1 site/s.    Left  - 5 units of Botox at 1  site/s.      Right Orbicularis Oris (superior) - 2.5 units of Botox at 1 site/s.    Left Orbicularis Oris (superior) - 2.5 units of Botox at 1 site/s.     Right Mentalis - 2.5 units of Botox at 1 site/s.    Left Mentalis - 2.5 units of Botox at 1 site/s.       RESPONSE TO PROCEDURE:  Tia Pablo tolerated the procedure well and there were no immediate complications.  She was allowed to recover for an appropriate period of time and was discharged home in stable condition.    FOLLOW UP: Tia Pablo was asked to follow up by phone in 7-14 days with Stacia Rose RN, Care Coordinator or Shruthi West PT, Care Coordinator, to report her response to this series of injections.  Based on the patient's previous response to this therapy, Tia Pablo was rescheduled for the next series of injections in 12 weeks.    PLAN (Medication Changes, Therapy Orders, Work or Disability Issues, etc.): Dose of Botox into the jaw musculature decreased slightly due to jaw weakness, and additional Botox placed into the orbicularis oris and mentalis for mouth puckering however, no other changes made to Botox dose or distribution today. Patient will continue to monitor response to Botox and report at next appointment.

## 2022-03-11 DIAGNOSIS — G24.1 GENETIC TORSION DYSTONIA: ICD-10-CM

## 2022-03-13 ASSESSMENT — ENCOUNTER SYMPTOMS
DISTURBANCES IN COORDINATION: 0
WEAKNESS: 0
SEIZURES: 0
MEMORY LOSS: 0
STIFFNESS: 1
DIZZINESS: 0
PARALYSIS: 0
DEPRESSION: 1
TINGLING: 0
BACK PAIN: 1
MYALGIAS: 1
INSOMNIA: 1
TREMORS: 0
DECREASED CONCENTRATION: 0
LOSS OF CONSCIOUSNESS: 0
NERVOUS/ANXIOUS: 1
SPEECH CHANGE: 0
NECK PAIN: 1
HEADACHES: 1
NUMBNESS: 0
ARTHRALGIAS: 1
PANIC: 0

## 2022-03-14 NOTE — TELEPHONE ENCOUNTER
Rx Authorization:  Requested Medication/ Dose CLONAZEPAM 0.5 MG TABLET  Date last refill ordered: 7/20/21  Quantity ordered: 90 tabs  # refills: 0  Date of last clinic visit with ordering provider: 2/12/21  Date of next clinic visit with ordering provider: 3/24/22  All pertinent protocol data (lab date/result):   Include pertinent information from patients message:

## 2022-03-17 RX ORDER — CLONAZEPAM 0.5 MG/1
0.5 TABLET ORAL 3 TIMES DAILY
Qty: 90 TABLET | Refills: 0 | OUTPATIENT
Start: 2022-03-17

## 2022-03-17 NOTE — TELEPHONE ENCOUNTER
Per Dr. Platt she will reassess Tia at her appointment on 3/24 and determine if further clonazepam refills are needed.

## 2022-03-18 ENCOUNTER — MYC MEDICAL ADVICE (OUTPATIENT)
Dept: FAMILY MEDICINE | Facility: CLINIC | Age: 57
End: 2022-03-18
Payer: COMMERCIAL

## 2022-03-23 ENCOUNTER — TELEPHONE (OUTPATIENT)
Dept: PHYSICAL MEDICINE AND REHAB | Facility: CLINIC | Age: 57
End: 2022-03-23

## 2022-03-24 ENCOUNTER — OFFICE VISIT (OUTPATIENT)
Dept: NEUROLOGY | Facility: CLINIC | Age: 57
End: 2022-03-24
Payer: COMMERCIAL

## 2022-03-24 VITALS
SYSTOLIC BLOOD PRESSURE: 145 MMHG | DIASTOLIC BLOOD PRESSURE: 83 MMHG | RESPIRATION RATE: 16 BRPM | HEART RATE: 82 BPM | OXYGEN SATURATION: 100 %

## 2022-03-24 DIAGNOSIS — M54.2 CERVICALGIA: ICD-10-CM

## 2022-03-24 DIAGNOSIS — G24.3 CERVICAL DYSTONIA: Primary | ICD-10-CM

## 2022-03-24 DIAGNOSIS — Z72.820 POOR SLEEP: ICD-10-CM

## 2022-03-24 DIAGNOSIS — F41.9 ANXIETY: ICD-10-CM

## 2022-03-24 PROCEDURE — 99215 OFFICE O/P EST HI 40 MIN: CPT | Mod: GC | Performed by: PSYCHIATRY & NEUROLOGY

## 2022-03-24 RX ORDER — CLONAZEPAM 0.5 MG/1
0.5 TABLET ORAL AT BEDTIME
Qty: 30 TABLET | Refills: 3 | Status: SHIPPED | OUTPATIENT
Start: 2022-03-24 | End: 2022-07-29

## 2022-03-24 ASSESSMENT — PAIN SCALES - GENERAL: PAINLEVEL: SEVERE PAIN (7)
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
## 2022-03-24 NOTE — TELEPHONE ENCOUNTER
PRIOR AUTHORIZATION DENIED    Medication: Botox Rx Benefit    Denial Date: 3/24/2022    Denial Rational: plan criteria does not allow coverage of Botox for cervical dystonia unless the patient experience abnormal placement of the head with limited range of motion in the neck.    Appeal Information: see below

## 2022-03-24 NOTE — PATIENT INSTRUCTIONS
Plan:  -continue with botulinum toxin injections with Dr. Vazquez  -restart clonazepam 0.5mg each night before bed  -STOP gabapentin, methocarbamol, duloxetine  -follow up with PCP to discuss whether wellbutrin is the best option, or if they would recommend working with a psychiatrist to optimize medications  -stay hydrated, drink sips of water throughout the day as needed for dry mouth  -referral for psychology to work on strategies for coping, stress management, low mood, and anxiety  -come back in 3 months
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
## 2022-03-31 ENCOUNTER — LAB (OUTPATIENT)
Dept: LAB | Facility: CLINIC | Age: 57
End: 2022-03-31
Payer: COMMERCIAL

## 2022-03-31 DIAGNOSIS — Z20.822 SUSPECTED 2019 NOVEL CORONAVIRUS INFECTION: ICD-10-CM

## 2022-03-31 PROCEDURE — U0003 INFECTIOUS AGENT DETECTION BY NUCLEIC ACID (DNA OR RNA); SEVERE ACUTE RESPIRATORY SYNDROME CORONAVIRUS 2 (SARS-COV-2) (CORONAVIRUS DISEASE [COVID-19]), AMPLIFIED PROBE TECHNIQUE, MAKING USE OF HIGH THROUGHPUT TECHNOLOGIES AS DESCRIBED BY CMS-2020-01-R: HCPCS

## 2022-03-31 PROCEDURE — U0005 INFEC AGEN DETEC AMPLI PROBE: HCPCS

## 2022-04-01 LAB — SARS-COV-2 RNA RESP QL NAA+PROBE: NEGATIVE

## 2022-04-20 DIAGNOSIS — F32.0 CURRENT MILD EPISODE OF MAJOR DEPRESSIVE DISORDER WITHOUT PRIOR EPISODE (H): ICD-10-CM

## 2022-04-20 DIAGNOSIS — Z00.00 ROUTINE GENERAL MEDICAL EXAMINATION AT A HEALTH CARE FACILITY: ICD-10-CM

## 2022-04-20 DIAGNOSIS — I10 ESSENTIAL HYPERTENSION: ICD-10-CM

## 2022-04-20 NOTE — TELEPHONE ENCOUNTER
Routing refill request to provider for review/approval because:  Last recorded blood pressure does not meet RN protocol parameters  PCP to determine refill    Mak Neal RN

## 2022-04-20 NOTE — TELEPHONE ENCOUNTER
Patient called to let us know she only has 2 days left of theses meds.      Aminah Eubanks, WAYNE Anaya

## 2022-04-21 RX ORDER — BUPROPION HYDROCHLORIDE 150 MG/1
TABLET ORAL
Qty: 90 TABLET | Refills: 1 | Status: SHIPPED | OUTPATIENT
Start: 2022-04-21 | End: 2022-09-13

## 2022-04-21 RX ORDER — LOSARTAN POTASSIUM 50 MG/1
TABLET ORAL
Qty: 90 TABLET | Refills: 1 | Status: SHIPPED | OUTPATIENT
Start: 2022-04-21 | End: 2022-08-05

## 2022-04-21 RX ORDER — CHLORTHALIDONE 25 MG/1
TABLET ORAL
Qty: 90 TABLET | Refills: 1 | Status: SHIPPED | OUTPATIENT
Start: 2022-04-21 | End: 2022-08-05

## 2022-04-21 NOTE — TELEPHONE ENCOUNTER
Routing refill request to provider for review/approval because:  Last recorded blood pressure does not meet RN protocol parameters    Mak Neal RN

## 2022-04-25 ENCOUNTER — MYC MEDICAL ADVICE (OUTPATIENT)
Dept: FAMILY MEDICINE | Facility: CLINIC | Age: 57
End: 2022-04-25
Payer: COMMERCIAL

## 2022-04-27 ENCOUNTER — OFFICE VISIT (OUTPATIENT)
Dept: FAMILY MEDICINE | Facility: CLINIC | Age: 57
End: 2022-04-27
Payer: COMMERCIAL

## 2022-04-27 VITALS
DIASTOLIC BLOOD PRESSURE: 70 MMHG | SYSTOLIC BLOOD PRESSURE: 110 MMHG | HEIGHT: 66 IN | OXYGEN SATURATION: 98 % | WEIGHT: 145 LBS | TEMPERATURE: 98.2 F | BODY MASS INDEX: 23.3 KG/M2 | HEART RATE: 78 BPM

## 2022-04-27 DIAGNOSIS — J01.40 ACUTE NON-RECURRENT PANSINUSITIS: Primary | ICD-10-CM

## 2022-04-27 PROCEDURE — 99213 OFFICE O/P EST LOW 20 MIN: CPT | Performed by: NURSE PRACTITIONER

## 2022-04-27 ASSESSMENT — ENCOUNTER SYMPTOMS
COUGH: 1
GASTROINTESTINAL NEGATIVE: 1
RHINORRHEA: 1
APPETITE CHANGE: 1
SINUS PRESSURE: 1
HEADACHES: 1
FEVER: 0
SORE THROAT: 0
FATIGUE: 1

## 2022-04-27 NOTE — PROGRESS NOTES
Assessment & Plan     Acute non-recurrent pansinusitis  Symptoms consistent with sinus infection. Augmentin prescribed. Side effects, risks and benefits of medication were discussed with patient. Discussed how and when to take medication. Recommended taking a probiotic and/or eating yogurt every day while on antibiotics and for ~1 week after stopping antibiotics to prevent GI upset. Discussed OTC recommendations for symptom control. Rest, hydration, humidification.      - amoxicillin-clavulanate (AUGMENTIN) 875-125 MG tablet; Take 1 tablet by mouth 2 times daily for 7 days             Patient Instructions   You have a sinus infection.  1. Take antibiotics as prescribed for the full course.  2. Take a probiotic and/or eat yogurt daily while on antibiotics and ~1 week after to prevent GI upset.  3. Continue to use OTC medications for symptom relief.   4. Rest and stay hydrated.  5. Use a humidifier in the bedroom, warm compresses on the face, and steam inhalation.  6. If symptoms worsen or do not improve within 1 week, please follow-up with your PCP.        Return if symptoms worsen or fail to improve.    SHANDRA Evans [facility] ELI Weber is a 56 year old who presents for the following health issues     HPI     Acute Illness  Acute illness concerns: sinus issues  Onset/Duration: x April 15th - at that time she tested positive for COVID   Symptoms:  Fever: no  Chills/Sweats: no  Headache (location?): YES  Sinus Pressure: YES  Conjunctivitis:  no  Ear Pain: YES: bilateral  Rhinorrhea: no  Congestion: YES  Sore Throat: no  Cough: YES-non-productive  Wheeze: no  Decreased Appetite: YES - no sense of taste or smell  Nausea: no  Vomiting: no  Diarrhea: no  Dysuria/Freq.: no  Dysuria or Hematuria: no  Fatigue/Achiness: YES  Sick/Strep Exposure: unknown just traveled by airplane and went on a cruise  Therapies tried and outcome: None    Answers for HPI/ROS submitted by the  patient on 4/27/2022  How many servings of fruits and vegetables do you eat daily?: 2-3  On average, how many sweetened beverages do you drink each day (Examples: soda, juice, sweet tea, etc.  Do NOT count diet or artificially sweetened beverages)?: 0  How many minutes a day do you exercise enough to make your heart beat faster?: 30 to 60  How many days a week do you exercise enough to make your heart beat faster?: 4  How many days per week do you miss taking your medication?: 0  What is the reason for your visit today?: Cough, congestion, ear pain, fatigue  When did your symptoms begin?: 1-2 weeks ago  What are your symptoms?: Cough, congestion, ear pain, fatigue  How would you describe these symptoms?: Moderate  Are your symptoms:: Staying the same  Have you had these symptoms before?: Yes  Have you tried or received treatment for these symptoms before?: Yes  Did that treatment work? : Yes  Please describe the treatment you had:: Antibiotics sinus ear infection      Additional provider notes: Symptoms started 2 weeks ago after she got back from a cruise and she tested positive for COVID. Her only symptom at that time was severe ear pain (R>L). States she feels worn out and symptoms have continued to get worse.     Allergies   Allergen Reactions     Bee Venom Anaphylaxis     Erythromycin Hives and Itching     Seasonal Allergies      Current Outpatient Medications   Medication     buPROPion (WELLBUTRIN XL) 150 MG 24 hr tablet     chlorthalidone (HYGROTON) 25 MG tablet     clonazePAM (KLONOPIN) 0.5 MG tablet     eletriptan (RELPAX) 40 MG tablet     EPINEPHrine (ANY BX GENERIC EQUIV) 0.3 MG/0.3ML injection 2-pack     losartan (COZAAR) 50 MG tablet     Current Facility-Administered Medications   Medication     botulinum toxin type A (BOTOX) 100 units injection 100 Units     botulinum toxin type A (BOTOX) 100 units injection 600 Units     Past Medical History:   Diagnosis Date     Anxiety      Depression      HTN  "(hypertension) 2016         Review of Systems   Constitutional: Positive for appetite change (decreased) and fatigue. Negative for fever.   HENT: Positive for congestion, ear pain (bilat), rhinorrhea and sinus pressure (frontal and maxillary). Negative for sore throat.    Respiratory: Positive for cough.    Gastrointestinal: Negative.    Neurological: Positive for headaches.            Objective    /70 (BP Location: Right arm, Patient Position: Sitting, Cuff Size: Adult Large)   Pulse 78   Temp 98.2  F (36.8  C) (Tympanic)   Ht 1.676 m (5' 6\")   Wt 65.8 kg (145 lb)   SpO2 98%   Breastfeeding No   BMI 23.40 kg/m    Body mass index is 23.4 kg/m .  Physical Exam  Vitals reviewed.   Constitutional:       General: She is not in acute distress.     Appearance: Normal appearance. She is not ill-appearing or toxic-appearing.   HENT:      Head: Normocephalic and atraumatic.      Right Ear: Ear canal and external ear normal. There is no impacted cerumen. Tympanic membrane is injected and bulging. Tympanic membrane is not erythematous.      Left Ear: Ear canal and external ear normal. There is no impacted cerumen. Tympanic membrane is bulging. Tympanic membrane is not injected or erythematous.      Nose: Nose normal.      Mouth/Throat:      Mouth: Mucous membranes are moist.      Pharynx: Oropharynx is clear. No posterior oropharyngeal erythema.   Cardiovascular:      Rate and Rhythm: Normal rate and regular rhythm.      Pulses: Normal pulses.      Heart sounds: Normal heart sounds.   Pulmonary:      Effort: Pulmonary effort is normal.      Breath sounds: Normal breath sounds.   Musculoskeletal:      Cervical back: Normal range of motion and neck supple. No tenderness.   Lymphadenopathy:      Cervical: No cervical adenopathy.   Skin:     General: Skin is warm and dry.   Neurological:      Mental Status: She is alert and oriented to person, place, and time.   Psychiatric:         Behavior: Behavior normal.      "

## 2022-05-04 ENCOUNTER — TELEPHONE (OUTPATIENT)
Dept: FAMILY MEDICINE | Facility: CLINIC | Age: 57
End: 2022-05-04

## 2022-05-04 ENCOUNTER — MYC MEDICAL ADVICE (OUTPATIENT)
Dept: FAMILY MEDICINE | Facility: CLINIC | Age: 57
End: 2022-05-04

## 2022-05-04 DIAGNOSIS — Z86.19 HISTORY OF CANDIDIASIS OF VAGINA: Primary | ICD-10-CM

## 2022-05-04 DIAGNOSIS — B37.31 YEAST INFECTION OF THE VAGINA: Primary | ICD-10-CM

## 2022-05-04 RX ORDER — FLUCONAZOLE 150 MG/1
150 TABLET ORAL
Qty: 2 TABLET | Refills: 0 | Status: SHIPPED | OUTPATIENT
Start: 2022-05-04 | End: 2022-05-08

## 2022-05-04 NOTE — TELEPHONE ENCOUNTER
Patient called and is asking to make sure you see her Alai message.  Please advise and respond to patient when you have time.        Aminah Eubanks, WAYNE Anaya

## 2022-05-04 NOTE — TELEPHONE ENCOUNTER
Called patient after receiving mychart message:    Flonase daily for a couple weeks.   Claritin daily for a couple weeks.   Benzonatate PRN.   Diflucan for yeast infection.     Sheron Early DNP, NP-C 5/4/2022 1:20 PM

## 2022-05-10 RX ORDER — BENZONATATE 100 MG/1
100-200 CAPSULE ORAL EVERY 8 HOURS PRN
Qty: 30 CAPSULE | Refills: 0 | Status: CANCELLED | OUTPATIENT
Start: 2022-05-10

## 2022-05-10 NOTE — TELEPHONE ENCOUNTER
Can someone contact this patient and ask if she still needs the benzonatate. I have pended the order, just need the pharmacy she wants. I was out sick, my apologies for just now seeing this.     Thanks,  Sheron Early DNP, NP-C 5/10/2022 11:47 AM

## 2022-05-16 ENCOUNTER — TELEPHONE (OUTPATIENT)
Dept: PHYSICAL MEDICINE AND REHAB | Facility: CLINIC | Age: 57
End: 2022-05-16
Payer: COMMERCIAL

## 2022-05-16 RX ORDER — FLUCONAZOLE 150 MG/1
TABLET ORAL
Qty: 2 TABLET | Refills: 0 | Status: SHIPPED | OUTPATIENT
Start: 2022-05-16 | End: 2022-05-23

## 2022-05-16 NOTE — TELEPHONE ENCOUNTER
Spoke with patient. She is still having vaginal yeast symptoms after finishing antibiotics. Diflucan prescribed.     If symptoms persist, recommend she follow-up in clinic for wet prep.     Sheron Early DNP, NP-C 5/16/2022 11:39 AM

## 2022-05-16 NOTE — TELEPHONE ENCOUNTER
Health Call Center    Phone Message    May a detailed message be left on voicemail: yes     Reason for Call: Other: Patient has a botox injection on 5/18 and the same afternoon patient has a dentist appointment for a filling.  Patient requests a call back to see if she can still do dentist appt or cancel.  Please call patient back . or respond on my Chart.     Action Taken: Message routed to:  [facility] ([facility]): PM&R    Travel Screening: Not Applicable

## 2022-05-17 NOTE — TELEPHONE ENCOUNTER
Writer spoke with Dr. Vazqeuz regarding patient's question and she stated that it would be fine for the patient to go to dentist appointment tomorrow afternoon following Botox injection tomorrow morning.     Writer called patient and left a voicemail with this information, and stated that we will see her tomorrow morning for her Botox injection visit.    Cherise Wall RN on 5/17/2022 at 1:56 PM

## 2022-05-18 ENCOUNTER — OFFICE VISIT (OUTPATIENT)
Dept: PHYSICAL MEDICINE AND REHAB | Facility: CLINIC | Age: 57
End: 2022-05-18
Payer: COMMERCIAL

## 2022-05-18 ENCOUNTER — MYC MEDICAL ADVICE (OUTPATIENT)
Dept: FAMILY MEDICINE | Facility: CLINIC | Age: 57
End: 2022-05-18

## 2022-05-18 VITALS
WEIGHT: 145 LBS | OXYGEN SATURATION: 95 % | BODY MASS INDEX: 23.4 KG/M2 | RESPIRATION RATE: 16 BRPM | DIASTOLIC BLOOD PRESSURE: 76 MMHG | HEART RATE: 82 BPM | SYSTOLIC BLOOD PRESSURE: 109 MMHG

## 2022-05-18 DIAGNOSIS — G24.3 SPASMODIC TORTICOLLIS: Primary | ICD-10-CM

## 2022-05-18 DIAGNOSIS — G24.4 OROMANDIBULAR DYSTONIA: ICD-10-CM

## 2022-05-18 DIAGNOSIS — G43.719 CHRONIC MIGRAINE WITHOUT AURA, INTRACTABLE, WITHOUT STATUS MIGRAINOSUS: ICD-10-CM

## 2022-05-18 PROCEDURE — 95874 GUIDE NERV DESTR NEEDLE EMG: CPT | Performed by: PHYSICAL MEDICINE & REHABILITATION

## 2022-05-18 PROCEDURE — 96372 THER/PROPH/DIAG INJ SC/IM: CPT | Performed by: PHYSICAL MEDICINE & REHABILITATION

## 2022-05-18 PROCEDURE — 64616 CHEMODENERV MUSC NECK DYSTON: CPT | Mod: 59 | Performed by: PHYSICAL MEDICINE & REHABILITATION

## 2022-05-18 PROCEDURE — 64612 DESTROY NERVE FACE MUSCLE: CPT | Mod: 59 | Performed by: PHYSICAL MEDICINE & REHABILITATION

## 2022-05-18 NOTE — LETTER
"5/18/2022       RE: Tia Pablo  [id number] [address] [id number]     Dear Colleague,    Thank you for referring your patient, Tia Pablo, to the [facility] [place] at [facility]. Please see a copy of my visit note below.      [facility]     PM&R CLINIC NOTE  BOTULINUM TOXIN PROCEDURE      HPI  Chief Complaint   Patient presents with     RECHECK     Return Botox-Oromandibular dystonia and spasmodic torticollis     Tia Pablo is a 56 year old female with a history of headaches and generalized dystonia with pain who presents to clinic for botulinum toxin injections for management of chronic migraine headaches, cervical and oromandibular dystonia.     SINCE LAST VISIT  Tia Pablo was last seen here in clinic on 2/23/20222, at which time she received 450 units of Botox.    Patient denies new medical diagnoses, illnesses, hospitalizations, emergency room visits, and injuries since the previous injection with botulinum neurotoxin.     She was recently started back on clonazepam at night to help with muscle spasms that keep her up at night. She also has a prescription for medical cannabis, which she does not feel she takes \"soon enough\" to deal with her spasms.     RESPONSE TO PREVIOUS TREATMENT    Side effects: No problems reported - has a history of jaw weakness.     1.  Headache frequency during this injection cycle:  4 headache days per month, with only one severe migraine headache day per month.  This is compared to her baseline headache frequency of 30 headache days per month.     2.  Headache duration during this injection cycle:  Headache duration is unknown. She has \"headaches a lot\" and does not pay attention to duration. Patient reports no episodes of multiple day headaches during this injection cycle.     3.  Headache intensity during this injection " cycle:    A.  2/10  =  Typical pain level.  B.  7/10  =  Worst pain level.  C.  2/10  =  Lowest pain level.    4.  Change in headache medication usage during this injection cycle:  (For Example:  Able to decrease use of oral pain medications.) She is only taking her migraine headache medication about once per month, whereas she was taking it much more frequently prior to Botox.     5.  ER Visits During This Injection Cycle: None.     6.  Functional Performance:  Change in ADL's, social interaction, days lost from work, etc. Patient reports being able to more fully participate in social and family activities and responsibilities as headache symptoms have improved      Dystonia Improvement (neck and jaw): Yes.  Percent Improvement: 75 %    Duration of Benefit:  7 weeks and followed by a gradual reduction in benefit, with significant benefit in the jaw and neck musculature.       PHYSICAL EXAM  VS: /76   Pulse 82   Resp 16   Wt 65.8 kg (145 lb)   SpO2 95%   BMI 23.40 kg/m     GEN: Pleasant and cooperative, in no acute distress  HEENT: No facial asymmetry  NECK: Involuntary right rotation with hypertonicity noted in right anterior neck musculature and upper trapezius.     ALLERGIES  Allergies   Allergen Reactions     Bee Venom Anaphylaxis     Erythromycin Hives and Itching     Seasonal Allergies        CURRENT MEDICATIONS    Current Outpatient Medications:      buPROPion (WELLBUTRIN XL) 150 MG 24 hr tablet, TAKE 1 TABLET BY MOUTH EVERY DAY IN THE MORNING, Disp: 90 tablet, Rfl: 1     chlorthalidone (HYGROTON) 25 MG tablet, TAKE 1 TABLET BY MOUTH EVERY DAY, Disp: 90 tablet, Rfl: 1     clonazePAM (KLONOPIN) 0.5 MG tablet, Take 1 tablet (0.5 mg) by mouth At Bedtime in the evening before bed., Disp: 30 tablet, Rfl: 3     eletriptan (RELPAX) 40 MG tablet, Take 1 tablet (40 mg) by mouth at onset of headache for migraine May repeat in 2 hours. Max 2 tablets/24 hours., Disp: 12 tablet, Rfl: 3     EPINEPHrine (ANY BX  GENERIC EQUIV) 0.3 MG/0.3ML injection 2-pack, Inject 0.3 mg into the muscle, Disp: , Rfl:      fluconazole (DIFLUCAN) 150 MG tablet, Use 1 tablet today and if symptoms persist repeat in 72 hours, Disp: 2 tablet, Rfl: 0     losartan (COZAAR) 50 MG tablet, TAKE 1 TABLET BY MOUTH EVERY DAY, Disp: 90 tablet, Rfl: 1    Current Facility-Administered Medications:      botulinum toxin type A (BOTOX) 100 units injection 600 Units, 600 Units, Intramuscular, Q90 Days, Simi Vazquez MD, 450 Units at 22 1021       BOTULINUM NEUROTOXIN INJECTION PROCEDURES    VERIFICATION OF PATIENT IDENTIFICATION AND PROCEDURE     Initials   Patient Name SES   Patient  SES   Procedure Verified by: SES     Prior to the start of the procedure and with procedural staff participation, I verbally confirmed the patient s identity using two indicators, relevant allergies, that the procedure was appropriate and matched the consent or emergent situation, and that the correct equipment/implants were available. Immediately prior to starting the procedure I conducted the Time Out with the procedural staff and re-confirmed the patient s name, procedure, and site/side. (The Joint Commission universal protocol was followed.)  Yes    Sedation (Moderate or Deep): None    ABOVE ASSESSMENTS PERFORMED BY    Simi Vazquez MD      INDICATIONS FOR PROCEDURES  Tia Pablo is a 56 year old patient with involuntary muscle spasms and pain secondary to the diagnosis of oromandibular/cervical dystonia and chronic migraine headaches. Her baseline symptoms have been recalcitrant to oral medications and conservative therapy.  She is here today for reinjection with Botox.    GOAL OF PROCEDURE  The goal of this procedure is to increase active range of motion, improve volitional motor control, decrease pain  and enhance functional independence.      TOTAL DOSE ADMINISTERED  Dose Administered:  450 units  Botox (Botulinum Toxin Type A)   Unavoidable Drug  Waste: Yes  Amount of drug waste (mL): 50 units Botox.  Reason for waste:  Single use vial  Diluent Used:  Preservative Free Normal Saline  Total Volume of Diluent Used:  5 ml  Lot # [id number] with Expiration Date:  05/2024  NDC #: Botox 100u ([address]-73)      CONSENT  The risks, benefits, and treatment options were discussed with Tia Pablo and she agreed to proceed.    Written consent was obtained by [facility].     EQUIPMENT USED  Needle-37mm stimulating/recording  Needle-30 gauge  EMG/NCS Machine    SKIN PREPARATION  Skin preparation was performed using an alcohol wipe.    GUIDANCE DESCRIPTION  Electro-myographic guidance was necessary throughout the procedure to accurately identify all areas of dystonic muscles while avoiding injection of non-dystonic muscles, neighboring nerves and nearby vascular structures.     AREA/MUSCLE INJECTED: 450 UNITS BOTOX = TOTAL DOSE     1. NECK MUSCLES: 135 UNITS BOTOX = TOTAL DOSE, 1:1 DILUTION     Right middle trapezius - 5 units of Botox at 1 site/s.   Left middle trapezius - 5 units of Botox at 1 site/s.      Right Lateral Trapezius - 15 units of Botox at 3 site/s.   Left lateral trapezius - 15 units of Botox at 5 site/s.      Right splenius - 5 units of Botox at 1 site/s.   Left splenius - 5 units of Botox at 1 site/s.      Right Auricularis - 15 units of Botox at 3 site/s.   Left Auricularis - 15 units of Botox at 3 site/s.      Right occipitalis - 10 units of Botox at 3 site/s.   Left occipitalis - 10 units of Botox at 3 site/s.     Right Sternocleidomastoid - 10 units of Botox at 1 site/s.    Right Middle Scalene - 15 units of Botox at 1 site/s.     Right Platysma - 5 units of Botox at 2 site/s.  Left Platysma - 5 units of Botox at 2 site/s.      2. UPPER EXTREMITY & TRUNK MUSCLES: 110 UNITS BOTOX = TOTAL DOSE, 1:1 DILUTION     Right levator scapula - 25 units of Botox at 2 site/s.   Left levator scapula - 25 units of Botox at 2 site/s.      Right Rhomboid - 20 units of  Botox at 1 site/s.    Left Rhomboid - 20 units of Botox at 3 site/s.       Right Latissimus Dorsi - 10 units of Botox at 3 site/s.    Left Latissimus Dorsi - 10 units of Botox at 3 site/s.      3. JAW MUSCLES: 160 UNITS BOTOX = TOTAL DOSE, 1:1 DILUTION      Right masseter - 20 units of Botox at 2 site/s.   Left masseter - 20 units of Botox at 2 site/s.     Right temporalis - 50 units of Botox at 5 site/s.               Left temporalis - 50 units of Botox at 5 site/s.     Right medial pterygoid - 10 units of Botox at 1 site/s.    Left medial pterygoid - 10 units of Botox at 1 site/s.     4. FACIAL MUSCLES: 45 UNITS BOTOX = TOTAL DOSE, 2:1 DILUTION     Right Frontalis - 10 units of Botox at 1 site/s.    Left Frontalis - 10 units of Botox at 1 site/s.      Procerus - 5 units of Botox at 1 site/s.       Right  - 5 units of Botox at 1 site/s.    Left  - 5 units of Botox at 1 site/s.      Right Orbicularis Oris (superior) - 2.5 units of Botox at 1 site/s.    Left Orbicularis Oris (superior) - 2.5 units of Botox at 1 site/s.     Right Mentalis - 2.5 units of Botox at 1 site/s.    Left Mentalis - 2.5 units of Botox at 1 site/s.       RESPONSE TO PROCEDURE  Tia Pablo tolerated the procedure well and there were no immediate complications. She was allowed to recover for an appropriate period of time and was discharged home in stable condition.    ASSESSMENT AND PLAN   1. Botulinum toxin injections: No changes made to Botox dose or distribution today. Patient will continue to monitor response and report at next appointment.   2. Referrals: None.   3. Follow up: Tia Pablo was rescheduled for the next series of injections in 12 weeks, at which time we will evaluate response to today's injections. she may call the clinic prior with any questions or concerns prior to the next appointment.         Sincerely,    Simi Vazquez MD

## 2022-05-18 NOTE — PROGRESS NOTES
"  [facility]     PM&R CLINIC NOTE  BOTULINUM TOXIN PROCEDURE      HPI  Chief Complaint   Patient presents with     RECHECK     Return Botox-Oromandibular dystonia and spasmodic torticollis     Tia Pablo is a 56 year old female with a history of headaches and generalized dystonia with pain who presents to clinic for botulinum toxin injections for management of chronic migraine headaches, cervical and oromandibular dystonia.     SINCE LAST VISIT  Tia Pablo was last seen here in clinic on 2/23/20222, at which time she received 450 units of Botox.    Patient denies new medical diagnoses, illnesses, hospitalizations, emergency room visits, and injuries since the previous injection with botulinum neurotoxin.     She was recently started back on clonazepam at night to help with muscle spasms that keep her up at night. She also has a prescription for medical cannabis, which she does not feel she takes \"soon enough\" to deal with her spasms.     RESPONSE TO PREVIOUS TREATMENT    Side effects: No problems reported - has a history of jaw weakness.     1.  Headache frequency during this injection cycle:  4 headache days per month, with only one severe migraine headache day per month.  This is compared to her baseline headache frequency of 30 headache days per month.     2.  Headache duration during this injection cycle:  Headache duration is unknown. She has \"headaches a lot\" and does not pay attention to duration. Patient reports no episodes of multiple day headaches during this injection cycle.     3.  Headache intensity during this injection cycle:    A.  2/10  =  Typical pain level.  B.  7/10  =  Worst pain level.  C.  2/10  =  Lowest pain level.    4.  Change in headache medication usage during this injection cycle:  (For Example:  Able to decrease use of oral pain medications.) She is only taking her migraine headache medication about once per month, whereas she was taking it " much more frequently prior to Botox.     5.  ER Visits During This Injection Cycle: None.     6.  Functional Performance:  Change in ADL's, social interaction, days lost from work, etc. Patient reports being able to more fully participate in social and family activities and responsibilities as headache symptoms have improved      Dystonia Improvement (neck and jaw): Yes.  Percent Improvement: 75 %    Duration of Benefit:  7 weeks and followed by a gradual reduction in benefit, with significant benefit in the jaw and neck musculature.       PHYSICAL EXAM  VS: /76   Pulse 82   Resp 16   Wt 65.8 kg (145 lb)   SpO2 95%   BMI 23.40 kg/m     GEN: Pleasant and cooperative, in no acute distress  HEENT: No facial asymmetry  NECK: Involuntary right rotation with hypertonicity noted in right anterior neck musculature and upper trapezius.     ALLERGIES  Allergies   Allergen Reactions     Bee Venom Anaphylaxis     Erythromycin Hives and Itching     Seasonal Allergies        CURRENT MEDICATIONS    Current Outpatient Medications:      buPROPion (WELLBUTRIN XL) 150 MG 24 hr tablet, TAKE 1 TABLET BY MOUTH EVERY DAY IN THE MORNING, Disp: 90 tablet, Rfl: 1     chlorthalidone (HYGROTON) 25 MG tablet, TAKE 1 TABLET BY MOUTH EVERY DAY, Disp: 90 tablet, Rfl: 1     clonazePAM (KLONOPIN) 0.5 MG tablet, Take 1 tablet (0.5 mg) by mouth At Bedtime in the evening before bed., Disp: 30 tablet, Rfl: 3     eletriptan (RELPAX) 40 MG tablet, Take 1 tablet (40 mg) by mouth at onset of headache for migraine May repeat in 2 hours. Max 2 tablets/24 hours., Disp: 12 tablet, Rfl: 3     EPINEPHrine (ANY BX GENERIC EQUIV) 0.3 MG/0.3ML injection 2-pack, Inject 0.3 mg into the muscle, Disp: , Rfl:      fluconazole (DIFLUCAN) 150 MG tablet, Use 1 tablet today and if symptoms persist repeat in 72 hours, Disp: 2 tablet, Rfl: 0     losartan (COZAAR) 50 MG tablet, TAKE 1 TABLET BY MOUTH EVERY DAY, Disp: 90 tablet, Rfl: 1    Current Facility-Administered  Medications:      botulinum toxin type A (BOTOX) 100 units injection 600 Units, 600 Units, Intramuscular, Q90 Days, Simi Vazquez MD, 450 Units at 22 1021       BOTULINUM NEUROTOXIN INJECTION PROCEDURES    VERIFICATION OF PATIENT IDENTIFICATION AND PROCEDURE     Initials   Patient Name SES   Patient  SES   Procedure Verified by: SES     Prior to the start of the procedure and with procedural staff participation, I verbally confirmed the patient s identity using two indicators, relevant allergies, that the procedure was appropriate and matched the consent or emergent situation, and that the correct equipment/implants were available. Immediately prior to starting the procedure I conducted the Time Out with the procedural staff and re-confirmed the patient s name, procedure, and site/side. (The Joint Commission universal protocol was followed.)  Yes    Sedation (Moderate or Deep): None    ABOVE ASSESSMENTS PERFORMED BY    Simi Vazquez MD      INDICATIONS FOR PROCEDURES  Tia Pablo is a 56 year old patient with involuntary muscle spasms and pain secondary to the diagnosis of oromandibular/cervical dystonia and chronic migraine headaches. Her baseline symptoms have been recalcitrant to oral medications and conservative therapy.  She is here today for reinjection with Botox.    GOAL OF PROCEDURE  The goal of this procedure is to increase active range of motion, improve volitional motor control, decrease pain  and enhance functional independence.      TOTAL DOSE ADMINISTERED  Dose Administered:  450 units  Botox (Botulinum Toxin Type A)   Unavoidable Drug Waste: Yes  Amount of drug waste (mL): 50 units Botox.  Reason for waste:  Single use vial  Diluent Used:  Preservative Free Normal Saline  Total Volume of Diluent Used:  5 ml  Lot # [id number] with Expiration Date:  2024  NDC #: Botox 100u ([address]-68)      CONSENT  The risks, benefits, and treatment options were discussed with Tia MANUEL  Samy and she agreed to proceed.    Written consent was obtained by [facility].     EQUIPMENT USED  Needle-37mm stimulating/recording  Needle-30 gauge  EMG/NCS Machine    SKIN PREPARATION  Skin preparation was performed using an alcohol wipe.    GUIDANCE DESCRIPTION  Electro-myographic guidance was necessary throughout the procedure to accurately identify all areas of dystonic muscles while avoiding injection of non-dystonic muscles, neighboring nerves and nearby vascular structures.     AREA/MUSCLE INJECTED: 450 UNITS BOTOX = TOTAL DOSE     1. NECK MUSCLES: 135 UNITS BOTOX = TOTAL DOSE, 1:1 DILUTION     Right middle trapezius - 5 units of Botox at 1 site/s.   Left middle trapezius - 5 units of Botox at 1 site/s.      Right Lateral Trapezius - 15 units of Botox at 3 site/s.   Left lateral trapezius - 15 units of Botox at 5 site/s.      Right splenius - 5 units of Botox at 1 site/s.   Left splenius - 5 units of Botox at 1 site/s.      Right Auricularis - 15 units of Botox at 3 site/s.   Left Auricularis - 15 units of Botox at 3 site/s.      Right occipitalis - 10 units of Botox at 3 site/s.   Left occipitalis - 10 units of Botox at 3 site/s.     Right Sternocleidomastoid - 10 units of Botox at 1 site/s.    Right Middle Scalene - 15 units of Botox at 1 site/s.     Right Platysma - 5 units of Botox at 2 site/s.  Left Platysma - 5 units of Botox at 2 site/s.      2. UPPER EXTREMITY & TRUNK MUSCLES: 110 UNITS BOTOX = TOTAL DOSE, 1:1 DILUTION     Right levator scapula - 25 units of Botox at 2 site/s.   Left levator scapula - 25 units of Botox at 2 site/s.      Right Rhomboid - 20 units of Botox at 1 site/s.    Left Rhomboid - 20 units of Botox at 3 site/s.       Right Latissimus Dorsi - 10 units of Botox at 3 site/s.    Left Latissimus Dorsi - 10 units of Botox at 3 site/s.      3. JAW MUSCLES: 160 UNITS BOTOX = TOTAL DOSE, 1:1 DILUTION      Right masseter - 20 units of Botox at 2 site/s.   Left masseter - 20 units of Botox at 2  site/s.     Right temporalis - 50 units of Botox at 5 site/s.               Left temporalis - 50 units of Botox at 5 site/s.     Right medial pterygoid - 10 units of Botox at 1 site/s.    Left medial pterygoid - 10 units of Botox at 1 site/s.     4. FACIAL MUSCLES: 45 UNITS BOTOX = TOTAL DOSE, 2:1 DILUTION     Right Frontalis - 10 units of Botox at 1 site/s.    Left Frontalis - 10 units of Botox at 1 site/s.      Procerus - 5 units of Botox at 1 site/s.       Right  - 5 units of Botox at 1 site/s.    Left  - 5 units of Botox at 1 site/s.      Right Orbicularis Oris (superior) - 2.5 units of Botox at 1 site/s.    Left Orbicularis Oris (superior) - 2.5 units of Botox at 1 site/s.     Right Mentalis - 2.5 units of Botox at 1 site/s.    Left Mentalis - 2.5 units of Botox at 1 site/s.       RESPONSE TO PROCEDURE  Tia Pablo tolerated the procedure well and there were no immediate complications. She was allowed to recover for an appropriate period of time and was discharged home in stable condition.    ASSESSMENT AND PLAN   1. Botulinum toxin injections: No changes made to Botox dose or distribution today. Patient will continue to monitor response and report at next appointment.   2. Referrals: None.   3. Follow up: Tia Pablo was rescheduled for the next series of injections in 12 weeks, at which time we will evaluate response to today's injections. she may call the clinic prior with any questions or concerns prior to the next appointment.

## 2022-05-18 NOTE — NURSING NOTE
Chief Complaint   Patient presents with     RECHECK     Return Botox-Oromandibular dystonia and spasmodic torticollis     Jean-Pierre Tello

## 2022-05-19 NOTE — TELEPHONE ENCOUNTER
Call placed to patient   Relayed Dr. Stone's message    Patient verbalized understanding  Appointment scheduled for 5/23/2022 at 1430  No further questions/concerns    Mak Neal RN

## 2022-05-23 ENCOUNTER — VIRTUAL VISIT (OUTPATIENT)
Dept: FAMILY MEDICINE | Facility: CLINIC | Age: 57
End: 2022-05-23
Payer: COMMERCIAL

## 2022-05-23 DIAGNOSIS — G24.1 GENETIC TORSION DYSTONIA: Primary | ICD-10-CM

## 2022-05-23 DIAGNOSIS — F32.0 CURRENT MILD EPISODE OF MAJOR DEPRESSIVE DISORDER WITHOUT PRIOR EPISODE (H): ICD-10-CM

## 2022-05-23 PROCEDURE — 99214 OFFICE O/P EST MOD 30 MIN: CPT | Mod: 95 | Performed by: FAMILY MEDICINE

## 2022-05-23 RX ORDER — GABAPENTIN 300 MG/1
300 CAPSULE ORAL 3 TIMES DAILY
Qty: 90 CAPSULE | Refills: 0 | Status: SHIPPED | OUTPATIENT
Start: 2022-05-23 | End: 2022-07-19

## 2022-05-23 NOTE — PROGRESS NOTES
Tia is a 56 year old who is being evaluated via a billable video visit.      How would you like to obtain your AVS? MyChart  If the video visit is dropped, the invitation should be resent by: Text to cell phone: [phone]  Will anyone else be joining your video visit? 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 Current mild episode of major depressive disorder without prior episode (H)  F32.0        Reviewed with pt the difficulty of determining if she is getting benefit from a medication or not when she comes on and off medications relatively quickly, especially given the fluctuation of her pain based on where she is in her injection cycle.  Reviewed that in order to determine if meds are helpful she will need to be willing to continue medicines for some time (barring side effects) and log symptoms over the course of an injection cycle.  She agrees that it is difficult to know how things are going because shortly after her injection she feels pretty good and that wears off over time.      She does not feel her mood is much of a concern right now.  Feels mood is closely related to her pain.  Feels sleep is perhaps the bigger issue.  Has had sexual side effects on 2 selective serotonin reuptake inhibitor and 1 SNRI so far.  Wellbutrin was initially added in an attempt to counteract sexual side effects from duloxetine.  She did not find it helpful for that.    Reviewed that 1 change at a time with josseline given to notice effect would be best.  Pt opted to try restarting gabapentin.  Prescription as ordered.  F/u 4-6 weeks or sooner as needed.    33 minutes spent reviewing treatment to date, goals of care and next steps as above    Subjective   Tia is a 56 year old who presents for the following health issues:    Chief Complaint   Patient presents with     Consult     Wants to discuss what her neurologist Dr. Platt  suggested from 03/24/2022 visit in relation to taking an antidepressant/antianxiety medication. Pt would like to discuss if she needs to actually take gabapentin, duloxetine, wellbutrin and methocarbomal.     HPI     Describes having a hard time sleeping at night.  Feels the pain makes it difficult for her to fall asleep.    Was restarted on clonazepam by neurology.  Only takes as needed.  Does not want to take it, worried about addiction.  Was to be taken at bedtime and does find it helps a lot with sleep but very worried about dependency.    Not sure about her mood.  Feels this fluctuates a lot with her pain.  Has been on both sertraline and fluoxetine in the past for mood.  Switched off for sexual side effects.  Duloxetine was started for impact on her pain but felt she has sexual side effects on that too.      Her neurologist recommended she schedule a visit as she felt the wellbutrin was causing side effects or increased anxiety, headache and insomnia.  Pt herself is nto sure.  Notes she was struggling when she saw her neurologist and feels she is doing better now as she recently got her injections.    Not really sure what she wants to do.  Not particularly interested in seeing psychiatry, does not feel mood is a big concern.  Frustrated with her chronic pain and the need to take medication.  Really does not want to take any medicines.    Has seen pain psychology in the past and did not find it particularly helpful        Review of Systems   Constitutional, HEENT, cardiovascular, pulmonary, gi and gu systems are negative, except as otherwise noted.      Objective           Vitals:  No vitals were obtained today due to virtual visit.    Physical Exam   GENERAL: Healthy, alert and no distress  EYES: Eyes grossly normal to inspection.  No discharge or erythema, or obvious scleral/conjunctival abnormalities.  RESP: No audible wheeze, cough, or visible cyanosis.  No visible retractions or increased work of  breathing.    SKIN: Visible skin clear. No significant rash, abnormal pigmentation or lesions.  NEURO: Cranial nerves grossly intact.  Mentation and speech appropriate for age.  PSYCH: mentation appears normal, affect flat and tearful    Epic reviewed            Video-Visit Details    Type of service:  Video Visit    Video End Time: 3:14 PM        Originating Location (pt. Location): Home    Distant Location (provider location):  [facility]     Platform used for Video Visit: [employer]

## 2022-05-31 ENCOUNTER — MYC MEDICAL ADVICE (OUTPATIENT)
Dept: FAMILY MEDICINE | Facility: CLINIC | Age: 57
End: 2022-05-31
Payer: COMMERCIAL

## 2022-05-31 DIAGNOSIS — F32.0 CURRENT MILD EPISODE OF MAJOR DEPRESSIVE DISORDER WITHOUT PRIOR EPISODE (H): Primary | ICD-10-CM

## 2022-06-03 ENCOUNTER — MYC MEDICAL ADVICE (OUTPATIENT)
Dept: NEUROLOGY | Facility: CLINIC | Age: 57
End: 2022-06-03
Payer: COMMERCIAL

## 2022-06-03 DIAGNOSIS — R52 PAIN: Primary | ICD-10-CM

## 2022-06-03 DIAGNOSIS — G24.3 CERVICAL DYSTONIA: ICD-10-CM

## 2022-06-03 DIAGNOSIS — M54.2 CERVICALGIA: ICD-10-CM

## 2022-06-03 DIAGNOSIS — M79.605 PAIN OF LEFT LOWER EXTREMITY: ICD-10-CM

## 2022-06-03 NOTE — TELEPHONE ENCOUNTER
Nisha, I recall she has tried gabapentin in the past and it was not useful.  I hesitate to provide recommendations on treating pain as she has several issues that could be contributing to pain, such as fibromyalgia. I am happy to place a Pain Management referral so that they may address her pain primarily. If she is open to it, I will place the referral.     Thank you,     Sushila

## 2022-06-06 RX ORDER — ESCITALOPRAM OXALATE 10 MG/1
10 TABLET ORAL DAILY
Qty: 30 TABLET | Refills: 0 | Status: SHIPPED | OUTPATIENT
Start: 2022-06-06 | End: 2022-06-26

## 2022-06-22 ENCOUNTER — MYC MEDICAL ADVICE (OUTPATIENT)
Dept: FAMILY MEDICINE | Facility: CLINIC | Age: 57
End: 2022-06-22

## 2022-06-26 ENCOUNTER — MYC REFILL (OUTPATIENT)
Dept: FAMILY MEDICINE | Facility: CLINIC | Age: 57
End: 2022-06-26

## 2022-06-26 DIAGNOSIS — F32.0 CURRENT MILD EPISODE OF MAJOR DEPRESSIVE DISORDER WITHOUT PRIOR EPISODE (H): ICD-10-CM

## 2022-06-27 RX ORDER — ESCITALOPRAM OXALATE 10 MG/1
10 TABLET ORAL DAILY
Qty: 30 TABLET | Refills: 0 | OUTPATIENT
Start: 2022-06-27

## 2022-06-27 RX ORDER — ESCITALOPRAM OXALATE 10 MG/1
10 TABLET ORAL DAILY
Qty: 30 TABLET | Refills: 0 | Status: SHIPPED | OUTPATIENT
Start: 2022-06-27 | End: 2022-07-19

## 2022-06-27 NOTE — TELEPHONE ENCOUNTER
escitalopram (LEXAPRO) 10 MG tablet 30 tablet 0 6/27/2022  No   Sig - Route: Take 1 tablet (10 mg) by mouth daily - Oral   Sent to pharmacy as: Escitalopram Oxalate 10 MG Oral Tablet (LEXAPRO)   Class: E-Prescribe   Notes to Pharmacy: OK to fill today Needs MD appointment before further refills   Order: [id number]   E-Prescribing Status: Receipt confirmed by pharmacy (6/27/2022  2:21 PM CDT)             Printout Tracking    External Result Report                 Pharmacy    [facility] [id number] [place] [address]

## 2022-07-14 ENCOUNTER — MYC MEDICAL ADVICE (OUTPATIENT)
Dept: FAMILY MEDICINE | Facility: CLINIC | Age: 57
End: 2022-07-14

## 2022-07-18 ENCOUNTER — TELEPHONE (OUTPATIENT)
Dept: FAMILY MEDICINE | Facility: CLINIC | Age: 57
End: 2022-07-18
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
## 2022-07-19 ENCOUNTER — VIRTUAL VISIT (OUTPATIENT)
Dept: FAMILY MEDICINE | Facility: CLINIC | Age: 57
End: 2022-07-19
Payer: COMMERCIAL

## 2022-07-19 DIAGNOSIS — F32.0 CURRENT MILD EPISODE OF MAJOR DEPRESSIVE DISORDER WITHOUT PRIOR EPISODE (H): ICD-10-CM

## 2022-07-19 PROCEDURE — 99213 OFFICE O/P EST LOW 20 MIN: CPT | Mod: TEL | Performed by: FAMILY MEDICINE

## 2022-07-19 RX ORDER — ESCITALOPRAM OXALATE 10 MG/1
10 TABLET ORAL DAILY
Qty: 90 TABLET | Refills: 0 | Status: SHIPPED | OUTPATIENT
Start: 2022-07-19 | End: 2022-08-05

## 2022-07-19 ASSESSMENT — ANXIETY QUESTIONNAIRES
7. FEELING AFRAID AS IF SOMETHING AWFUL MIGHT HAPPEN: NOT AT ALL
GAD7 TOTAL SCORE: 4
1. FEELING NERVOUS, ANXIOUS, OR ON EDGE: SEVERAL DAYS
2. NOT BEING ABLE TO STOP OR CONTROL WORRYING: SEVERAL DAYS
5. BEING SO RESTLESS THAT IT IS HARD TO SIT STILL: NOT AT ALL
3. WORRYING TOO MUCH ABOUT DIFFERENT THINGS: SEVERAL DAYS
6. BECOMING EASILY ANNOYED OR IRRITABLE: NOT AT ALL
GAD7 TOTAL SCORE: 4
IF YOU CHECKED OFF ANY PROBLEMS ON THIS QUESTIONNAIRE, HOW DIFFICULT HAVE THESE PROBLEMS MADE IT FOR YOU TO DO YOUR WORK, TAKE CARE OF THINGS AT HOME, OR GET ALONG WITH OTHER PEOPLE: SOMEWHAT DIFFICULT

## 2022-07-19 ASSESSMENT — PATIENT HEALTH QUESTIONNAIRE - PHQ9
5. POOR APPETITE OR OVEREATING: SEVERAL DAYS
SUM OF ALL RESPONSES TO PHQ QUESTIONS 1-9: 5

## 2022-07-19 NOTE — PROGRESS NOTES
"Tia is a 56 year old who is being evaluated via a billable telephone visit.      What phone number would you like to be contacted at? [phone]  How would you like to obtain your AVS? Serafin     1:07 PM      A/P:      ICD-10-CM    1. Current mild episode of major depressive disorder without prior episode (H)  F32.0 escitalopram (LEXAPRO) 10 MG tablet     Generally feeling okay.  Seems to be tolerating lexapro.  Plan to continue at current dose.    concerned wellbutrin might be contributing to anxiety.  Would like to stop but worried about weight gain with her son's wedding coming up in Sept.  Has opted to continue for now and consider stopping in Sept.    Plan follow up this fall if dose change in the lexapro is needed.    Subjective   Tia is a 56 year old, presenting for the following health issues:  Depression      History of Present Illness       Reason for visit:  Depression anxiety    She eats 2-3 servings of fruits and vegetables daily.She consumes 0 sweetened beverage(s) daily.She exercises with enough effort to increase her heart rate 30 to 60 minutes per day.  She exercises with enough effort to increase her heart rate 6 days per week.   She is taking medications regularly.       Following up on escitalopram dose.  Started med at the end of May.  Does not feel like she has noticed much change in her symptoms.  Worries that the wellbutrin might be causing her to feel anxious.  Continues to feel somewhat down.  Has had a lot of stress recently as well.    Has had anxiety in the past that was much worse.    Has been feeling down because she \"does not feel the greatest all the time\".      Feels like she is functioning and doing well for the most part.  No sure if she is having any side effects.      Son is getting  in September.  Concerned that stopping the wellbutrin might cause weight gain.      Review of Systems   Constitutional, HEENT, cardiovascular, pulmonary, gi and gu systems are " negative, except as otherwise noted.      Objective           Vitals:  No vitals were obtained today due to virtual visit.    Physical Exam   healthy, alert and no distress  PSYCH: Alert and oriented times 3; coherent speech, normal   rate and volume, able to articulate logical thoughts, able   to abstract reason, no tangential thoughts, no hallucinations   or delusions  Her affect is normal  RESP: No cough, no audible wheezing, able to talk in full sentences  Remainder of exam unable to be completed due to telephone visits    Epic reviewed          1:24 PM    Phone call duration: 17 minutes    .  ..

## 2022-07-22 ENCOUNTER — NURSE TRIAGE (OUTPATIENT)
Dept: NURSING | Facility: CLINIC | Age: 57
End: 2022-07-22
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
## 2022-07-23 ENCOUNTER — MYC MEDICAL ADVICE (OUTPATIENT)
Dept: FAMILY MEDICINE | Facility: CLINIC | Age: 57
End: 2022-07-23

## 2022-07-25 ENCOUNTER — OFFICE VISIT (OUTPATIENT)
Dept: FAMILY MEDICINE | Facility: CLINIC | Age: 57
End: 2022-07-25
Payer: COMMERCIAL

## 2022-07-25 VITALS
BODY MASS INDEX: 22.18 KG/M2 | HEART RATE: 68 BPM | SYSTOLIC BLOOD PRESSURE: 108 MMHG | RESPIRATION RATE: 14 BRPM | DIASTOLIC BLOOD PRESSURE: 64 MMHG | TEMPERATURE: 98.8 F | WEIGHT: 138 LBS | HEIGHT: 66 IN | OXYGEN SATURATION: 97 %

## 2022-07-25 DIAGNOSIS — M54.2 NECK PAIN: Primary | ICD-10-CM
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
## 2022-07-25 PROCEDURE — 99213 OFFICE O/P EST LOW 20 MIN: CPT | Performed by: PHYSICIAN ASSISTANT

## 2022-07-25 RX ORDER — METHOCARBAMOL 500 MG/1
250-500 TABLET, FILM COATED ORAL 4 TIMES DAILY PRN
Qty: 40 TABLET | Refills: 0 | Status: SHIPPED | OUTPATIENT
Start: 2022-07-25 | End: 2023-08-29

## 2022-07-25 ASSESSMENT — PAIN SCALES - GENERAL: PAINLEVEL: SEVERE PAIN (6)
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
## 2022-07-25 NOTE — PATIENT INSTRUCTIONS
Inez Weber,    Thank you for allowing [facility] to manage your care.    I sent your prescriptions to your pharmacy.    I made an as needed PT referral, they will be calling in approximately 1 week to set up your appointment.  If you do not hear from them, please call the specialty number on your after visit summary.     For your pain, please use Ibuprofen 400mg four times daily with food. Between ibuprofen doses, you may use Tylenol 650mg.     Max acetaminophen (Tylenol) 4,000mg/24 hours  Max ibuprofen 3,200mg/24 hours    For severe pain not controlled by over the counter medications, please use methocarbamol as prescribed. Do not use this medication while driving, operating machinery, with other sedating medications, or while drinking alcohol as it will make you drowsy.    If you have any questions or concerns, please feel free to call us at [phone]    Sincerely,    Timmy Porter PA-C    Did you know?      You can schedule a video visit for follow-up appointments as well as future appointments for certain conditions.  Please see the below link.     [email or web address]    If you have not already done so,  I encourage you to sign up for Mychart ([email or web address]).  This will allow you to review your results, securely communicate with a provider, and schedule virtual visits as well.

## 2022-07-29 ENCOUNTER — VIRTUAL VISIT (OUTPATIENT)
Dept: NEUROLOGY | Facility: CLINIC | Age: 57
End: 2022-07-29
Payer: COMMERCIAL

## 2022-07-29 DIAGNOSIS — Z72.820 POOR SLEEP: ICD-10-CM

## 2022-07-29 DIAGNOSIS — F41.9 ANXIETY: ICD-10-CM

## 2022-07-29 DIAGNOSIS — G24.3 CERVICAL DYSTONIA: Primary | ICD-10-CM

## 2022-07-29 DIAGNOSIS — M54.2 CERVICALGIA: ICD-10-CM

## 2022-07-29 PROCEDURE — 99214 OFFICE O/P EST MOD 30 MIN: CPT | Mod: 95 | Performed by: PSYCHIATRY & NEUROLOGY

## 2022-07-29 RX ORDER — CLONAZEPAM 0.5 MG/1
TABLET ORAL
Qty: 90 TABLET | Refills: 3 | Status: SHIPPED | OUTPATIENT
Start: 2022-07-29 | End: 2022-12-21

## 2022-07-29 NOTE — PROGRESS NOTES
Tia is a 56 year old who is being evaluated via a billable video visit.      How would you like to obtain your AVS? MyChart  If the video visit is dropped, the invitation should be resent by: [phone]        Video-Visit Details  Start: 07/29/2022 09:35 am  Stop: 07/29/2022 09:55 am    Type of service:  Video Visit    Originating Location (pt. Location): Home    Distant Location (provider location):  [facility] [place]     Platform used for Video Visit: Wolonge
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
## 2022-07-29 NOTE — PATIENT INSTRUCTIONS
Plan:   - Continue to take clonazepam 0.5 mg 1 tab at bedtime. Okay to take 1-2 tabs prn during the day.    Patient to return in 6 months, for in-person visit, 30 minutes. Call on Monday.

## 2022-08-02 ENCOUNTER — TELEPHONE (OUTPATIENT)
Dept: NEUROLOGY | Facility: CLINIC | Age: 57
End: 2022-08-02

## 2022-08-02 NOTE — TELEPHONE ENCOUNTER
Attempted call to patient to schedule a 6 months (in-person) follow up in the Neurology Clinic per Dr Sushila Platt last visit on 07/29/22 checkout comment dispositions. Left message with clinic number and send [employer].

## 2022-08-05 DIAGNOSIS — F32.0 CURRENT MILD EPISODE OF MAJOR DEPRESSIVE DISORDER WITHOUT PRIOR EPISODE (H): ICD-10-CM

## 2022-08-05 DIAGNOSIS — I10 ESSENTIAL HYPERTENSION: ICD-10-CM

## 2022-08-05 DIAGNOSIS — Z00.00 ROUTINE GENERAL MEDICAL EXAMINATION AT A HEALTH CARE FACILITY: ICD-10-CM

## 2022-08-05 RX ORDER — LOSARTAN POTASSIUM 50 MG/1
TABLET ORAL
Qty: 90 TABLET | Refills: 1 | Status: SHIPPED | OUTPATIENT
Start: 2022-08-05 | End: 2023-02-03

## 2022-08-05 RX ORDER — CHLORTHALIDONE 25 MG/1
TABLET ORAL
Qty: 90 TABLET | Refills: 1 | Status: SHIPPED | OUTPATIENT
Start: 2022-08-05 | End: 2023-02-03

## 2022-08-05 RX ORDER — ESCITALOPRAM OXALATE 10 MG/1
10 TABLET ORAL DAILY
Qty: 90 TABLET | Refills: 0 | Status: SHIPPED | OUTPATIENT
Start: 2022-08-05 | End: 2023-01-26 | Stop reason: DRUGHIGH

## 2022-08-05 NOTE — TELEPHONE ENCOUNTER
"Requested Prescriptions   Pending Prescriptions Disp Refills     losartan (COZAAR) 50 MG tablet [Pharmacy Med Name: LOSARTAN POTASSIUM 50 MG TAB] 90 tablet 1     Sig: TAKE 1 TABLET BY MOUTH EVERY DAY       Angiotensin-II Receptors Passed - 8/5/2022  8:24 AM        Passed - Last blood pressure under 140/90 in past 12 months     BP Readings from Last 3 Encounters:   07/25/22 108/64   05/18/22 109/76   04/27/22 110/70                 Passed - Recent (12 mo) or future (30 days) visit within the authorizing provider's specialty     Patient has had an office visit with the authorizing provider or a provider within the authorizing providers department within the previous 12 mos or has a future within next 30 days. See \"Patient Info\" tab in inbasket, or \"Choose Columns\" in Meds & Orders section of the refill encounter.              Passed - Medication is active on med list        Passed - Patient is age 18 or older        Passed - No active pregnancy on record        Passed - Normal serum creatinine on file in past 12 months     Recent Labs   Lab Test 02/18/22  0942   CR 0.66       Ok to refill medication if creatinine is low          Passed - Normal serum potassium on file in past 12 months     Recent Labs   Lab Test 02/18/22  0942   POTASSIUM 4.0                    Passed - No positive pregnancy test in past 12 months           escitalopram (LEXAPRO) 10 MG tablet [Pharmacy Med Name: ESCITALOPRAM 10 MG TABLET] 90 tablet 0     Sig: TAKE 1 TABLET (10 MG) BY MOUTH DAILY.       SSRIs Protocol Failed - 8/5/2022  8:24 AM        Failed - PHQ-9 score less than 5 in past 6 months     Please review last PHQ-9 score.           Passed - Medication is active on med list        Passed - Patient is age 18 or older        Passed - No active pregnancy on record        Passed - No positive pregnancy test in last 12 months        Passed - Recent (6 mo) or future (30 days) visit within the authorizing provider's specialty     Patient had " "office visit in the last 6 months or has a visit in the next 30 days with authorizing provider or within the authorizing provider's specialty.  See \"Patient Info\" tab in inbasket, or \"Choose Columns\" in Meds & Orders section of the refill encounter.               chlorthalidone (HYGROTON) 25 MG tablet [Pharmacy Med Name: CHLORTHALIDONE 25 MG TABLET] 90 tablet 1     Sig: TAKE 1 TABLET BY MOUTH EVERY DAY       Diuretics (Including Combos) Protocol Passed - 8/5/2022  8:24 AM        Passed - Blood pressure under 140/90 in past 12 months     BP Readings from Last 3 Encounters:   07/25/22 108/64   05/18/22 109/76   04/27/22 110/70                 Passed - Recent (12 mo) or future (30 days) visit within the authorizing provider's specialty     Patient has had an office visit with the authorizing provider or a provider within the authorizing providers department within the previous 12 mos or has a future within next 30 days. See \"Patient Info\" tab in inbasket, or \"Choose Columns\" in Meds & Orders section of the refill encounter.              Passed - Medication is active on med list        Passed - Patient is age 18 or older        Passed - No active pregancy on record        Passed - Normal serum creatinine on file in past 12 months     Recent Labs   Lab Test 02/18/22  0942   CR 0.66              Passed - Normal serum potassium on file in past 12 months     Recent Labs   Lab Test 02/18/22  0942   POTASSIUM 4.0                    Passed - Normal serum sodium on file in past 12 months     Recent Labs   Lab Test 02/18/22  0942                 Passed - No positive pregnancy test in past 12 months             "

## 2022-08-10 ENCOUNTER — OFFICE VISIT (OUTPATIENT)
Dept: PHYSICAL MEDICINE AND REHAB | Facility: CLINIC | Age: 57
End: 2022-08-10
Payer: COMMERCIAL

## 2022-08-10 VITALS
SYSTOLIC BLOOD PRESSURE: 130 MMHG | TEMPERATURE: 98.7 F | BODY MASS INDEX: 22.76 KG/M2 | WEIGHT: 141 LBS | HEART RATE: 77 BPM | OXYGEN SATURATION: 97 % | DIASTOLIC BLOOD PRESSURE: 83 MMHG

## 2022-08-10 DIAGNOSIS — G24.3 SPASMODIC TORTICOLLIS: Primary | ICD-10-CM

## 2022-08-10 DIAGNOSIS — G24.4 OROMANDIBULAR DYSTONIA: ICD-10-CM

## 2022-08-10 DIAGNOSIS — G43.719 CHRONIC MIGRAINE WITHOUT AURA, INTRACTABLE, WITHOUT STATUS MIGRAINOSUS: ICD-10-CM

## 2022-08-10 PROCEDURE — 96372 THER/PROPH/DIAG INJ SC/IM: CPT | Performed by: PHYSICAL MEDICINE & REHABILITATION

## 2022-08-10 PROCEDURE — 64612 DESTROY NERVE FACE MUSCLE: CPT | Mod: 59 | Performed by: PHYSICAL MEDICINE & REHABILITATION

## 2022-08-10 PROCEDURE — 64616 CHEMODENERV MUSC NECK DYSTON: CPT | Mod: 59 | Performed by: PHYSICAL MEDICINE & REHABILITATION

## 2022-08-10 PROCEDURE — 95874 GUIDE NERV DESTR NEEDLE EMG: CPT | Performed by: PHYSICAL MEDICINE & REHABILITATION
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
## 2022-08-10 NOTE — NURSING NOTE
Chief Complaint   Patient presents with     RECHECK     Botox injections confirmed by patient     Jaqueline Sahu CMA at 10:56 AM on 8/10/2022.
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
## 2022-09-13 ENCOUNTER — VIRTUAL VISIT (OUTPATIENT)
Dept: FAMILY MEDICINE | Facility: CLINIC | Age: 57
End: 2022-09-13
Payer: COMMERCIAL

## 2022-09-13 ENCOUNTER — MYC MEDICAL ADVICE (OUTPATIENT)
Dept: FAMILY MEDICINE | Facility: CLINIC | Age: 57
End: 2022-09-13

## 2022-09-13 DIAGNOSIS — F32.0 CURRENT MILD EPISODE OF MAJOR DEPRESSIVE DISORDER WITHOUT PRIOR EPISODE (H): ICD-10-CM

## 2022-09-13 PROCEDURE — 99213 OFFICE O/P EST LOW 20 MIN: CPT | Mod: 95 | Performed by: FAMILY MEDICINE

## 2022-09-13 RX ORDER — ESCITALOPRAM OXALATE 10 MG/1
10 TABLET ORAL DAILY
Qty: 90 TABLET | Refills: 0 | Status: CANCELLED | OUTPATIENT
Start: 2022-09-13

## 2022-09-13 RX ORDER — ESCITALOPRAM OXALATE 20 MG/1
20 TABLET ORAL DAILY
Qty: 90 TABLET | Refills: 0 | Status: SHIPPED | OUTPATIENT
Start: 2022-09-13 | End: 2022-12-22

## 2022-09-13 RX ORDER — BUPROPION HYDROCHLORIDE 150 MG/1
TABLET ORAL
Qty: 90 TABLET | Refills: 1 | Status: SHIPPED | OUTPATIENT
Start: 2022-09-13 | End: 2022-12-22

## 2022-09-13 ASSESSMENT — ANXIETY QUESTIONNAIRES
5. BEING SO RESTLESS THAT IT IS HARD TO SIT STILL: MORE THAN HALF THE DAYS
7. FEELING AFRAID AS IF SOMETHING AWFUL MIGHT HAPPEN: NOT AT ALL
6. BECOMING EASILY ANNOYED OR IRRITABLE: NEARLY EVERY DAY
7. FEELING AFRAID AS IF SOMETHING AWFUL MIGHT HAPPEN: NOT AT ALL
2. NOT BEING ABLE TO STOP OR CONTROL WORRYING: NEARLY EVERY DAY
GAD7 TOTAL SCORE: 17
IF YOU CHECKED OFF ANY PROBLEMS ON THIS QUESTIONNAIRE, HOW DIFFICULT HAVE THESE PROBLEMS MADE IT FOR YOU TO DO YOUR WORK, TAKE CARE OF THINGS AT HOME, OR GET ALONG WITH OTHER PEOPLE: VERY DIFFICULT
8. IF YOU CHECKED OFF ANY PROBLEMS, HOW DIFFICULT HAVE THESE MADE IT FOR YOU TO DO YOUR WORK, TAKE CARE OF THINGS AT HOME, OR GET ALONG WITH OTHER PEOPLE?: VERY DIFFICULT
1. FEELING NERVOUS, ANXIOUS, OR ON EDGE: NEARLY EVERY DAY
GAD7 TOTAL SCORE: 17
4. TROUBLE RELAXING: NEARLY EVERY DAY
3. WORRYING TOO MUCH ABOUT DIFFERENT THINGS: NEARLY EVERY DAY
GAD7 TOTAL SCORE: 17

## 2022-09-13 ASSESSMENT — PATIENT HEALTH QUESTIONNAIRE - PHQ9
10. IF YOU CHECKED OFF ANY PROBLEMS, HOW DIFFICULT HAVE THESE PROBLEMS MADE IT FOR YOU TO DO YOUR WORK, TAKE CARE OF THINGS AT HOME, OR GET ALONG WITH OTHER PEOPLE: SOMEWHAT DIFFICULT
SUM OF ALL RESPONSES TO PHQ QUESTIONS 1-9: 9
SUM OF ALL RESPONSES TO PHQ QUESTIONS 1-9: 9

## 2022-09-13 NOTE — PROGRESS NOTES
Tia is a 56 year old who is being evaluated via a billable video visit.      How would you like to obtain your AVS? [employer]  If the video visit is dropped, the invitation should be resent by: Text to cell phone: [phone]  Will anyone else be joining your video visit? No        A/p:      ICD-10-CM    1. Current mild episode of major depressive disorder without prior episode (H)  F32.0 buPROPion (WELLBUTRIN XL) 150 MG 24 hr tablet     escitalopram (LEXAPRO) 20 MG tablet     Plan increased dose of escitalopram.  Reviewed this will not be effective by the wedding this Saturday but will take a few weeks.  Pt feels this is still necessary.  Advised follow up via [employer] in 4-6 weeks, sooner if concerns abut the dose change.    Subjective   Tia is a 56 year old, presenting for the following health issues:  Anxiety      HPI     Depression and Anxiety Follow-Up    How are you doing with your depression since your last visit? Worsened for the last 3 weeks    How are you doing with your anxiety since your last visit?  Worsened for the last 3 weeks     Are you having other symptoms that might be associated with depression or anxiety? No    Have you had a significant life event? OTHER: stress with son's upcoming wedding and ill dog     Do you have any concerns with your use of alcohol or other drugs? No    Social History     Tobacco Use     Smoking status: Never Smoker     Smokeless tobacco: Never Used   Vaping Use     Vaping Use: Never used   Substance Use Topics     Alcohol use: Yes     Comment: Occasionally, 1-2 glasses of wine on weekends     Drug use: No     PHQ 2/7/2022 7/19/2022 9/13/2022   PHQ-9 Total Score 2 5 9   Q9: Thoughts of better off dead/self-harm past 2 weeks Not at all Not at all Not at all     PATRICK-7 SCORE 2/7/2022 7/19/2022 9/13/2022   Total Score 2 (minimal anxiety) - 17 (severe anxiety)   Total Score 2 4 17         Son is getting  and that process is stressful and feels like soon to be  "daughter in law is \"high maintenance\"    Has been using clonazepam more regularly at night for help with sleep.  Has also taken it some during the day, usually no more then 1 dose.    Would like to not need the clonazepam as much.  Wondering about increasing the lexapro.  Feels she has been tolerating this dose well.    Review of Systems   Constitutional, HEENT, cardiovascular, pulmonary, gi and gu systems are negative, except as otherwise noted.      Objective           Vitals:  No vitals were obtained today due to virtual visit.    Physical Exam   GENERAL: Healthy, alert and no distress  EYES: Eyes grossly normal to inspection.  No discharge or erythema, or obvious scleral/conjunctival abnormalities.  RESP: No audible wheeze, cough, or visible cyanosis.  No visible retractions or increased work of breathing.    SKIN: Visible skin clear. No significant rash, abnormal pigmentation or lesions.  NEURO: Cranial nerves grossly intact.  Mentation and speech appropriate for age.  PSYCH: Mentation appears normal, affect normal, judgement and insight intact, normal speech and appearance well-groomed.    Epic reviewed            Video-Visit Details    Video Start Time: 5:45 PM      Type of service:  Video Visit    Video End Time:5:58 PM      Originating Location (pt. Location): Home    Distant Location (provider location):  [facility]     Platform used for Video Visit: Aylin    "

## 2022-09-20 ENCOUNTER — MYC MEDICAL ADVICE (OUTPATIENT)
Dept: FAMILY MEDICINE | Facility: CLINIC | Age: 57
End: 2022-09-20

## 2022-09-21 ENCOUNTER — THERAPY VISIT (OUTPATIENT)
Dept: PHYSICAL THERAPY | Facility: CLINIC | Age: 57
End: 2022-09-21
Attending: PHYSICIAN ASSISTANT
Payer: COMMERCIAL
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
## 2022-09-21 DIAGNOSIS — M54.2 NECK PAIN: ICD-10-CM

## 2022-09-21 PROCEDURE — 97110 THERAPEUTIC EXERCISES: CPT | Mod: GP | Performed by: PHYSICAL THERAPIST

## 2022-09-21 PROCEDURE — 97161 PT EVAL LOW COMPLEX 20 MIN: CPT | Mod: GP | Performed by: PHYSICAL THERAPIST
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
## 2022-09-28 ENCOUNTER — THERAPY VISIT (OUTPATIENT)
Dept: PHYSICAL THERAPY | Facility: CLINIC | Age: 57
End: 2022-09-28
Payer: COMMERCIAL

## 2022-09-28 DIAGNOSIS — M54.2 NECK PAIN: Primary | ICD-10-CM

## 2022-09-28 PROCEDURE — 97110 THERAPEUTIC EXERCISES: CPT | Mod: GP

## 2022-09-28 PROCEDURE — 97140 MANUAL THERAPY 1/> REGIONS: CPT | Mod: GP

## 2022-10-11 ENCOUNTER — OFFICE VISIT (OUTPATIENT)
Dept: DERMATOLOGY | Facility: CLINIC | Age: 57
End: 2022-10-11
Payer: COMMERCIAL

## 2022-10-11 DIAGNOSIS — L82.1 SEBORRHEIC KERATOSIS: ICD-10-CM

## 2022-10-11 DIAGNOSIS — L70.0 ACNE VULGARIS: Primary | ICD-10-CM

## 2022-10-11 DIAGNOSIS — L84 CALLUS OF FOOT: ICD-10-CM

## 2022-10-11 DIAGNOSIS — L81.4 LENTIGO: ICD-10-CM

## 2022-10-11 DIAGNOSIS — D23.9 DERMATOFIBROMA: ICD-10-CM

## 2022-10-11 DIAGNOSIS — D18.01 CHERRY ANGIOMA: ICD-10-CM

## 2022-10-11 PROCEDURE — 99213 OFFICE O/P EST LOW 20 MIN: CPT | Performed by: PHYSICIAN ASSISTANT

## 2022-10-11 RX ORDER — TRETINOIN 0.5 MG/G
CREAM TOPICAL
Qty: 45 G | Refills: 3 | Status: SHIPPED | OUTPATIENT
Start: 2022-10-11 | End: 2023-10-04

## 2022-10-11 RX ORDER — UREA 40 %
CREAM (GRAM) TOPICAL
Qty: 85 G | Refills: 4 | Status: SHIPPED | OUTPATIENT
Start: 2022-10-11 | End: 2023-12-29

## 2022-10-11 ASSESSMENT — PAIN SCALES - GENERAL: PAINLEVEL: NO PAIN (0)

## 2022-10-11 NOTE — LETTER
10/11/2022         RE: Tia Pablo  [id number] [facility] [id number]        Dear Colleague,    Thank you for referring your patient, Tia Pablo, to the [facility]. Please see a copy of my visit note below.    Tia Pablo is an extremely pleasant 56 year old year old female patient here today for skin check. She denies any painful or bleeding skin lesions. She notes callusing on feet and occasionally acne with sweating. She would like to try tretinoin for her acne.  Patient has no other skin complaints today.  Remainder of the HPI, Meds, PMH, Allergies, FH, and SH was reviewed in chart.    Pertinent Hx:   No personal history of skin cancer.   Past Medical History:   Diagnosis Date     Anxiety      Depression      HTN (hypertension)        Past Surgical History:   Procedure Laterality Date     AS REPAIR TEND/MUS/FLEX FOREARM/WRIST, PRIM, EACH        SECTION       HYSTERECTOMY          Family History   Problem Relation Age of Onset     Hypertension Mother      Autoimmune Disease Mother         CREST     Hyperlipidemia Mother      Hypertension Father      Skin Cancer Father      Heart Disease Father      Melanoma No family hx of        Social History     Socioeconomic History     Marital status:      Spouse name: Not on file     Number of children: Not on file     Years of education: Not on file     Highest education level: Not on file   Occupational History     Not on file   Tobacco Use     Smoking status: Never     Smokeless tobacco: Never   Vaping Use     Vaping Use: Never used   Substance and Sexual Activity     Alcohol use: Yes     Comment: Occasionally, 1-2 glasses of wine on weekends     Drug use: No     Sexual activity: Not on file   Other Topics Concern     Parent/sibling w/ CABG, MI or angioplasty before 65F 55M? 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, 2 boys, one dog       Social Determinants of Health     Financial Resource Strain: Not on file   Food Insecurity: Not on file   Transportation Needs: Not on file   Physical Activity: Not on file   Stress: Not on file   Social Connections: Not on file   Intimate Partner Violence: Not on file   Housing Stability: Not on file       Outpatient Encounter Medications as of 10/11/2022   Medication Sig Dispense Refill     buPROPion (WELLBUTRIN XL) 150 MG 24 hr tablet TAKE 1 TABLET BY MOUTH EVERY DAY IN THE MORNING 90 tablet 1     chlorthalidone (HYGROTON) 25 MG tablet TAKE 1 TABLET BY MOUTH EVERY DAY 90 tablet 1     clonazePAM (KLONOPIN) 0.5 MG tablet Take 1 tab in the evening before bed. Okay to take up to 2 tabs as needed in the day. 90 tablet 3     eletriptan (RELPAX) 40 MG tablet Take 1 tablet (40 mg) by mouth at onset of headache for migraine May repeat in 2 hours. Max 2 tablets/24 hours. 12 tablet 3     EPINEPHrine (ANY BX GENERIC EQUIV) 0.3 MG/0.3ML injection 2-pack Inject 0.3 mg into the muscle       escitalopram (LEXAPRO) 20 MG tablet Take 1 tablet (20 mg) by mouth daily 90 tablet 0     losartan (COZAAR) 50 MG tablet TAKE 1 TABLET BY MOUTH EVERY DAY 90 tablet 1     methocarbamol (ROBAXIN) 500 MG tablet Take 0.5-1 tablets (250-500 mg) by mouth 4 times daily as needed for muscle spasms 40 tablet 0     tretinoin (RETIN-A) 0.05 % external cream Apply pea sized amount at bedtime. 45 g 3     Urea 40 % CREA Apply daily at bedtime to feet as needed. 85 g 4     escitalopram (LEXAPRO) 10 MG tablet TAKE 1 TABLET (10 MG) BY MOUTH DAILY. 90 tablet 0     Facility-Administered Encounter Medications as of 10/11/2022   Medication Dose Route Frequency Provider Last Rate Last Admin     botulinum toxin type A (BOTOX) 100 units injection 600 Units  600 Units Intramuscular Q90 Days Standal, Simi Mccloud MD   450 Units at 08/20/22 1601             O:   NAD, WDWN, Alert & Oriented, Mood & Affect wnl, Vitals stable   Here today alone   There were no  vitals taken for this visit.   General appearance normal   Vitals stable   Alert, oriented and in no acute distress        Stuck on papules and brown macules on trunk and ext   Red papules on trunk  Brown papules and macules with regular pigment network and borders on torso and extremities with regular pigment network and borders    Callus on feet  White papules on face   Brown firm papules with positive dimple sign on lower legs   The remainder of skin exam is normal       Eyes: Conjunctivae/lids:Normal     ENT: Lips: normal    MSK:Normal    Cardiovascular: peripheral edema none    Pulm: Breathing Normal    Neuro/Psych: Orientation:Alert and Orientedx3 ; Mood/Affect:normal     A/P:  1. Callus on feet  Sent in urea cream.   2. Acne vulgaris  Apply tretinoin at bedtime. Use daily moisturizers.   3. Seborrheic keratosis, lentigo, angioma, benign nevi, dermatofibroma   BENIGN LESIONS DISCUSSED WITH PATIENT:  I discussed the specifics of tumor, prognosis, and genetics of benign lesions.  I explained that treatment of these lesions would be purely cosmetic and not medically neccessary.  I discussed with patient different removal options including excision, cautery and /or laser.      Nature and genetics of benign skin lesions dicussed with patient.  Signs and Symptoms of skin cancer discussed with patient.  ABCDEs of melanoma reviewed with patient.  Patient encouraged to perform monthly skin exams.  UV precautions reviewed with patient.  Risks of non-melanoma skin cancer discussed with patient   Return to clinic in one year or sooner if needed.         Again, thank you for allowing me to participate in the care of your patient.        Sincerely,        Queenie Zelaya PA-C

## 2022-10-11 NOTE — PROGRESS NOTES
Tia Pablo is an extremely pleasant 56 year old year old female patient here today for skin check. She denies any painful or bleeding skin lesions. She notes callusing on feet and occasionally acne with sweating. She would like to try tretinoin for her acne.  Patient has no other skin complaints today.  Remainder of the HPI, Meds, PMH, Allergies, FH, and SH was reviewed in chart.    Pertinent Hx:   No personal history of skin cancer.   Past Medical History:   Diagnosis Date     Anxiety      Depression      HTN (hypertension)        Past Surgical History:   Procedure Laterality Date     AS REPAIR TEND/MUS/FLEX FOREARM/WRIST, PRIM, EACH        SECTION       HYSTERECTOMY          Family History   Problem Relation Age of Onset     Hypertension Mother      Autoimmune Disease Mother         CREST     Hyperlipidemia Mother      Hypertension Father      Skin Cancer Father      Heart Disease Father      Melanoma No family hx of        Social History     Socioeconomic History     Marital status:      Spouse name: Not on file     Number of children: Not on file     Years of education: Not on file     Highest education level: Not on file   Occupational History     Not on file   Tobacco Use     Smoking status: Never     Smokeless tobacco: Never   Vaping Use     Vaping Use: Never used   Substance and Sexual Activity     Alcohol use: Yes     Comment: Occasionally, 1-2 glasses of wine on weekends     Drug use: No     Sexual activity: Not on file   Other Topics Concern     Parent/sibling w/ CABG, MI or angioplasty before 65F 55M? 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, 2 boys, one dog      Social Determinants of Health     Financial Resource Strain: Not on file   Food Insecurity: Not on file   Transportation Needs: Not on file   Physical Activity: Not on file   Stress: Not on file   Social Connections: Not on file   Intimate Partner  Violence: Not on file   Housing Stability: Not on file       Outpatient Encounter Medications as of 10/11/2022   Medication Sig Dispense Refill     buPROPion (WELLBUTRIN XL) 150 MG 24 hr tablet TAKE 1 TABLET BY MOUTH EVERY DAY IN THE MORNING 90 tablet 1     chlorthalidone (HYGROTON) 25 MG tablet TAKE 1 TABLET BY MOUTH EVERY DAY 90 tablet 1     clonazePAM (KLONOPIN) 0.5 MG tablet Take 1 tab in the evening before bed. Okay to take up to 2 tabs as needed in the day. 90 tablet 3     eletriptan (RELPAX) 40 MG tablet Take 1 tablet (40 mg) by mouth at onset of headache for migraine May repeat in 2 hours. Max 2 tablets/24 hours. 12 tablet 3     EPINEPHrine (ANY BX GENERIC EQUIV) 0.3 MG/0.3ML injection 2-pack Inject 0.3 mg into the muscle       escitalopram (LEXAPRO) 20 MG tablet Take 1 tablet (20 mg) by mouth daily 90 tablet 0     losartan (COZAAR) 50 MG tablet TAKE 1 TABLET BY MOUTH EVERY DAY 90 tablet 1     methocarbamol (ROBAXIN) 500 MG tablet Take 0.5-1 tablets (250-500 mg) by mouth 4 times daily as needed for muscle spasms 40 tablet 0     tretinoin (RETIN-A) 0.05 % external cream Apply pea sized amount at bedtime. 45 g 3     Urea 40 % CREA Apply daily at bedtime to feet as needed. 85 g 4     escitalopram (LEXAPRO) 10 MG tablet TAKE 1 TABLET (10 MG) BY MOUTH DAILY. 90 tablet 0     Facility-Administered Encounter Medications as of 10/11/2022   Medication Dose Route Frequency Provider Last Rate Last Admin     botulinum toxin type A (BOTOX) 100 units injection 600 Units  600 Units Intramuscular Q90 Days StandSimi escalona MD   450 Units at 08/20/22 1601             O:   NAD, WDWN, Alert & Oriented, Mood & Affect wnl, Vitals stable   Here today alone   There were no vitals taken for this visit.   General appearance normal   Vitals stable   Alert, oriented and in no acute distress        Stuck on papules and brown macules on trunk and ext   Red papules on trunk  Brown papules and macules with regular pigment network  and borders on torso and extremities with regular pigment network and borders    Callus on feet  White papules on face   Brown firm papules with positive dimple sign on lower legs   The remainder of skin exam is normal       Eyes: Conjunctivae/lids:Normal     ENT: Lips: normal    MSK:Normal    Cardiovascular: peripheral edema none    Pulm: Breathing Normal    Neuro/Psych: Orientation:Alert and Orientedx3 ; Mood/Affect:normal     A/P:  1. Callus on feet  Sent in urea cream.   2. Acne vulgaris  Apply tretinoin at bedtime. Use daily moisturizers.   3. Seborrheic keratosis, lentigo, angioma, benign nevi, dermatofibroma   BENIGN LESIONS DISCUSSED WITH PATIENT:  I discussed the specifics of tumor, prognosis, and genetics of benign lesions.  I explained that treatment of these lesions would be purely cosmetic and not medically neccessary.  I discussed with patient different removal options including excision, cautery and /or laser.      Nature and genetics of benign skin lesions dicussed with patient.  Signs and Symptoms of skin cancer discussed with patient.  ABCDEs of melanoma reviewed with patient.  Patient encouraged to perform monthly skin exams.  UV precautions reviewed with patient.  Risks of non-melanoma skin cancer discussed with patient   Return to clinic in one year or sooner if needed.

## 2022-10-12 ENCOUNTER — THERAPY VISIT (OUTPATIENT)
Dept: PHYSICAL THERAPY | Facility: CLINIC | Age: 57
End: 2022-10-12
Payer: COMMERCIAL

## 2022-10-12 DIAGNOSIS — M54.2 NECK PAIN: Primary | ICD-10-CM

## 2022-10-12 PROCEDURE — 97140 MANUAL THERAPY 1/> REGIONS: CPT | Mod: GP

## 2022-10-12 PROCEDURE — 97110 THERAPEUTIC EXERCISES: CPT | Mod: GP

## 2022-10-12 NOTE — PROGRESS NOTES
SUBJECTIVE  Subjective changes as noted by pt: Pt states she did some gardening this past weekend, says it does bug her neck, but states she works on her posture. Pt states the manual work last week was very helpful, left feeling much less stiff, this lasted the rest of the day. HEP going well, no questions.    Current Pain level: 5/10   Changes in function:  Yes     Adverse reaction to treatment or activity:  None    OBJECTIVE  Changes in objective findings:  Yes  Objective: Cervical AROM: stiff B rotation, R rotation 40deg, L SB 15deg today with shoulder hiking compensation, looking over R shoulder very limited     ASSESSMENT  Tia continues to require intervention to meet STG and LTG's: PT  Patient is progressing as expected.  Response to therapy has shown an improvement in  pain level, strength and function  Response to therapy has shown lack of progress in  ROM   Progress made towards STG/LTG?  Yes     PLAN  Continue current treatment plan until patient demonstrates readiness to progress to higher level exercises.    PTA/ATC plan:  N/A    Please refer to the daily flowsheet for treatment today, total treatment time and time spent performing 1:1 timed codes.

## 2022-10-13 ENCOUNTER — MYC MEDICAL ADVICE (OUTPATIENT)
Dept: FAMILY MEDICINE | Facility: CLINIC | Age: 57
End: 2022-10-13

## 2022-10-17 DIAGNOSIS — F32.0 CURRENT MILD EPISODE OF MAJOR DEPRESSIVE DISORDER WITHOUT PRIOR EPISODE (H): ICD-10-CM

## 2022-10-18 RX ORDER — ESCITALOPRAM OXALATE 20 MG/1
TABLET ORAL
Qty: 90 TABLET | Refills: 0 | OUTPATIENT
Start: 2022-10-18

## 2022-10-18 NOTE — TELEPHONE ENCOUNTER
escitalopram (LEXAPRO) 20 MG tablet 90 tablet 0 9/13/2022  --   Sig - Route: Take 1 tablet (20 mg) by mouth daily - Oral   Sent to pharmacy as: Escitalopram Oxalate 20 MG Oral Tablet (LEXAPRO)   Class: E-Prescribe   Order: [id number]   E-Prescribing Status: Receipt confirmed by pharmacy (9/13/2022  5:52 PM CDT)     Printout Tracking    External Result Report     Pharmacy    [id number] [facility] - [address]

## 2022-10-19 ENCOUNTER — THERAPY VISIT (OUTPATIENT)
Dept: PHYSICAL THERAPY | Facility: CLINIC | Age: 57
End: 2022-10-19
Payer: COMMERCIAL

## 2022-10-19 DIAGNOSIS — M54.2 NECK PAIN: Primary | ICD-10-CM

## 2022-10-19 PROCEDURE — 97110 THERAPEUTIC EXERCISES: CPT | Mod: GP | Performed by: PHYSICAL THERAPIST

## 2022-10-19 PROCEDURE — 97140 MANUAL THERAPY 1/> REGIONS: CPT | Mod: GP | Performed by: PHYSICAL THERAPIST

## 2022-10-26 ENCOUNTER — THERAPY VISIT (OUTPATIENT)
Dept: PHYSICAL THERAPY | Facility: CLINIC | Age: 57
End: 2022-10-26
Payer: COMMERCIAL

## 2022-10-26 ENCOUNTER — MYC MEDICAL ADVICE (OUTPATIENT)
Dept: PHYSICAL MEDICINE AND REHAB | Facility: CLINIC | Age: 57
End: 2022-10-26

## 2022-10-26 ENCOUNTER — HOSPITAL ENCOUNTER (OUTPATIENT)
Dept: MAMMOGRAPHY | Facility: CLINIC | Age: 57
Discharge: HOME | End: 2022-10-26
Attending: FAMILY MEDICINE | Admitting: FAMILY MEDICINE
Payer: COMMERCIAL

## 2022-10-26 DIAGNOSIS — M54.2 NECK PAIN: Primary | ICD-10-CM

## 2022-10-26 DIAGNOSIS — Z12.31 VISIT FOR SCREENING MAMMOGRAM: ICD-10-CM

## 2022-10-26 PROCEDURE — 77067 SCR MAMMO BI INCL CAD: CPT

## 2022-10-26 PROCEDURE — 97110 THERAPEUTIC EXERCISES: CPT | Mod: GP | Performed by: PHYSICAL THERAPIST

## 2022-10-26 PROCEDURE — 97140 MANUAL THERAPY 1/> REGIONS: CPT | Mod: GP | Performed by: PHYSICAL THERAPIST

## 2022-10-26 NOTE — TELEPHONE ENCOUNTER
Returned a call to patient, had to leave a message. Asked her to call back or send my-chart message to let us know which specific appointment she's calling about as she has a couple of apps scheduled in epic.     Thanks.

## 2022-10-27 ENCOUNTER — TELEPHONE (OUTPATIENT)
Dept: PHYSICAL MEDICINE AND REHAB | Facility: CLINIC | Age: 57
End: 2022-10-27
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
## 2022-11-01 DIAGNOSIS — Z91.038 OTHER INSECT ALLERGY STATUS: ICD-10-CM

## 2022-11-01 NOTE — TELEPHONE ENCOUNTER
Routing refill request to provider for review/approval because:  Medication is reported/historical  Viviana Clemens RN

## 2022-11-02 ENCOUNTER — OFFICE VISIT (OUTPATIENT)
Dept: PHYSICAL MEDICINE AND REHAB | Facility: CLINIC | Age: 57
End: 2022-11-02
Payer: COMMERCIAL

## 2022-11-02 DIAGNOSIS — M79.18 MYOFASCIAL PAIN: Primary | ICD-10-CM

## 2022-11-02 PROCEDURE — 20553 NJX 1/MLT TRIGGER POINTS 3/>: CPT | Performed by: PHYSICAL MEDICINE & REHABILITATION

## 2022-11-02 RX ORDER — EPINEPHRINE 0.3 MG/.3ML
INJECTION SUBCUTANEOUS
Qty: 2 EACH | Refills: 0 | Status: SHIPPED | OUTPATIENT
Start: 2022-11-02 | End: 2023-03-22

## 2022-11-02 RX ORDER — TRIAMCINOLONE ACETONIDE 40 MG/ML
40 INJECTION, SUSPENSION INTRA-ARTICULAR; INTRAMUSCULAR ONCE
Status: COMPLETED | OUTPATIENT
Start: 2022-11-02 | End: 2022-11-02

## 2022-11-02 RX ORDER — BUPIVACAINE HYDROCHLORIDE 5 MG/ML
5 INJECTION, SOLUTION EPIDURAL; INTRACAUDAL ONCE
Status: COMPLETED | OUTPATIENT
Start: 2022-11-02 | End: 2022-11-02

## 2022-11-02 RX ADMIN — TRIAMCINOLONE ACETONIDE 40 MG: 40 INJECTION, SUSPENSION INTRA-ARTICULAR; INTRAMUSCULAR at 12:28

## 2022-11-02 RX ADMIN — BUPIVACAINE HYDROCHLORIDE 25 MG: 5 INJECTION, SOLUTION EPIDURAL; INTRACAUDAL at 12:27
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
## 2022-11-07 ENCOUNTER — MYC MEDICAL ADVICE (OUTPATIENT)
Dept: FAMILY MEDICINE | Facility: CLINIC | Age: 57
End: 2022-11-07

## 2022-11-08 ENCOUNTER — TELEPHONE (OUTPATIENT)
Dept: FAMILY MEDICINE | Facility: CLINIC | Age: 57
End: 2022-11-08

## 2022-11-08 NOTE — TELEPHONE ENCOUNTER
Patient called and says she is weaning off of her Lexapro and has some questions about it and is not feeling very well.      Aminah Eubanks, WAYNE Anaya

## 2022-11-08 NOTE — TELEPHONE ENCOUNTER
Tia states that she wants to admit she started weaning herself off of the Lexapro before she sent the [employer] message to Dr Stone but now has been having a nervous stomach so wishes she would have messaged a lot earlier. States she took a half of a Lexapro for about a week, then a half every other day for about 3-4 days, then she completely stopped on 11/1/22. She has had a nervous stomach since 11/4/22. So she read Dr Stone's message from yesterday and got nervous so she took a full Lexapro this morning. She is wondering what she should do now? She is still on the Wellbutrin one every day in the morning. Thank you!    Lillian Bravo RN

## 2022-11-08 NOTE — TELEPHONE ENCOUNTER
I think she can restart at 10mg daily and then schedule follow up and we can decide how best to wean off.    Sally Stone, DO

## 2022-11-09 ENCOUNTER — THERAPY VISIT (OUTPATIENT)
Dept: PHYSICAL THERAPY | Facility: CLINIC | Age: 57
End: 2022-11-09
Payer: COMMERCIAL

## 2022-11-09 DIAGNOSIS — M54.2 NECK PAIN: Primary | ICD-10-CM

## 2022-11-09 PROCEDURE — 97140 MANUAL THERAPY 1/> REGIONS: CPT | Mod: GP | Performed by: PHYSICAL THERAPIST

## 2022-11-09 PROCEDURE — 97110 THERAPEUTIC EXERCISES: CPT | Mod: GP | Performed by: PHYSICAL THERAPIST

## 2022-11-17 ENCOUNTER — VIRTUAL VISIT (OUTPATIENT)
Dept: FAMILY MEDICINE | Facility: CLINIC | Age: 57
End: 2022-11-17
Payer: COMMERCIAL

## 2022-11-17 DIAGNOSIS — F32.0 CURRENT MILD EPISODE OF MAJOR DEPRESSIVE DISORDER WITHOUT PRIOR EPISODE (H): Primary | ICD-10-CM

## 2022-11-17 DIAGNOSIS — F41.8 SITUATIONAL ANXIETY: ICD-10-CM

## 2022-11-17 PROCEDURE — 99213 OFFICE O/P EST LOW 20 MIN: CPT | Mod: 95 | Performed by: FAMILY MEDICINE

## 2022-11-17 ASSESSMENT — ANXIETY QUESTIONNAIRES
GAD7 TOTAL SCORE: 5
GAD7 TOTAL SCORE: 5
IF YOU CHECKED OFF ANY PROBLEMS ON THIS QUESTIONNAIRE, HOW DIFFICULT HAVE THESE PROBLEMS MADE IT FOR YOU TO DO YOUR WORK, TAKE CARE OF THINGS AT HOME, OR GET ALONG WITH OTHER PEOPLE: SOMEWHAT DIFFICULT
6. BECOMING EASILY ANNOYED OR IRRITABLE: SEVERAL DAYS
7. FEELING AFRAID AS IF SOMETHING AWFUL MIGHT HAPPEN: NOT AT ALL
5. BEING SO RESTLESS THAT IT IS HARD TO SIT STILL: NOT AT ALL
2. NOT BEING ABLE TO STOP OR CONTROL WORRYING: SEVERAL DAYS
1. FEELING NERVOUS, ANXIOUS, OR ON EDGE: SEVERAL DAYS
3. WORRYING TOO MUCH ABOUT DIFFERENT THINGS: SEVERAL DAYS

## 2022-11-17 ASSESSMENT — PATIENT HEALTH QUESTIONNAIRE - PHQ9
5. POOR APPETITE OR OVEREATING: SEVERAL DAYS
SUM OF ALL RESPONSES TO PHQ QUESTIONS 1-9: 3

## 2022-11-17 NOTE — PROGRESS NOTES
Tia is a 56 year old who is being evaluated via a billable video visit.      How would you like to obtain your AVS? MyChart  If the video visit is dropped, the invitation should be resent by: Text to cell phone: [phone]  Will anyone else be joining your video visit? No          A/P:      ICD-10-CM    1. Current mild episode of major depressive disorder without prior episode (H)  F32.0       2. Situational anxiety  F41.8         Pt wishes to stop lexapro due to side effects and improvement in anxiety.  Reviewed slow wean.  Plan 5mg daily for 2 weeks then 2.5mg for 1-2 weeks prior to stopping.  Pt declines refill of lower dose, will use her pill cutter.    Discussed if she feels anxiety worsens of med I would recommend trail of alternate agent rather then increase in wellbutrin.  She will reach out if needed        Subjective   Tia is a 56 year old, presenting for the following health issues:  Depression      HPI     Depression Followup    How are you doing with your depression since your last visit? No change/stable    Are you having other symptoms that might be associated with depression? No    Have you had a significant life event?  No     Are you feeling anxious or having panic attacks?   Yes:  anxious    Do you have any concerns with your use of alcohol or other drugs? No    Social History     Tobacco Use     Smoking status: Never     Smokeless tobacco: Never   Vaping Use     Vaping Use: Never used   Substance Use Topics     Alcohol use: Yes     Comment: Occasionally, 1-2 glasses of wine on weekends     Drug use: No     PHQ 2/7/2022 7/19/2022 9/13/2022   PHQ-9 Total Score 2 5 9   Q9: Thoughts of better off dead/self-harm past 2 weeks Not at all Not at all Not at all     PATRICK-7 SCORE 2/7/2022 7/19/2022 9/13/2022   Total Score 2 (minimal anxiety) - 17 (severe anxiety)   Total Score 2 4 17     Does not like to be on the lexapro as she has been experiencing some sexual side effects.  Feels like she was  "feeling well and decided to try to wean off.    Felt like prior initiating the medication her anxiety was increased due to the increased stress.    Felt she was doing fine on the wellbutrin alone.    Was taking the lexapro every other day and then stopped.  Started having upset stomach and \"hot flashes at night\".  Got worried so took 20mg.  Has now been on the 10mg daily for about the last 2 weeks.    Feels like she is not having any withdrawal effects right now.  Feels like mood/anxiety have been stable, no worse on the lower dose.            Review of Systems   Constitutional, HEENT, cardiovascular, pulmonary, gi and gu systems are negative, except as otherwise noted.      Objective           Vitals:  No vitals were obtained today due to virtual visit.    Physical Exam   GENERAL: Healthy, alert and no distress  EYES: Eyes grossly normal to inspection.  No discharge or erythema, or obvious scleral/conjunctival abnormalities.  RESP: No audible wheeze, cough, or visible cyanosis.  No visible retractions or increased work of breathing.    SKIN: Visible skin clear. No significant rash, abnormal pigmentation or lesions.  NEURO: Cranial nerves grossly intact.  Mentation and speech appropriate for age.  PSYCH: Mentation appears normal, affect normal/bright, judgement and insight intact, normal speech and appearance well-groomed.    Epic reviewed            Video-Visit Details    Video Start Time: 5:22 PM        Type of service:  Video Visit    Video End Time: 5:33 PM      Originating Location (pt. Location): Home        Distant Location (provider location):  On-site    Platform used for Video Visit: Aylin    "

## 2022-11-28 ENCOUNTER — OFFICE VISIT (OUTPATIENT)
Dept: PHYSICAL MEDICINE AND REHAB | Facility: CLINIC | Age: 57
End: 2022-11-28
Payer: COMMERCIAL

## 2022-11-28 VITALS
OXYGEN SATURATION: 94 % | DIASTOLIC BLOOD PRESSURE: 81 MMHG | RESPIRATION RATE: 16 BRPM | HEART RATE: 94 BPM | SYSTOLIC BLOOD PRESSURE: 116 MMHG

## 2022-11-28 DIAGNOSIS — G24.3 SPASMODIC TORTICOLLIS: Primary | ICD-10-CM

## 2022-11-28 DIAGNOSIS — G43.719 CHRONIC MIGRAINE WITHOUT AURA, INTRACTABLE, WITHOUT STATUS MIGRAINOSUS: ICD-10-CM

## 2022-11-28 DIAGNOSIS — G24.4 OROMANDIBULAR DYSTONIA: ICD-10-CM

## 2022-11-28 PROCEDURE — 95874 GUIDE NERV DESTR NEEDLE EMG: CPT | Performed by: PHYSICAL MEDICINE & REHABILITATION

## 2022-11-28 PROCEDURE — 64615 CHEMODENERV MUSC MIGRAINE: CPT | Performed by: PHYSICAL MEDICINE & REHABILITATION

## 2022-11-28 ASSESSMENT — PAIN SCALES - GENERAL: PAINLEVEL: EXTREME PAIN (8)

## 2022-11-28 NOTE — PROGRESS NOTES
[facility]     PM&R CLINIC NOTE  BOTULINUM TOXIN PROCEDURE      HPI  No chief complaint on file.    Tia Pablo is a 57 year old female with a history of headaches and generalized dystonia with pain who presents to clinic for botulinum toxin injections for management of chronic migraine headaches, cervical and oromandibular dystonia.     SINCE LAST VISIT  Tia Pablo was last seen here in clinic on 8/10/93824, at which time she received 450 units of Botox. She also had trigger point injections on 11/2/2022, which seemed to help with neck and shoulder pain.      Patient denies new medical diagnoses, illnesses, hospitalizations, emergency room visits, and injuries since the previous injection with botulinum neurotoxin.     RESPONSE TO PREVIOUS TREATMENT    Side effects: No problems reported, although she does have a history of jaw weakness.     1.  Headache frequency during this injection cycle: Only one migraine headache day per month when Botox is in effect, but the Botox only seems to work for 7 weeks and then she experiences 4-8 migraine headache days per month and daily milder headaches. This is compared to her baseline headache frequency of 30 headache days per month.     2.  Headache duration during this injection cycle:  Headache duration is typically no longer than a few hours with Relpax, but if the Relpax does not work right away, she has to take two doses and the headaches last much longer. Patient reports no episodes of multiple day headaches during this injection cycle.     3.  Headache intensity during this injection cycle:    A.  5/10  =  Typical pain level.  B.  7/10  =  Worst pain level.  C.  2/10  =  Lowest pain level.    4.  Change in headache medication usage during this injection cycle:  (For Example:  Able to decrease use of oral pain medications.) She is only taking her migraine headache medication about once per month, whereas she was taking it much  more frequently prior to Botox. Over the last 2 months, she has been having more headaches due to Botox wearing off.     5.  ER Visits During This Injection Cycle: None.     6.  Functional Performance:  Change in ADL's, social interaction, days lost from work, etc. Patient reports being able to more fully participate in social and family activities and responsibilities as headache symptoms have improved      Dystonia Improvement (neck and jaw): Yes.  Percent Improvement: 75 %    Duration of Benefit:  7 weeks and followed by a gradual reduction in benefit. She has been clenching her teeth more consistently over the last 1-2 months as the Botox has not been in effect.       PHYSICAL EXAM  VS: There were no vitals taken for this visit.   GEN: Pleasant and cooperative, in no acute distress  HEENT: No facial asymmetry  NECK: Involuntary right rotation with hypertonicity noted in right anterior neck musculature and upper trapezius.     ALLERGIES  Allergies   Allergen Reactions     Bee Venom Anaphylaxis     Erythromycin Hives and Itching     Seasonal Allergies        CURRENT MEDICATIONS    Current Outpatient Medications:      buPROPion (WELLBUTRIN XL) 150 MG 24 hr tablet, TAKE 1 TABLET BY MOUTH EVERY DAY IN THE MORNING, Disp: 90 tablet, Rfl: 1     chlorthalidone (HYGROTON) 25 MG tablet, TAKE 1 TABLET BY MOUTH EVERY DAY, Disp: 90 tablet, Rfl: 1     clonazePAM (KLONOPIN) 0.5 MG tablet, Take 1 tab in the evening before bed. Okay to take up to 2 tabs as needed in the day., Disp: 90 tablet, Rfl: 3     eletriptan (RELPAX) 40 MG tablet, Take 1 tablet (40 mg) by mouth at onset of headache for migraine May repeat in 2 hours. Max 2 tablets/24 hours., Disp: 12 tablet, Rfl: 3     EPINEPHrine (ANY BX GENERIC EQUIV) 0.3 MG/0.3ML injection 2-pack, INJECT 0.3 MLS (0.3 MG) INTO THE MUSCLE ONCE FOR 1 DOSE, Disp: 2 each, Rfl: 0     escitalopram (LEXAPRO) 10 MG tablet, TAKE 1 TABLET (10 MG) BY MOUTH DAILY. (Patient taking differently: Take 5  mg by mouth daily), Disp: 90 tablet, Rfl: 0     escitalopram (LEXAPRO) 20 MG tablet, Take 1 tablet (20 mg) by mouth daily (Patient taking differently: Take 10 mg by mouth daily), Disp: 90 tablet, Rfl: 0     losartan (COZAAR) 50 MG tablet, TAKE 1 TABLET BY MOUTH EVERY DAY, Disp: 90 tablet, Rfl: 1     methocarbamol (ROBAXIN) 500 MG tablet, Take 0.5-1 tablets (250-500 mg) by mouth 4 times daily as needed for muscle spasms, Disp: 40 tablet, Rfl: 0     tretinoin (RETIN-A) 0.05 % external cream, Apply pea sized amount at bedtime., Disp: 45 g, Rfl: 3     Urea 40 % CREA, Apply daily at bedtime to feet as needed., Disp: 85 g, Rfl: 4    Current Facility-Administered Medications:      botulinum toxin type A (BOTOX) 100 units injection 600 Units, 600 Units, Intramuscular, Q90 Days, Simi Vazquez MD, 450 Units at 22 1601       BOTULINUM NEUROTOXIN INJECTION PROCEDURES    VERIFICATION OF PATIENT IDENTIFICATION AND PROCEDURE     Initials   Patient Name SES   Patient  SES   Procedure Verified by: SES     Prior to the start of the procedure and with procedural staff participation, I verbally confirmed the patient s identity using two indicators, relevant allergies, that the procedure was appropriate and matched the consent or emergent situation, and that the correct equipment/implants were available. Immediately prior to starting the procedure I conducted the Time Out with the procedural staff and re-confirmed the patient s name, procedure, and site/side. (The Joint Commission universal protocol was followed.)  Yes    Sedation (Moderate or Deep): None    ABOVE ASSESSMENTS PERFORMED BY    Simi Vazquez MD      INDICATIONS FOR PROCEDURES  Tia Pablo is a 57 year old patient with involuntary muscle spasms and pain secondary to the diagnosis of oromandibular/cervical dystonia and chronic migraine headaches. Her baseline symptoms have been recalcitrant to oral medications and conservative therapy.  She is here  today for reinjection with Botox.    GOAL OF PROCEDURE  The goal of this procedure is to increase active range of motion, improve volitional motor control, decrease pain  and enhance functional independence.      TOTAL DOSE ADMINISTERED  Dose Administered:  450 units  Botox (Botulinum Toxin Type A)   Unavoidable Drug Waste: Yes  Amount of drug waste (mL): 50 units Botox.  Reason for waste:  Single use vial  Diluent Used:  Preservative Free Normal Saline  Total Volume of Diluent Used:  5 ml  Lot # [id number] with Expiration Date:  09/9/2023  Lot # [id number] with Expiration Date:  09/2023  Lot # [id number] with Expiration Date:  11/2024 (x3)  NDC #: Botox 100u ([address]-38)      CONSENT  The risks, benefits, and treatment options were discussed with Tia Pablo and she agreed to proceed.    Written consent was obtained by [facility].     EQUIPMENT USED  Needle-37mm stimulating/recording  Needle-30 gauge  EMG/NCS Machine    SKIN PREPARATION  Skin preparation was performed using an alcohol wipe.    GUIDANCE DESCRIPTION  Electro-myographic guidance was necessary throughout the procedure to accurately identify all areas of dystonic muscles while avoiding injection of non-dystonic muscles, neighboring nerves and nearby vascular structures.     AREA/MUSCLE INJECTED: 450 UNITS BOTOX = TOTAL DOSE     1. NECK MUSCLES: 145 UNITS BOTOX = TOTAL DOSE, 1:1 DILUTION     Right Upper Trapezius - 20 units of Botox at 4 site/s.   Left Upper Trapezius - 20 units of Botox at 4 site/s.      Right Splenius - 5 units of Botox at 1 site/s.   Left Splenius - 5 units of Botox at 1 site/s.      Right Auricularis - 15 units of Botox at 3 site/s (anterior, superior & posterior).   Left Auricularis - 15 units of Botox at 3 site/s (anterior, superior & posterior).     Right occipitalis - 15 units of Botox at 3 site/s.   Left occipitalis - 15 units of Botox at 3 site/s.     Right Sternocleidomastoid - 10 units of Botox at 1 site/s.    Right Middle Scalene - 15  units of Botox at 1 site/s.     Right Platysma - 5 units of Botox at 2 site/s.  Left Platysma - 5 units of Botox at 2 site/s.      2. UPPER EXTREMITY & TRUNK MUSCLES: 90 UNITS BOTOX = TOTAL DOSE, 1:1 DILUTION     Right levator scapula - 25 units of Botox at 2 site/s.   Left levator scapula - 25 units of Botox at 2 site/s.      Right Rhomboid - 20 units of Botox at 1 site/s.    Left Rhomboid - 20 units of Botox at 3 site/s.       3. JAW MUSCLES: 170 UNITS BOTOX = TOTAL DOSE, 1:1 DILUTION      Right temporalis - 60 units of Botox at 5 site/s.               Left temporalis - 60 units of Botox at 5 site/s.     Right Masseter - 20 units of Botox at 2 site/s.    Left Masseter - 20 units of Botox at 2 site/s.      Right medial pterygoid - 5 units of Botox at 1 site/s.    Left medial pterygoid - 5 units of Botox at 1 site/s.     4. FACIAL MUSCLES: 45 UNITS BOTOX = TOTAL DOSE, 2:1 DILUTION     Right Frontalis - 15 units of Botox at 3 site/s (2 middle & 1 hairline).    Left Frontalis - 15 units of Botox at 3 site/s (2 middle & 1 hairline).     Procerus - 5 units of Botox at 1 site/s.       Right  - 5 units of Botox at 1 site/s.    Left  - 5 units of Botox at 1 site/s.       RESPONSE TO PROCEDURE  Tia Pablo tolerated the procedure well and there were no immediate complications. She was allowed to recover for an appropriate period of time and was discharged home in stable condition.    ASSESSMENT AND PLAN   1. Botulinum toxin injections: No changes made to Botox today, but distribution changed slightly based on symptoms. Additionally, masseters were avoided due to the fact that injections in this area affect her ability to smile, and her son is getting  next month and she will need to be able to smile for pictures. Patient will continue to monitor response and report at next appointment.   2. Referrals: None.   3. Follow up: Tia Pablo was rescheduled for the next series of injections in 12  weeks, at which time we will evaluate response to today's injections. she may call the clinic prior with any questions or concerns prior to the next appointment.

## 2022-11-28 NOTE — LETTER
11/28/2022       RE: Tia Pablo  [address] [facility] [id number]     Dear Colleague,    Thank you for referring your patient, Tia Pablo, to the [facility] [place] at [facility]. Please see a copy of my visit note below.      [facility]     PM&R CLINIC NOTE  BOTULINUM TOXIN PROCEDURE      HPI  No chief complaint on file.    Tia Pablo is a 57 year old female with a history of headaches and generalized dystonia with pain who presents to clinic for botulinum toxin injections for management of chronic migraine headaches, cervical and oromandibular dystonia.     SINCE LAST VISIT  Tia Pablo was last seen here in clinic on 8/10/28509, at which time she received 450 units of Botox. She also had trigger point injections on 11/2/2022, which seemed to help with neck and shoulder pain.      Patient denies new medical diagnoses, illnesses, hospitalizations, emergency room visits, and injuries since the previous injection with botulinum neurotoxin.     RESPONSE TO PREVIOUS TREATMENT    Side effects: No problems reported, although she does have a history of jaw weakness.     1.  Headache frequency during this injection cycle: Only one migraine headache day per month when Botox is in effect, but the Botox only seems to work for 7 weeks and then she experiences 4-8 migraine headache days per month and daily milder headaches. This is compared to her baseline headache frequency of 30 headache days per month.     2.  Headache duration during this injection cycle:  Headache duration is typically no longer than a few hours with Relpax, but if the Relpax does not work right away, she has to take two doses and the headaches last much longer. Patient reports no episodes of multiple day headaches during this injection cycle.     3.  Headache intensity during this injection cycle:     A.  5/10  =  Typical pain level.  B.  7/10  =  Worst pain level.  C.  2/10  =  Lowest pain level.    4.  Change in headache medication usage during this injection cycle:  (For Example:  Able to decrease use of oral pain medications.) She is only taking her migraine headache medication about once per month, whereas she was taking it much more frequently prior to Botox. Over the last 2 months, she has been having more headaches due to Botox wearing off.     5.  ER Visits During This Injection Cycle: None.     6.  Functional Performance:  Change in ADL's, social interaction, days lost from work, etc. Patient reports being able to more fully participate in social and family activities and responsibilities as headache symptoms have improved      Dystonia Improvement (neck and jaw): Yes.  Percent Improvement: 75 %    Duration of Benefit:  7 weeks and followed by a gradual reduction in benefit. She has been clenching her teeth more consistently over the last 1-2 months as the Botox has not been in effect.       PHYSICAL EXAM  VS: There were no vitals taken for this visit.   GEN: Pleasant and cooperative, in no acute distress  HEENT: No facial asymmetry  NECK: Involuntary right rotation with hypertonicity noted in right anterior neck musculature and upper trapezius.     ALLERGIES  Allergies   Allergen Reactions     Bee Venom Anaphylaxis     Erythromycin Hives and Itching     Seasonal Allergies        CURRENT MEDICATIONS    Current Outpatient Medications:      buPROPion (WELLBUTRIN XL) 150 MG 24 hr tablet, TAKE 1 TABLET BY MOUTH EVERY DAY IN THE MORNING, Disp: 90 tablet, Rfl: 1     chlorthalidone (HYGROTON) 25 MG tablet, TAKE 1 TABLET BY MOUTH EVERY DAY, Disp: 90 tablet, Rfl: 1     clonazePAM (KLONOPIN) 0.5 MG tablet, Take 1 tab in the evening before bed. Okay to take up to 2 tabs as needed in the day., Disp: 90 tablet, Rfl: 3     eletriptan (RELPAX) 40 MG tablet, Take 1 tablet (40 mg) by mouth at onset of headache for  migraine May repeat in 2 hours. Max 2 tablets/24 hours., Disp: 12 tablet, Rfl: 3     EPINEPHrine (ANY BX GENERIC EQUIV) 0.3 MG/0.3ML injection 2-pack, INJECT 0.3 MLS (0.3 MG) INTO THE MUSCLE ONCE FOR 1 DOSE, Disp: 2 each, Rfl: 0     escitalopram (LEXAPRO) 10 MG tablet, TAKE 1 TABLET (10 MG) BY MOUTH DAILY. (Patient taking differently: Take 5 mg by mouth daily), Disp: 90 tablet, Rfl: 0     escitalopram (LEXAPRO) 20 MG tablet, Take 1 tablet (20 mg) by mouth daily (Patient taking differently: Take 10 mg by mouth daily), Disp: 90 tablet, Rfl: 0     losartan (COZAAR) 50 MG tablet, TAKE 1 TABLET BY MOUTH EVERY DAY, Disp: 90 tablet, Rfl: 1     methocarbamol (ROBAXIN) 500 MG tablet, Take 0.5-1 tablets (250-500 mg) by mouth 4 times daily as needed for muscle spasms, Disp: 40 tablet, Rfl: 0     tretinoin (RETIN-A) 0.05 % external cream, Apply pea sized amount at bedtime., Disp: 45 g, Rfl: 3     Urea 40 % CREA, Apply daily at bedtime to feet as needed., Disp: 85 g, Rfl: 4    Current Facility-Administered Medications:      botulinum toxin type A (BOTOX) 100 units injection 600 Units, 600 Units, Intramuscular, Q90 Days, Standal, Simi Mccloud MD, 450 Units at 22 1601       BOTULINUM NEUROTOXIN INJECTION PROCEDURES    VERIFICATION OF PATIENT IDENTIFICATION AND PROCEDURE     Initials   Patient Name SES   Patient  SES   Procedure Verified by: SES     Prior to the start of the procedure and with procedural staff participation, I verbally confirmed the patient s identity using two indicators, relevant allergies, that the procedure was appropriate and matched the consent or emergent situation, and that the correct equipment/implants were available. Immediately prior to starting the procedure I conducted the Time Out with the procedural staff and re-confirmed the patient s name, procedure, and site/side. (The Joint Commission universal protocol was followed.)  Yes    Sedation (Moderate or Deep): None    ABOVE ASSESSMENTS  PERFORMED BY    Simi Vazquez MD      INDICATIONS FOR PROCEDURES  Tia Pablo is a 57 year old patient with involuntary muscle spasms and pain secondary to the diagnosis of oromandibular/cervical dystonia and chronic migraine headaches. Her baseline symptoms have been recalcitrant to oral medications and conservative therapy.  She is here today for reinjection with Botox.    GOAL OF PROCEDURE  The goal of this procedure is to increase active range of motion, improve volitional motor control, decrease pain  and enhance functional independence.      TOTAL DOSE ADMINISTERED  Dose Administered:  450 units  Botox (Botulinum Toxin Type A)   Unavoidable Drug Waste: Yes  Amount of drug waste (mL): 50 units Botox.  Reason for waste:  Single use vial  Diluent Used:  Preservative Free Normal Saline  Total Volume of Diluent Used:  5 ml  Lot # [id number] with Expiration Date:  09/9/2023  Lot # [id number] with Expiration Date:  09/2023  Lot # [id number] with Expiration Date:  11/2024 (x3)  NDC #: Botox 100u ([address]-26)      CONSENT  The risks, benefits, and treatment options were discussed with Tia Pablo and she agreed to proceed.    Written consent was obtained by [facility].     EQUIPMENT USED  Needle-37mm stimulating/recording  Needle-30 gauge  EMG/NCS Machine    SKIN PREPARATION  Skin preparation was performed using an alcohol wipe.    GUIDANCE DESCRIPTION  Electro-myographic guidance was necessary throughout the procedure to accurately identify all areas of dystonic muscles while avoiding injection of non-dystonic muscles, neighboring nerves and nearby vascular structures.     AREA/MUSCLE INJECTED: 450 UNITS BOTOX = TOTAL DOSE     1. NECK MUSCLES: 145 UNITS BOTOX = TOTAL DOSE, 1:1 DILUTION     Right Upper Trapezius - 20 units of Botox at 4 site/s.   Left Upper Trapezius - 20 units of Botox at 4 site/s.      Right Splenius - 5 units of Botox at 1 site/s.   Left Splenius - 5 units of Botox at 1 site/s.      Right Auricularis -  15 units of Botox at 3 site/s (anterior, superior & posterior).   Left Auricularis - 15 units of Botox at 3 site/s (anterior, superior & posterior).     Right occipitalis - 15 units of Botox at 3 site/s.   Left occipitalis - 15 units of Botox at 3 site/s.     Right Sternocleidomastoid - 10 units of Botox at 1 site/s.    Right Middle Scalene - 15 units of Botox at 1 site/s.     Right Platysma - 5 units of Botox at 2 site/s.  Left Platysma - 5 units of Botox at 2 site/s.      2. UPPER EXTREMITY & TRUNK MUSCLES: 90 UNITS BOTOX = TOTAL DOSE, 1:1 DILUTION     Right levator scapula - 25 units of Botox at 2 site/s.   Left levator scapula - 25 units of Botox at 2 site/s.      Right Rhomboid - 20 units of Botox at 1 site/s.    Left Rhomboid - 20 units of Botox at 3 site/s.       3. JAW MUSCLES: 170 UNITS BOTOX = TOTAL DOSE, 1:1 DILUTION      Right temporalis - 60 units of Botox at 5 site/s.               Left temporalis - 60 units of Botox at 5 site/s.     Right Masseter - 20 units of Botox at 2 site/s.    Left Masseter - 20 units of Botox at 2 site/s.      Right medial pterygoid - 5 units of Botox at 1 site/s.    Left medial pterygoid - 5 units of Botox at 1 site/s.     4. FACIAL MUSCLES: 45 UNITS BOTOX = TOTAL DOSE, 2:1 DILUTION     Right Frontalis - 15 units of Botox at 3 site/s (2 middle & 1 hairline).    Left Frontalis - 15 units of Botox at 3 site/s (2 middle & 1 hairline).     Procerus - 5 units of Botox at 1 site/s.       Right  - 5 units of Botox at 1 site/s.    Left  - 5 units of Botox at 1 site/s.       RESPONSE TO PROCEDURE  Tia Pablo tolerated the procedure well and there were no immediate complications. She was allowed to recover for an appropriate period of time and was discharged home in stable condition.    ASSESSMENT AND PLAN   1. Botulinum toxin injections: No changes made to Botox today, but distribution changed slightly based on symptoms. Additionally, masseters were avoided due  to the fact that injections in this area affect her ability to smile, and her son is getting  next month and she will need to be able to smile for pictures. Patient will continue to monitor response and report at next appointment.   2. Referrals: None.   3. Follow up: Tia Pablo was rescheduled for the next series of injections in 12 weeks, at which time we will evaluate response to today's injections. she may call the clinic prior with any questions or concerns prior to the next appointment.       Sincerely,    Simi Vazquez MD

## 2022-12-04 DIAGNOSIS — G43.719 INTRACTABLE CHRONIC MIGRAINE WITHOUT AURA AND WITHOUT STATUS MIGRAINOSUS: ICD-10-CM

## 2022-12-05 NOTE — TELEPHONE ENCOUNTER
"Routing refill request to provider for review/approval because:  Failed protocol      Requested Prescriptions   Pending Prescriptions Disp Refills     eletriptan (RELPAX) 40 MG tablet [Pharmacy Med Name: ELETRIPTAN HBR 40 MG TABLET] 12 tablet 3     Sig: TAKE 1 TABLET (40 MG) BY MOUTH AT ONSET OF HEADACHE FOR MIGRAINE MAY REPEAT IN 2 HOURS. MAX 2 TABLETS/24 HOURS.       Serotonin Agonists Failed - 12/4/2022 10:06 PM        Failed - Serotonin Agonist request needs review.     Please review patient's record. If patient has had 8 or more treatments in the past month, please forward to provider.          Passed - Blood pressure under 140/90 in past 12 months     BP Readings from Last 3 Encounters:   11/28/22 116/81   08/10/22 130/83   07/25/22 108/64                 Passed - Recent (12 mo) or future (30 days) visit within the authorizing provider's specialty     Patient has had an office visit with the authorizing provider or a provider within the authorizing providers department within the previous 12 mos or has a future within next 30 days. See \"Patient Info\" tab in inbasket, or \"Choose Columns\" in Meds & Orders section of the refill encounter.              Passed - Medication is active on med list        Passed - Patient is age 18 or older        Passed - No active pregnancy on record        Passed - No positive pregnancy test in past 12 months                 Jaimee Elaine RN 12/05/22 12:01 PM  "

## 2022-12-07 RX ORDER — ELETRIPTAN HYDROBROMIDE 40 MG/1
40 TABLET, FILM COATED ORAL
Qty: 12 TABLET | Refills: 3 | Status: SHIPPED | OUTPATIENT
Start: 2022-12-07 | End: 2023-12-19

## 2022-12-16 ENCOUNTER — THERAPY VISIT (OUTPATIENT)
Dept: PHYSICAL THERAPY | Facility: CLINIC | Age: 57
End: 2022-12-16
Payer: COMMERCIAL

## 2022-12-16 DIAGNOSIS — M54.2 NECK PAIN: Primary | ICD-10-CM

## 2022-12-16 PROCEDURE — 97110 THERAPEUTIC EXERCISES: CPT | Mod: GP | Performed by: PHYSICAL THERAPIST

## 2022-12-16 PROCEDURE — 97140 MANUAL THERAPY 1/> REGIONS: CPT | Mod: GP | Performed by: PHYSICAL THERAPIST

## 2022-12-21 ENCOUNTER — MYC MEDICAL ADVICE (OUTPATIENT)
Dept: NEUROLOGY | Facility: CLINIC | Age: 57
End: 2022-12-21

## 2022-12-21 ENCOUNTER — THERAPY VISIT (OUTPATIENT)
Dept: PHYSICAL THERAPY | Facility: CLINIC | Age: 57
End: 2022-12-21
Payer: COMMERCIAL

## 2022-12-21 DIAGNOSIS — F41.9 ANXIETY: ICD-10-CM

## 2022-12-21 DIAGNOSIS — G24.3 CERVICAL DYSTONIA: ICD-10-CM

## 2022-12-21 DIAGNOSIS — M54.2 CERVICALGIA: ICD-10-CM

## 2022-12-21 DIAGNOSIS — Z72.820 POOR SLEEP: ICD-10-CM

## 2022-12-21 DIAGNOSIS — M54.2 NECK PAIN: Primary | ICD-10-CM

## 2022-12-21 PROCEDURE — 97140 MANUAL THERAPY 1/> REGIONS: CPT | Mod: GP | Performed by: PHYSICAL THERAPIST

## 2022-12-21 PROCEDURE — 97110 THERAPEUTIC EXERCISES: CPT | Mod: GP | Performed by: PHYSICAL THERAPIST

## 2022-12-21 RX ORDER — CLONAZEPAM 0.5 MG/1
0.5 TABLET ORAL 3 TIMES DAILY
Qty: 90 TABLET | Refills: 5 | Status: SHIPPED | OUTPATIENT
Start: 2022-12-21 | End: 2023-09-12

## 2022-12-22 ENCOUNTER — MYC MEDICAL ADVICE (OUTPATIENT)
Dept: FAMILY MEDICINE | Facility: CLINIC | Age: 57
End: 2022-12-22

## 2022-12-22 DIAGNOSIS — F32.0 CURRENT MILD EPISODE OF MAJOR DEPRESSIVE DISORDER WITHOUT PRIOR EPISODE (H): ICD-10-CM

## 2022-12-22 RX ORDER — BUPROPION HYDROCHLORIDE 150 MG/1
300 TABLET ORAL EVERY MORNING
Qty: 180 TABLET | Refills: 0 | Status: SHIPPED | OUTPATIENT
Start: 2022-12-22 | End: 2023-02-10

## 2023-01-11 ENCOUNTER — THERAPY VISIT (OUTPATIENT)
Dept: PHYSICAL THERAPY | Facility: CLINIC | Age: 58
End: 2023-01-11
Payer: COMMERCIAL

## 2023-01-11 DIAGNOSIS — M54.2 NECK PAIN: Primary | ICD-10-CM

## 2023-01-11 PROCEDURE — 97140 MANUAL THERAPY 1/> REGIONS: CPT | Mod: GP | Performed by: PHYSICAL THERAPIST

## 2023-01-18 ENCOUNTER — THERAPY VISIT (OUTPATIENT)
Dept: PHYSICAL THERAPY | Facility: CLINIC | Age: 58
End: 2023-01-18
Payer: COMMERCIAL

## 2023-01-18 DIAGNOSIS — M54.2 NECK PAIN: Primary | ICD-10-CM

## 2023-01-18 PROCEDURE — 97014 ELECTRIC STIMULATION THERAPY: CPT | Mod: GP | Performed by: PHYSICAL THERAPIST

## 2023-01-18 PROCEDURE — 20561 NDL INSJ W/O NJX 3+ MUSC: CPT | Performed by: PHYSICAL THERAPIST

## 2023-01-18 PROCEDURE — 97140 MANUAL THERAPY 1/> REGIONS: CPT | Mod: GP | Performed by: PHYSICAL THERAPIST

## 2023-01-25 ENCOUNTER — THERAPY VISIT (OUTPATIENT)
Dept: PHYSICAL THERAPY | Facility: CLINIC | Age: 58
End: 2023-01-25
Payer: COMMERCIAL

## 2023-01-25 DIAGNOSIS — M54.2 NECK PAIN: Primary | ICD-10-CM

## 2023-01-25 PROCEDURE — 99207 PR STAT DRY NEEDLING - IAM: CPT | Mod: 25 | Performed by: PHYSICAL THERAPIST

## 2023-01-25 PROCEDURE — 97140 MANUAL THERAPY 1/> REGIONS: CPT | Mod: GP | Performed by: PHYSICAL THERAPIST

## 2023-01-25 PROCEDURE — 97014 ELECTRIC STIMULATION THERAPY: CPT | Mod: GP | Performed by: PHYSICAL THERAPIST

## 2023-01-26 ENCOUNTER — OFFICE VISIT (OUTPATIENT)
Dept: FAMILY MEDICINE | Facility: CLINIC | Age: 58
End: 2023-01-26
Payer: COMMERCIAL

## 2023-01-26 VITALS
TEMPERATURE: 98 F | WEIGHT: 143 LBS | SYSTOLIC BLOOD PRESSURE: 138 MMHG | HEART RATE: 71 BPM | BODY MASS INDEX: 22.98 KG/M2 | DIASTOLIC BLOOD PRESSURE: 80 MMHG | OXYGEN SATURATION: 97 % | HEIGHT: 66 IN

## 2023-01-26 VITALS
OXYGEN SATURATION: 97 % | TEMPERATURE: 98 F | DIASTOLIC BLOOD PRESSURE: 80 MMHG | WEIGHT: 143 LBS | SYSTOLIC BLOOD PRESSURE: 138 MMHG | BODY MASS INDEX: 22.98 KG/M2 | HEART RATE: 71 BPM | HEIGHT: 66 IN
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
## 2023-01-26 DIAGNOSIS — M54.2 NECK PAIN: ICD-10-CM

## 2023-01-26 DIAGNOSIS — F41.9 ANXIETY: ICD-10-CM

## 2023-01-26 DIAGNOSIS — F40.298 OTHER SPECIFIED PHOBIA: ICD-10-CM

## 2023-01-26 DIAGNOSIS — F32.0 CURRENT MILD EPISODE OF MAJOR DEPRESSIVE DISORDER WITHOUT PRIOR EPISODE (H): Primary | ICD-10-CM

## 2023-01-26 PROCEDURE — 99213 OFFICE O/P EST LOW 20 MIN: CPT | Performed by: FAMILY MEDICINE

## 2023-01-26 RX ORDER — ESCITALOPRAM OXALATE 20 MG/1
20 TABLET ORAL DAILY
COMMUNITY
End: 2023-05-16 | Stop reason: DRUGHIGH
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
## 2023-01-26 NOTE — NURSING NOTE
"Initial /80   Pulse 71   Temp 98  F (36.7  C) (Tympanic)   Ht 1.666 m (5' 5.6\")   Wt 64.9 kg (143 lb)   SpO2 97%   BMI 23.36 kg/m   Estimated body mass index is 23.36 kg/m  as calculated from the following:    Height as of this encounter: 1.666 m (5' 5.6\").    Weight as of this encounter: 64.9 kg (143 lb). .    "
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
## 2023-01-26 NOTE — NURSING NOTE
"Initial /80   Pulse 71   Temp 98  F (36.7  C) (Tympanic)   Ht 1.67 m (5' 5.75\")   Wt 64.9 kg (143 lb)   SpO2 97%   BMI 23.26 kg/m   Estimated body mass index is 23.26 kg/m  as calculated from the following:    Height as of this encounter: 1.67 m (5' 5.75\").    Weight as of this encounter: 64.9 kg (143 lb). .      "

## 2023-01-27 ENCOUNTER — TELEPHONE (OUTPATIENT)
Dept: PHYSICAL MEDICINE AND REHAB | Facility: CLINIC | Age: 58
End: 2023-01-27
Payer: COMMERCIAL

## 2023-01-27 NOTE — TELEPHONE ENCOUNTER
"[facility] Call Center    Phone Message    May a detailed message be left on voicemail: yes     Reason for Call:    Pt is requesting a call back from someone on Dr. Vazquez's care team to discuss some \"problems she's having\".  Pt didn't want to give details.  Thanks.                                                                   "

## 2023-01-31 ASSESSMENT — PAIN SCALES - PAIN ENJOYMENT GENERAL ACTIVITY SCALE (PEG)
INTERFERED_ENJOYMENT_LIFE: 6
AVG_PAIN_PASTWEEK: 6
AVG_PAIN_PASTWEEK: 6
INTERFERED_GENERAL_ACTIVITY: 4
INTERFERED_ENJOYMENT_LIFE: 6
INTERFERED_GENERAL_ACTIVITY: 4
PEG_TOTALSCORE: 5.33
PEG_TOTALSCORE: 5.33

## 2023-01-31 ASSESSMENT — ANXIETY QUESTIONNAIRES
GAD7 TOTAL SCORE: 3
GAD7 TOTAL SCORE: 3
3. WORRYING TOO MUCH ABOUT DIFFERENT THINGS: NOT AT ALL
2. NOT BEING ABLE TO STOP OR CONTROL WORRYING: SEVERAL DAYS
7. FEELING AFRAID AS IF SOMETHING AWFUL MIGHT HAPPEN: NOT AT ALL
IF YOU CHECKED OFF ANY PROBLEMS ON THIS QUESTIONNAIRE, HOW DIFFICULT HAVE THESE PROBLEMS MADE IT FOR YOU TO DO YOUR WORK, TAKE CARE OF THINGS AT HOME, OR GET ALONG WITH OTHER PEOPLE: SOMEWHAT DIFFICULT
6. BECOMING EASILY ANNOYED OR IRRITABLE: NOT AT ALL
5. BEING SO RESTLESS THAT IT IS HARD TO SIT STILL: NOT AT ALL
8. IF YOU CHECKED OFF ANY PROBLEMS, HOW DIFFICULT HAVE THESE MADE IT FOR YOU TO DO YOUR WORK, TAKE CARE OF THINGS AT HOME, OR GET ALONG WITH OTHER PEOPLE?: SOMEWHAT DIFFICULT
4. TROUBLE RELAXING: NOT AT ALL
1. FEELING NERVOUS, ANXIOUS, OR ON EDGE: MORE THAN HALF THE DAYS
7. FEELING AFRAID AS IF SOMETHING AWFUL MIGHT HAPPEN: NOT AT ALL

## 2023-02-01 ENCOUNTER — OFFICE VISIT (OUTPATIENT)
Dept: PALLIATIVE MEDICINE | Facility: CLINIC | Age: 58
End: 2023-02-01
Attending: PSYCHIATRY & NEUROLOGY
Payer: COMMERCIAL

## 2023-02-01 VITALS — OXYGEN SATURATION: 100 % | SYSTOLIC BLOOD PRESSURE: 137 MMHG | DIASTOLIC BLOOD PRESSURE: 84 MMHG | HEART RATE: 74 BPM

## 2023-02-01 DIAGNOSIS — M47.812 CERVICAL SPONDYLOSIS WITHOUT MYELOPATHY: ICD-10-CM

## 2023-02-01 DIAGNOSIS — G24.3 CERVICAL DYSTONIA: ICD-10-CM

## 2023-02-01 DIAGNOSIS — M79.605 PAIN OF LEFT LOWER EXTREMITY: ICD-10-CM

## 2023-02-01 DIAGNOSIS — M54.2 CERVICALGIA: ICD-10-CM

## 2023-02-01 DIAGNOSIS — M79.18 MYOFASCIAL MUSCLE PAIN: Primary | ICD-10-CM

## 2023-02-01 DIAGNOSIS — R52 PAIN: ICD-10-CM

## 2023-02-01 PROCEDURE — 99215 OFFICE O/P EST HI 40 MIN: CPT | Performed by: PAIN MEDICINE

## 2023-02-01 RX ORDER — CELECOXIB 100 MG/1
100 CAPSULE ORAL 2 TIMES DAILY
Qty: 60 CAPSULE | Refills: 1 | Status: SHIPPED | OUTPATIENT
Start: 2023-02-01 | End: 2023-05-16

## 2023-02-01 ASSESSMENT — PAIN SCALES - GENERAL: PAINLEVEL: SEVERE PAIN (6)

## 2023-02-01 NOTE — PROGRESS NOTES
PEG Score 2/1/2023   PEG Total Score 6.67     Answers for HPI/ROS submitted by the patient on 1/31/2023  PATRICK 7 TOTAL SCORE: 3      Jeaneth Sr MA  [facility] [place]
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
## 2023-02-01 NOTE — PATIENT INSTRUCTIONS
- Further procedures recommended   - ordered Bilateral ton,3,4 MEDIAL BRANCH BLOCK/RFA   - Medication Management:    - consider changing to norflex instead of robaxin. Continue robaxin as needed for now   - Will start celebrex 100mg twice a day as needed, take with food, do not take with other NSAIDS    - continue lexapro and Wellbutrin   - Physical Therapy: continue   - Clinical Health Psychologist to address issues of relaxation, behavioral change, coping style, and other factors important to improvement: consider   - Diagnostic Studies: consider repeat cervical MRI if symptoms continue to progress  - Urine toxicology screen today: no   - Follow up: for procedure     ----------------------------------------------------------------  Clinic Number:  [phone]   Call with any questions about your care and for scheduling assistance.   Calls are returned Monday through Friday between 8 AM and 4:30 PM. We usually get back to you within 2 business days depending on the issue/request.    If we are prescribing your medications:  For opioid medication refills, call the clinic or send a [facility] message 7 days in advance.  Please include:  Name of requested medication  Name of the pharmacy.  For non-opioid medications, call your pharmacy directly to request a refill. Please allow 3-4 days to be processed.   Per [place] State Law:  All controlled substance prescriptions must be filled within 30 days of being written.    For those controlled substances allowing refills, pickup must occur within 30 days of last fill.      We believe regular attendance is key to your success in our program!    Any time you are unable to keep your appointment we ask that you call us at least 24 hours in advance to cancel.This will allow us to offer the appointment time to another patient.   Multiple missed appointments may lead to dismissal from the clinic.

## 2023-02-02 ENCOUNTER — TELEPHONE (OUTPATIENT)
Dept: PALLIATIVE MEDICINE | Facility: CLINIC | Age: 58
End: 2023-02-02
Payer: COMMERCIAL
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
## 2023-02-03 DIAGNOSIS — Z00.00 ROUTINE GENERAL MEDICAL EXAMINATION AT A HEALTH CARE FACILITY: ICD-10-CM

## 2023-02-03 DIAGNOSIS — I10 ESSENTIAL HYPERTENSION: ICD-10-CM

## 2023-02-03 RX ORDER — LOSARTAN POTASSIUM 50 MG/1
TABLET ORAL
Qty: 90 TABLET | Refills: 0 | Status: SHIPPED | OUTPATIENT
Start: 2023-02-03 | End: 2023-02-10

## 2023-02-03 RX ORDER — CHLORTHALIDONE 25 MG/1
TABLET ORAL
Qty: 90 TABLET | Refills: 1 | Status: SHIPPED | OUTPATIENT
Start: 2023-02-03 | End: 2023-08-18

## 2023-02-03 NOTE — TELEPHONE ENCOUNTER
Prescription approved per [facility] Refill Protocol.  José Miguel Charles RN

## 2023-02-08 ENCOUNTER — THERAPY VISIT (OUTPATIENT)
Dept: PHYSICAL THERAPY | Facility: CLINIC | Age: 58
End: 2023-02-08
Payer: COMMERCIAL

## 2023-02-08 DIAGNOSIS — M54.2 NECK PAIN: Primary | ICD-10-CM

## 2023-02-08 PROCEDURE — 20561 NDL INSJ W/O NJX 3+ MUSC: CPT | Performed by: PHYSICAL THERAPIST

## 2023-02-08 PROCEDURE — 97140 MANUAL THERAPY 1/> REGIONS: CPT | Mod: GP | Performed by: PHYSICAL THERAPIST

## 2023-02-10 ENCOUNTER — MYC REFILL (OUTPATIENT)
Dept: FAMILY MEDICINE | Facility: CLINIC | Age: 58
End: 2023-02-10
Payer: COMMERCIAL

## 2023-02-10 DIAGNOSIS — F32.0 CURRENT MILD EPISODE OF MAJOR DEPRESSIVE DISORDER WITHOUT PRIOR EPISODE (H): ICD-10-CM

## 2023-02-10 DIAGNOSIS — I10 ESSENTIAL HYPERTENSION: ICD-10-CM

## 2023-02-10 DIAGNOSIS — Z00.00 ROUTINE GENERAL MEDICAL EXAMINATION AT A HEALTH CARE FACILITY: ICD-10-CM

## 2023-02-10 RX ORDER — BUPROPION HYDROCHLORIDE 150 MG/1
150 TABLET ORAL EVERY MORNING
Qty: 90 TABLET | Refills: 1 | Status: SHIPPED | OUTPATIENT
Start: 2023-02-10 | End: 2023-03-22

## 2023-02-10 RX ORDER — BUPROPION HYDROCHLORIDE 150 MG/1
300 TABLET ORAL EVERY MORNING
Qty: 180 TABLET | Refills: 0 | OUTPATIENT
Start: 2023-02-10

## 2023-02-10 RX ORDER — LOSARTAN POTASSIUM 50 MG/1
50 TABLET ORAL DAILY
Qty: 90 TABLET | Refills: 0 | Status: SHIPPED | OUTPATIENT
Start: 2023-02-10 | End: 2023-05-16

## 2023-02-10 NOTE — TELEPHONE ENCOUNTER
Routing refill request to provider for review/approval because:  PCP to determine  José Miguel Charles RN
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
## 2023-02-15 ENCOUNTER — THERAPY VISIT (OUTPATIENT)
Dept: PHYSICAL THERAPY | Facility: CLINIC | Age: 58
End: 2023-02-15
Payer: COMMERCIAL

## 2023-02-15 DIAGNOSIS — M54.2 NECK PAIN: Primary | ICD-10-CM

## 2023-02-15 PROCEDURE — 97014 ELECTRIC STIMULATION THERAPY: CPT | Mod: GP | Performed by: PHYSICAL THERAPIST

## 2023-02-15 PROCEDURE — 20561 NDL INSJ W/O NJX 3+ MUSC: CPT | Performed by: PHYSICAL THERAPIST

## 2023-02-16 ENCOUNTER — VIRTUAL VISIT (OUTPATIENT)
Dept: FAMILY MEDICINE | Facility: CLINIC | Age: 58
End: 2023-02-16
Payer: COMMERCIAL

## 2023-02-16 ENCOUNTER — MYC MEDICAL ADVICE (OUTPATIENT)
Dept: PHYSICAL MEDICINE AND REHAB | Facility: CLINIC | Age: 58
End: 2023-02-16

## 2023-02-16 DIAGNOSIS — F41.9 ANXIETY: ICD-10-CM

## 2023-02-16 DIAGNOSIS — F32.0 CURRENT MILD EPISODE OF MAJOR DEPRESSIVE DISORDER WITHOUT PRIOR EPISODE (H): Primary | ICD-10-CM

## 2023-02-16 PROCEDURE — 99213 OFFICE O/P EST LOW 20 MIN: CPT | Mod: VID | Performed by: FAMILY MEDICINE

## 2023-02-16 ASSESSMENT — ANXIETY QUESTIONNAIRES
6. BECOMING EASILY ANNOYED OR IRRITABLE: NOT AT ALL
5. BEING SO RESTLESS THAT IT IS HARD TO SIT STILL: NOT AT ALL
1. FEELING NERVOUS, ANXIOUS, OR ON EDGE: SEVERAL DAYS
IF YOU CHECKED OFF ANY PROBLEMS ON THIS QUESTIONNAIRE, HOW DIFFICULT HAVE THESE PROBLEMS MADE IT FOR YOU TO DO YOUR WORK, TAKE CARE OF THINGS AT HOME, OR GET ALONG WITH OTHER PEOPLE: NOT DIFFICULT AT ALL
GAD7 TOTAL SCORE: 3
GAD7 TOTAL SCORE: 3
7. FEELING AFRAID AS IF SOMETHING AWFUL MIGHT HAPPEN: NOT AT ALL
3. WORRYING TOO MUCH ABOUT DIFFERENT THINGS: SEVERAL DAYS
2. NOT BEING ABLE TO STOP OR CONTROL WORRYING: SEVERAL DAYS

## 2023-02-16 ASSESSMENT — PATIENT HEALTH QUESTIONNAIRE - PHQ9
SUM OF ALL RESPONSES TO PHQ QUESTIONS 1-9: 3
5. POOR APPETITE OR OVEREATING: NOT AT ALL

## 2023-02-16 NOTE — PROGRESS NOTES
Tia is a 57 year old who is being evaluated via a billable video visit.      How would you like to obtain your AVS? MyChart  If the video visit is dropped, the invitation should be resent by: Text to cell phone: [phone]  Will anyone else be joining your video visit? No          A/P:      ICD-10-CM    1. Current mild episode of major depressive disorder without prior episode (H)  F32.0       2. Anxiety  F41.9         Has been doing well but continues to struggle with side effects and would prefer to be off selective serotonin reuptake inhibitor.  Agreed to continue current dose of lexapro for 3 months.  At that point will wean to 10mg daily for 2 months and if tolerating well and still having side effects will stop.  Pt agrees.  All questions answered.        Subjective   Tia is a 57 year old, presenting for the following health issues:  Depression and Anxiety      HPI     Depression and Anxiety Follow-Up    How are you doing with your depression since your last visit? No change/stable    How are you doing with your anxiety since your last visit?  No change/stable    Are you having other symptoms that might be associated with depression or anxiety? No    Have you had a significant life event? No     Do you have any concerns with your use of alcohol or other drugs? No    Social History     Tobacco Use     Smoking status: Never     Smokeless tobacco: Never   Vaping Use     Vaping Use: Never used   Substance Use Topics     Alcohol use: Yes     Comment: Occasionally, 1-2 glasses of wine on weekends     Drug use: No     PHQ 7/19/2022 9/13/2022 11/17/2022   PHQ-9 Total Score 5 9 3   Q9: Thoughts of better off dead/self-harm past 2 weeks Not at all Not at all Not at all     PATRICK-7 SCORE 9/13/2022 11/17/2022 1/31/2023   Total Score 17 (severe anxiety) - 3 (minimal anxiety)   Total Score 17 5 3     Had been doing some reading on HRT.  Stopped that some time ago but was seeing that menopause can cause depression and  anxiety.    Saw a show that discussed the issue and was wondering if hormne replacement night help    Overall feels better.  Feels more calm and less worried.  Feels like the drive to [place] for her appointment went better then expected which has helped her to feel more relaxed.    Still struggling with sexual dysfunction related to lexapro.  Struggles with decreased interest and difficulty achieving orgasm.      Had tried duloxetine and sertraline in the past with the same side effects.    Review of Systems         Objective           Vitals:  No vitals were obtained today due to virtual visit.    Physical Exam   GENERAL: Healthy, alert and no distress  EYES: Eyes grossly normal to inspection.  No discharge or erythema, or obvious scleral/conjunctival abnormalities.  RESP: No audible wheeze, cough, or visible cyanosis.  No visible retractions or increased work of breathing.    SKIN: Visible skin clear. No significant rash, abnormal pigmentation or lesions.  NEURO: Cranial nerves grossly intact.  Mentation and speech appropriate for age.  PSYCH: Mentation appears normal, affect normal/bright, judgement and insight intact, normal speech and appearance well-groomed.    Epic reviewed            Video-Visit Details    Type of service:  Video Visit     Originating Location (pt. Location): Home    Distant Location (provider location):  On-site  Platform used for Video Visit: [employer]
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
## 2023-02-28 ENCOUNTER — OFFICE VISIT (OUTPATIENT)
Dept: PHYSICAL MEDICINE AND REHAB | Facility: CLINIC | Age: 58
End: 2023-02-28
Payer: COMMERCIAL

## 2023-02-28 VITALS — SYSTOLIC BLOOD PRESSURE: 125 MMHG | HEART RATE: 69 BPM | DIASTOLIC BLOOD PRESSURE: 79 MMHG

## 2023-02-28 DIAGNOSIS — G24.3 SPASMODIC TORTICOLLIS: Primary | ICD-10-CM

## 2023-02-28 DIAGNOSIS — G43.719 CHRONIC MIGRAINE WITHOUT AURA, INTRACTABLE, WITHOUT STATUS MIGRAINOSUS: ICD-10-CM

## 2023-02-28 DIAGNOSIS — G24.4 OROMANDIBULAR DYSTONIA: ICD-10-CM

## 2023-02-28 PROCEDURE — 95874 GUIDE NERV DESTR NEEDLE EMG: CPT | Performed by: PHYSICAL MEDICINE & REHABILITATION

## 2023-02-28 PROCEDURE — 64643 CHEMODENERV 1 EXTREM 1-4 EA: CPT | Mod: XS | Performed by: PHYSICAL MEDICINE & REHABILITATION

## 2023-02-28 PROCEDURE — 64615 CHEMODENERV MUSC MIGRAINE: CPT | Performed by: PHYSICAL MEDICINE & REHABILITATION

## 2023-02-28 PROCEDURE — 64642 CHEMODENERV 1 EXTREMITY 1-4: CPT | Mod: XS | Performed by: PHYSICAL MEDICINE & REHABILITATION

## 2023-02-28 NOTE — CONFIDENTIAL NOTE
[facility]     PM&R CLINIC NOTE  BOTULINUM TOXIN PROCEDURE      HPI  Chief Complaint   Patient presents with     Procedure     Botox     Tia Pablo is a 57 year old female with a history of headaches and generalized dystonia with pain who presents to clinic for botulinum toxin injections for management of chronic migraine headaches, cervical and oromandibular dystonia.     SINCE LAST VISIT  Tia Pablo was last seen here in clinic on 11/28/20222, at which time she received 450 units of Botox. Temporarily avoiding the masseters at the last visit worked well for her son's wedding pictures, and she would like to return to her usual regimen including the masseters now.      Patient denies new medical diagnoses, illnesses, hospitalizations, emergency room visits, and injuries since the previous injection with botulinum neurotoxin.     RESPONSE TO PREVIOUS TREATMENT    Side effects: No problems reported, although she does have a history of jaw weakness.     1.  Headache frequency during this injection cycle: Only 1-3 migraine headache day per month when Botox is in effect, but the Botox only seems to work for 7 weeks and then she experiences 4-8 migraine headache days per month and daily milder headaches. This is compared to her baseline headache frequency of 30 headache days per month.     2.  Headache duration during this injection cycle:  Headache duration is typically no longer than a few hours with Relpax, but if the Relpax does not work right away, she has to take two doses and the headaches last much longer. Patient reports no episodes of multiple day headaches during this injection cycle.     3.  Headache intensity during this injection cycle:    A.  5/10  =  Typical pain level.  B.  7/10  =  Worst pain level.  C.  2/10  =  Lowest pain level.    4.  Change in headache medication usage during this injection cycle:  (For Example:  Able to decrease use of oral pain  medications.) She is only taking her migraine headache medication about once per month, whereas she was taking it much more frequently prior to Botox. Over the last 2 months, she has been having more headaches due to Botox wearing off.     5.  ER Visits During This Injection Cycle: None.     6.  Functional Performance:  Change in ADL's, social interaction, days lost from work, etc. Patient reports being able to more fully participate in social and family activities and responsibilities as headache symptoms have improved      Dystonia Improvement (neck and jaw): Yes.  Percent Improvement: 75 %    Duration of Benefit:  7 weeks and followed by a gradual reduction in benefit. She starts clenching her teeth more consistently as the Botox wears off.       PHYSICAL EXAM  VS: /79 (BP Location: Right arm, Patient Position: Sitting, Cuff Size: Adult Regular)   Pulse 69    GEN: Pleasant and cooperative, in no acute distress  HEENT: No facial asymmetry  NECK: Involuntary right rotation with hypertonicity noted in right anterior neck musculature and upper trapezius.     ALLERGIES  Allergies   Allergen Reactions     Bee Venom Anaphylaxis     Erythromycin Hives and Itching     Seasonal Allergies        CURRENT MEDICATIONS    Current Outpatient Medications:      buPROPion (WELLBUTRIN XL) 150 MG 24 hr tablet, Take 1 tablet (150 mg) by mouth every morning, Disp: 90 tablet, Rfl: 1     chlorthalidone (HYGROTON) 25 MG tablet, TAKE 1 TABLET BY MOUTH EVERY DAY, Disp: 90 tablet, Rfl: 1     clonazePAM (KLONOPIN) 0.5 MG tablet, Take 1 tablet (0.5 mg) by mouth 3 times daily Take 1 tab in the evening before bed. Okay to take up to 2 tabs as needed in the day., Disp: 90 tablet, Rfl: 5     eletriptan (RELPAX) 40 MG tablet, TAKE 1 TABLET (40 MG) BY MOUTH AT ONSET OF HEADACHE FOR MIGRAINE MAY REPEAT IN 2 HOURS. MAX 2 TABLETS/24 HOURS., Disp: 12 tablet, Rfl: 3     EPINEPHrine (ANY BX GENERIC EQUIV) 0.3 MG/0.3ML injection 2-pack, INJECT 0.3  MLS (0.3 MG) INTO THE MUSCLE ONCE FOR 1 DOSE, Disp: 2 each, Rfl: 0     escitalopram (LEXAPRO) 20 MG tablet, Take 20 mg by mouth daily, Disp: , Rfl:      losartan (COZAAR) 50 MG tablet, Take 1 tablet (50 mg) by mouth daily, Disp: 90 tablet, Rfl: 0     methocarbamol (ROBAXIN) 500 MG tablet, Take 0.5-1 tablets (250-500 mg) by mouth 4 times daily as needed for muscle spasms, Disp: 40 tablet, Rfl: 0     tretinoin (RETIN-A) 0.05 % external cream, Apply pea sized amount at bedtime., Disp: 45 g, Rfl: 3     Urea 40 % CREA, Apply daily at bedtime to feet as needed., Disp: 85 g, Rfl: 4     celecoxib (CELEBREX) 100 MG capsule, Take 1 capsule (100 mg) by mouth 2 times daily (Patient not taking: Reported on 2023), Disp: 60 capsule, Rfl: 1    Current Facility-Administered Medications:      botulinum toxin type A (BOTOX) 100 units injection 600 Units, 600 Units, Intramuscular, Q90 Days, Simi Vazquez MD, 450 Units at 22 1042       BOTULINUM NEUROTOXIN INJECTION PROCEDURES    VERIFICATION OF PATIENT IDENTIFICATION AND PROCEDURE     Initials   Patient Name SES   Patient  SES   Procedure Verified by: SES     Prior to the start of the procedure and with procedural staff participation, I verbally confirmed the patient s identity using two indicators, relevant allergies, that the procedure was appropriate and matched the consent or emergent situation, and that the correct equipment/implants were available. Immediately prior to starting the procedure I conducted the Time Out with the procedural staff and re-confirmed the patient s name, procedure, and site/side. (The Joint Commission universal protocol was followed.)  Yes    Sedation (Moderate or Deep): None    ABOVE ASSESSMENTS PERFORMED BY    Simi Vazquez MD      INDICATIONS FOR PROCEDURES  Tia Pablo is a 57 year old patient with involuntary muscle spasms and pain secondary to the diagnosis of oromandibular/cervical dystonia and chronic migraine  headaches. Her baseline symptoms have been recalcitrant to oral medications and conservative therapy.  She is here today for reinjection with Botox.    GOAL OF PROCEDURE  The goal of this procedure is to increase active range of motion, improve volitional motor control, decrease pain  and enhance functional independence.      TOTAL DOSE ADMINISTERED  Dose Administered:  450 units  Botox (Botulinum Toxin Type A)   Unavoidable Drug Waste: Yes  Amount of drug waste (mL): 50 units Botox.  Reason for waste:  Single use vial  Diluent Used:  Preservative Free Normal Saline  Total Volume of Diluent Used:  5 ml    ***  Lot # [id number] with Expiration Date:  09/9/2023  Lot # [id number] with Expiration Date:  09/2023  Lot # [id number] with Expiration Date:  11/2024 (x3)  NDC #: Botox 100u ([address]-49)      CONSENT  The risks, benefits, and treatment options were discussed with Tia MAR Pablo and she agreed to proceed.    Written consent was obtained by [facility].     EQUIPMENT USED  Needle-37mm stimulating/recording  Needle-30 gauge  EMG/NCS Machine    SKIN PREPARATION  Skin preparation was performed using an alcohol wipe.    GUIDANCE DESCRIPTION  Electro-myographic guidance was necessary throughout the procedure to accurately identify all areas of dystonic muscles while avoiding injection of non-dystonic muscles, neighboring nerves and nearby vascular structures.     AREA/MUSCLE INJECTED: 450 UNITS BOTOX = TOTAL DOSE     1. NECK MUSCLES: 145 UNITS BOTOX = TOTAL DOSE, 1:1 DILUTION     Right Upper Trapezius - 20 units of Botox at 4 site/s.   Left Upper Trapezius - 20 units of Botox at 4 site/s.      Right Splenius - 5 units of Botox at 1 site/s.   Left Splenius - 5 units of Botox at 1 site/s.      Right Auricularis - 15 units of Botox at 3 site/s (anterior, superior & posterior).   Left Auricularis - 15 units of Botox at 3 site/s (anterior, superior & posterior).     Right occipitalis - 15 units of Botox at 3 site/s.   Left occipitalis -  15 units of Botox at 3 site/s.     Right Sternocleidomastoid - 10 units of Botox at 1 site/s.    Right Middle Scalene - 15 units of Botox at 1 site/s.     Right Platysma - 5 units of Botox at 2 site/s.  Left Platysma - 5 units of Botox at 2 site/s.      2. UPPER EXTREMITY & TRUNK MUSCLES: 90 UNITS BOTOX = TOTAL DOSE, 1:1 DILUTION     Right levator scapula - 25 units of Botox at 2 site/s.   Left levator scapula - 25 units of Botox at 2 site/s.      Right Rhomboid - 20 units of Botox at 1 site/s.    Left Rhomboid - 20 units of Botox at 3 site/s.       3. JAW MUSCLES: 170 UNITS BOTOX = TOTAL DOSE, 1:1 DILUTION      Right temporalis - 60 units of Botox at 5 site/s.               Left temporalis - 60 units of Botox at 5 site/s.     Right Masseter - 20 units of Botox at 2 site/s.    Left Masseter - 20 units of Botox at 2 site/s.      Right medial pterygoid - 5 units of Botox at 1 site/s.    Left medial pterygoid - 5 units of Botox at 1 site/s.     4. FACIAL MUSCLES: 45 UNITS BOTOX = TOTAL DOSE, 2:1 DILUTION     Right Frontalis - 15 units of Botox at 3 site/s (2 middle & 1 hairline).    Left Frontalis - 15 units of Botox at 3 site/s (2 middle & 1 hairline).     Procerus - 5 units of Botox at 1 site/s.       Right  - 5 units of Botox at 1 site/s.    Left  - 5 units of Botox at 1 site/s.       RESPONSE TO PROCEDURE  Tia Pablo tolerated the procedure well and there were no immediate complications. She was allowed to recover for an appropriate period of time and was discharged home in stable condition.    ASSESSMENT AND PLAN   1. Botulinum toxin injections: No changes made to Botox today, but distribution changed slightly based on symptoms.   2. Referrals: None.   3. Follow up: Tia Pablo was rescheduled for the next series of injections in 12 weeks, at which time we will evaluate response to today's injections. she may call the clinic prior with any questions or concerns prior to the next  appointment.

## 2023-02-28 NOTE — LETTER
2/28/2023         RE: Tia Pablo  [id number] [facility] [id number]        Dear Colleague,    Thank you for referring your patient, Tia Pablo, to the [facility]. Please see a copy of my visit note below.      [facility]     PM&R CLINIC NOTE  BOTULINUM TOXIN PROCEDURE      HPI  Chief Complaint   Patient presents with     Procedure     Botox     Tia Pablo is a 57 year old female with a history of headaches and generalized dystonia with pain who presents to clinic for botulinum toxin injections for management of chronic migraine headaches, cervical and oromandibular dystonia.     SINCE LAST VISIT  Tia Pablo was last seen here in clinic on 11/28/20222, at which time she received 450 units of Botox. Temporarily avoiding the masseters at the last visit worked well for her son's wedding pictures, and she would like to return to her usual regimen including the masseters now.      Patient denies new medical diagnoses, illnesses, hospitalizations, emergency room visits, and injuries since the previous injection with botulinum neurotoxin.     RESPONSE TO PREVIOUS TREATMENT    Side effects: No problems reported, although she does have a history of jaw weakness.     1.  Headache frequency during this injection cycle: Only 1-3 migraine headache day per month when Botox is in effect, but the Botox only seems to work for 7 weeks and then she experiences 4-8 migraine headache days per month and daily milder headaches. This is compared to her baseline headache frequency of 30 headache days per month.     2.  Headache duration during this injection cycle:  Headache duration is typically no longer than a few hours with Relpax, but if the Relpax does not work right away, she has to take two doses and the headaches last much longer. Patient reports no episodes of multiple day headaches during this injection cycle.     3.  Headache intensity  during this injection cycle:    A.  5/10  =  Typical pain level.  B.  7/10  =  Worst pain level.  C.  2/10  =  Lowest pain level.    4.  Change in headache medication usage during this injection cycle:  (For Example:  Able to decrease use of oral pain medications.) She is only taking her migraine headache medication about once per month, whereas she was taking it much more frequently prior to Botox. Over the last 2 months, she has been having more headaches due to Botox wearing off.     5.  ER Visits During This Injection Cycle: None.     6.  Functional Performance:  Change in ADL's, social interaction, days lost from work, etc. Patient reports being able to more fully participate in social and family activities and responsibilities as headache symptoms have improved    Dystonia Improvement (neck and jaw): Yes.  Percent Improvement: 75 %    Duration of Benefit:  7 weeks and followed by a gradual reduction in benefit. She starts clenching her teeth more consistently as the Botox wears off.       PHYSICAL EXAM  VS: /79 (BP Location: Right arm, Patient Position: Sitting, Cuff Size: Adult Regular)   Pulse 69    GEN: Pleasant and cooperative, in no acute distress  HEENT: No facial asymmetry  NECK: Involuntary right rotation with hypertonicity noted in right anterior neck musculature and upper trapezius.     ALLERGIES  Allergies   Allergen Reactions     Bee Venom Anaphylaxis     Erythromycin Hives and Itching     Seasonal Allergies        CURRENT MEDICATIONS    Current Outpatient Medications:      chlorthalidone (HYGROTON) 25 MG tablet, TAKE 1 TABLET BY MOUTH EVERY DAY, Disp: 90 tablet, Rfl: 1     clonazePAM (KLONOPIN) 0.5 MG tablet, Take 1 tablet (0.5 mg) by mouth 3 times daily Take 1 tab in the evening before bed. Okay to take up to 2 tabs as needed in the day., Disp: 90 tablet, Rfl: 5     eletriptan (RELPAX) 40 MG tablet, TAKE 1 TABLET (40 MG) BY MOUTH AT ONSET OF HEADACHE FOR MIGRAINE MAY REPEAT IN 2 HOURS. MAX  2 TABLETS/24 HOURS., Disp: 12 tablet, Rfl: 3     escitalopram (LEXAPRO) 20 MG tablet, Take 20 mg by mouth daily, Disp: , Rfl:      losartan (COZAAR) 50 MG tablet, Take 1 tablet (50 mg) by mouth daily, Disp: 90 tablet, Rfl: 0     methocarbamol (ROBAXIN) 500 MG tablet, Take 0.5-1 tablets (250-500 mg) by mouth 4 times daily as needed for muscle spasms, Disp: 40 tablet, Rfl: 0     tretinoin (RETIN-A) 0.05 % external cream, Apply pea sized amount at bedtime., Disp: 45 g, Rfl: 3     Urea 40 % CREA, Apply daily at bedtime to feet as needed., Disp: 85 g, Rfl: 4     buPROPion (WELLBUTRIN XL) 150 MG 24 hr tablet, TAKE 2 TABLETS (300 MG) BY MOUTH EVERY MORNING, Disp: 180 tablet, Rfl: 1     celecoxib (CELEBREX) 100 MG capsule, Take 1 capsule (100 mg) by mouth 2 times daily (Patient not taking: Reported on 2023), Disp: 60 capsule, Rfl: 1    Current Facility-Administered Medications:      botulinum toxin type A (BOTOX) 100 units injection 600 Units, 600 Units, Intramuscular, Q90 Days, Simi Vazquez MD, 500 Units at 23 1642       BOTULINUM NEUROTOXIN INJECTION PROCEDURES    VERIFICATION OF PATIENT IDENTIFICATION AND PROCEDURE     Initials   Patient Name SES   Patient  SES   Procedure Verified by: SES     Prior to the start of the procedure and with procedural staff participation, I verbally confirmed the patient s identity using two indicators, relevant allergies, that the procedure was appropriate and matched the consent or emergent situation, and that the correct equipment/implants were available. Immediately prior to starting the procedure I conducted the Time Out with the procedural staff and re-confirmed the patient s name, procedure, and site/side. (The Joint Commission universal protocol was followed.)  Yes    Sedation (Moderate or Deep): None    ABOVE ASSESSMENTS PERFORMED BY    Simi Vazquez MD      INDICATIONS FOR PROCEDURES  Tia Pablo is a 57 year old patient with involuntary muscle  spasms and pain secondary to the diagnosis of oromandibular/cervical dystonia and chronic migraine headaches. Her baseline symptoms have been recalcitrant to oral medications and conservative therapy.  She is here today for reinjection with Botox.    GOAL OF PROCEDURE  The goal of this procedure is to increase active range of motion, improve volitional motor control, decrease pain  and enhance functional independence.      TOTAL DOSE ADMINISTERED  Dose Administered:  450 units  Botox (Botulinum Toxin Type A)   Unavoidable Drug Waste: Yes  Amount of drug waste (mL): 50 units Botox.  Reason for waste:  Single use vial  Diluent Used:  Preservative Free Normal Saline  Total Volume of Diluent Used:  5 ml  NDC #: Botox 100u ([address]-78)      CONSENT  The risks, benefits, and treatment options were discussed with Tia Pablo and she agreed to proceed.    Written consent was obtained by [facility].     EQUIPMENT USED  Needle-37mm stimulating/recording  Needle-30 gauge  EMG/NCS Machine    SKIN PREPARATION  Skin preparation was performed using an alcohol wipe.    GUIDANCE DESCRIPTION  Electro-myographic guidance was necessary throughout the procedure to accurately identify all areas of dystonic muscles while avoiding injection of non-dystonic muscles, neighboring nerves and nearby vascular structures.     AREA/MUSCLE INJECTED: 450 UNITS BOTOX = TOTAL DOSE     1. NECK MUSCLES: 145 UNITS BOTOX = TOTAL DOSE, 1:1 DILUTION     Right Upper Trapezius - 20 units of Botox at 4 site/s.   Left Upper Trapezius - 20 units of Botox at 4 site/s.      Right Splenius - 5 units of Botox at 1 site/s.   Left Splenius - 5 units of Botox at 1 site/s.      Right Auricularis - 15 units of Botox at 3 site/s (anterior, superior & posterior).   Left Auricularis - 15 units of Botox at 3 site/s (anterior, superior & posterior).     Right occipitalis - 15 units of Botox at 3 site/s.   Left occipitalis - 15 units of Botox at 3 site/s.     Right  Sternocleidomastoid - 10 units of Botox at 1 site/s.    Right Middle Scalene - 15 units of Botox at 1 site/s.     Right Platysma - 5 units of Botox at 2 site/s.  Left Platysma - 5 units of Botox at 2 site/s.      2. UPPER EXTREMITY & TRUNK MUSCLES: 90 UNITS BOTOX = TOTAL DOSE, 1:1 DILUTION     Right levator scapula - 25 units of Botox at 2 site/s.   Left levator scapula - 25 units of Botox at 2 site/s.      Right Rhomboid - 20 units of Botox at 1 site/s.    Left Rhomboid - 20 units of Botox at 3 site/s.       3. JAW MUSCLES: 170 UNITS BOTOX = TOTAL DOSE, 1:1 DILUTION      Right temporalis - 60 units of Botox at 5 site/s.               Left temporalis - 60 units of Botox at 5 site/s.     Right Masseter - 20 units of Botox at 2 site/s.    Left Masseter - 20 units of Botox at 2 site/s.      Right medial pterygoid - 5 units of Botox at 1 site/s.    Left medial pterygoid - 5 units of Botox at 1 site/s.     4. FACIAL MUSCLES: 45 UNITS BOTOX = TOTAL DOSE, 2:1 DILUTION     Right Frontalis - 15 units of Botox at 3 site/s (2 middle & 1 hairline).    Left Frontalis - 15 units of Botox at 3 site/s (2 middle & 1 hairline).     Procerus - 5 units of Botox at 1 site/s.       Right  - 5 units of Botox at 1 site/s.    Left  - 5 units of Botox at 1 site/s.       RESPONSE TO PROCEDURE  Tia Pablo tolerated the procedure well and there were no immediate complications. She was allowed to recover for an appropriate period of time and was discharged home in stable condition.    ASSESSMENT AND PLAN   1. Botulinum toxin injections: No changes made to Botox dose or distribution today other than masseters were addressed today. Patient will continue to monitor response to Botox and report at next appointment.   2. Referrals: None.   3. Follow up: Tia Pablo was rescheduled for the next series of injections in 12 weeks, at which time we will evaluate response to today's injections. she may call the clinic prior with  any questions or concerns prior to the next appointment.       Again, thank you for allowing me to participate in the care of your patient.        Sincerely,        Simi Vazquez MD

## 2023-03-15 ENCOUNTER — THERAPY VISIT (OUTPATIENT)
Dept: PHYSICAL THERAPY | Facility: CLINIC | Age: 58
End: 2023-03-15
Payer: COMMERCIAL

## 2023-03-15 DIAGNOSIS — M54.2 NECK PAIN: Primary | ICD-10-CM

## 2023-03-15 PROCEDURE — 97140 MANUAL THERAPY 1/> REGIONS: CPT | Mod: GP | Performed by: PHYSICAL THERAPIST

## 2023-03-15 PROCEDURE — 99207 PR STAT DRY NEEDLING - IAM: CPT | Mod: 25 | Performed by: PHYSICAL THERAPIST

## 2023-03-21 ENCOUNTER — MYC MEDICAL ADVICE (OUTPATIENT)
Dept: FAMILY MEDICINE | Facility: CLINIC | Age: 58
End: 2023-03-21
Payer: COMMERCIAL

## 2023-03-21 DIAGNOSIS — Z91.038 OTHER INSECT ALLERGY STATUS: ICD-10-CM

## 2023-03-21 DIAGNOSIS — F32.0 CURRENT MILD EPISODE OF MAJOR DEPRESSIVE DISORDER WITHOUT PRIOR EPISODE (H): ICD-10-CM

## 2023-03-22 RX ORDER — BUPROPION HYDROCHLORIDE 150 MG/1
TABLET ORAL
Qty: 180 TABLET | Refills: 1 | Status: SHIPPED | OUTPATIENT
Start: 2023-03-22 | End: 2023-08-04

## 2023-03-22 RX ORDER — EPINEPHRINE 0.3 MG/.3ML
0.3 INJECTION SUBCUTANEOUS ONCE
Qty: 1 EACH | Refills: 0 | Status: SHIPPED | OUTPATIENT
Start: 2023-03-22 | End: 2023-03-22

## 2023-03-22 NOTE — TELEPHONE ENCOUNTER
Call placed to patient   Relayed Dr. Stone's message    Patient states her understanding was she would take the 150mg dosing and see how her symptoms were on the decreased dosing    Patient reports the 150mg dosing does not seem to be covering her symptoms and would like to go up to 2 tablets - 300mg daily to better cover her symptoms     Mak Neal RN

## 2023-03-22 NOTE — TELEPHONE ENCOUNTER
Please contact pt.  Per my notes I see she decreased the wellbutrin back to 150mg daily in Jan and that is what we had been filling for her.    Has she since increased it again?    Dr. Sally Stone, DO

## 2023-03-22 NOTE — TELEPHONE ENCOUNTER
Prescription approved per [facility] Refill Protocol.    Suha Lopez RN on 3/22/2023 at 7:47 AM

## 2023-03-22 NOTE — TELEPHONE ENCOUNTER
Routing refill request to provider for review/approval because:  Requested dosing different from medication list.   Suha Lopez RN on 3/22/2023 at 8:58 AM

## 2023-03-22 NOTE — TELEPHONE ENCOUNTER
Patient called back  States she was distracted during last conversation     Patient reports she is currently taking (2) 150mg tablets (300mg total) daily     Patient prefers the 150mg tablets for when she decides to wean down / off the medication     Patient reporting good control with medication currently and feels she is not in a place yet to decrease dosing     Mak Neal RN

## 2023-03-24 ENCOUNTER — ANCILLARY ORDERS (OUTPATIENT)
Dept: PALLIATIVE MEDICINE | Facility: CLINIC | Age: 58
End: 2023-03-24

## 2023-03-24 DIAGNOSIS — M54.12 CERVICAL RADICULOPATHY: ICD-10-CM

## 2023-04-05 ENCOUNTER — THERAPY VISIT (OUTPATIENT)
Dept: PHYSICAL THERAPY | Facility: CLINIC | Age: 58
End: 2023-04-05
Payer: COMMERCIAL

## 2023-04-05 DIAGNOSIS — M54.2 NECK PAIN: Primary | ICD-10-CM

## 2023-04-05 PROCEDURE — 99207 PR STAT DRY NEEDLING - IAM: CPT | Mod: 25 | Performed by: PHYSICAL THERAPIST

## 2023-04-05 PROCEDURE — 97140 MANUAL THERAPY 1/> REGIONS: CPT | Mod: GP | Performed by: PHYSICAL THERAPIST

## 2023-04-06 NOTE — PROGRESS NOTES
[facility]     PM&R CLINIC NOTE  BOTULINUM TOXIN PROCEDURE      HPI  Chief Complaint   Patient presents with     Procedure     Botox     Tia Pablo is a 57 year old female with a history of headaches and generalized dystonia with pain who presents to clinic for botulinum toxin injections for management of chronic migraine headaches, cervical and oromandibular dystonia.     SINCE LAST VISIT  Tia Pablo was last seen here in clinic on 11/28/20222, at which time she received 450 units of Botox. Temporarily avoiding the masseters at the last visit worked well for her son's wedding pictures, and she would like to return to her usual regimen including the masseters now.      Patient denies new medical diagnoses, illnesses, hospitalizations, emergency room visits, and injuries since the previous injection with botulinum neurotoxin.     RESPONSE TO PREVIOUS TREATMENT    Side effects: No problems reported, although she does have a history of jaw weakness.     1.  Headache frequency during this injection cycle: Only 1-3 migraine headache day per month when Botox is in effect, but the Botox only seems to work for 7 weeks and then she experiences 4-8 migraine headache days per month and daily milder headaches. This is compared to her baseline headache frequency of 30 headache days per month.     2.  Headache duration during this injection cycle:  Headache duration is typically no longer than a few hours with Relpax, but if the Relpax does not work right away, she has to take two doses and the headaches last much longer. Patient reports no episodes of multiple day headaches during this injection cycle.     3.  Headache intensity during this injection cycle:    A.  5/10  =  Typical pain level.  B.  7/10  =  Worst pain level.  C.  2/10  =  Lowest pain level.    4.  Change in headache medication usage during this injection cycle:  (For Example:  Able to decrease use of oral pain  medications.) She is only taking her migraine headache medication about once per month, whereas she was taking it much more frequently prior to Botox. Over the last 2 months, she has been having more headaches due to Botox wearing off.     5.  ER Visits During This Injection Cycle: None.     6.  Functional Performance:  Change in ADL's, social interaction, days lost from work, etc. Patient reports being able to more fully participate in social and family activities and responsibilities as headache symptoms have improved    Dystonia Improvement (neck and jaw): Yes.  Percent Improvement: 75 %    Duration of Benefit:  7 weeks and followed by a gradual reduction in benefit. She starts clenching her teeth more consistently as the Botox wears off.       PHYSICAL EXAM  VS: /79 (BP Location: Right arm, Patient Position: Sitting, Cuff Size: Adult Regular)   Pulse 69    GEN: Pleasant and cooperative, in no acute distress  HEENT: No facial asymmetry  NECK: Involuntary right rotation with hypertonicity noted in right anterior neck musculature and upper trapezius.     ALLERGIES  Allergies   Allergen Reactions     Bee Venom Anaphylaxis     Erythromycin Hives and Itching     Seasonal Allergies        CURRENT MEDICATIONS    Current Outpatient Medications:      chlorthalidone (HYGROTON) 25 MG tablet, TAKE 1 TABLET BY MOUTH EVERY DAY, Disp: 90 tablet, Rfl: 1     clonazePAM (KLONOPIN) 0.5 MG tablet, Take 1 tablet (0.5 mg) by mouth 3 times daily Take 1 tab in the evening before bed. Okay to take up to 2 tabs as needed in the day., Disp: 90 tablet, Rfl: 5     eletriptan (RELPAX) 40 MG tablet, TAKE 1 TABLET (40 MG) BY MOUTH AT ONSET OF HEADACHE FOR MIGRAINE MAY REPEAT IN 2 HOURS. MAX 2 TABLETS/24 HOURS., Disp: 12 tablet, Rfl: 3     escitalopram (LEXAPRO) 20 MG tablet, Take 20 mg by mouth daily, Disp: , Rfl:      losartan (COZAAR) 50 MG tablet, Take 1 tablet (50 mg) by mouth daily, Disp: 90 tablet, Rfl: 0     methocarbamol  (ROBAXIN) 500 MG tablet, Take 0.5-1 tablets (250-500 mg) by mouth 4 times daily as needed for muscle spasms, Disp: 40 tablet, Rfl: 0     tretinoin (RETIN-A) 0.05 % external cream, Apply pea sized amount at bedtime., Disp: 45 g, Rfl: 3     Urea 40 % CREA, Apply daily at bedtime to feet as needed., Disp: 85 g, Rfl: 4     buPROPion (WELLBUTRIN XL) 150 MG 24 hr tablet, TAKE 2 TABLETS (300 MG) BY MOUTH EVERY MORNING, Disp: 180 tablet, Rfl: 1     celecoxib (CELEBREX) 100 MG capsule, Take 1 capsule (100 mg) by mouth 2 times daily (Patient not taking: Reported on 2023), Disp: 60 capsule, Rfl: 1    Current Facility-Administered Medications:      botulinum toxin type A (BOTOX) 100 units injection 600 Units, 600 Units, Intramuscular, Q90 Days, Simi Vazquez MD, 500 Units at 23 1642       BOTULINUM NEUROTOXIN INJECTION PROCEDURES    VERIFICATION OF PATIENT IDENTIFICATION AND PROCEDURE     Initials   Patient Name SES   Patient  SES   Procedure Verified by: SES     Prior to the start of the procedure and with procedural staff participation, I verbally confirmed the patient s identity using two indicators, relevant allergies, that the procedure was appropriate and matched the consent or emergent situation, and that the correct equipment/implants were available. Immediately prior to starting the procedure I conducted the Time Out with the procedural staff and re-confirmed the patient s name, procedure, and site/side. (The Joint Commission universal protocol was followed.)  Yes    Sedation (Moderate or Deep): None    ABOVE ASSESSMENTS PERFORMED BY    Simi Vazquez MD      INDICATIONS FOR PROCEDURES  Tia Pablo is a 57 year old patient with involuntary muscle spasms and pain secondary to the diagnosis of oromandibular/cervical dystonia and chronic migraine headaches. Her baseline symptoms have been recalcitrant to oral medications and conservative therapy.  She is here today for reinjection with  Botox.    GOAL OF PROCEDURE  The goal of this procedure is to increase active range of motion, improve volitional motor control, decrease pain  and enhance functional independence.      TOTAL DOSE ADMINISTERED  Dose Administered:  450 units  Botox (Botulinum Toxin Type A)   Unavoidable Drug Waste: Yes  Amount of drug waste (mL): 50 units Botox.  Reason for waste:  Single use vial  Diluent Used:  Preservative Free Normal Saline  Total Volume of Diluent Used:  5 ml  NDC #: Botox 100u ([address]-34)      CONSENT  The risks, benefits, and treatment options were discussed with Tia Pablo and she agreed to proceed.    Written consent was obtained by [facility].     EQUIPMENT USED  Needle-37mm stimulating/recording  Needle-30 gauge  EMG/NCS Machine    SKIN PREPARATION  Skin preparation was performed using an alcohol wipe.    GUIDANCE DESCRIPTION  Electro-myographic guidance was necessary throughout the procedure to accurately identify all areas of dystonic muscles while avoiding injection of non-dystonic muscles, neighboring nerves and nearby vascular structures.     AREA/MUSCLE INJECTED: 450 UNITS BOTOX = TOTAL DOSE     1. NECK MUSCLES: 145 UNITS BOTOX = TOTAL DOSE, 1:1 DILUTION     Right Upper Trapezius - 20 units of Botox at 4 site/s.   Left Upper Trapezius - 20 units of Botox at 4 site/s.      Right Splenius - 5 units of Botox at 1 site/s.   Left Splenius - 5 units of Botox at 1 site/s.      Right Auricularis - 15 units of Botox at 3 site/s (anterior, superior & posterior).   Left Auricularis - 15 units of Botox at 3 site/s (anterior, superior & posterior).     Right occipitalis - 15 units of Botox at 3 site/s.   Left occipitalis - 15 units of Botox at 3 site/s.     Right Sternocleidomastoid - 10 units of Botox at 1 site/s.    Right Middle Scalene - 15 units of Botox at 1 site/s.     Right Platysma - 5 units of Botox at 2 site/s.  Left Platysma - 5 units of Botox at 2 site/s.      2. UPPER EXTREMITY & TRUNK MUSCLES: 90  UNITS BOTOX = TOTAL DOSE, 1:1 DILUTION     Right levator scapula - 25 units of Botox at 2 site/s.   Left levator scapula - 25 units of Botox at 2 site/s.      Right Rhomboid - 20 units of Botox at 1 site/s.    Left Rhomboid - 20 units of Botox at 3 site/s.       3. JAW MUSCLES: 170 UNITS BOTOX = TOTAL DOSE, 1:1 DILUTION      Right temporalis - 60 units of Botox at 5 site/s.               Left temporalis - 60 units of Botox at 5 site/s.     Right Masseter - 20 units of Botox at 2 site/s.    Left Masseter - 20 units of Botox at 2 site/s.      Right medial pterygoid - 5 units of Botox at 1 site/s.    Left medial pterygoid - 5 units of Botox at 1 site/s.     4. FACIAL MUSCLES: 45 UNITS BOTOX = TOTAL DOSE, 2:1 DILUTION     Right Frontalis - 15 units of Botox at 3 site/s (2 middle & 1 hairline).    Left Frontalis - 15 units of Botox at 3 site/s (2 middle & 1 hairline).     Procerus - 5 units of Botox at 1 site/s.       Right  - 5 units of Botox at 1 site/s.    Left  - 5 units of Botox at 1 site/s.       RESPONSE TO PROCEDURE  Tia Pablo tolerated the procedure well and there were no immediate complications. She was allowed to recover for an appropriate period of time and was discharged home in stable condition.    ASSESSMENT AND PLAN   1. Botulinum toxin injections: No changes made to Botox dose or distribution today other than masseters were addressed today. Patient will continue to monitor response to Botox and report at next appointment.   2. Referrals: None.   3. Follow up: Tia Pablo was rescheduled for the next series of injections in 12 weeks, at which time we will evaluate response to today's injections. she may call the clinic prior with any questions or concerns prior to the next appointment.

## 2023-04-10 ENCOUNTER — TELEPHONE (OUTPATIENT)
Dept: DERMATOLOGY | Facility: CLINIC | Age: 58
End: 2023-04-10
Payer: COMMERCIAL

## 2023-04-10 NOTE — TELEPHONE ENCOUNTER
[facility] Call Center    Phone Message    May a detailed message be left on voicemail: yes     Reason for Call: Symptoms or Concerns     If patient has red-flag symptoms, warm transfer to triage line    Current symptom or concern: raised itchy bumps has spread down arm one big one under arm     Symptoms have been present for:  1 month(s)    Has patient previously been seen for this? No    By : NA    Date: NA    Are there any new or worsening symptoms? No      Action Taken: Other: Derm    Travel Screening: Not Applicable

## 2023-04-10 NOTE — TELEPHONE ENCOUNTER
"Spoke to patient.     \"I saw her in the fall and I have not had something like this in the past. They have been itching a lot lately and they are like tiny little moles and I am just concerned with all the skin cancers out there people have. They are in like a cluster of 4 or 5 of them on my collarbone and they have been there a few weeks.\"    Denies blisters, or open areas, flesh colored. Has not tried any otc medications for itching.     Advised to try some otc Hydrocortisone cream and otc Benadryl to help her stop scratching.     I asked if she could send a photo via My chart so Queenie can determine if a sooner than 1st available Mid August appointment is warranted or not.     Patient verbalized understanding. Leni Corley RN      "

## 2023-04-13 ENCOUNTER — ANCILLARY ORDERS (OUTPATIENT)
Dept: PALLIATIVE MEDICINE | Facility: CLINIC | Age: 58
End: 2023-04-13

## 2023-04-13 ENCOUNTER — RADIOLOGY INJECTION OFFICE VISIT (OUTPATIENT)
Dept: PALLIATIVE MEDICINE | Facility: CLINIC | Age: 58
End: 2023-04-13
Payer: COMMERCIAL

## 2023-04-13 VITALS — HEART RATE: 80 BPM | DIASTOLIC BLOOD PRESSURE: 71 MMHG | SYSTOLIC BLOOD PRESSURE: 147 MMHG | OXYGEN SATURATION: 96 %

## 2023-04-13 DIAGNOSIS — M47.812 SPONDYLOSIS OF CERVICAL REGION WITHOUT MYELOPATHY OR RADICULOPATHY: ICD-10-CM

## 2023-04-13 DIAGNOSIS — M47.812 CERVICAL SPONDYLOSIS WITHOUT MYELOPATHY: ICD-10-CM

## 2023-04-13 DIAGNOSIS — M54.12 CERVICAL RADICULOPATHY: ICD-10-CM

## 2023-04-13 PROCEDURE — 64491 INJ PARAVERT F JNT C/T 2 LEV: CPT | Mod: 50 | Performed by: PAIN MEDICINE

## 2023-04-13 PROCEDURE — 64490 INJ PARAVERT F JNT C/T 1 LEV: CPT | Mod: 50 | Performed by: PAIN MEDICINE

## 2023-04-13 ASSESSMENT — PAIN SCALES - GENERAL: PAINLEVEL: SEVERE PAIN (6)

## 2023-04-13 NOTE — PROGRESS NOTES
Pre-procedure Intake  If YES to any questions or NO to having a   Please complete laminated checklist and leave on the computer keyboard for Provider, verbally inform provider if able.    For SCS Trial, RFA's or any sedation procedure:  Have you been fasting? NA     If yes, for how long?     Are you taking any any blood thinners such as Coumadin, Warfarin, Jantoven, Pradaxa Xarelto, Eliquis, Edoxaban, Enoxaparin, Lovenox, Heparin, Arixtra, Fondaparinux, or Fragmin? OR Antiplatelet medication such as Plavix, Brilinta, or Effient?   No     If yes, when did you take your last dose?     Do you take aspirin?  No    If cervical procedure, have you held aspirin for 6 days?   No     Do you have any allergies to contrast dye, iodine, steroid and/or numbing medications?  NO    Are you currently taking antibiotics or have an active infection?  NO    Have you had a fever/elevated temperature within the past week? NO    Are you currently taking oral steroids? NO    Do you have a ? Yes    Are you pregnant or breastfeeding?  Not Applicable    Have you received the COVID-19 vaccine? Yes    If yes, was it your 1st, 2nd or only dose needed? 2ND  AND BOOSTER    Date of most recent vaccine: 9/19/22    Notify provider and RNs if systolic BP >170, diastolic BP >100, P >100 or O2 sats < 90%

## 2023-04-13 NOTE — PROGRESS NOTES
Pre procedure Diagnosis: Cervical spondylosis   Post procedure Diagnosis: Same  Procedure performed: cervical medial branch block bilateral ton,3,4  Anesthesia: none  Complications: none  Operators: Saeed Edwards MD    Indications:   Tia Pablo is a 57 year old female. The patient has a history of axial neck pain.  Exam shows pain with extension/rotatio and they have tried conservative treatment including meds/pt.    Options/alternatives, benefits and risks were discussed with the patient including but not limited to bleeding, infection, tissue trauma, exposure to radiation, reaction to medications, spinal cord injury, weakness, numbness and paralysis.  Questions were answered to her satisfaction and she wishes to proceed. Voluntary informed consent was obtained and signed.     Vitals were reviewed: Yes  Allergies were reviewed:  Yes   Medications were reviewed:  Yes   Pre-procedure pain score: 6/10    Procedure:  After obtaining signed, informed consent, the patient was taken to the procedure room and placed in the prone position on the [facility] frame.  A Pause for the Cause was completed prior to procedure start.  The patient was prepped and draped in the usual sterile fashion.    The areas of interest were identified with fluoroscopy.  The skin and subcutaneous tissue were anesthetized by injecting Lidocaine 1% 1 ml at each injection site utilizing a 27 gauge 1.25 inch needle.  Then,  25 gauge 3.5 inch spinal needles with slightly curved tips were advanced tangential to the medial branch nerves, abutting the articular pillars at [place],3,4 bilateral utilizing AP and Lateral fluoroscopic guidance.  Aspiration for blood and CSF was negative at all the levels.     Then, after repeated negative aspiration, each level was injected with 0.5 ml of 0.25% bupivacaine and the needles were restyletted and withdrawn.    The injection sites were cleaned and sterile dressings were applied where indicated.    The patient  tolerated the procedure well without complications and was taken to the recovery area for continued observation.  Fluoroscopic images were saved to PACS.    The patient was re-evaluated following the procedure and they noted decreasd pain and improved  range of motion.    Post-procedure pain:  6/10    The patient was given a pain diary and instructed to document their pain throughout the day.  The patient was asked to return the pain diary the next day in person or by fax at which time it will be reviewed and the decision made whether or not to proceed .    Follow-up:  Pending results     Saeed Edwards MD  [facility]

## 2023-04-13 NOTE — NURSING NOTE
Discharge Information    IV Discontiued Time:  NA    Amount of Fluid Infused:  NA    Discharge Criteria = When patient returns to baseline or as per MD order    Consciousness:  Pt is fully awake    Circulation:  BP +/- 20% of pre-procedure level    Respiration:  Patient is able to breathe deeply    O2 Sat:  Patient is able to maintain O2 Sat >92% on room air    Activity:  Moves 4 extremities on command    Ambulation:  Patient is able to stand and walk or stand and pivot into wheelchair    Dressing:  Clean/dry or No Dressing    Notes:   Discharge instructions and AVS given to patient    Patient meets criteria for discharge?  YES    Admitted to PCU?  No    Responsible adult present to accompany patient home?  Yes    Signature/Title:    anil travis RN  RN Care Coordinator  [facility]

## 2023-04-13 NOTE — PATIENT INSTRUCTIONS
[facility]   Medial Branch Block Discharge Instructions      Your procedure was performed by: Dr. Saeed Edwards       Medications used include:  Lidocaine  Bupivicaine       You will need to complete the Pain Scale Log form and return it to us as soon as possible.  Once we have received the form, we will review it and call you to determine the next steps.     The form can be faxed to [phone] or mailed to:   [facility]   [address]   [place] [address]    You may resume your regular medications  If you were holding your blood thinning medication, please restart taking it: N/A  You may resume your regular activities  Be cautious as numbness and/or weakness in the upper extremities may occur for up to 6-8 hours due to the effects of the anesthetic.  Avoid driving for 6 hours. The local anesthetic could slow your reflexes.   You may shower, however no swimming or tub baths or hot tubs for 24 hours following your procedure.  Your pain will return after the numbing medications have worn off.  You may use your current pain medications as needed.  Unless you have been directed to avoid the use of anti-inflammatory medications (NSAIDS), you may use medications such as ibuprofen, Aleve or Tylenol for pain control if needed.  Some people find it helpful to alternate ibuprofen and Tylenol every 3 hours for a couple of days.  You may use ice packs 10-15 minutes three to four times a day at the injection site for comfort.   Do not use heat to painful areas for 6 to 8 hours. This will give the local anesthetic time to wear off and prevent you from accidentally burning your skin.   If you experience any of the following, call the Pain Clinic during work hours (Monday through Friday 8 am-4:30 pm) at [phone] or the Provider Line after hours at [phone]:  -Fever over 100 degree F  -Swelling, bleeding, redness, drainage, warmth at the injection  site  -Progressive weakness or numbness in your legs or arms  -If lumbar, call if you have a loss of bowel or bladder function  -If cervical, call if you have any unusual headache that is not relieved by Tylenol  -Unusual new onset of pain that is not improving

## 2023-04-14 ENCOUNTER — MYC MEDICAL ADVICE (OUTPATIENT)
Dept: PALLIATIVE MEDICINE | Facility: CLINIC | Age: 58
End: 2023-04-14

## 2023-04-14 DIAGNOSIS — M47.812 SPONDYLOSIS OF CERVICAL REGION WITHOUT MYELOPATHY OR RADICULOPATHY: Primary | ICD-10-CM

## 2023-04-14 NOTE — TELEPHONE ENCOUNTER
Pt had a bilateral Cervical  medial branch block # 1 on 4/13/23.  The post medial branch block form was received.      According to pain diary pt would like to proceed with medial branch block #2.    Max relief from block is:    Pt had moderate on the day of the block. (50 to 80%)    Physical therapy:      Last done in?:  current.     How many sessions?:  >4.      Where was it  done?  [facility] .     Pain diary attached    Routed to Katty to review. Does pt had enough relief insurance-wise to proceed to medial branch block #2?  If so please schedule.      Lucie Murphy, RN-BSN  [facility]

## 2023-04-18 NOTE — TELEPHONE ENCOUNTER
[facility] RFA REQUIREMENTS-     [facility]-4/1/2022    [id number] Facet Joint Denervation (Article number on web site)  POLICY STATEMENT  Nonpulsed radiofrequency denervation of cervical facet joints (C3-4 and below) and lumbar facet joints is considered medically necessary when ALL of the following criteria are met.    No prior spinal fusion surgery in the vertebral level being treated; AND    Disabling low back (lumbosacral) or neck (cervical) pain, suggestive of facet joint origin as evidenced by absence of nerve root compression as documented in the medical record on history, physical, and radiographic evaluations; and the pain is not radicular; AND    Pain has failed to respond to 3 months of conservative management, which may consist of therapies such as nonsteroidal anti-inflammatory medications, acetaminophen, manipulation, physical therapy, and a home exercise program; AND    There has been a successful trial of controlled medial branch blocks (see Policy Guidelines section); AND    If there has been a prior successful radiofrequency denervation, a minimum time of 6 months has elapsed since prior radiofrequency treatment (per side, per anatomic level of the spine).  Radiofrequency denervation is considered investigational for the treatment of chronic spinal or back pain for all uses that do not meet the criteria listed above, including but not limited to treatment of thoracic facet joint pain.  All other methods of denervation are considered investigational for the treatment of chronic spinal or back pain, including, but not limited to pulsed radiofrequency denervation, laser denervation, chemodenervation (eg, alcohol, phenol, or high concentration local anesthetics), and cryodenervation.  Therapeutic medial branch blocks are considered investigational.  If there has been a prior successful radiofrequency denervation, additional diagnostic medial branch blocks for the same level of the spine are not medically  necessary.    POLICY GUIDELINES    A successful trial of controlled diagnostic medial branch blocks consists of 2 separate positive blocks on different days with local anesthetic only (no steroids or other drugs), or a placebo-controlled series of blocks, under fluoroscopic guidance, that has resulted in at least a 50% reduction in pain for the duration of the local anesthetic used (eg, 3 hours longer with bupivacaine than lidocaine).     No therapeutic intra-articular injections (ie, steroids, saline, or other substances) should be administered for a period of at least 4 weeks prior to the diagnostic medial branch block.     The diagnostic blocks should involve the levels being considered for radiofrequency treatment and should not be conducted under intravenous sedation unless specifically indicated (eg, the patient is unable to cooperate with the procedure).     These diagnostic blocks should be targeted to the likely pain generator. Single level blocks lead to more precise diagnostic information, but multiple single-level blocks require several visits and additional exposure to radiation.        Okay to schedule CMBB#2          Katty NAVARRO    [facility]

## 2023-04-18 NOTE — TELEPHONE ENCOUNTER
JESSICAM to schedule CMBB#2        Olivier Breen    [facility] [place]     Patient Seen in: [facility] Emergency Department    History   Patient presents with:  Dyspnea PEYTON SOB (respiratory)    Stated Complaint: PEYTON    HPI    80-year-old male presents emergency department complaining of shortness of breath.   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   diazepam (VALIUM) 5 MG Oral Tab,  Ta scleral icterus, mucous membranes are moist, there is no erythema or exudate in the posterior pharynx  Neck: Supple no JVD no lymphadenopathy no meningismus no carotid bruit  CV: Regular rate and rhythm, there is a grade 3 out of 6 systolic ejection murmur PLATELET    Narrative: The following orders were created for panel order CBC WITH DIFFERENTIAL WITH PLATELET.   Procedure                               Abnormality         Status                     ---------                               -----------

## 2023-04-19 ENCOUNTER — THERAPY VISIT (OUTPATIENT)
Dept: PHYSICAL THERAPY | Facility: CLINIC | Age: 58
End: 2023-04-19
Payer: COMMERCIAL

## 2023-04-19 DIAGNOSIS — M54.2 NECK PAIN: Primary | ICD-10-CM

## 2023-04-19 PROCEDURE — 97140 MANUAL THERAPY 1/> REGIONS: CPT | Mod: GP | Performed by: PHYSICAL THERAPIST

## 2023-04-23 ENCOUNTER — HEALTH MAINTENANCE LETTER (OUTPATIENT)
Age: 58
End: 2023-04-23

## 2023-04-24 ENCOUNTER — MYC MEDICAL ADVICE (OUTPATIENT)
Dept: FAMILY MEDICINE | Facility: CLINIC | Age: 58
End: 2023-04-24
Payer: COMMERCIAL

## 2023-04-24 DIAGNOSIS — F32.0 CURRENT MILD EPISODE OF MAJOR DEPRESSIVE DISORDER WITHOUT PRIOR EPISODE (H): Primary | ICD-10-CM

## 2023-04-24 DIAGNOSIS — F41.9 ANXIETY: ICD-10-CM

## 2023-04-25 RX ORDER — ESCITALOPRAM OXALATE 10 MG/1
20 TABLET ORAL DAILY
Qty: 180 TABLET | Refills: 0 | Status: SHIPPED | OUTPATIENT
Start: 2023-04-25 | End: 2023-05-18

## 2023-04-26 ENCOUNTER — MYC MEDICAL ADVICE (OUTPATIENT)
Dept: FAMILY MEDICINE | Facility: CLINIC | Age: 58
End: 2023-04-26

## 2023-05-01 ENCOUNTER — THERAPY VISIT (OUTPATIENT)
Dept: PHYSICAL THERAPY | Facility: CLINIC | Age: 58
End: 2023-05-01
Payer: COMMERCIAL

## 2023-05-01 DIAGNOSIS — M54.2 NECK PAIN: Primary | ICD-10-CM

## 2023-05-01 PROCEDURE — 99207 PR STAT DRY NEEDLING - IAM: CPT | Mod: 25 | Performed by: PHYSICAL THERAPIST

## 2023-05-01 PROCEDURE — 97140 MANUAL THERAPY 1/> REGIONS: CPT | Mod: GP | Performed by: PHYSICAL THERAPIST

## 2023-05-14 ASSESSMENT — ANXIETY QUESTIONNAIRES
4. TROUBLE RELAXING: SEVERAL DAYS
6. BECOMING EASILY ANNOYED OR IRRITABLE: SEVERAL DAYS
8. IF YOU CHECKED OFF ANY PROBLEMS, HOW DIFFICULT HAVE THESE MADE IT FOR YOU TO DO YOUR WORK, TAKE CARE OF THINGS AT HOME, OR GET ALONG WITH OTHER PEOPLE?: SOMEWHAT DIFFICULT
5. BEING SO RESTLESS THAT IT IS HARD TO SIT STILL: NOT AT ALL
7. FEELING AFRAID AS IF SOMETHING AWFUL MIGHT HAPPEN: NOT AT ALL
GAD7 TOTAL SCORE: 4
3. WORRYING TOO MUCH ABOUT DIFFERENT THINGS: NOT AT ALL
GAD7 TOTAL SCORE: 4
GAD7 TOTAL SCORE: 4
7. FEELING AFRAID AS IF SOMETHING AWFUL MIGHT HAPPEN: NOT AT ALL
IF YOU CHECKED OFF ANY PROBLEMS ON THIS QUESTIONNAIRE, HOW DIFFICULT HAVE THESE PROBLEMS MADE IT FOR YOU TO DO YOUR WORK, TAKE CARE OF THINGS AT HOME, OR GET ALONG WITH OTHER PEOPLE: SOMEWHAT DIFFICULT
2. NOT BEING ABLE TO STOP OR CONTROL WORRYING: SEVERAL DAYS
1. FEELING NERVOUS, ANXIOUS, OR ON EDGE: SEVERAL DAYS

## 2023-05-14 ASSESSMENT — PATIENT HEALTH QUESTIONNAIRE - PHQ9
SUM OF ALL RESPONSES TO PHQ QUESTIONS 1-9: 8
SUM OF ALL RESPONSES TO PHQ QUESTIONS 1-9: 8
10. IF YOU CHECKED OFF ANY PROBLEMS, HOW DIFFICULT HAVE THESE PROBLEMS MADE IT FOR YOU TO DO YOUR WORK, TAKE CARE OF THINGS AT HOME, OR GET ALONG WITH OTHER PEOPLE: SOMEWHAT DIFFICULT

## 2023-05-15 ENCOUNTER — THERAPY VISIT (OUTPATIENT)
Dept: PHYSICAL THERAPY | Facility: CLINIC | Age: 58
End: 2023-05-15
Payer: COMMERCIAL

## 2023-05-15 DIAGNOSIS — M54.2 NECK PAIN: Primary | ICD-10-CM

## 2023-05-15 PROCEDURE — 20561 NDL INSJ W/O NJX 3+ MUSC: CPT | Performed by: PHYSICAL THERAPIST

## 2023-05-15 PROCEDURE — 97140 MANUAL THERAPY 1/> REGIONS: CPT | Mod: GP | Performed by: PHYSICAL THERAPIST

## 2023-05-16 ENCOUNTER — OFFICE VISIT (OUTPATIENT)
Dept: FAMILY MEDICINE | Facility: CLINIC | Age: 58
End: 2023-05-16
Payer: COMMERCIAL

## 2023-05-16 VITALS
HEART RATE: 71 BPM | TEMPERATURE: 98.4 F | RESPIRATION RATE: 16 BRPM | OXYGEN SATURATION: 98 % | BODY MASS INDEX: 22.94 KG/M2 | WEIGHT: 140.4 LBS | SYSTOLIC BLOOD PRESSURE: 122 MMHG | DIASTOLIC BLOOD PRESSURE: 80 MMHG

## 2023-05-16 DIAGNOSIS — F32.0 CURRENT MILD EPISODE OF MAJOR DEPRESSIVE DISORDER WITHOUT PRIOR EPISODE (H): ICD-10-CM

## 2023-05-16 DIAGNOSIS — F41.9 ANXIETY: Primary | ICD-10-CM

## 2023-05-16 DIAGNOSIS — I10 ESSENTIAL HYPERTENSION: ICD-10-CM

## 2023-05-16 DIAGNOSIS — Z13.6 CARDIOVASCULAR SCREENING; LDL GOAL LESS THAN 130: ICD-10-CM

## 2023-05-16 DIAGNOSIS — Z78.0 MENOPAUSE: ICD-10-CM

## 2023-05-16 DIAGNOSIS — Z12.11 SCREEN FOR COLON CANCER: ICD-10-CM

## 2023-05-16 LAB
ALBUMIN SERPL BCG-MCNC: 4.9 G/DL (ref 3.5–5.2)
ALP SERPL-CCNC: 75 U/L (ref 35–104)
ALT SERPL W P-5'-P-CCNC: 21 U/L (ref 10–35)
ANION GAP SERPL CALCULATED.3IONS-SCNC: 14 MMOL/L (ref 7–15)
AST SERPL W P-5'-P-CCNC: 28 U/L (ref 10–35)
BILIRUB SERPL-MCNC: 0.7 MG/DL
BUN SERPL-MCNC: 14.5 MG/DL (ref 6–20)
CALCIUM SERPL-MCNC: 10.3 MG/DL (ref 8.6–10)
CHLORIDE SERPL-SCNC: 96 MMOL/L (ref 98–107)
CREAT SERPL-MCNC: 0.69 MG/DL (ref 0.51–0.95)
DEPRECATED HCO3 PLAS-SCNC: 27 MMOL/L (ref 22–29)
GFR SERPL CREATININE-BSD FRML MDRD: >90 ML/MIN/1.73M2
GLUCOSE SERPL-MCNC: 97 MG/DL (ref 70–99)
POTASSIUM SERPL-SCNC: 4.3 MMOL/L (ref 3.4–5.3)
PROT SERPL-MCNC: 7.1 G/DL (ref 6.4–8.3)
SODIUM SERPL-SCNC: 137 MMOL/L (ref 136–145)

## 2023-05-16 PROCEDURE — 80053 COMPREHEN METABOLIC PANEL: CPT | Performed by: FAMILY MEDICINE

## 2023-05-16 PROCEDURE — 36415 COLL VENOUS BLD VENIPUNCTURE: CPT | Performed by: FAMILY MEDICINE

## 2023-05-16 PROCEDURE — 99214 OFFICE O/P EST MOD 30 MIN: CPT | Performed by: FAMILY MEDICINE

## 2023-05-16 RX ORDER — EPINEPHRINE 0.3 MG/.3ML
INJECTION SUBCUTANEOUS
COMMUNITY
Start: 2023-03-22 | End: 2024-03-23

## 2023-05-16 RX ORDER — ESCITALOPRAM OXALATE 10 MG/1
20 TABLET ORAL DAILY
Qty: 180 TABLET | Refills: 0 | Status: CANCELLED | OUTPATIENT
Start: 2023-05-16

## 2023-05-16 ASSESSMENT — ANXIETY QUESTIONNAIRES: GAD7 TOTAL SCORE: 4

## 2023-05-16 ASSESSMENT — PATIENT HEALTH QUESTIONNAIRE - PHQ9
10. IF YOU CHECKED OFF ANY PROBLEMS, HOW DIFFICULT HAVE THESE PROBLEMS MADE IT FOR YOU TO DO YOUR WORK, TAKE CARE OF THINGS AT HOME, OR GET ALONG WITH OTHER PEOPLE: SOMEWHAT DIFFICULT
SUM OF ALL RESPONSES TO PHQ QUESTIONS 1-9: 8

## 2023-05-16 NOTE — PROGRESS NOTES
A/P:      ICD-10-CM    1. Anxiety  F41.9       2. Current mild episode of major depressive disorder without prior episode (H)  F32.0       3. Essential hypertension  I10 Comprehensive metabolic panel (BMP + Alb, Alk Phos, ALT, AST, Total. Bili, TP)     Comprehensive metabolic panel (BMP + Alb, Alk Phos, ALT, AST, Total. Bili, TP)      4. Screen for colon cancer  Z12.11 Colonoscopy Screening  Referral      5. CARDIOVASCULAR SCREENING; LDL GOAL LESS THAN 130  Z13.6       6. Menopause  Z78.0 DX Hip/Pelvis/Spine        Anxiety/depression:  Some increase in depression with dose reduction of wellbutrin. Plan to increase dose back to 300mg daily.  Continue lexapro 20mg daily now, especially given changing health situation of her family.  She will reach out if she develops side effects on higher dose. (irritability/agitation)    HTN:  Well controlled, labs today    Lipids;  elevated at last check, will return fasting for lab work.    Screening for osteoporosis:  Family history in mom.  She will check on coverage before scheduling.          Rubia Weber is a 57 year old, presenting for the following health issues:  Back Pain and Mental Health Problem         View : No data to display.              History of Present Illness       Back Pain:  She presents for follow up of back pain. Patient's back pain is a recurring problem.  Location of back pain:  Right upper back and right side of neck  Description of back pain: cramping and sharp  Back pain spreads: right side of neck    Since patient first noticed back pain, pain is: always present, but gets better and worse  Does back pain interfere with her job:  Yes      Mental Health Follow-up:  Patient presents to follow-up on Depression & Anxiety.Patient's depression since last visit has been:  Medium  The patient is not having other symptoms associated with depression.  Patient's anxiety since last visit has been:  Medium  The patient is having other symptoms  associated with anxiety.  Any significant life events: health concerns  Patient is not feeling anxious or having panic attacks.  Patient has no concerns about alcohol or drug use.    She eats 4 or more servings of fruits and vegetables daily.She consumes 0 sweetened beverage(s) daily.She exercises with enough effort to increase her heart rate 30 to 60 minutes per day.  She exercises with enough effort to increase her heart rate 5 days per week.   She is taking medications regularly.    Today's PHQ-9         PHQ-9 Total Score: 8    PHQ-9 Q9 Thoughts of better off dead/self-harm past 2 weeks :   Not at all    How difficult have these problems made it for you to do your work, take care of things at home, or get along with other people: Somewhat difficult  Today's PATRICK-7 Score: 4       Decreased the wellbutrin to 150mg daily in March.  Has continued on the 20mg of the lexapro daily.    Has had some increased depression since reducing the dose of the wellbutrin.  Mom, dad and  are all having health conditions which is increasing the stress.  Was concerned about increased anxiety/agitation on the wellbutrin but now wondering if she was mostly just worried about it and it was less of an issue then she thought.  Would be willing to try back on the higher dose.      *  Wondering about bone density screening.  Mom with h/o CREST and osteoporosis  Pt s/p hyst in 2017, weaned off hormones a few years ago.        * chart reviewed.  Had colonoscopy in 2017 (2nd colonoscopy) due to h/o polyps.  Recommendation was for 5 year follow up.  New referral placed.    Review of Systems         Objective    /80   Pulse 71   Temp 98.4  F (36.9  C) (Tympanic)   Resp 16   Wt 63.7 kg (140 lb 6.4 oz)   SpO2 98%   BMI 22.94 kg/m    Body mass index is 22.94 kg/m .  Physical Exam   PE:  VS as above   Gen:  WN/WD/WH female in NAD   Psych: Alert and oriented times 3; coherent speech, normal   rate and volume, able to articulate  logical thoughts, able   to abstract reason, no tangential thoughts, no hallucinations   or delusions  Her affect is mildly flat and tearful      EPic reviewed

## 2023-05-16 NOTE — NURSING NOTE
"Initial /80   Pulse 71   Temp 98.4  F (36.9  C) (Tympanic)   Resp 16   Wt 63.7 kg (140 lb 6.4 oz)   SpO2 98%   BMI 22.94 kg/m   Estimated body mass index is 22.94 kg/m  as calculated from the following:    Height as of 1/26/23: 1.666 m (5' 5.6\").    Weight as of this encounter: 63.7 kg (140 lb 6.4 oz). .      "

## 2023-05-16 NOTE — LETTER
My Depression Action Plan  Name: Tia Pablo   Date of Birth 1965  Date: 5/16/2023    My doctor: Sally Stone   My clinic: [facility]  [address] [id number] [phone]            GREEN    ZONE   Good Control    What it looks like:   Things are going generally well. You have normal ups and downs. You may even feel depressed from time to time, but bad moods usually last less than a day.   What you need to do:  Continue to care for yourself (see self care plan)  Check your depression survival kit and update it as needed  Follow your physician s recommendations including any medication.  Do not stop taking medication unless you consult with your physician first.             YELLOW         ZONE Getting Worse    What it looks like:   Depression is starting to interfere with your life.   It may be hard to get out of bed; you may be starting to isolate yourself from others.  Symptoms of depression are starting to last most all day and this has happened for several days.   You may have suicidal thoughts but they are not constant.   What you need to do:     Call your care team. Your response to treatment will improve if you keep your care team informed of your progress. Yellow periods are signs an adjustment may need to be made.     Continue your self-care.  Just get dressed and ready for the day.  Don't give yourself time to talk yourself out of it.    Talk to someone in your support network.    Open up your Depression Self-Care Plan/Wellness Kit.             RED    ZONE Medical Alert - Get Help    What it looks like:   Depression is seriously interfering with your life.   You may experience these or other symptoms: You can t get out of bed most days, can t work or engage in other necessary activities, you have trouble taking care of basic hygiene, or basic responsibilities, thoughts of suicide or death that will not go away, self-injurious behavior.      What you need to do:  Call your care team and request a same-day appointment. If they are not available (weekends or after hours) call your local crisis line, emergency room or 911.          Depression Self-Care Plan / Wellness Kit    Many people find that medication and therapy are helpful treatments for managing depression. In addition, making small changes to your everyday life can help to boost your mood and improve your wellbeing. Below are some tips for you to consider. Be sure to talk with your medical provider and/or behavioral health consultant if your symptoms are worsening or not improving.     Sleep   Sleep hygiene  means all of the habits that support good, restful sleep. It includes maintaining a consistent bedtime and wake time, using your bedroom only for sleeping or sex, and keeping the bedroom dark and free of distractions like a computer, smartphone, or television.     Develop a Healthy Routine  Maintain good hygiene. Get out of bed in the morning, make your bed, brush your teeth, take a shower, and get dressed. Don t spend too much time viewing media that makes you feel stressed. Find time to relax each day.    Exercise  Get some form of exercise every day. This will help reduce pain and release endorphins, the  feel good  chemicals in your brain. It can be as simple as just going for a walk or doing some gardening, anything that will get you moving.      Diet  Strive to eat healthy foods, including fruits and vegetables. Drink plenty of water. Avoid excessive sugar, caffeine, alcohol, and other mood-altering substances.     Stay Connected with Others  Stay in touch with friends and family members.    Manage Your Mood  Try deep breathing, massage therapy, biofeedback, or meditation. Take part in fun activities when you can. Try to find something to smile about each day.     Psychotherapy  Be open to working with a therapist if your provider recommends it.     Medication  Be sure to take your  medication as prescribed. Most anti-depressants need to be taken every day. It usually takes several weeks for medications to work. Not all medicines work for all people. It is important to follow-up with your provider to make sure you have a treatment plan that is working for you. Do not stop your medication abruptly without first discussing it with your provider.    Crisis Resources   These hotlines are for both adults and children. They and are open 24 hours a day, 7 days a week unless noted otherwise.    National Suicide Prevention Lifeline   988 or 3-702-198-ZWGJ (6932)    Crisis Text Line    www.crisistextline.org  Text HOME to 652477 from anywhere in the [place], anytime, about any type of crisis. A live, trained crisis counselor will receive the text and respond quickly.    [employer] Lifeline for LGBTQ Youth  A national crisis intervention and suicide lifeline for LGBTQ youth under 25. Provides a safe place to talk without judgement. Call 1-505.336.7244; text START to 893300 or visit www.the[employer] Mobilevorproject.org to talk to a trained counselor.    For [facility] crisis numbers, visit the [facility] website at:  https://[place].gov/dhs/people-we-serve/adults/health-care/mental-health/resources/crisis-contacts.jsp

## 2023-05-17 DIAGNOSIS — F41.9 ANXIETY: ICD-10-CM

## 2023-05-17 DIAGNOSIS — F32.0 CURRENT MILD EPISODE OF MAJOR DEPRESSIVE DISORDER WITHOUT PRIOR EPISODE (H): ICD-10-CM

## 2023-05-18 RX ORDER — ESCITALOPRAM OXALATE 10 MG/1
TABLET ORAL
Qty: 180 TABLET | Refills: 1 | Status: SHIPPED | OUTPATIENT
Start: 2023-05-18 | End: 2023-08-29

## 2023-05-18 NOTE — TELEPHONE ENCOUNTER
Routing refill request to provider for review/approval because:   1/31/2023 2/16/2023 5/14/2023                PHQ-9 and GAD7 Scores       PHQ9 TOTAL SCORE               8       PHQ-9 Total Score          3     8       PATRICK-7 Total Score     3     3     4     .Mak Neal RN

## 2023-05-24 ENCOUNTER — OFFICE VISIT (OUTPATIENT)
Dept: PHYSICAL MEDICINE AND REHAB | Facility: CLINIC | Age: 58
End: 2023-05-24
Payer: COMMERCIAL

## 2023-05-24 DIAGNOSIS — G43.719 CHRONIC MIGRAINE WITHOUT AURA, INTRACTABLE, WITHOUT STATUS MIGRAINOSUS: ICD-10-CM

## 2023-05-24 DIAGNOSIS — G24.3 SPASMODIC TORTICOLLIS: Primary | ICD-10-CM

## 2023-05-24 DIAGNOSIS — G24.4 OROMANDIBULAR DYSTONIA: ICD-10-CM

## 2023-05-24 PROCEDURE — 64615 CHEMODENERV MUSC MIGRAINE: CPT | Performed by: PHYSICAL MEDICINE & REHABILITATION

## 2023-05-24 PROCEDURE — 95874 GUIDE NERV DESTR NEEDLE EMG: CPT | Performed by: PHYSICAL MEDICINE & REHABILITATION

## 2023-05-24 NOTE — LETTER
5/24/2023       RE: Tia Pablo  [address] [address]       Dear Colleague,    Thank you for referring your patient, Tia Pablo, to the [facility] [place] [facility]. Please see a copy of my visit note below.      [facility]     PM&R CLINIC NOTE  BOTULINUM TOXIN PROCEDURE      HPI  No chief complaint on file.    Tia Pablo is a 57 year old female with a history of headaches and generalized dystonia with pain who presents to clinic for botulinum toxin injections for management of chronic migraine headaches, cervical and oromandibular dystonia.     SINCE LAST VISIT  Tia Pablo was last seen here in clinic on 2/28/20222, at which time she received 450 units of Botox. .      Patient denies new medical diagnoses, illnesses, hospitalizations, emergency room visits, and injuries since the previous injection with botulinum neurotoxin.     RESPONSE TO PREVIOUS TREATMENT    Side effects: She had mild jaw weakness as well as increased pain in the lower thoracic area for a few days 2-7 days following the injections.     1.  Headache frequency during this injection cycle: 1-3 migraine headache day per month when Botox is in effect, but the Botox only seems to work for 7 weeks and then she experiences 4-8 migraine headache days per month and daily milder headaches. This is compared to her baseline headache frequency of 30 headache days per month.     2.  Headache duration during this injection cycle:  Headache duration is typically no longer than a few hours with Relpax, but if the Relpax does not work right away, she has to take two doses and the headaches last much longer. Patient reports no episodes of multiple day headaches during this injection cycle.     3.  Headache intensity during this injection cycle:    A.  5/10  =  Typical pain level.  B.  6-7/10   =  Worst pain level.  C.  2/10  =  Lowest pain level.    4.  Change in headache medication usage during this injection cycle:  (For Example:  Able to decrease use of oral pain medications.) She is only taking her migraine headache medication about once per month, whereas she was taking it much more frequently prior to Botox.    5.  ER Visits During This Injection Cycle: None.     6.  Functional Performance:  Change in ADL's, social interaction, days lost from work, etc. Patient reports being able to more fully participate in social and family activities and responsibilities as headache symptoms have improved.    Dystonia Improvement (neck and jaw): Yes.  Percent Improvement: 75 %    Duration of Benefit:  7 weeks and followed by a gradual reduction in benefit. She starts clenching her teeth more consistently as the Botox wears off.       PHYSICAL EXAM  VS: There were no vitals taken for this visit.   GEN: Pleasant and cooperative, in no acute distress  HEENT: No facial asymmetry  NECK: Involuntary right rotation with hypertonicity noted in right anterior neck musculature and upper trapezius.     ALLERGIES  Allergies   Allergen Reactions    Bee Venom Anaphylaxis    Erythromycin Hives and Itching    Seasonal Allergies        CURRENT MEDICATIONS    Current Outpatient Medications:     buPROPion (WELLBUTRIN XL) 150 MG 24 hr tablet, TAKE 2 TABLETS (300 MG) BY MOUTH EVERY MORNING (Patient taking differently: 150 mg every morning), Disp: 180 tablet, Rfl: 1    chlorthalidone (HYGROTON) 25 MG tablet, TAKE 1 TABLET BY MOUTH EVERY DAY, Disp: 90 tablet, Rfl: 1    clonazePAM (KLONOPIN) 0.5 MG tablet, Take 1 tablet (0.5 mg) by mouth 3 times daily Take 1 tab in the evening before bed. Okay to take up to 2 tabs as needed in the day., Disp: 90 tablet, Rfl: 5    eletriptan (RELPAX) 40 MG tablet, TAKE 1 TABLET (40 MG) BY MOUTH AT ONSET OF HEADACHE FOR MIGRAINE MAY REPEAT IN 2 HOURS. MAX 2 TABLETS/24 HOURS., Disp: 12 tablet, Rfl: 3     EPINEPHrine (ANY BX GENERIC EQUIV) 0.3 MG/0.3ML injection 2-pack, PLEASE SEE ATTACHED FOR DETAILED DIRECTIONS, Disp: , Rfl:     escitalopram (LEXAPRO) 10 MG tablet, TAKE 2 TABLETS BY MOUTH EVERY DAY, Disp: 180 tablet, Rfl: 1    methocarbamol (ROBAXIN) 500 MG tablet, Take 0.5-1 tablets (250-500 mg) by mouth 4 times daily as needed for muscle spasms, Disp: 40 tablet, Rfl: 0    tretinoin (RETIN-A) 0.05 % external cream, Apply pea sized amount at bedtime., Disp: 45 g, Rfl: 3    Urea 40 % CREA, Apply daily at bedtime to feet as needed., Disp: 85 g, Rfl: 4    Current Facility-Administered Medications:     botulinum toxin type A (BOTOX) 100 units injection 600 Units, 600 Units, Intramuscular, Q90 Days, Simi Vazquez MD       BOTULINUM NEUROTOXIN INJECTION PROCEDURES    VERIFICATION OF PATIENT IDENTIFICATION AND PROCEDURE     Initials   Patient Name SES   Patient  SES   Procedure Verified by: SES     Prior to the start of the procedure and with procedural staff participation, I verbally confirmed the patient s identity using two indicators, relevant allergies, that the procedure was appropriate and matched the consent or emergent situation, and that the correct equipment/implants were available. Immediately prior to starting the procedure I conducted the Time Out with the procedural staff and re-confirmed the patient s name, procedure, and site/side. (The Joint Commission universal protocol was followed.)  Yes    Sedation (Moderate or Deep): None    ABOVE ASSESSMENTS PERFORMED BY    Simi Vazquez MD      INDICATIONS FOR PROCEDURES  Tia Pablo is a 57 year old patient with involuntary muscle spasms and pain secondary to the diagnosis of oromandibular/cervical dystonia and chronic migraine headaches. Her baseline symptoms have been recalcitrant to oral medications and conservative therapy.  She is here today for reinjection with Botox.    GOAL OF PROCEDURE  The goal of this procedure is to increase  active range of motion, improve volitional motor control, decrease pain  and enhance functional independence.      TOTAL DOSE ADMINISTERED  Dose Administered:  450 units  Botox (Botulinum Toxin Type A)   Unavoidable Drug Waste: Yes  Amount of drug waste (mL): 50 units Botox.  Reason for waste:  Single use vial  Diluent Used:  Preservative Free Normal Saline  Total Volume of Diluent Used:  5 ml  NDC #: Botox 100u ([address]-70)      CONSENT  The risks, benefits, and treatment options were discussed with Tia MAR Pablo and she agreed to proceed.    Written consent was obtained by [facility].     EQUIPMENT USED  Needle-37mm stimulating/recording  Needle-30 gauge  EMG/NCS Machine    SKIN PREPARATION  Skin preparation was performed using an alcohol wipe.    GUIDANCE DESCRIPTION  Electro-myographic guidance was necessary throughout the procedure to accurately identify all areas of dystonic muscles while avoiding injection of non-dystonic muscles, neighboring nerves and nearby vascular structures.     AREA/MUSCLE INJECTED: 450 UNITS BOTOX = TOTAL DOSE     1. NECK MUSCLES: 145 UNITS BOTOX = TOTAL DOSE, 1:1 DILUTION     Right Upper Trapezius - 20 units of Botox at 4 site/s.   Left Upper Trapezius - 20 units of Botox at 4 site/s.      Right Splenius - 5 units of Botox at 1 site/s.   Left Splenius - 5 units of Botox at 1 site/s.      Right Auricularis - 15 units of Botox at 3 site/s (anterior, superior & posterior).   Left Auricularis - 15 units of Botox at 3 site/s (anterior, superior & posterior).     Right occipitalis - 15 units of Botox at 3 site/s.   Left occipitalis - 15 units of Botox at 3 site/s.     Right Sternocleidomastoid - 10 units of Botox at 1 site/s.    Right Middle Scalene - 15 units of Botox at 1 site/s.     Right Platysma - 5 units of Botox at 2 site/s.  Left Platysma - 5 units of Botox at 2 site/s.      2. UPPER EXTREMITY & TRUNK MUSCLES: 90 UNITS BOTOX = TOTAL DOSE, 1:1 DILUTION     Right Levator scapula - 25  units of Botox at 2 site/s.   Left Levator scapula - 25 units of Botox at 2 site/s.      Right Rhomboid - 20 units of Botox at 1 site/s.    Left Rhomboid - 20 units of Botox at 3 site/s.       3. JAW MUSCLES: 140 UNITS BOTOX = TOTAL DOSE, 1:1 DILUTION      Right Temporalis - 60 units of Botox at 5 site/s.               Left Temporalis - 60 units of Botox at 5 site/s.     Right Masseter - 10 units of Botox at 1 site/s.    Left Masseter - 10 units of Botox at 1 site/s.     4. FACIAL & SCALP MUSCLES: 45 UNITS BOTOX = TOTAL DOSE, 2:1 DILUTION     Right Frontalis - 15 units of Botox at 3 site/s (2 middle & 1 hairline).    Left Frontalis - 15 units of Botox at 3 site/s (2 middle & 1 hairline).     Procerus - 5 units of Botox at 1 site/s.       Right  - 5 units of Botox at 1 site/s.    Left  - 5 units of Botox at 1 site/s.      Right Occipitalis - 15 units of Botox at 1 site/s.    Left Occipitalis - 15 units of Botox at 1 site/s.       RESPONSE TO PROCEDURE  Tia Pablo tolerated the procedure well and there were no immediate complications. She was allowed to recover for an appropriate period of time and was discharged home in stable condition.    ASSESSMENT AND PLAN   Botulinum toxin injections: No changes made to Botox dose today, however Botox was distributed to avoid jaw weakness. Patient will continue to monitor response to Botox and report at next appointment.   Referrals: None.   Follow up: Tia Pablo was rescheduled for the next series of injections in 12 weeks, at which time we will evaluate response to today's injections. she may call the clinic prior with any questions or concerns prior to the next appointment.               Again, thank you for allowing me to participate in the care of your patient.      Sincerely,    Simi Vazquez MD

## 2023-05-24 NOTE — PROGRESS NOTES
[facility]     PM&R CLINIC NOTE  BOTULINUM TOXIN PROCEDURE      HPI  No chief complaint on file.    Tia Pablo is a 57 year old female with a history of headaches and generalized dystonia with pain who presents to clinic for botulinum toxin injections for management of chronic migraine headaches, cervical and oromandibular dystonia.     SINCE LAST VISIT  Tia Pablo was last seen here in clinic on 2/28/20222, at which time she received 450 units of Botox. .      Patient denies new medical diagnoses, illnesses, hospitalizations, emergency room visits, and injuries since the previous injection with botulinum neurotoxin.     RESPONSE TO PREVIOUS TREATMENT    Side effects: She had mild jaw weakness as well as increased pain in the lower thoracic area for a few days 2-7 days following the injections.     1.  Headache frequency during this injection cycle: 1-3 migraine headache day per month when Botox is in effect, but the Botox only seems to work for 7 weeks and then she experiences 4-8 migraine headache days per month and daily milder headaches. This is compared to her baseline headache frequency of 30 headache days per month.     2.  Headache duration during this injection cycle:  Headache duration is typically no longer than a few hours with Relpax, but if the Relpax does not work right away, she has to take two doses and the headaches last much longer. Patient reports no episodes of multiple day headaches during this injection cycle.     3.  Headache intensity during this injection cycle:    A.  5/10  =  Typical pain level.  B.  6-7/10  =  Worst pain level.  C.  2/10  =  Lowest pain level.    4.  Change in headache medication usage during this injection cycle:  (For Example:  Able to decrease use of oral pain medications.) She is only taking her migraine headache medication about once per month, whereas she was taking it much more frequently prior to Botox.    5.  ER  Visits During This Injection Cycle: None.     6.  Functional Performance:  Change in ADL's, social interaction, days lost from work, etc. Patient reports being able to more fully participate in social and family activities and responsibilities as headache symptoms have improved.    Dystonia Improvement (neck and jaw): Yes.  Percent Improvement: 75 %    Duration of Benefit:  7 weeks and followed by a gradual reduction in benefit. She starts clenching her teeth more consistently as the Botox wears off.       PHYSICAL EXAM  VS: There were no vitals taken for this visit.   GEN: Pleasant and cooperative, in no acute distress  HEENT: No facial asymmetry  NECK: Involuntary right rotation with hypertonicity noted in right anterior neck musculature and upper trapezius.     ALLERGIES  Allergies   Allergen Reactions     Bee Venom Anaphylaxis     Erythromycin Hives and Itching     Seasonal Allergies        CURRENT MEDICATIONS    Current Outpatient Medications:      buPROPion (WELLBUTRIN XL) 150 MG 24 hr tablet, TAKE 2 TABLETS (300 MG) BY MOUTH EVERY MORNING (Patient taking differently: 150 mg every morning), Disp: 180 tablet, Rfl: 1     chlorthalidone (HYGROTON) 25 MG tablet, TAKE 1 TABLET BY MOUTH EVERY DAY, Disp: 90 tablet, Rfl: 1     clonazePAM (KLONOPIN) 0.5 MG tablet, Take 1 tablet (0.5 mg) by mouth 3 times daily Take 1 tab in the evening before bed. Okay to take up to 2 tabs as needed in the day., Disp: 90 tablet, Rfl: 5     eletriptan (RELPAX) 40 MG tablet, TAKE 1 TABLET (40 MG) BY MOUTH AT ONSET OF HEADACHE FOR MIGRAINE MAY REPEAT IN 2 HOURS. MAX 2 TABLETS/24 HOURS., Disp: 12 tablet, Rfl: 3     EPINEPHrine (ANY BX GENERIC EQUIV) 0.3 MG/0.3ML injection 2-pack, PLEASE SEE ATTACHED FOR DETAILED DIRECTIONS, Disp: , Rfl:      escitalopram (LEXAPRO) 10 MG tablet, TAKE 2 TABLETS BY MOUTH EVERY DAY, Disp: 180 tablet, Rfl: 1     methocarbamol (ROBAXIN) 500 MG tablet, Take 0.5-1 tablets (250-500 mg) by mouth 4 times daily as  needed for muscle spasms, Disp: 40 tablet, Rfl: 0     tretinoin (RETIN-A) 0.05 % external cream, Apply pea sized amount at bedtime., Disp: 45 g, Rfl: 3     Urea 40 % CREA, Apply daily at bedtime to feet as needed., Disp: 85 g, Rfl: 4    Current Facility-Administered Medications:      botulinum toxin type A (BOTOX) 100 units injection 600 Units, 600 Units, Intramuscular, Q90 Days, Simi Vazquez MD       BOTULINUM NEUROTOXIN INJECTION PROCEDURES    VERIFICATION OF PATIENT IDENTIFICATION AND PROCEDURE     Initials   Patient Name SES   Patient  SES   Procedure Verified by: SES     Prior to the start of the procedure and with procedural staff participation, I verbally confirmed the patient s identity using two indicators, relevant allergies, that the procedure was appropriate and matched the consent or emergent situation, and that the correct equipment/implants were available. Immediately prior to starting the procedure I conducted the Time Out with the procedural staff and re-confirmed the patient s name, procedure, and site/side. (The Joint Commission universal protocol was followed.)  Yes    Sedation (Moderate or Deep): None    ABOVE ASSESSMENTS PERFORMED BY    Simi Vazquez MD      INDICATIONS FOR PROCEDURES  Tia Pablo is a 57 year old patient with involuntary muscle spasms and pain secondary to the diagnosis of oromandibular/cervical dystonia and chronic migraine headaches. Her baseline symptoms have been recalcitrant to oral medications and conservative therapy.  She is here today for reinjection with Botox.    GOAL OF PROCEDURE  The goal of this procedure is to increase active range of motion, improve volitional motor control, decrease pain  and enhance functional independence.      TOTAL DOSE ADMINISTERED  Dose Administered:  450 units  Botox (Botulinum Toxin Type A)   Unavoidable Drug Waste: Yes  Amount of drug waste (mL): 50 units Botox.  Reason for waste:  Single use vial  Diluent Used:   Preservative Free Normal Saline  Total Volume of Diluent Used:  5 ml  NDC #: Botox 100u ([address]-81)      CONSENT  The risks, benefits, and treatment options were discussed with Tia MANUEL Coltmarlyn and she agreed to proceed.    Written consent was obtained by [facility].     EQUIPMENT USED  Needle-37mm stimulating/recording  Needle-30 gauge  EMG/NCS Machine    SKIN PREPARATION  Skin preparation was performed using an alcohol wipe.    GUIDANCE DESCRIPTION  Electro-myographic guidance was necessary throughout the procedure to accurately identify all areas of dystonic muscles while avoiding injection of non-dystonic muscles, neighboring nerves and nearby vascular structures.     AREA/MUSCLE INJECTED: 450 UNITS BOTOX = TOTAL DOSE     1. NECK MUSCLES: 145 UNITS BOTOX = TOTAL DOSE, 1:1 DILUTION     Right Upper Trapezius - 20 units of Botox at 4 site/s.   Left Upper Trapezius - 20 units of Botox at 4 site/s.      Right Splenius - 5 units of Botox at 1 site/s.   Left Splenius - 5 units of Botox at 1 site/s.      Right Auricularis - 15 units of Botox at 3 site/s (anterior, superior & posterior).   Left Auricularis - 15 units of Botox at 3 site/s (anterior, superior & posterior).     Right occipitalis - 15 units of Botox at 3 site/s.   Left occipitalis - 15 units of Botox at 3 site/s.     Right Sternocleidomastoid - 10 units of Botox at 1 site/s.    Right Middle Scalene - 15 units of Botox at 1 site/s.     Right Platysma - 5 units of Botox at 2 site/s.  Left Platysma - 5 units of Botox at 2 site/s.      2. UPPER EXTREMITY & TRUNK MUSCLES: 90 UNITS BOTOX = TOTAL DOSE, 1:1 DILUTION     Right Levator scapula - 25 units of Botox at 2 site/s.   Left Levator scapula - 25 units of Botox at 2 site/s.      Right Rhomboid - 20 units of Botox at 1 site/s.    Left Rhomboid - 20 units of Botox at 3 site/s.       3. JAW MUSCLES: 140 UNITS BOTOX = TOTAL DOSE, 1:1 DILUTION      Right Temporalis - 60 units of Botox at 5 site/s.               Left  Temporalis - 60 units of Botox at 5 site/s.     Right Masseter - 10 units of Botox at 1 site/s.    Left Masseter - 10 units of Botox at 1 site/s.     4. FACIAL & SCALP MUSCLES: 45 UNITS BOTOX = TOTAL DOSE, 2:1 DILUTION     Right Frontalis - 15 units of Botox at 3 site/s (2 middle & 1 hairline).    Left Frontalis - 15 units of Botox at 3 site/s (2 middle & 1 hairline).     Procerus - 5 units of Botox at 1 site/s.       Right  - 5 units of Botox at 1 site/s.    Left  - 5 units of Botox at 1 site/s.      Right Occipitalis - 15 units of Botox at 1 site/s.    Left Occipitalis - 15 units of Botox at 1 site/s.       RESPONSE TO PROCEDURE  Tia Pablo tolerated the procedure well and there were no immediate complications. She was allowed to recover for an appropriate period of time and was discharged home in stable condition.    ASSESSMENT AND PLAN   1. Botulinum toxin injections: No changes made to Botox dose today, however Botox was distributed to avoid jaw weakness. Patient will continue to monitor response to Botox and report at next appointment.   2. Referrals: None.   3. Follow up: Tia Pablo was rescheduled for the next series of injections in 12 weeks, at which time we will evaluate response to today's injections. she may call the clinic prior with any questions or concerns prior to the next appointment.

## 2023-05-26 DIAGNOSIS — G24.4 OROMANDIBULAR DYSTONIA: ICD-10-CM

## 2023-05-26 DIAGNOSIS — G43.719 CHRONIC MIGRAINE WITHOUT AURA, INTRACTABLE, WITHOUT STATUS MIGRAINOSUS: ICD-10-CM

## 2023-05-26 DIAGNOSIS — G24.3 SPASMODIC TORTICOLLIS: Primary | ICD-10-CM

## 2023-06-09 ENCOUNTER — THERAPY VISIT (OUTPATIENT)
Dept: PHYSICAL THERAPY | Facility: CLINIC | Age: 58
End: 2023-06-09
Payer: COMMERCIAL

## 2023-06-09 DIAGNOSIS — M54.2 NECK PAIN: Primary | ICD-10-CM

## 2023-06-09 PROCEDURE — 97140 MANUAL THERAPY 1/> REGIONS: CPT | Mod: GP | Performed by: PHYSICAL THERAPIST

## 2023-06-09 PROCEDURE — 20560 NDL INSJ W/O NJX 1 OR 2 MUSC: CPT | Performed by: PHYSICAL THERAPIST

## 2023-06-19 ENCOUNTER — THERAPY VISIT (OUTPATIENT)
Dept: PHYSICAL THERAPY | Facility: CLINIC | Age: 58
End: 2023-06-19
Payer: COMMERCIAL

## 2023-06-19 DIAGNOSIS — M54.2 NECK PAIN: Primary | ICD-10-CM

## 2023-06-19 PROCEDURE — 97140 MANUAL THERAPY 1/> REGIONS: CPT | Mod: GP | Performed by: PHYSICAL THERAPIST

## 2023-06-19 PROCEDURE — 20560 NDL INSJ W/O NJX 1 OR 2 MUSC: CPT | Performed by: PHYSICAL THERAPIST

## 2023-06-21 ENCOUNTER — TELEPHONE (OUTPATIENT)
Dept: PALLIATIVE MEDICINE | Facility: CLINIC | Age: 58
End: 2023-06-21
Payer: COMMERCIAL

## 2023-06-21 DIAGNOSIS — M79.18 MYOFASCIAL MUSCLE PAIN: Primary | ICD-10-CM

## 2023-06-21 NOTE — TELEPHONE ENCOUNTER
Reason for call:  Other   Patient called regarding (reason for call):   Additional comments: pt requesting order be placed for TPI w/Dr Edwards    Phone number to reach patient:  Home number on file [phone] (home)    Can we leave a detailed message on this number?  YES    Travel screening: Not Applicable        Olivier Breen    [facility] [place]

## 2023-06-21 NOTE — TELEPHONE ENCOUNTER
Patient requesting TPI.     Patient completed CMBB #1 on 4/13 and attempts were made to schedule MBB#2 with her.       Per LOV with Dr. Edwards 2/1/2023.    - Further procedures recommended              - ordered Bilateral ton,3,4 MEDIAL BRANCH BLOCK/RFA   - Medication Management:               - consider changing to norflex instead of robaxin. Continue robaxin as needed for now              - Will start celebrex 100mg twice a day as needed, take with food, do not take with other NSAIDS               - continue lexapro and Wellbutrin   - Physical Therapy: continue   - Clinical Health Psychologist to address issues of relaxation, behavioral change, coping style, and other factors important to improvement: consider   - Diagnostic Studies: consider repeat cervical MRI if symptoms continue to progress  - Urine toxicology screen today: no   - Follow up: for procedure     Routing to provider to review request.     Kathryn Saenz, RN  [facility] [place]  [phone]

## 2023-07-01 ENCOUNTER — MYC MEDICAL ADVICE (OUTPATIENT)
Dept: FAMILY MEDICINE | Facility: CLINIC | Age: 58
End: 2023-07-01
Payer: COMMERCIAL

## 2023-07-10 ENCOUNTER — THERAPY VISIT (OUTPATIENT)
Dept: PHYSICAL THERAPY | Facility: CLINIC | Age: 58
End: 2023-07-10
Payer: COMMERCIAL

## 2023-07-10 DIAGNOSIS — M54.2 NECK PAIN: Primary | ICD-10-CM

## 2023-07-10 PROCEDURE — 20560 NDL INSJ W/O NJX 1 OR 2 MUSC: CPT | Performed by: PHYSICAL THERAPIST

## 2023-07-10 PROCEDURE — 97140 MANUAL THERAPY 1/> REGIONS: CPT | Mod: GP | Performed by: PHYSICAL THERAPIST

## 2023-07-12 ENCOUNTER — TELEPHONE (OUTPATIENT)
Dept: SURGERY | Facility: CLINIC | Age: 58
End: 2023-07-12
Payer: COMMERCIAL

## 2023-07-12 NOTE — TELEPHONE ENCOUNTER
Screening Questions  BLUE  KIND OF PREP RED  LOCATION [review exclusion criteria] GREEN  SEDATION TYPE        Y Are you active on mychart?       [facility] Ordering/Referring Provider?        [facility]?      n Have you had a positive covid test in the last 14 days?     23.36 1. BMI  [BMI 40+ - review exclusion criteria& smart-phrase document]    Y  2. Are you able to give consent for your medical care? [IF NO,RN REVIEW]          N  3. Are you taking any prescription pain medications on a routine schedule   (ex narcotics: oxycodone, roxicodone, oxycontin,  and percocet)? [RN Review]        N  3a. EXTENDED PREP What kind of prescription?     N 4. Do you have any chemical dependencies such as alcohol, street drugs, or methadone?        **If yes 3- 5 , please schedule with MAC sedation.**          IF YES TO ANY 6 - 10 - HOSPITAL SETTING ONLY.     N 6.   Do you need assistance transferring?     N 7.   Have you had a heart or lung transplant?    N 8.   Are you currently on dialysis?   N 9.   Do you use daily home oxygen?   N 10. Do you take nitroglycerin?   10a. N If yes, how often?     N 11. Are you currently pregnant?    11a. N If yes, how many weeks? [ Greater than 12 weeks, OR NEEDED]    N 12. Do you have Pulmonary Hypertension? *NEED PAC APPT AT [facility] w/ [facility]*     N 13. [review exclusion criteria]  Do you have any implantable devices in your body (pacemaker, defib, LVAD)?    N 14. In the past 6 months, have you had any heart related issues including cardiomyopathy or heart attack?     14a. N If yes, did it require cardiac stenting if so when?     N 15. Have you had a stroke or Transient ischemic attack (TIA - aka  mini stroke ) within 6 months?      N 16. Do you have mod to severe Obstructive Sleep Apnea?  [Hospital only]    N 17. Do you have SEVERE AND UNCONTROLLED asthma? *NEED PAC APPT AT [facility] w/[facility]*     18.Do you take blood thinners?  No    N 19. Do you take any of the  "following medications?    NPhentermine    NOzempic    NWegovy (Semaglutide)      19a. If yes, \"Hold for 7 days before procedure.  Please consult your prescribing provider if you have questions about holding this medication.\"     N  20. Do you have chronic kidney disease?      N  21. Do you have a diagnosis of diabetes?     N  22. On a regular basis do you go 3-5 days between bowel movements?     See below 23. Preferred LOCAL Pharmacy for Pre Prescription         [id number] [place] [facility] [address]  WRITTEN PRESCRIPTION REQUESTED        - CLOSING REMINDERS -  You will receive a call from a Nurse to review instructions and health history.  This assessment must be completed prior to your procedure.  Failure to complete the Nurse assessment may result in the procedure being cancelled.    On the day of your procedure, please designatean adult(s) who can drive you home stay with you for the next 24 hours. The medicines used in the exam will make you sleepy. You will not be able to drive.    You cannot take public transportation, ride share services, or non-medical taxi service without a responsible caregiver.  Medical transport services are allowed with the requirement that a responsible caregiver will receive you at your destination.  We require that drivers and caregivers are confirmed prior to your procedure.      - SCHEDULING DETAILS -  N & N Hospital Setting Required & If yes, what is the exclusion?   Alicia  Surgeon    10-18-23  Date of Procedure  Lower Endoscopy [Colonoscopy]  Type of Procedure Scheduled  [facility] [place] If you answer yes to questions #8, #20, #21 [  pts ]Which Colonoscopy Prep was Sent?     GEN Sedation Type     N PAC / Pre-op Required              "

## 2023-07-19 ASSESSMENT — PAIN SCALES - PAIN ENJOYMENT GENERAL ACTIVITY SCALE (PEG)
INTERFERED_ENJOYMENT_LIFE: 6
INTERFERED_GENERAL_ACTIVITY: 6
AVG_PAIN_PASTWEEK: 8
PEG_TOTALSCORE: 6.67

## 2023-07-24 ENCOUNTER — OFFICE VISIT (OUTPATIENT)
Dept: PALLIATIVE MEDICINE | Facility: CLINIC | Age: 58
End: 2023-07-24
Attending: PAIN MEDICINE
Payer: COMMERCIAL

## 2023-07-24 VITALS — HEART RATE: 72 BPM | DIASTOLIC BLOOD PRESSURE: 79 MMHG | SYSTOLIC BLOOD PRESSURE: 133 MMHG

## 2023-07-24 DIAGNOSIS — M79.18 MYOFASCIAL MUSCLE PAIN: ICD-10-CM

## 2023-07-24 PROCEDURE — 20553 NJX 1/MLT TRIGGER POINTS 3/>: CPT | Performed by: PAIN MEDICINE

## 2023-07-24 RX ORDER — BUPIVACAINE HYDROCHLORIDE 2.5 MG/ML
10 INJECTION, SOLUTION EPIDURAL; INFILTRATION; INTRACAUDAL ONCE
Status: COMPLETED | OUTPATIENT
Start: 2023-07-24 | End: 2023-07-24

## 2023-07-24 RX ADMIN — BUPIVACAINE HYDROCHLORIDE 25 MG: 2.5 INJECTION, SOLUTION EPIDURAL; INFILTRATION; INTRACAUDAL at 22:37

## 2023-07-24 ASSESSMENT — PAIN SCALES - GENERAL: PAINLEVEL: SEVERE PAIN (7)

## 2023-07-24 NOTE — PATIENT INSTRUCTIONS
[facility]  Post Procedure Instructions    Today you had:  trigger point injections   occipital nerve block   bursa injection  Joint Injection    Medications used:  lidocaine   bupivacaine   kenalog   dexamethasone        Go to the emergency room if you develop any shortness of breath  Monitor the injection sites for signs and symptoms of infection-fever, chills, redness, swelling, warmth, or drainage to areas.  You may have soreness at injection sites for up to 24 hours.  It may take up to 14 days for the steroid medication to start working although you may feel the effect as early as a few days after the procedure.     You may apply ice to the painful areas to help minimize the discomfort of the needle pokes.  Do not apply heat to sites for at least 12 hours.  You may use anti-inflammatory medications or Tylenol for pain control if necessary    Pain Clinic phone number during work hours (Monday through Friday 8 am-4:30 pm) at [phone] or the Provider Line after hours at [phone]:

## 2023-07-25 NOTE — PROGRESS NOTES
Tia was seen today for pain.    Diagnoses and all orders for this visit:    Myofascial muscle pain  -     PAIN INJECTION EVAL/TREAT/FOLLOW UP        Trigger points were identified by patient, and marked when appropriate.  The area was prepped with Chloroprep.    Using clean technique, injections were completed using a 25G, 3.5 inch needle.  After negative aspiration, injection was completed.  A total of 5 locations were injected.  When possible, tissue was retracted from the chest wall to avoid lung injury.    Muscle groups injected:  Bilateral trapezius, rhomboids, and levator scapulae     Injection solution contained:  10ml of 0.25% bupivacaine       Saeed Edwards MD  [facility]

## 2023-08-01 ENCOUNTER — THERAPY VISIT (OUTPATIENT)
Dept: PHYSICAL THERAPY | Facility: CLINIC | Age: 58
End: 2023-08-01
Payer: COMMERCIAL

## 2023-08-01 DIAGNOSIS — M54.2 NECK PAIN: ICD-10-CM

## 2023-08-01 DIAGNOSIS — M54.2 CERVICAL PAIN: Primary | ICD-10-CM

## 2023-08-01 PROCEDURE — 97110 THERAPEUTIC EXERCISES: CPT | Mod: GP | Performed by: PHYSICAL THERAPIST

## 2023-08-01 PROCEDURE — 97140 MANUAL THERAPY 1/> REGIONS: CPT | Mod: GP | Performed by: PHYSICAL THERAPIST

## 2023-08-02 ENCOUNTER — DOCUMENTATION ONLY (OUTPATIENT)
Dept: FAMILY MEDICINE | Facility: CLINIC | Age: 58
End: 2023-08-02
Payer: COMMERCIAL

## 2023-08-02 NOTE — PROGRESS NOTES
Hi! Please place future laboratory orders if needed for upcoming appointment on 8/3/23. If labs are not due, please have your care team contact the patient to cancel lab only appointment.     Thank you!  [facility]

## 2023-08-04 ENCOUNTER — MYC MEDICAL ADVICE (OUTPATIENT)
Dept: FAMILY MEDICINE | Facility: CLINIC | Age: 58
End: 2023-08-04
Payer: COMMERCIAL

## 2023-08-04 ENCOUNTER — TELEPHONE (OUTPATIENT)
Dept: PHYSICAL MEDICINE AND REHAB | Facility: CLINIC | Age: 58
End: 2023-08-04
Payer: COMMERCIAL

## 2023-08-04 DIAGNOSIS — I10 ESSENTIAL HYPERTENSION: ICD-10-CM

## 2023-08-04 DIAGNOSIS — F32.0 CURRENT MILD EPISODE OF MAJOR DEPRESSIVE DISORDER WITHOUT PRIOR EPISODE (H): Primary | ICD-10-CM

## 2023-08-04 DIAGNOSIS — Z13.6 CARDIOVASCULAR SCREENING; LDL GOAL LESS THAN 130: ICD-10-CM

## 2023-08-04 RX ORDER — BUPROPION HYDROCHLORIDE 300 MG/1
300 TABLET ORAL EVERY MORNING
Qty: 90 TABLET | Refills: 1 | Status: SHIPPED | OUTPATIENT
Start: 2023-08-04 | End: 2023-10-16

## 2023-08-04 NOTE — TELEPHONE ENCOUNTER
[facility] Call Center    Phone Message    May a detailed message be left on voicemail: yes     Reason for Call: Other: Patient is wondering if Dr Franz could give her TPIs for all of the areas that Dr Vazquez does. Patient states she was scheduled with Dr Edwards in pain management but was told he would need be able to injection all of the areas Dr Vazquez would due to his training. Patient states she would rather see Dr. Vazquez but cannot get in until 12/20 and is wanting to explore all options. Please call patient to discuss at [phone].      Action Taken: Message routed to:  [facility] PM&R    Travel Screening: Not Applicable

## 2023-08-07 NOTE — TELEPHONE ENCOUNTER
Writer called and left detailed VM message, per permission granted on call from call center.  Reviewed that Dr. Franz will be able to inject all of the same locations that Dr. Vazquez did at her last visit, as they trained under the same physician and they both perform the same injections.  Stated that Dr. Franz will assess and discuss the injections at her visit to optimize her needs, including location and amounts to be given.  Also stated that I reviewed that she did receive trigger point injections from Dr. Edwards in late July, and if she is requesting that she receive trigger point injections in addition to the Botox at her visit on 8/24, Dr. Franz will discuss this with her at the visit.  Asked that she call us back if she has any further questions, otherwise, we hope that this provides her with some reassurance for her upcoming visit.    Cherise Wall RN on 8/7/2023 at 12:29 PM

## 2023-08-09 ENCOUNTER — MYC MEDICAL ADVICE (OUTPATIENT)
Dept: PHYSICAL MEDICINE AND REHAB | Facility: CLINIC | Age: 58
End: 2023-08-09
Payer: COMMERCIAL

## 2023-08-09 NOTE — TELEPHONE ENCOUNTER
[facility] Call Center    Phone Message    May a detailed message be left on voicemail: yes     Reason for Call: Other: Patient called stating she does not want her TPI and Neurotoxin scheduled same day, but wonders if it is okay to schedule for 3rd week in October with Dr. Franz, or if she needs to wait to be evaluated first to determine it is okay to schedule. Patient states she did receive message that she could schedule with [facility], but patient prefers [facility] in [place] and also wonders if this okay instead. See TE from 8/9/2023. Please review.      Action Taken: Message routed to:  [facility] PM&R    Travel Screening: Not Applicable

## 2023-08-14 ENCOUNTER — THERAPY VISIT (OUTPATIENT)
Dept: PHYSICAL THERAPY | Facility: CLINIC | Age: 58
End: 2023-08-14
Payer: COMMERCIAL

## 2023-08-14 DIAGNOSIS — M54.2 NECK PAIN: Primary | ICD-10-CM

## 2023-08-14 PROCEDURE — 97140 MANUAL THERAPY 1/> REGIONS: CPT | Mod: GP | Performed by: PHYSICAL THERAPIST

## 2023-08-16 ENCOUNTER — TELEPHONE (OUTPATIENT)
Dept: FAMILY MEDICINE | Facility: CLINIC | Age: 58
End: 2023-08-16

## 2023-08-16 NOTE — TELEPHONE ENCOUNTER
Received call from Patient.  Was working in her flower garden and did not notice the poison ivy.  At first thought it was a mosquito bite and was scratching it.  After patient realized it was poison ivy, she washed her arm.  Patient requesting to know what she should do now.  Has hydrocortisone cream at home.  Wanting to know if it should be covered.  Recommended that patient can try calamine, oatmeal, benadryl, ice/cold packs and OTC poison Ivy treatment.  Areas not oozing at this time.  Did not recommend covering area.  Recommended that if she does cover area to wet area before peeling off if dressing is stuck to arm.  Verbalized understanding.  José Miguel Charles RN

## 2023-08-17 DIAGNOSIS — I10 ESSENTIAL HYPERTENSION: ICD-10-CM

## 2023-08-17 DIAGNOSIS — Z00.00 ROUTINE GENERAL MEDICAL EXAMINATION AT A HEALTH CARE FACILITY: ICD-10-CM

## 2023-08-18 RX ORDER — CHLORTHALIDONE 25 MG/1
TABLET ORAL
Qty: 90 TABLET | Refills: 1 | Status: SHIPPED | OUTPATIENT
Start: 2023-08-18 | End: 2024-01-26

## 2023-08-18 NOTE — TELEPHONE ENCOUNTER
"Routing refill request to provider for review/approval because:  Due for a annual exam        Requested Prescriptions   Pending Prescriptions Disp Refills    chlorthalidone (HYGROTON) 25 MG tablet [Pharmacy Med Name: CHLORTHALIDONE 25 MG TABLET] 90 tablet 1     Sig: TAKE 1 TABLET BY MOUTH EVERY DAY       Diuretics (Including Combos) Protocol Passed - 8/17/2023 11:37 PM        Passed - Blood pressure under 140/90 in past 12 months     BP Readings from Last 3 Encounters:   07/24/23 133/79   05/16/23 122/80   04/13/23 (!) 147/71                 Passed - Recent (12 mo) or future (30 days) visit within the authorizing provider's specialty     Patient has had an office visit with the authorizing provider or a provider within the authorizing providers department within the previous 12 mos or has a future within next 30 days. See \"Patient Info\" tab in inbasket, or \"Choose Columns\" in Meds & Orders section of the refill encounter.              Passed - Medication is active on med list        Passed - Patient is age 18 or older        Passed - No active pregancy on record        Passed - Normal serum creatinine on file in past 12 months     Recent Labs   Lab Test 05/16/23  1214   CR 0.69              Passed - Normal serum potassium on file in past 12 months     Recent Labs   Lab Test 05/16/23  1214   POTASSIUM 4.3                    Passed - Normal serum sodium on file in past 12 months     Recent Labs   Lab Test 05/16/23  1214                 Passed - No positive pregnancy test in past 12 months                 Suha Lopez RN 08/18/23 8:24 AM   "

## 2023-08-22 ENCOUNTER — THERAPY VISIT (OUTPATIENT)
Dept: PHYSICAL THERAPY | Facility: CLINIC | Age: 58
End: 2023-08-22
Payer: COMMERCIAL

## 2023-08-22 DIAGNOSIS — M54.2 NECK PAIN: Primary | ICD-10-CM

## 2023-08-22 PROCEDURE — 20560 NDL INSJ W/O NJX 1 OR 2 MUSC: CPT | Performed by: PHYSICAL THERAPIST

## 2023-08-24 ENCOUNTER — OFFICE VISIT (OUTPATIENT)
Dept: PHYSICAL MEDICINE AND REHAB | Facility: CLINIC | Age: 58
End: 2023-08-24
Payer: COMMERCIAL

## 2023-08-24 DIAGNOSIS — G24.4 OROMANDIBULAR DYSTONIA: ICD-10-CM

## 2023-08-24 DIAGNOSIS — G43.719 CHRONIC MIGRAINE WITHOUT AURA, INTRACTABLE, WITHOUT STATUS MIGRAINOSUS: ICD-10-CM

## 2023-08-24 DIAGNOSIS — G24.3 SPASMODIC TORTICOLLIS: Primary | ICD-10-CM

## 2023-08-24 PROCEDURE — 95874 GUIDE NERV DESTR NEEDLE EMG: CPT | Performed by: PHYSICAL MEDICINE & REHABILITATION

## 2023-08-24 PROCEDURE — 64615 CHEMODENERV MUSC MIGRAINE: CPT | Performed by: PHYSICAL MEDICINE & REHABILITATION

## 2023-08-24 NOTE — PROGRESS NOTES
[facility]     PM&R CLINIC NOTE  BOTULINUM TOXIN PROCEDURE      HPI  Chief Complaint   Patient presents with    RECHECK     Botox injections confirmed with patient     Tia Pablo is a 57 year old female with a history of headaches and generalized dystonia with pain who presents to clinic for botulinum toxin injections for management of chronic migraine headaches, cervical and oromandibular dystonia.     SINCE LAST VISIT  Tia Pablo was last seen here in clinic on 5/24/23, at which time she received 450 units of Botox. .      Patient denies new medical diagnoses, illnesses, hospitalizations, emergency room visits, and injuries since the previous injection with botulinum neurotoxin.       Had TPIs 7/24/23 with Dr. Edwards in pain clinic using bupi only (Bilateral trapezius, rhomboids, and levator scapulae) with good results.        RESPONSE TO PREVIOUS TREATMENT    Side effects: She always has some mild jaw weakness and difficulty chewing for about a month after the injections    1.  Headache frequency during this injection cycle: 1-3 migraine headache day per month when Botox is in effect, but the Botox only seems to work for 7 weeks and then she experiences 4-8 migraine headache days per month and daily milder headaches. This is compared to her baseline headache frequency of 30 headache days per month. - she reported the exact same response this past cycle     2.  Headache duration during this injection cycle:  Headache duration is typically no longer than a few hours with Relpax, but if the Relpax does not work right away, she has to take two doses and the headaches last much longer. Patient reports no episodes of multiple day headaches during this injection cycle.     3.  Headache intensity during this injection cycle:    A.  5/10  =  Typical pain level.  B.  6-7/10  =  Worst pain level.  C.  2/10  =  Lowest pain level.    4.  Change in headache medication usage during  "this injection cycle:  (For Example:  Able to decrease use of oral pain medications.) She is only taking her migraine headache medication about once per month, whereas she was taking it much more frequently prior to Botox. She also takes robaxin as needed for more severe headaches     5.  ER Visits During This Injection Cycle: None.     6.  Functional Performance:  Change in ADL's, social interaction, days lost from work, etc. Patient reports being able to more fully participate in social and family activities and responsibilities as headache symptoms have improved. She is overall a lot more productive and can \"get more done\" when botox is working.     Dystonia Improvement (neck and jaw): Yes.  Percent Improvement: 75 %    Duration of Benefit:  7 weeks and followed by a gradual reduction in benefit - similar to last cycle. Her jaw clenching gets severe a few weeks before her appointment.       PHYSICAL EXAM  VS: There were no vitals taken for this visit.   GEN: Pleasant and cooperative, in no acute distress  HEENT: No facial asymmetry  NECK: Involuntary right rotation with hypertonicity noted in right anterior neck musculature and upper trapezius.     ALLERGIES  Allergies   Allergen Reactions    Bee Venom Anaphylaxis    Erythromycin Hives and Itching    Seasonal Allergies        CURRENT MEDICATIONS    Current Outpatient Medications:     buPROPion (WELLBUTRIN XL) 150 MG 24 hr tablet, TAKE 1 TABLET BY MOUTH EVERY MORNING, Disp: 90 tablet, Rfl: 1    buPROPion (WELLBUTRIN XL) 300 MG 24 hr tablet, Take 1 tablet (300 mg) by mouth every morning, Disp: 90 tablet, Rfl: 1    chlorthalidone (HYGROTON) 25 MG tablet, TAKE 1 TABLET BY MOUTH EVERY DAY, Disp: 90 tablet, Rfl: 1    clonazePAM (KLONOPIN) 0.5 MG tablet, Take 1 tablet (0.5 mg) by mouth 3 times daily Take 1 tab in the evening before bed. Okay to take up to 2 tabs as needed in the day., Disp: 90 tablet, Rfl: 5    eletriptan (RELPAX) 40 MG tablet, TAKE 1 TABLET (40 MG) BY " MOUTH AT ONSET OF HEADACHE FOR MIGRAINE MAY REPEAT IN 2 HOURS. MAX 2 TABLETS/24 HOURS., Disp: 12 tablet, Rfl: 3    EPINEPHrine (ANY BX GENERIC EQUIV) 0.3 MG/0.3ML injection 2-pack, PLEASE SEE ATTACHED FOR DETAILED DIRECTIONS, Disp: , Rfl:     escitalopram (LEXAPRO) 10 MG tablet, Take 2 tablets (20 mg) by mouth daily, Disp: 180 tablet, Rfl: 1    losartan (COZAAR) 50 MG tablet, TAKE 1 TABLET BY MOUTH EVERY DAY, Disp: 90 tablet, Rfl: 0    methocarbamol (ROBAXIN) 500 MG tablet, Take 0.5-1 tablets (250-500 mg) by mouth 4 times daily as needed for muscle spasms, Disp: 40 tablet, Rfl: 0    tretinoin (RETIN-A) 0.05 % external cream, Apply pea sized amount at bedtime., Disp: 45 g, Rfl: 3    Urea 40 % CREA, Apply daily at bedtime to feet as needed., Disp: 85 g, Rfl: 4    Current Facility-Administered Medications:     botulinum toxin type A (BOTOX) 100 units injection 600 Units, 600 Units, Intramuscular, Q90 Days, Angela Franz MD, 450 Units at 23 1251    botulinum toxin type A (BOTOX) 100 units injection 600 Units, 600 Units, Intramuscular, Q90 Days, Simi Vazquez MD, 450 Units at 23 1802       BOTULINUM NEUROTOXIN INJECTION PROCEDURES    VERIFICATION OF PATIENT IDENTIFICATION AND PROCEDURE     Initials   Patient Name PS   Patient  PS   Procedure Verified by: PS     Prior to the start of the procedure and with procedural staff participation, I verbally confirmed the patient s identity using two indicators, relevant allergies, that the procedure was appropriate and matched the consent or emergent situation, and that the correct equipment/implants were available. Immediately prior to starting the procedure I conducted the Time Out with the procedural staff and re-confirmed the patient s name, procedure, and site/side. (The Joint Commission universal protocol was followed.)  Yes    Sedation (Moderate or Deep): None    ABOVE ASSESSMENTS PERFORMED BY    Angela Franz MD      INDICATIONS FOR  PROCEDURES  Tia Pablo is a 57 year old patient with involuntary muscle spasms and pain secondary to the diagnosis of oromandibular/cervical dystonia and chronic migraine headaches. Her baseline symptoms have been recalcitrant to oral medications and conservative therapy.  She is here today for reinjection with Botox.    GOAL OF PROCEDURE  The goal of this procedure is to increase active range of motion, improve volitional motor control, decrease pain  and enhance functional independence.      TOTAL DOSE ADMINISTERED  Dose Administered:  450 units  Botox (Botulinum Toxin Type A) see below   Unavoidable Drug Waste: Yes  Amount of drug waste (mL): 50 units Botox.  Reason for waste:  Single use vial  Diluent Used:  Preservative Free Normal Saline  Total Volume of Diluent Used:  5 ml  NDC #: Botox 100u ([address]-80)      CONSENT  The risks, benefits, and treatment options were discussed with Tia Pablo and she agreed to proceed.    Written consent was obtained by PS.     EQUIPMENT USED  Needle-35mm stimulating/recording  Needle-30 gauge  EMG/NCS Machine    SKIN PREPARATION  Skin preparation was performed using an alcohol wipe.    GUIDANCE DESCRIPTION  Electro-myographic guidance was necessary throughout the procedure to accurately identify all areas of dystonic muscles while avoiding injection of non-dystonic muscles, neighboring nerves and nearby vascular structures.     AREA/MUSCLE INJECTED: 450 UNITS BOTOX = TOTAL DOSE     1. NECK MUSCLES: 145 UNITS BOTOX = TOTAL DOSE, 1:1 DILUTION     Right Upper Trapezius - 20 units of Botox at 4 site/s.   Left Upper Trapezius - 20 units of Botox at 4 site/s.      Right Splenius Capitis - 5 units of Botox at 1 site/s.   Left Splenius Capitis - 5 units of Botox at 1 site/s.      Right Auricularis - 15 units of Botox at 3 site/s (anterior, superior & posterior).   Left Auricularis - 15 units of Botox at 3 site/s (anterior, superior & posterior).     Right occipitalis - 15  units of Botox at 3 site/s.   Left occipitalis - 15 units of Botox at 3 site/s.     Right Sternocleidomastoid - 10 units of Botox at 1 site/s.    Right Middle Scalene - 15 units of Botox at 1 site/s.     Right Platysma - 5 units of Botox at 2 site/s.  Left Platysma - 5 units of Botox at 2 site/s.      2. UPPER EXTREMITY & TRUNK MUSCLES: 90 UNITS BOTOX = TOTAL DOSE, 1:1 DILUTION     Right Levator scapula - 25 units of Botox at 2 site/s.   Left Levator scapula - 25 units of Botox at 2 site/s.      Right Rhomboid - 20 units of Botox at 1 site/s.    Left Rhomboid - 20 units of Botox at 3 site/s.       3. JAW MUSCLES: 140 UNITS BOTOX = TOTAL DOSE, 1:1 DILUTION      Right Temporalis - 60 units of Botox at 5 site/s.               Left Temporalis - 60 units of Botox at 5 site/s.     Right Masseter - 10 units of Botox at 1 site/s.    Left Masseter - 10 units of Botox at 1 site/s.     4. FACIAL & SCALP MUSCLES: 75 UNITS BOTOX = TOTAL DOSE, 2:1 DILUTION     Right Frontalis - 15 units of Botox at 3 site/s (2 middle & 1 hairline).    Left Frontalis - 15 units of Botox at 3 site/s (2 middle & 1 hairline).     Procerus - 5 units of Botox at 1 site/s.       Right  - 5 units of Botox at 1 site/s.    Left  - 5 units of Botox at 1 site/s.      Right Occipitalis - 15 units of Botox at 1 site/s.    Left Occipitalis - 15 units of Botox at 1 site/s.       RESPONSE TO PROCEDURE  Tia Pablo tolerated the procedure well and there were no immediate complications. She was allowed to recover for an appropriate period of time and was discharged home in stable condition.    ASSESSMENT AND PLAN   Botulinum toxin injections: No changes made to Botox dose today, however Botox was distributed to avoid jaw weakness. Patient will continue to monitor response to Botox and report at next appointment.   Referrals: None.  Follow up: Tia Pablo was rescheduled for the next series of injections in 12 weeks, at which time we will  evaluate response to today's injections. she may call the clinic prior with any questions or concerns prior to the next appointment.     Angela Frazn MD  Physical Medicine & Rehabilitation

## 2023-08-24 NOTE — NURSING NOTE
Chief Complaint   Patient presents with    RECHECK     Botox injections confirmed with patient     Jaqueline Sahu CMA at 11:21 AM on 8/24/2023.

## 2023-08-28 ENCOUNTER — THERAPY VISIT (OUTPATIENT)
Dept: PHYSICAL THERAPY | Facility: CLINIC | Age: 58
End: 2023-08-28
Payer: COMMERCIAL

## 2023-08-28 DIAGNOSIS — Z00.00 ROUTINE GENERAL MEDICAL EXAMINATION AT A HEALTH CARE FACILITY: ICD-10-CM

## 2023-08-28 DIAGNOSIS — M54.2 NECK PAIN: Primary | ICD-10-CM

## 2023-08-28 DIAGNOSIS — F32.0 CURRENT MILD EPISODE OF MAJOR DEPRESSIVE DISORDER WITHOUT PRIOR EPISODE (H): ICD-10-CM

## 2023-08-28 DIAGNOSIS — I10 ESSENTIAL HYPERTENSION: ICD-10-CM

## 2023-08-28 PROCEDURE — 97140 MANUAL THERAPY 1/> REGIONS: CPT | Mod: GP | Performed by: PHYSICAL THERAPIST

## 2023-08-28 PROCEDURE — 20560 NDL INSJ W/O NJX 1 OR 2 MUSC: CPT | Performed by: PHYSICAL THERAPIST

## 2023-08-29 ENCOUNTER — MYC REFILL (OUTPATIENT)
Dept: FAMILY MEDICINE | Facility: CLINIC | Age: 58
End: 2023-08-29
Payer: COMMERCIAL

## 2023-08-29 ENCOUNTER — MYC MEDICAL ADVICE (OUTPATIENT)
Dept: FAMILY MEDICINE | Facility: CLINIC | Age: 58
End: 2023-08-29
Payer: COMMERCIAL

## 2023-08-29 DIAGNOSIS — M54.2 NECK PAIN: ICD-10-CM

## 2023-08-29 DIAGNOSIS — F41.9 ANXIETY: ICD-10-CM
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
## 2023-08-29 DIAGNOSIS — F32.0 CURRENT MILD EPISODE OF MAJOR DEPRESSIVE DISORDER WITHOUT PRIOR EPISODE (H): ICD-10-CM

## 2023-08-29 RX ORDER — METHOCARBAMOL 500 MG/1
250-500 TABLET, FILM COATED ORAL 4 TIMES DAILY PRN
Qty: 40 TABLET | Refills: 0 | Status: SHIPPED | OUTPATIENT
Start: 2023-08-29 | End: 2024-06-06

## 2023-08-30 RX ORDER — BUPROPION HYDROCHLORIDE 150 MG/1
150 TABLET ORAL EVERY MORNING
Qty: 90 TABLET | Refills: 1 | Status: SHIPPED | OUTPATIENT
Start: 2023-08-30 | End: 2023-10-19

## 2023-08-30 RX ORDER — LOSARTAN POTASSIUM 50 MG/1
TABLET ORAL
Qty: 90 TABLET | Refills: 0 | Status: SHIPPED | OUTPATIENT
Start: 2023-08-30 | End: 2023-10-19

## 2023-08-30 RX ORDER — ESCITALOPRAM OXALATE 10 MG/1
20 TABLET ORAL DAILY
Qty: 180 TABLET | Refills: 1 | Status: SHIPPED | OUTPATIENT
Start: 2023-08-30 | End: 2024-01-26

## 2023-09-07 NOTE — PATIENT INSTRUCTIONS
Following conversation AUNDREA CONTRERAS placed call to [facility] and advised advised underpad is not in their system. AUNDREA CONTRERAS noted that provider had letters in chart for patient for underpads and wipes. AUNDREA CONTRERAS was transferred to [facility] who advised that ANABELL SAMS does not pay for wipes. She will put in for chucks/underpads - both reusable and disposable. She set up an account: [id number] and verified his insurance. AUNDREA CONTRERAS was informed that a CMN script will be sent. Once provider signs and dates the form it can be sent back. Once shipped it takes 3-5 business days per [employer] to arrive to patient. Process can potentially take 3-5 weeks - sooner if staff gets form back in timely manner. Information will be relayed to office. Patient was referred on [employer] to [address] [facility], [address] [facility] [address], [address], [address] [place] [address] for food     AUNDREA CONTRERAS will close referral as requested information was provided and account for incontinence supplies was created. AUNDREA CONTRERAS will remain available for future psychosocial support as needed. You have a sinus infection.  Take antibiotics as prescribed for the full course.  Take a probiotic and/or eat yogurt daily while on antibiotics and ~1 week after to prevent GI upset.  Continue to use OTC medications for symptom relief.   Rest and stay hydrated.  Use a humidifier in the bedroom, warm compresses on the face, and steam inhalation.  If symptoms worsen or do not improve within 1 week, please follow-up with your PCP.

## 2023-09-12 DIAGNOSIS — Z72.820 POOR SLEEP: ICD-10-CM

## 2023-09-12 DIAGNOSIS — F41.9 ANXIETY: ICD-10-CM

## 2023-09-12 DIAGNOSIS — M54.2 CERVICALGIA: ICD-10-CM

## 2023-09-12 DIAGNOSIS — G24.3 CERVICAL DYSTONIA: ICD-10-CM

## 2023-09-12 NOTE — CONFIDENTIAL NOTE
Rx Authorization:  Requested Medication/ Dose: Clonazepam 0.5MG tabs  Date last refill ordered:   Quantity ordered:   # refills:   Date of last clinic visit with ordering provider:   Date of next clinic visit with ordering provider:   All pertinent protocol data (lab date/result):   Include pertinent information from patients message:

## 2023-09-13 RX ORDER — CLONAZEPAM 0.5 MG/1
0.5 TABLET ORAL 3 TIMES DAILY
Qty: 90 TABLET | Refills: 0 | Status: SHIPPED | OUTPATIENT
Start: 2023-09-13 | End: 2024-01-25

## 2023-09-13 NOTE — TELEPHONE ENCOUNTER
Medication and strength: Clonazepam 0.5 mg    Is this a [facility] patient? Yes    Last appointment: 7/29/2022 with Dr. Platt    Next appointment: None scheduled    Last re-ordered: 12/21/2022 for a 30 day supply with 5 refills    Action taken: Will send a 30 day supply to be signed with a note stating no more refills until an appointment is made

## 2023-09-20 ENCOUNTER — THERAPY VISIT (OUTPATIENT)
Dept: PHYSICAL THERAPY | Facility: CLINIC | Age: 58
End: 2023-09-20
Payer: COMMERCIAL

## 2023-09-20 ENCOUNTER — LAB (OUTPATIENT)
Dept: LAB | Facility: CLINIC | Age: 58
End: 2023-09-20
Payer: COMMERCIAL

## 2023-09-20 DIAGNOSIS — I10 ESSENTIAL HYPERTENSION: ICD-10-CM

## 2023-09-20 DIAGNOSIS — M54.2 NECK PAIN: Primary | ICD-10-CM

## 2023-09-20 DIAGNOSIS — Z13.6 CARDIOVASCULAR SCREENING; LDL GOAL LESS THAN 130: ICD-10-CM

## 2023-09-20 LAB
ALBUMIN SERPL BCG-MCNC: 4.6 G/DL (ref 3.5–5.2)
ALP SERPL-CCNC: 72 U/L (ref 35–104)
ALT SERPL W P-5'-P-CCNC: 18 U/L (ref 0–50)
ANION GAP SERPL CALCULATED.3IONS-SCNC: 13 MMOL/L (ref 7–15)
AST SERPL W P-5'-P-CCNC: 29 U/L (ref 0–45)
BILIRUB SERPL-MCNC: 0.6 MG/DL
BUN SERPL-MCNC: 15.3 MG/DL (ref 6–20)
CALCIUM SERPL-MCNC: 9.8 MG/DL (ref 8.6–10)
CHLORIDE SERPL-SCNC: 99 MMOL/L (ref 98–107)
CHOLEST SERPL-MCNC: 243 MG/DL
CREAT SERPL-MCNC: 0.7 MG/DL (ref 0.51–0.95)
DEPRECATED HCO3 PLAS-SCNC: 28 MMOL/L (ref 22–29)
EGFRCR SERPLBLD CKD-EPI 2021: >90 ML/MIN/1.73M2
GLUCOSE SERPL-MCNC: 89 MG/DL (ref 70–99)
HDLC SERPL-MCNC: 80 MG/DL
LDLC SERPL CALC-MCNC: 150 MG/DL
NONHDLC SERPL-MCNC: 163 MG/DL
POTASSIUM SERPL-SCNC: 4.4 MMOL/L (ref 3.4–5.3)
PROT SERPL-MCNC: 6.9 G/DL (ref 6.4–8.3)
SODIUM SERPL-SCNC: 140 MMOL/L (ref 136–145)
TRIGL SERPL-MCNC: 67 MG/DL

## 2023-09-20 PROCEDURE — 20560 NDL INSJ W/O NJX 1 OR 2 MUSC: CPT | Performed by: PHYSICAL THERAPIST

## 2023-09-20 PROCEDURE — 80061 LIPID PANEL: CPT

## 2023-09-20 PROCEDURE — 80053 COMPREHEN METABOLIC PANEL: CPT

## 2023-09-20 PROCEDURE — 36415 COLL VENOUS BLD VENIPUNCTURE: CPT

## 2023-09-20 PROCEDURE — 97140 MANUAL THERAPY 1/> REGIONS: CPT | Mod: GP | Performed by: PHYSICAL THERAPIST

## 2023-09-27 ENCOUNTER — ANCILLARY ORDERS (OUTPATIENT)
Dept: MAMMOGRAPHY | Facility: CLINIC | Age: 58
End: 2023-09-27

## 2023-09-27 DIAGNOSIS — Z12.31 SCREENING MAMMOGRAM, ENCOUNTER FOR: Primary | ICD-10-CM

## 2023-09-28 ENCOUNTER — HOSPITAL ENCOUNTER (OUTPATIENT)
Dept: MAMMOGRAPHY | Facility: CLINIC | Age: 58
Discharge: HOME | End: 2023-09-28
Attending: FAMILY MEDICINE | Admitting: FAMILY MEDICINE
Payer: COMMERCIAL

## 2023-09-28 DIAGNOSIS — Z12.31 SCREENING MAMMOGRAM, ENCOUNTER FOR: ICD-10-CM

## 2023-09-28 PROCEDURE — 77067 SCR MAMMO BI INCL CAD: CPT

## 2023-10-04 ENCOUNTER — OFFICE VISIT (OUTPATIENT)
Dept: DERMATOLOGY | Facility: CLINIC | Age: 58
End: 2023-10-04
Payer: COMMERCIAL

## 2023-10-04 ENCOUNTER — THERAPY VISIT (OUTPATIENT)
Dept: PHYSICAL THERAPY | Facility: CLINIC | Age: 58
End: 2023-10-04
Payer: COMMERCIAL

## 2023-10-04 DIAGNOSIS — L81.4 LENTIGO: Primary | ICD-10-CM

## 2023-10-04 DIAGNOSIS — D18.01 CHERRY ANGIOMA: ICD-10-CM

## 2023-10-04 DIAGNOSIS — D23.9 DERMATOFIBROMA: ICD-10-CM

## 2023-10-04 DIAGNOSIS — L70.0 ACNE VULGARIS: ICD-10-CM

## 2023-10-04 DIAGNOSIS — L82.1 SEBORRHEIC KERATOSIS: ICD-10-CM

## 2023-10-04 DIAGNOSIS — D22.9 MULTIPLE BENIGN NEVI: ICD-10-CM

## 2023-10-04 DIAGNOSIS — M54.2 NECK PAIN: Primary | ICD-10-CM

## 2023-10-04 PROCEDURE — 99213 OFFICE O/P EST LOW 20 MIN: CPT | Performed by: PHYSICIAN ASSISTANT

## 2023-10-04 PROCEDURE — 97140 MANUAL THERAPY 1/> REGIONS: CPT | Mod: GP | Performed by: PHYSICAL THERAPIST

## 2023-10-04 RX ORDER — HYDROQUINONE 40 MG/G
CREAM TOPICAL
Qty: 28.35 G | Refills: 3 | Status: SHIPPED | OUTPATIENT
Start: 2023-10-04 | End: 2024-01-25

## 2023-10-04 RX ORDER — TRETINOIN 0.5 MG/G
CREAM TOPICAL
Qty: 45 G | Refills: 3 | Status: SHIPPED | OUTPATIENT
Start: 2023-10-04 | End: 2024-01-25

## 2023-10-04 NOTE — LETTER
10/4/2023         RE: Tia Pablo  [address] [facility] [id number]        Dear Colleague,    Thank you for referring your patient, Tia Pablo, to the [facility]. Please see a copy of my visit note below.    Tia Pablo is an extremely pleasant 56 year old year old female patient here today for skin check. She denies any painful or bleeding skin lesions. She would like refill on tretinoin, which has been helping with acne.  Patient has no other skin complaints today.  Remainder of the HPI, Meds, PMH, Allergies, FH, and SH was reviewed in chart.    Pertinent Hx:   No personal history of skin cancer.   Past Medical History:   Diagnosis Date     Anxiety      Depression      HTN (hypertension)        Past Surgical History:   Procedure Laterality Date     AS REPAIR TEND/MUS/FLEX FOREARM/WRIST, PRIM, EACH        SECTION       HYSTERECTOMY          Family History   Problem Relation Age of Onset     Hypertension Mother      Autoimmune Disease Mother         CREST     Hyperlipidemia Mother      Hypertension Father      Skin Cancer Father      Heart Disease Father      Melanoma No family hx of        Social History     Socioeconomic History     Marital status:      Spouse name: Not on file     Number of children: Not on file     Years of education: Not on file     Highest education level: Not on file   Occupational History     Not on file   Tobacco Use     Smoking status: Never     Smokeless tobacco: Never   Vaping Use     Vaping Use: Never used   Substance and Sexual Activity     Alcohol use: Yes     Comment: Occasionally, 1-2 glasses of wine on weekends     Drug use: No     Sexual activity: Not on file   Other Topics Concern     Parent/sibling w/ CABG, MI or angioplasty before 65F 55M? 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 28.35 g 3     tretinoin (RETIN-A) 0.05 % external cream Apply pea sized amount at bedtime. 45 g 3     Urea 40 % CREA Apply daily at bedtime to feet as needed. 85 g 4     buPROPion (WELLBUTRIN XL) 150 MG 24 hr tablet TAKE 1 TABLET BY MOUTH EVERY MORNING 90 tablet 1     buPROPion (WELLBUTRIN XL) 300 MG 24 hr tablet Take 1 tablet (300 mg) by mouth every morning 90 tablet 1     chlorthalidone (HYGROTON) 25 MG tablet TAKE 1 TABLET BY MOUTH EVERY DAY 90 tablet 1     clonazePAM (KLONOPIN) 0.5 MG tablet Take 1 tablet (0.5 mg) by mouth 3 times daily Take 1 tab in the evening before bed. Okay to take up to 2 tabs as needed in the day. 90 tablet 0     eletriptan (RELPAX) 40 MG tablet TAKE 1 TABLET (40 MG) BY MOUTH AT ONSET OF HEADACHE FOR MIGRAINE MAY REPEAT IN 2 HOURS. MAX 2 TABLETS/24 HOURS. 12 tablet 3     EPINEPHrine (ANY BX GENERIC EQUIV) 0.3 MG/0.3ML injection 2-pack PLEASE SEE ATTACHED FOR DETAILED DIRECTIONS       escitalopram (LEXAPRO) 10 MG tablet Take 2 tablets (20 mg) by mouth daily 180 tablet 1     losartan (COZAAR) 50 MG tablet TAKE 1 TABLET BY MOUTH EVERY DAY 90 tablet 0     methocarbamol (ROBAXIN) 500 MG tablet Take 0.5-1 tablets (250-500 mg) by mouth 4 times daily as needed for muscle spasms 40 tablet 0     [DISCONTINUED] tretinoin (RETIN-A) 0.05 % external cream Apply pea sized amount at bedtime. 45 g 3     Facility-Administered Encounter Medications as of 10/4/2023   Medication Dose Route Frequency Provider Last Rate Last Admin     botulinum toxin type A (BOTOX) 100 units  injection 600 Units  600 Units Intramuscular Q90 Days Angela Franz MD   450 Units at 08/24/23 1251     botulinum toxin type A (BOTOX) 100 units injection 600 Units  600 Units Intramuscular Q90 Days Simi Vazquez MD   450 Units at 05/24/23 1802             O:   NAD, WDWN, Alert & Oriented, Mood & Affect wnl, Vitals stable   Here today alone   There were no vitals taken for this visit.   General appearance normal   Vitals stable   Alert, oriented and in no acute distress        Stuck on papules and brown macules on trunk and ext   Red papules on trunk  Brown papules and macules with regular pigment network and borders on torso and extremities with regular pigment network and borders    White papules on face   Brown firm papules with positive dimple sign on lower legs   The remainder of skin exam is normal       Eyes: Conjunctivae/lids:Normal     ENT: Lips: normal    MSK:Normal    Cardiovascular: peripheral edema none    Pulm: Breathing Normal    Neuro/Psych: Orientation:Alert and Orientedx3 ; Mood/Affect:normal     A/P:  1. Acne vulgaris  Apply tretinoin at bedtime. Use daily moisturizers.   3. Seborrheic keratosis, lentigo, angioma, benign nevi, dermatofibroma   Sent in hydroquinone for lentigo.   BENIGN LESIONS DISCUSSED WITH PATIENT:  I discussed the specifics of tumor, prognosis, and genetics of benign lesions.  I explained that treatment of these lesions would be purely cosmetic and not medically neccessary.  I discussed with patient different removal options including excision, cautery and /or laser.      Nature and genetics of benign skin lesions dicussed with patient.  Signs and Symptoms of skin cancer discussed with patient.  ABCDEs of melanoma reviewed with patient.  Patient encouraged to perform monthly skin exams.  UV precautions reviewed with patient.  Risks of non-melanoma skin cancer discussed with patient   Return to clinic in one year or sooner if needed.       Again, thank you for  allowing me to participate in the care of your patient.        Sincerely,        Queenie Zelaya PA-C

## 2023-10-04 NOTE — PROGRESS NOTES
Tai Pablo is an extremely pleasant 56 year old year old female patient here today for skin check. She denies any painful or bleeding skin lesions. She would like refill on tretinoin, which has been helping with acne.  Patient has no other skin complaints today.  Remainder of the HPI, Meds, PMH, Allergies, FH, and SH was reviewed in chart.    Pertinent Hx:   No personal history of skin cancer.   Past Medical History:   Diagnosis Date    Anxiety     Depression     HTN (hypertension)        Past Surgical History:   Procedure Laterality Date    AS REPAIR TEND/MUS/FLEX FOREARM/WRIST, PRIM, EACH       SECTION      HYSTERECTOMY          Family History   Problem Relation Age of Onset    Hypertension Mother     Autoimmune Disease Mother         CREST    Hyperlipidemia Mother     Hypertension Father     Skin Cancer Father     Heart Disease Father     Melanoma No family hx of        Social History     Socioeconomic History    Marital status:      Spouse name: Not on file    Number of children: Not on file    Years of education: Not on file    Highest education level: Not on file   Occupational History    Not on file   Tobacco Use    Smoking status: Never    Smokeless tobacco: Never   Vaping Use    Vaping Use: Never used   Substance and Sexual Activity    Alcohol use: Yes     Comment: Occasionally, 1-2 glasses of wine on weekends    Drug use: No    Sexual activity: Not on file   Other Topics Concern    Parent/sibling w/ CABG, MI or angioplasty before 65F 55M? 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 28.35 g 3    tretinoin (RETIN-A) 0.05 % external cream Apply pea sized amount at bedtime. 45 g 3    Urea 40 % CREA Apply daily at bedtime to feet as needed. 85 g 4    buPROPion (WELLBUTRIN XL) 150 MG 24 hr tablet TAKE 1 TABLET BY MOUTH EVERY MORNING 90 tablet 1    buPROPion (WELLBUTRIN XL) 300 MG 24 hr tablet Take 1 tablet (300 mg) by mouth every morning 90 tablet 1    chlorthalidone (HYGROTON) 25 MG tablet TAKE 1 TABLET BY MOUTH EVERY DAY 90 tablet 1    clonazePAM (KLONOPIN) 0.5 MG tablet Take 1 tablet (0.5 mg) by mouth 3 times daily Take 1 tab in the evening before bed. Okay to take up to 2 tabs as needed in the day. 90 tablet 0    eletriptan (RELPAX) 40 MG tablet TAKE 1 TABLET (40 MG) BY MOUTH AT ONSET OF HEADACHE FOR MIGRAINE MAY REPEAT IN 2 HOURS. MAX 2 TABLETS/24 HOURS. 12 tablet 3    EPINEPHrine (ANY BX GENERIC EQUIV) 0.3 MG/0.3ML injection 2-pack PLEASE SEE ATTACHED FOR DETAILED DIRECTIONS      escitalopram (LEXAPRO) 10 MG tablet Take 2 tablets (20 mg) by mouth daily 180 tablet 1    losartan (COZAAR) 50 MG tablet TAKE 1 TABLET BY MOUTH EVERY DAY 90 tablet 0    methocarbamol (ROBAXIN) 500 MG tablet Take 0.5-1 tablets (250-500 mg) by mouth 4 times daily as needed for muscle spasms 40 tablet 0    [DISCONTINUED] tretinoin (RETIN-A) 0.05 % external cream Apply pea sized amount at bedtime. 45 g 3     Facility-Administered Encounter Medications as of 10/4/2023   Medication Dose Route Frequency Provider Last Rate Last Admin    botulinum toxin type A (BOTOX) 100 units injection 600 Units  600 Units Intramuscular Q90 Days Angela Franz MD   450 Units at 08/24/23 1251    botulinum toxin type A (BOTOX) 100 units injection 600 Units  600 Units Intramuscular Q90 Days Simi Vazquez MD   450 Units at 05/24/23 1802             O:   NAD, WDWN, Alert &  Oriented, Mood & Affect wnl, Vitals stable   Here today alone   There were no vitals taken for this visit.   General appearance normal   Vitals stable   Alert, oriented and in no acute distress        Stuck on papules and brown macules on trunk and ext   Red papules on trunk  Brown papules and macules with regular pigment network and borders on torso and extremities with regular pigment network and borders    White papules on face   Brown firm papules with positive dimple sign on lower legs   The remainder of skin exam is normal       Eyes: Conjunctivae/lids:Normal     ENT: Lips: normal    MSK:Normal    Cardiovascular: peripheral edema none    Pulm: Breathing Normal    Neuro/Psych: Orientation:Alert and Orientedx3 ; Mood/Affect:normal     A/P:  1. Acne vulgaris  Apply tretinoin at bedtime. Use daily moisturizers.   3. Seborrheic keratosis, lentigo, angioma, benign nevi, dermatofibroma   Sent in hydroquinone for lentigo.   BENIGN LESIONS DISCUSSED WITH PATIENT:  I discussed the specifics of tumor, prognosis, and genetics of benign lesions.  I explained that treatment of these lesions would be purely cosmetic and not medically neccessary.  I discussed with patient different removal options including excision, cautery and /or laser.      Nature and genetics of benign skin lesions dicussed with patient.  Signs and Symptoms of skin cancer discussed with patient.  ABCDEs of melanoma reviewed with patient.  Patient encouraged to perform monthly skin exams.  UV precautions reviewed with patient.  Risks of non-melanoma skin cancer discussed with patient   Return to clinic in one year or sooner if needed.

## 2023-10-16 ENCOUNTER — OFFICE VISIT (OUTPATIENT)
Dept: PHYSICAL MEDICINE AND REHAB | Facility: CLINIC | Age: 58
End: 2023-10-16
Payer: COMMERCIAL

## 2023-10-16 VITALS — DIASTOLIC BLOOD PRESSURE: 82 MMHG | SYSTOLIC BLOOD PRESSURE: 148 MMHG | HEART RATE: 71 BPM | OXYGEN SATURATION: 95 %

## 2023-10-16 DIAGNOSIS — M62.838 MUSCLE SPASM: Primary | ICD-10-CM

## 2023-10-16 DIAGNOSIS — G24.4 OROMANDIBULAR DYSTONIA: ICD-10-CM

## 2023-10-16 DIAGNOSIS — G24.3 SPASMODIC TORTICOLLIS: ICD-10-CM

## 2023-10-16 DIAGNOSIS — M79.18 MYOFASCIAL PAIN: ICD-10-CM

## 2023-10-16 DIAGNOSIS — F32.0 CURRENT MILD EPISODE OF MAJOR DEPRESSIVE DISORDER WITHOUT PRIOR EPISODE (H): ICD-10-CM

## 2023-10-16 DIAGNOSIS — G43.719 CHRONIC MIGRAINE WITHOUT AURA, INTRACTABLE, WITHOUT STATUS MIGRAINOSUS: ICD-10-CM

## 2023-10-16 PROCEDURE — 20553 NJX 1/MLT TRIGGER POINTS 3/>: CPT | Performed by: PHYSICAL MEDICINE & REHABILITATION

## 2023-10-16 RX ORDER — TRIAMCINOLONE ACETONIDE 40 MG/ML
40 INJECTION, SUSPENSION INTRA-ARTICULAR; INTRAMUSCULAR ONCE
Status: COMPLETED | OUTPATIENT
Start: 2023-10-16 | End: 2023-10-16

## 2023-10-16 RX ORDER — LIDOCAINE HYDROCHLORIDE 20 MG/ML
3 INJECTION, SOLUTION INFILTRATION; PERINEURAL ONCE
Status: DISCONTINUED | OUTPATIENT
Start: 2023-10-16 | End: 2023-10-17

## 2023-10-16 RX ORDER — BUPIVACAINE HYDROCHLORIDE 5 MG/ML
6 INJECTION, SOLUTION PERINEURAL ONCE
Status: COMPLETED | OUTPATIENT
Start: 2023-10-16 | End: 2023-10-16

## 2023-10-16 RX ADMIN — BUPIVACAINE HYDROCHLORIDE 6 ML: 5 INJECTION, SOLUTION PERINEURAL at 16:34

## 2023-10-16 RX ADMIN — TRIAMCINOLONE ACETONIDE 40 MG: 40 INJECTION, SUSPENSION INTRA-ARTICULAR; INTRAMUSCULAR at 16:36

## 2023-10-16 ASSESSMENT — PAIN SCALES - GENERAL: PAINLEVEL: SEVERE PAIN (7)

## 2023-10-16 NOTE — PROGRESS NOTES
"Pre procedure Diagnosis: myofascial pain   Post procedure Diagnosis: Same   Procedure performed: trigger point injections   Anesthesia: none   Complications: none     Indications:   Tia Pablo is a 57 year old female with a history of chronic migraine headaches, cervical and oromandibular dystonia who is followed in our clinic for botulinum toxin injections. Last round was on 8/24.   She also received TPIs 7/24/23 with Dr. Edwards in pain clinic using bupi only (Bilateral trapezius, rhomboids, and levator scapulae) with good results.     Today, she reported significant worsening of her symptoms over the past 1-2 weeks since \"botox wore off\". She has severe muscle tightness and spasms at her jaws, lateral / posterior neck area, traps and christine-scapular areas b/l. She is very uncomfortable during the day and is also concerned about her upcoming trip and severe symptoms.     Options/alternatives, benefits and risks were discussed with the patient including bleeding, infection, tissue trauma and pnuemothorax. Questions were answered to her satisfaction and she agrees to proceed. Voluntary informed consent was obtained and signed.   Vitals were reviewed: Yes   Allergies were reviewed: Yes   Medications were reviewed: Yes     Procedure:   After getting informed consent, a Pause for the Cause was performed.   Trigger points were identified by patient, and marked when appropriate.   The area was prepped with Chloroprep.   Using clean technique, injections were completed using a 25G, 1.5 inch needle for traps and christine-scapular areas and 30G needle for masseter and splenius muscles.     Muscle groups injected:   Bilateral   Masseter  Splenius capitis   Splenius cervicis  Traps   Levator at scap insertion   Rhomboids     Injection solution contained:   2ml of 1% lidocaine, 7ml of 0.5% bupivacaine and 1ml of kenolog.     Hemostasis was achieved, the area was cleaned, and bandaids were placed when appropriate.   The patient " tolerated the procedure well. She was slightly lightheaded after the injections so rested on the bed for a few minutes before she left.     Plan:   -trigger point injections today; discussed pros and cons of using steroid for TPIs and risk of muscle atrophy. Given the severity of her symptoms and her upcoming trip decided to use kenolog today but may not repeat that at the next rounds of TPIs.   -f/u with Dr. Vazquez as scheduled in Nov for botox injections   -continue working with PT  -last saw Dr. Platt in 7/2022 and needs to follow up as recommended     Angela Franz MD  Physical Medicine & Rehabilitation

## 2023-10-16 NOTE — NURSING NOTE
Chief Complaint   Patient presents with    RECHECK     TPI injections per patient     Jaqueline Sahu CMA at 12:02 PM on 10/16/2023.

## 2023-10-16 NOTE — LETTER
"10/16/2023       RE: Tia Pablo  [id number] [address] [facility] [id number]     Dear Colleague,    Thank you for referring your patient, Tia Pablo, to the [facility] [place] [facility]. Please see a copy of my visit note below.    Pre procedure Diagnosis: myofascial pain   Post procedure Diagnosis: Same   Procedure performed: trigger point injections   Anesthesia: none   Complications: none     Indications:   Tia Pablo is a 57 year old female with a history of chronic migraine headaches, cervical and oromandibular dystonia who is followed in our clinic for botulinum toxin injections. Last round was on 8/24.   She also received TPIs 7/24/23 with Dr. Edwards in pain clinic using bupi only (Bilateral trapezius, rhomboids, and levator scapulae) with good results.     Today, she reported significant worsening of her symptoms over the past 1-2 weeks since \"botox wore off\". She has severe muscle tightness and spasms at her jaws, lateral / posterior neck area, traps and christine-scapular areas b/l. She is very uncomfortable during the day and is also concerned about her upcoming trip and severe symptoms.     Options/alternatives, benefits and risks were discussed with the patient including bleeding, infection, tissue trauma and pnuemothorax. Questions were answered to her satisfaction and she agrees to proceed. Voluntary informed consent was obtained and signed.   Vitals were reviewed: Yes   Allergies were reviewed: Yes   Medications were reviewed: Yes     Procedure:   After getting informed consent, a Pause for the Cause was performed.   Trigger points were identified by patient, and marked when appropriate.   The area was prepped with Chloroprep.   Using clean technique, injections were completed using a 25G, 1.5 inch needle for traps and christine-scapular areas and 30G needle for masseter and splenius muscles. "     Muscle groups injected:   Bilateral   Masseter  Splenius capitis   Splenius cervicis  Traps   Levator at scap insertion   Rhomboids     Injection solution contained:   2ml of 1% lidocaine, 7ml of 0.5% bupivacaine and 1ml of kenolog.     Hemostasis was achieved, the area was cleaned, and bandaids were placed when appropriate.   The patient tolerated the procedure well. She was slightly lightheaded after the injections so rested on the bed for a few minutes before she left.     Plan:   -trigger point injections today; discussed pros and cons of using steroid for TPIs and risk of muscle atrophy. Given the severity of her symptoms and her upcoming trip decided to use kenolog today but may not repeat that at the next rounds of TPIs.   -f/u with Dr. Vazquez as scheduled in Nov for botox injections   -continue working with PT  -last saw Dr. Platt in 7/2022 and needs to follow up as recommended         Again, thank you for allowing me to participate in the care of your patient.      Sincerely,    Angela Franz MD

## 2023-10-17 RX ORDER — BUPROPION HYDROCHLORIDE 300 MG/1
300 TABLET ORAL EVERY MORNING
Qty: 90 TABLET | Refills: 1 | Status: SHIPPED | OUTPATIENT
Start: 2023-10-17 | End: 2023-10-19

## 2023-10-18 ASSESSMENT — PATIENT HEALTH QUESTIONNAIRE - PHQ9
SUM OF ALL RESPONSES TO PHQ QUESTIONS 1-9: 1
10. IF YOU CHECKED OFF ANY PROBLEMS, HOW DIFFICULT HAVE THESE PROBLEMS MADE IT FOR YOU TO DO YOUR WORK, TAKE CARE OF THINGS AT HOME, OR GET ALONG WITH OTHER PEOPLE: SOMEWHAT DIFFICULT
SUM OF ALL RESPONSES TO PHQ QUESTIONS 1-9: 1

## 2023-10-19 ENCOUNTER — OFFICE VISIT (OUTPATIENT)
Dept: FAMILY MEDICINE | Facility: CLINIC | Age: 58
End: 2023-10-19
Payer: COMMERCIAL

## 2023-10-19 VITALS
OXYGEN SATURATION: 98 % | HEART RATE: 65 BPM | TEMPERATURE: 98.7 F | BODY MASS INDEX: 22.98 KG/M2 | SYSTOLIC BLOOD PRESSURE: 138 MMHG | DIASTOLIC BLOOD PRESSURE: 82 MMHG | WEIGHT: 146.4 LBS | HEIGHT: 67 IN

## 2023-10-19 DIAGNOSIS — F32.0 CURRENT MILD EPISODE OF MAJOR DEPRESSIVE DISORDER WITHOUT PRIOR EPISODE (H): ICD-10-CM

## 2023-10-19 DIAGNOSIS — I10 ESSENTIAL HYPERTENSION: ICD-10-CM

## 2023-10-19 DIAGNOSIS — M54.2 NECK PAIN: Primary | ICD-10-CM

## 2023-10-19 PROCEDURE — 99214 OFFICE O/P EST MOD 30 MIN: CPT | Performed by: FAMILY MEDICINE

## 2023-10-19 RX ORDER — LOSARTAN POTASSIUM 50 MG/1
50 TABLET ORAL DAILY
Qty: 90 TABLET | Refills: 1 | Status: SHIPPED | OUTPATIENT
Start: 2023-10-19 | End: 2024-01-26

## 2023-10-19 RX ORDER — BUPROPION HYDROCHLORIDE 300 MG/1
300 TABLET ORAL EVERY MORNING
Qty: 90 TABLET | Refills: 1 | Status: SHIPPED | OUTPATIENT
Start: 2023-10-19 | End: 2024-01-26

## 2023-10-19 NOTE — PROGRESS NOTES
"  A/p:      ICD-10-CM    1. Neck pain  M54.2       2. Current mild episode of major depressive disorder without prior episode (H24)  F32.0 buPROPion (WELLBUTRIN XL) 300 MG 24 hr tablet      3. Essential hypertension  I10 losartan (COZAAR) 50 MG tablet        Neck pain:  chronic and ongoing related to dystonia.  Following with PMR for injections    Depression/anxiety:  currently well controlled on lexapro 20mg and wellbutrin 300mg daily.  Continue current meds    HTN:  BP controlled, pt will continue current meds and plan on obtaining BP cuff for home.      [place] [employer] [place] at [place] end of the [place].      Rubia Weber is a 57 year old, presenting for the following health issues:  RECHECK        10/19/2023    11:07 AM   Additional Questions   Roomed by Lelia SOLER CMA       History of Present Illness       Reason for visit:  Follow up regarding previous appointment, neck/back pain hip area pain    She eats 2-3 servings of fruits and vegetables daily.She consumes 0 sweetened beverage(s) daily.She exercises with enough effort to increase her heart rate 10 to 19 minutes per day.  She exercises with enough effort to increase her heart rate 3 or less days per week.   She is taking medications regularly.     Just recently had some trigger point injections.  Only able to do Botox injections every 12 weeks so does trigger point injections in between.  Already scheduled for Botox in Nov.  Not planning on following up with Dr Platt.  Uses clonazepam occasionally, mostly at night if pain is an issue.      Has continued to see PT for help with neck pain.  Gets manual therapy and \"dry needling\".  Has recently been told she needs to pay out of pocket for the dry needling so she has not been having that done.         Taking 300mg of buproprion daily as well as the lexapro 20mg daily.  Feels she has been tolerating these medications.      Still has worry with driving especially busy roads so does not want " "to stop the escitalopram.    Not visiting with therapy currently     Planning on getting a home BP cuff.  Ha not been exercising as much with a job change.       Has colonoscopy appointment in Jan at [place].    Review of Systems         Objective    /82   Pulse 65   Temp 98.7  F (37.1  C) (Tympanic)   Ht 1.698 m (5' 6.85\")   Wt 66.4 kg (146 lb 6.4 oz)   SpO2 98%   BMI 23.03 kg/m    Body mass index is 23.03 kg/m .  Physical Exam   PE:  VS as above   Gen:  WN/WD/WH female in NAD  Psych: Alert and oriented times 3; coherent speech, normal   rate and volume, able to articulate logical thoughts, able   to abstract reason, no tangential thoughts, no hallucinations   or delusions  Her affect is bright and appropriate      Epic reviewed                  "

## 2023-10-26 NOTE — TELEPHONE ENCOUNTER
Reason for call:  Other   Patient called regarding (reason for call): call back  Additional comments: Patient called back wanting to schedule a CRFA but pt did not called back to schedule her second CMBB and that was back in April. Patient wants to know if she can still proceed with scheduling the second block or if she needs to start over since its been 6 months since the first block. Please call pt back for advice.     Phone number to reach patient:  Home number on file [phone] (home)    Best Time:  Any     Can we leave a detailed message on this number?  YES    Ana Banks      [facility]  Pain Management

## 2023-10-26 NOTE — TELEPHONE ENCOUNTER
bilateral Cervical  medial branch block # 1 on 4/13/23.  Okay to proceed to #2 per insurance requirements?      Lucie, RN-BSN  [facility] Pain Management [facility]   [phone]

## 2023-10-27 ENCOUNTER — THERAPY VISIT (OUTPATIENT)
Dept: PHYSICAL THERAPY | Facility: CLINIC | Age: 58
End: 2023-10-27
Payer: COMMERCIAL

## 2023-10-27 DIAGNOSIS — M54.2 NECK PAIN: Primary | ICD-10-CM

## 2023-10-27 PROCEDURE — 97140 MANUAL THERAPY 1/> REGIONS: CPT | Mod: GP | Performed by: PHYSICAL THERAPIST

## 2023-10-27 NOTE — TELEPHONE ENCOUNTER
Appears patient only completed one MBB.  Sent [facility] requesting an RFA.  Per Katty okay to proceed insurance wise.     Would you like to proceed straight to RFA?     Routing to provider for review.     Kathryn Saenz RN  [facility] Pain Management [facility]  [phone]

## 2023-10-27 NOTE — TELEPHONE ENCOUNTER
[facility] no longer needs prior approval for RFA's, there is also not a medical policy with any guidelines.      With that said, it would be up to the discretion of the provider        Katty Avery   [facility]

## 2023-10-30 NOTE — TELEPHONE ENCOUNTER
Please contact patient to schedule RFA.     Kathryn Saenz RN  [facility] Pain Management [place] [facility] [place]  [phone]

## 2023-10-30 NOTE — TELEPHONE ENCOUNTER
Screening Questions for RFA Procedure      Procedure ordered? RFA    What insurance are we billing for this procedure?  MVA  IF SCHEDULING AT [facility] PLEASE SCHEDULE AT LEAST 7-10 BUSINESS DAYS OUT SO A PA CAN BE OBTAINED    Is patient scheduled at [facility]? no  If YES, route every encounter to [facility] CARE NAVIGATION POOL [[phone]]  Has patient had this injection before? No  Any chance of pregnancy? NO   If YES, do NOT schedule and route to RN pool     Is  Needed?: No  Will patient have a ?  Yes   If pt is given sedation meds, no driving for 24 hours.  Is pt taking a cab or transportation service? NO      If so will need to be accompanied by an adult too (friend/family member) in order for IV sedation to be given.    Per [facility] Policy:  Outpatients are to have responsible adult or family member to accompany them at discharge and drive them home. A service providing medically trained drivers or attendants would be acceptable. Public transportation would not be acceptable unless the patient is accompanied by a responsible adult or family member.  Is patient taking any blood thinners (i.e. plavix, coumadin, jantoven, warfarin, heparin, pradaxa or dabigatran, etc)? No   If YES, do NOT schedule, and route to RN pool    Is patient taking any aspirin products? No   If more than 325mg/day, OK to schedule; Instruct pt to decrease to less than 325 mg for 7 days AND route to RN pool  For CERVICAL procedures, hold all aspirin products for 6 days.   Tell pt that if aspirin product is not held for 6 days, the procedure WILL BE cancelled.      Does the patient have a bleeding or clotting disorder? No   If YES, it it OKAY to schedule AND route to RN pool  **For any patients with platelet count <100, must be forwarded to provider**    Is patient diabetic? No If YES, have them bring their glucometer.    Does patient have an active infection or treated for one within the past  week? No   If YES, do NOT schedule and route to RN nurse pool     Is patient currently taking any antibiotics?  No  For patients on chronic, preventative, or prophylactic antibiotics, procedures may be scheduled.   For patients on antibiotics for active or recent infection:antibiotic course must have been completed for 4 days    Is patient currently taking any steroid medications? (i.e. Prednisone, Medrol)  No   For patients on steroid medications, course must have been completed for 4 days    Is patient actively being treated for cancer or immunocompromised, including the spleen having been removed? No  If YES, do NOT schedule and route to RN pool     Any history of complications with sedation medications?  NO   If YES, OK to schedule AND route to RN pool     Any history of sleep apnea?  NO   If YES, OK to schedule AND route to RN pool     Any cardiac history?  NO   If YES, OK to schedule AND route to RN pool     Do you have an implanted pacemaker, ICD (implanted cardiac device) or AICD (automatic implanted cardiac device)?  NO  If YES, do NOT schedule AND route to RN pool.   Obtain name of device :     Obtain name of cardiologist:       Do you have an implanted stimulator?  NO  If YES, OK to schedule AND route to nursing.   Instruct patient to bring in the remote to the appointment and it will need to be turned off.  reviewed      Does patient have an allergy to contrast dye, iodine or shellfish?  No   If YES, OK to schedule. Route to RN pool AND add allergy information to appointment notes    Are you able to get on and off an exam table with minimal or no assistance? Yes   If NO, do NOT schedule and route to RN pool    Are you able to roll over and lay on your stomach with minimal or no assistance? Yes   If NO, do NOT schedule and route to RN pool    Reminders:  If you are started on any steroids or antibiotics between now and your appointment, you must contact us because it may affect our ability to  perform your procedure.  Yes  Informed patient that s/he needs to fast for 6 hours before procedure?  YES  Informed patient that it is OK to take normal medications with sips of water, especially blood pressure medications, before the procedure and must hold blood thinners as instructed.  Yes  Informed patient to arrive 30 minutes before procedure time to have an IV inserted.  reviewed   Do NOT schedule at 0745, 0815 or 1245.  reviewed   All radiofrequency ablations are in a 90 minute time slot.  reviewed

## 2023-11-17 ENCOUNTER — OFFICE VISIT (OUTPATIENT)
Dept: PHYSICAL MEDICINE AND REHAB | Facility: CLINIC | Age: 58
End: 2023-11-17
Attending: PHYSICAL MEDICINE & REHABILITATION
Payer: COMMERCIAL

## 2023-11-17 VITALS — HEART RATE: 83 BPM | DIASTOLIC BLOOD PRESSURE: 80 MMHG | OXYGEN SATURATION: 97 % | SYSTOLIC BLOOD PRESSURE: 137 MMHG

## 2023-11-17 DIAGNOSIS — G24.3 SPASMODIC TORTICOLLIS: Primary | ICD-10-CM

## 2023-11-17 DIAGNOSIS — G43.719 CHRONIC MIGRAINE WITHOUT AURA, INTRACTABLE, WITHOUT STATUS MIGRAINOSUS: ICD-10-CM

## 2023-11-17 DIAGNOSIS — G24.4 OROMANDIBULAR DYSTONIA: ICD-10-CM

## 2023-11-17 PROCEDURE — 64643 CHEMODENERV 1 EXTREM 1-4 EA: CPT | Mod: XS | Performed by: PHYSICAL MEDICINE & REHABILITATION

## 2023-11-17 PROCEDURE — 64615 CHEMODENERV MUSC MIGRAINE: CPT | Performed by: PHYSICAL MEDICINE & REHABILITATION

## 2023-11-17 PROCEDURE — 95874 GUIDE NERV DESTR NEEDLE EMG: CPT | Performed by: PHYSICAL MEDICINE & REHABILITATION

## 2023-11-17 PROCEDURE — 64642 CHEMODENERV 1 EXTREMITY 1-4: CPT | Mod: XS | Performed by: PHYSICAL MEDICINE & REHABILITATION

## 2023-11-17 NOTE — PROGRESS NOTES
[facility]     PM&R CLINIC NOTE  BOTULINUM TOXIN PROCEDURE      HPI  Chief Complaint   Patient presents with    RECHECK     Botox injections confirmed with patient     Tia Pablo is a 57 year old female with a history of headaches and generalized dystonia with pain who presents to clinic for botulinum toxin injections for management of chronic migraine headaches, cervical and oromandibular dystonia.     SINCE LAST VISIT  Tia Pablo was last seen here in clinic on 8/24/2023, at which time she received 450 units of Botox.     Currently, her jaw is bothering her, which is giving her headaches in her temples. She also has pain in the right side of her neck.      Patient denies new medical diagnoses, illnesses, hospitalizations, emergency room visits, and injuries since the previous injection with botulinum neurotoxin.     RESPONSE TO PREVIOUS TREATMENT    Side effects: She always has some mild jaw weakness and difficulty chewing for about a month after the injections.    1.  Headache frequency during this injection cycle: 2 migraine headache days per month. This is compared to her baseline headache frequency of 30 headache days per month.     2.  Headache duration during this injection cycle:  Headache duration is typically no longer than a few hours with Relpax, but if the Relpax does not work right away, she has to take two doses and the headaches last much longer. Patient reports no episodes of multiple day headaches during this injection cycle.     3.  Headache intensity during this injection cycle:    A.  5/10  =  Typical pain level.  B.  6-7/10  =  Worst pain level.  C.  2/10  =  Lowest pain level.    4.  Change in headache medication usage during this injection cycle:  (For Example:  Able to decrease use of oral pain medications.) She is only taking her migraine headache medication about once per month, whereas she was taking it much more frequently prior to Botox. She  also takes robaxin as needed for more severe headaches     5.  ER Visits During This Injection Cycle: None.     6.  Functional Performance:  Change in ADL's, social interaction, days lost from work, etc. Patient reports being able to more fully participate in social and family activities and responsibilities as headache symptoms have improved. She is overall a lot more productive when Botox is working. Recently, she was able to go on a [place] cruise with family, and this was quite enjoyable. 8    Dystonia Improvement (neck and jaw): Yes.  Percent Improvement: 75 %    Duration of Benefit:  7 weeks and followed by a gradual reduction in benefit. Her jaw clenching gets severe a few weeks before her appointment.       PHYSICAL EXAM  VS: /80   Pulse 83   SpO2 97%    GEN: Pleasant and cooperative, in no acute distress  HEENT: No facial asymmetry  NECK: Involuntary right rotation with hypertonicity noted in right anterior neck musculature and upper trapezius.     ALLERGIES  Allergies   Allergen Reactions    Bee Venom Anaphylaxis    Erythromycin Hives and Itching    Seasonal Allergies        CURRENT MEDICATIONS    Current Outpatient Medications:     buPROPion (WELLBUTRIN XL) 300 MG 24 hr tablet, Take 1 tablet (300 mg) by mouth every morning, Disp: 90 tablet, Rfl: 1    chlorthalidone (HYGROTON) 25 MG tablet, TAKE 1 TABLET BY MOUTH EVERY DAY, Disp: 90 tablet, Rfl: 1    clonazePAM (KLONOPIN) 0.5 MG tablet, Take 1 tablet (0.5 mg) by mouth 3 times daily Take 1 tab in the evening before bed. Okay to take up to 2 tabs as needed in the day., Disp: 90 tablet, Rfl: 0    eletriptan (RELPAX) 40 MG tablet, TAKE 1 TABLET (40 MG) BY MOUTH AT ONSET OF HEADACHE FOR MIGRAINE MAY REPEAT IN 2 HOURS. MAX 2 TABLETS/24 HOURS., Disp: 12 tablet, Rfl: 3    EPINEPHrine (ANY BX GENERIC EQUIV) 0.3 MG/0.3ML injection 2-pack, PLEASE SEE ATTACHED FOR DETAILED DIRECTIONS, Disp: , Rfl:     escitalopram (LEXAPRO) 10 MG tablet, Take 2 tablets (20  mg) by mouth daily, Disp: 180 tablet, Rfl: 1    hydroquinone (RUBY) 4 % external cream, Apply pea sized amount at bedtime to face., Disp: 28.35 g, Rfl: 3    losartan (COZAAR) 50 MG tablet, Take 1 tablet (50 mg) by mouth daily, Disp: 90 tablet, Rfl: 1    methocarbamol (ROBAXIN) 500 MG tablet, Take 0.5-1 tablets (250-500 mg) by mouth 4 times daily as needed for muscle spasms, Disp: 40 tablet, Rfl: 0    tretinoin (RETIN-A) 0.05 % external cream, Apply pea sized amount at bedtime., Disp: 45 g, Rfl: 3    Urea 40 % CREA, Apply daily at bedtime to feet as needed., Disp: 85 g, Rfl: 4    Current Facility-Administered Medications:     botulinum toxin type A (BOTOX) 100 units injection 600 Units, 600 Units, Intramuscular, Q90 Days, Angela Franz MD, 450 Units at 23 1251    botulinum toxin type A (BOTOX) 100 units injection 600 Units, 600 Units, Intramuscular, Q90 Days, Simi Vazquez MD, 450 Units at 23 1802       BOTULINUM NEUROTOXIN INJECTION PROCEDURES    VERIFICATION OF PATIENT IDENTIFICATION AND PROCEDURE     Initials   Patient Name PS   Patient  PS   Procedure Verified by: PS     Prior to the start of the procedure and with procedural staff participation, I verbally confirmed the patient s identity using two indicators, relevant allergies, that the procedure was appropriate and matched the consent or emergent situation, and that the correct equipment/implants were available. Immediately prior to starting the procedure I conducted the Time Out with the procedural staff and re-confirmed the patient s name, procedure, and site/side. (The Joint Commission universal protocol was followed.)  Yes    Sedation (Moderate or Deep): None    ABOVE ASSESSMENTS PERFORMED BY    Angela Franz MD      INDICATIONS FOR PROCEDURES  Tia Pablo is a 57 year old patient with involuntary muscle spasms and pain secondary to the diagnosis of oromandibular/cervical dystonia and chronic migraine headaches. Her  baseline symptoms have been recalcitrant to oral medications and conservative therapy.  She is here today for reinjection with Botox.    GOAL OF PROCEDURE  The goal of this procedure is to increase active range of motion, improve volitional motor control, decrease pain  and enhance functional independence.      TOTAL DOSE ADMINISTERED  Dose Administered:  450 units  Botox (Botulinum Toxin Type A) 1:1 Dilution  Unavoidable Drug Waste: Yes  Amount of drug waste (mL): 50 units Botox.  Reason for waste:  Single use vial  Diluent Used:  Preservative Free Normal Saline  Total Volume of Diluent Used:  4.5 ml  NDC #: Botox 100u ([address]-37)      CONSENT  The risks, benefits, and treatment options were discussed with Tia MANUEL Coltmarlyn and she agreed to proceed.    Written consent was obtained by [facility].     EQUIPMENT USED  Needle-35mm stimulating/recording  Needle-30 gauge  EMG/NCS Machine    SKIN PREPARATION  Skin preparation was performed using an alcohol wipe.    GUIDANCE DESCRIPTION  Electro-myographic guidance was necessary throughout the procedure to accurately identify all areas of dystonic muscles while avoiding injection of non-dystonic muscles, neighboring nerves and nearby vascular structures.     AREA/MUSCLE INJECTED: 450 UNITS BOTOX = TOTAL DOSE, 1:1 DILUTION     1. NECK MUSCLES: 145 UNITS BOTOX = TOTAL DOSE     Right Upper Trapezius - 20 units of Botox at 4 site/s.   Left Upper Trapezius - 20 units of Botox at 4 site/s.      Right Splenius Capitis - 5 units of Botox at 1 site/s.   Left Splenius Capitis - 5 units of Botox at 1 site/s.      Right Auricularis - 15 units of Botox at 3 site/s (anterior, superior & posterior).   Left Auricularis - 15 units of Botox at 3 site/s (anterior, superior & posterior).     Right occipitalis - 15 units of Botox at 3 site/s.   Left occipitalis - 15 units of Botox at 3 site/s.     Right Sternocleidomastoid - 10 units of Botox at 1 site/s.    Right Middle Scalene - 15 units of Botox  at 1 site/s.     Right Platysma - 5 units of Botox at 2 site/s.  Left Platysma - 5 units of Botox at 2 site/s.      2. UPPER EXTREMITY & TRUNK MUSCLES: 90 UNITS BOTOX = TOTAL DOSE     Right Levator scapula - 25 units of Botox at 2 site/s.   Left Levator scapula - 25 units of Botox at 2 site/s.      Right Rhomboid - 20 units of Botox at 1 site/s.    Left Rhomboid - 20 units of Botox at 3 site/s.       3. JAW MUSCLES: 140 UNITS BOTOX = TOTAL DOSE      Right Temporalis - 60 units of Botox at 5 site/s.               Left Temporalis - 60 units of Botox at 5 site/s.     Right Masseter - 10 units of Botox at 1 site/s.    Left Masseter - 10 units of Botox at 1 site/s.     4. FACIAL & SCALP MUSCLES: 75 UNITS BOTOX = TOTAL DOSE     Right Frontalis - 15 units of Botox at 3 site/s (2 middle & 1 hairline).    Left Frontalis - 15 units of Botox at 3 site/s (2 middle & 1 hairline).     Procerus - 5 units of Botox at 1 site/s.       Right  - 5 units of Botox at 1 site/s.    Left  - 5 units of Botox at 1 site/s.      Right Occipitalis - 15 units of Botox at 1 site/s.    Left Occipitalis - 15 units of Botox at 1 site/s.       RESPONSE TO PROCEDURE  Tia Pablo tolerated the procedure well and there were no immediate complications. She was allowed to recover for an appropriate period of time and was discharged home in stable condition.    ASSESSMENT AND PLAN   Botulinum toxin injections: No changes made to Botox dose today, however Botox was distributed to avoid jaw weakness. Patient will continue to monitor response to Botox and report at next appointment.   Referrals: None.  Follow up: Tia Pablo was rescheduled for the next series of injections in 12 weeks, at which time we will evaluate response to today's injections. she may call the clinic prior with any questions or concerns prior to the next appointment.

## 2023-11-17 NOTE — LETTER
11/17/2023       RE: Tia Pablo  [id number] [address] [facility] [place] [address]       Dear Colleague,    Thank you for referring your patient, Tia Pabol, to the [facility] [place] at [facility]. Please see a copy of my visit note below.      [facility]     PM&R CLINIC NOTE  BOTULINUM TOXIN PROCEDURE      HPI  Chief Complaint   Patient presents with    RECHECK     Botox injections confirmed with patient     Tia Pablo is a 57 year old female with a history of headaches and generalized dystonia with pain who presents to clinic for botulinum toxin injections for management of chronic migraine headaches, cervical and oromandibular dystonia.     SINCE LAST VISIT  Tia Pablo was last seen here in clinic on 8/24/2023, at which time she received 450 units of Botox.     Currently, her jaw is bothering her, which is giving her headaches in her temples. She also has pain in the right side of her neck.      Patient denies new medical diagnoses, illnesses, hospitalizations, emergency room visits, and injuries since the previous injection with botulinum neurotoxin.     RESPONSE TO PREVIOUS TREATMENT    Side effects: She always has some mild jaw weakness and difficulty chewing for about a month after the injections.    1.  Headache frequency during this injection cycle: 2 migraine headache days per month. This is compared to her baseline headache frequency of 30 headache days per month.     2.  Headache duration during this injection cycle:  Headache duration is typically no longer than a few hours with Relpax, but if the Relpax does not work right away, she has to take two doses and the headaches last much longer. Patient reports no episodes of multiple day headaches during this injection cycle.     3.  Headache intensity during this injection cycle:    A.  5/10  =  Typical pain  level.  B.  6-7/10  =  Worst pain level.  C.  2/10  =  Lowest pain level.    4.  Change in headache medication usage during this injection cycle:  (For Example:  Able to decrease use of oral pain medications.) She is only taking her migraine headache medication about once per month, whereas she was taking it much more frequently prior to Botox. She also takes robaxin as needed for more severe headaches     5.  ER Visits During This Injection Cycle: None.     6.  Functional Performance:  Change in ADL's, social interaction, days lost from work, etc. Patient reports being able to more fully participate in social and family activities and responsibilities as headache symptoms have improved. She is overall a lot more productive when Botox is working. Recently, she was able to go on a [place] cruise with family, and this was quite enjoyable. 8    Dystonia Improvement (neck and jaw): Yes.  Percent Improvement: 75 %    Duration of Benefit:  7 weeks and followed by a gradual reduction in benefit. Her jaw clenching gets severe a few weeks before her appointment.       PHYSICAL EXAM  VS: /80   Pulse 83   SpO2 97%    GEN: Pleasant and cooperative, in no acute distress  HEENT: No facial asymmetry  NECK: Involuntary right rotation with hypertonicity noted in right anterior neck musculature and upper trapezius.     ALLERGIES  Allergies   Allergen Reactions    Bee Venom Anaphylaxis    Erythromycin Hives and Itching    Seasonal Allergies        CURRENT MEDICATIONS    Current Outpatient Medications:     buPROPion (WELLBUTRIN XL) 300 MG 24 hr tablet, Take 1 tablet (300 mg) by mouth every morning, Disp: 90 tablet, Rfl: 1    chlorthalidone (HYGROTON) 25 MG tablet, TAKE 1 TABLET BY MOUTH EVERY DAY, Disp: 90 tablet, Rfl: 1    clonazePAM (KLONOPIN) 0.5 MG tablet, Take 1 tablet (0.5 mg) by mouth 3 times daily Take 1 tab in the evening before bed. Okay to take up to 2 tabs as needed in the day., Disp: 90 tablet, Rfl: 0     eletriptan (RELPAX) 40 MG tablet, TAKE 1 TABLET (40 MG) BY MOUTH AT ONSET OF HEADACHE FOR MIGRAINE MAY REPEAT IN 2 HOURS. MAX 2 TABLETS/24 HOURS., Disp: 12 tablet, Rfl: 3    EPINEPHrine (ANY BX GENERIC EQUIV) 0.3 MG/0.3ML injection 2-pack, PLEASE SEE ATTACHED FOR DETAILED DIRECTIONS, Disp: , Rfl:     escitalopram (LEXAPRO) 10 MG tablet, Take 2 tablets (20 mg) by mouth daily, Disp: 180 tablet, Rfl: 1    hydroquinone (RUBY) 4 % external cream, Apply pea sized amount at bedtime to face., Disp: 28.35 g, Rfl: 3    losartan (COZAAR) 50 MG tablet, Take 1 tablet (50 mg) by mouth daily, Disp: 90 tablet, Rfl: 1    methocarbamol (ROBAXIN) 500 MG tablet, Take 0.5-1 tablets (250-500 mg) by mouth 4 times daily as needed for muscle spasms, Disp: 40 tablet, Rfl: 0    tretinoin (RETIN-A) 0.05 % external cream, Apply pea sized amount at bedtime., Disp: 45 g, Rfl: 3    Urea 40 % CREA, Apply daily at bedtime to feet as needed., Disp: 85 g, Rfl: 4    Current Facility-Administered Medications:     botulinum toxin type A (BOTOX) 100 units injection 600 Units, 600 Units, Intramuscular, Q90 Days, Angela Franz MD, 450 Units at 23 1251    botulinum toxin type A (BOTOX) 100 units injection 600 Units, 600 Units, Intramuscular, Q90 Days, Simi Vazquez MD, 450 Units at 23 1802       BOTULINUM NEUROTOXIN INJECTION PROCEDURES    VERIFICATION OF PATIENT IDENTIFICATION AND PROCEDURE     Initials   Patient Name PS   Patient  PS   Procedure Verified by: PS     Prior to the start of the procedure and with procedural staff participation, I verbally confirmed the patient s identity using two indicators, relevant allergies, that the procedure was appropriate and matched the consent or emergent situation, and that the correct equipment/implants were available. Immediately prior to starting the procedure I conducted the Time Out with the procedural staff and re-confirmed the patient s name, procedure, and site/side. (The Joint  Commission universal protocol was followed.)  Yes    Sedation (Moderate or Deep): None    ABOVE ASSESSMENTS PERFORMED BY    Angela Franz MD      INDICATIONS FOR PROCEDURES  Tia Pablo is a 57 year old patient with involuntary muscle spasms and pain secondary to the diagnosis of oromandibular/cervical dystonia and chronic migraine headaches. Her baseline symptoms have been recalcitrant to oral medications and conservative therapy.  She is here today for reinjection with Botox.    GOAL OF PROCEDURE  The goal of this procedure is to increase active range of motion, improve volitional motor control, decrease pain  and enhance functional independence.      TOTAL DOSE ADMINISTERED  Dose Administered:  450 units  Botox (Botulinum Toxin Type A) 1:1 Dilution  Unavoidable Drug Waste: Yes  Amount of drug waste (mL): 50 units Botox.  Reason for waste:  Single use vial  Diluent Used:  Preservative Free Normal Saline  Total Volume of Diluent Used:  4.5 ml  NDC #: Botox 100u ([address]-78)      CONSENT  The risks, benefits, and treatment options were discussed with Tia Pablo and she agreed to proceed.    Written consent was obtained by [facility].     EQUIPMENT USED  Needle-35mm stimulating/recording  Needle-30 gauge  EMG/NCS Machine    SKIN PREPARATION  Skin preparation was performed using an alcohol wipe.    GUIDANCE DESCRIPTION  Electro-myographic guidance was necessary throughout the procedure to accurately identify all areas of dystonic muscles while avoiding injection of non-dystonic muscles, neighboring nerves and nearby vascular structures.     AREA/MUSCLE INJECTED: 450 UNITS BOTOX = TOTAL DOSE, 1:1 DILUTION     1. NECK MUSCLES: 145 UNITS BOTOX = TOTAL DOSE     Right Upper Trapezius - 20 units of Botox at 4 site/s.   Left Upper Trapezius - 20 units of Botox at 4 site/s.      Right Splenius Capitis - 5 units of Botox at 1 site/s.   Left Splenius Capitis - 5 units of Botox at 1 site/s.      Right Auricularis - 15 units of  Botox at 3 site/s (anterior, superior & posterior).   Left Auricularis - 15 units of Botox at 3 site/s (anterior, superior & posterior).     Right occipitalis - 15 units of Botox at 3 site/s.   Left occipitalis - 15 units of Botox at 3 site/s.     Right Sternocleidomastoid - 10 units of Botox at 1 site/s.    Right Middle Scalene - 15 units of Botox at 1 site/s.     Right Platysma - 5 units of Botox at 2 site/s.  Left Platysma - 5 units of Botox at 2 site/s.      2. UPPER EXTREMITY & TRUNK MUSCLES: 90 UNITS BOTOX = TOTAL DOSE     Right Levator scapula - 25 units of Botox at 2 site/s.   Left Levator scapula - 25 units of Botox at 2 site/s.      Right Rhomboid - 20 units of Botox at 1 site/s.    Left Rhomboid - 20 units of Botox at 3 site/s.       3. JAW MUSCLES: 140 UNITS BOTOX = TOTAL DOSE      Right Temporalis - 60 units of Botox at 5 site/s.               Left Temporalis - 60 units of Botox at 5 site/s.     Right Masseter - 10 units of Botox at 1 site/s.    Left Masseter - 10 units of Botox at 1 site/s.     4. FACIAL & SCALP MUSCLES: 75 UNITS BOTOX = TOTAL DOSE     Right Frontalis - 15 units of Botox at 3 site/s (2 middle & 1 hairline).    Left Frontalis - 15 units of Botox at 3 site/s (2 middle & 1 hairline).     Procerus - 5 units of Botox at 1 site/s.       Right  - 5 units of Botox at 1 site/s.    Left  - 5 units of Botox at 1 site/s.      Right Occipitalis - 15 units of Botox at 1 site/s.    Left Occipitalis - 15 units of Botox at 1 site/s.       RESPONSE TO PROCEDURE  Tia Pablo tolerated the procedure well and there were no immediate complications. She was allowed to recover for an appropriate period of time and was discharged home in stable condition.    ASSESSMENT AND PLAN   Botulinum toxin injections: No changes made to Botox dose today, however Botox was distributed to avoid jaw weakness. Patient will continue to monitor response to Botox and report at next appointment.    Referrals: None.  Follow up: Tia Pablo was rescheduled for the next series of injections in 12 weeks, at which time we will evaluate response to today's injections. she may call the clinic prior with any questions or concerns prior to the next appointment.         Again, thank you for allowing me to participate in the care of your patient.      Sincerely,    Simi Vazquez MD

## 2023-11-17 NOTE — NURSING NOTE
Chief Complaint   Patient presents with    RECHECK     Botox injections confirmed with patient     Jaqueline Sahu CMA at 10:49 AM on 11/17/2023.

## 2023-11-26 ENCOUNTER — OFFICE VISIT (OUTPATIENT)
Dept: URGENT CARE | Facility: URGENT CARE | Age: 58
End: 2023-11-26
Payer: COMMERCIAL

## 2023-11-26 VITALS
BODY MASS INDEX: 23.6 KG/M2 | DIASTOLIC BLOOD PRESSURE: 82 MMHG | TEMPERATURE: 98.8 F | SYSTOLIC BLOOD PRESSURE: 142 MMHG | OXYGEN SATURATION: 98 % | HEART RATE: 77 BPM | WEIGHT: 150 LBS | RESPIRATION RATE: 14 BRPM

## 2023-11-26 DIAGNOSIS — T36.95XA ANTIBIOTIC-INDUCED YEAST INFECTION: ICD-10-CM

## 2023-11-26 DIAGNOSIS — J01.90 ACUTE SINUSITIS WITH SYMPTOMS > 10 DAYS: Primary | ICD-10-CM

## 2023-11-26 DIAGNOSIS — B37.9 ANTIBIOTIC-INDUCED YEAST INFECTION: ICD-10-CM

## 2023-11-26 DIAGNOSIS — I10 HYPERTENSION, UNSPECIFIED TYPE: ICD-10-CM

## 2023-11-26 PROCEDURE — 99214 OFFICE O/P EST MOD 30 MIN: CPT | Performed by: NURSE PRACTITIONER

## 2023-11-26 RX ORDER — FLUCONAZOLE 150 MG/1
150 TABLET ORAL ONCE
Qty: 1 TABLET | Refills: 0 | Status: SHIPPED | OUTPATIENT
Start: 2023-11-26 | End: 2023-11-26

## 2023-11-26 NOTE — PROGRESS NOTES
Assessment & Plan     Acute sinusitis with symptoms > 10 days    - amoxicillin-clavulanate (AUGMENTIN) 875-125 MG tablet  Dispense: 14 tablet; Refill: 0    Antibiotic-induced yeast infection    - fluconazole (DIFLUCAN) 150 MG tablet  Dispense: 1 tablet; Refill: 0    Hypertension, unspecified type       Discussed sinus infections are usually caused by viruses and usually resolve within 7-10 days. Due to duration of symptoms, prescription sent to pharmacy for Augmentin twice daily for 7 days. Recommended rest, fluids especially warm clear liquids such as tea with honey and lemon, decreasing dairy which can increase congestion, humidifier, steam, nasal irrigation with saline, flonase nasal spray. Prescription sent to pharmacy for diiflucan as she gets yeast infections with abx for after antibiotic.    Patient is hypertensive today but asymptomatic.  No blurring of vision, chest pain, shortness of breath, dizziness, numbness, or weakness. Second BP reading was also above goal.  Patient instructed to follow up with PCP within the next week for BP recheck as untreated HTN may result in life threatening conditions.  Instructed to go to emergency department if develops chest pain, shortness of breath, dizziness, vision changes, numbness, weakness    Follow-up with PCP if symptoms persist for 7 days, and sooner if symptoms worsen or new symptoms develop.     Discussed red flag symptoms which warrant immediate visit in emergency room    All questions were answered and patient verbalized understanding. AVS reviewed with patient.     Stacey Loja, DNP, APRN, CNP 11/26/2023 10:30 AM  [facility]          Rubia Weber is a 57 year old female who presents to clinic today for the following health issues:  Chief Complaint   Patient presents with    URI     Feels like a sinus infection- cough, head hurting/sinus headache  Negative covid test last week  Has had sx for at least 1 week     Patient  presents for evaluation of sinus pressure. Associated symptoms: nasal congestion, ear pressure, teeth pain, post-nasal drip, fatigue, headache, cough. Symptoms have been present for 1.5 weeks and have been worsening. Denies fever, shortness of breath, sore throat. Cough is dry. She tested negative for COVID last week twice. Has been taking Mucinex which has not been helping. She states azithromycin has worked well for her previously.     She has a history of HTN, chronic migraine.     Problem list, Medication list, Allergies, and Medical history reviewed in EPIC.    ROS:  Review of systems negative except for noted above        Objective    BP (!) 142/82   Pulse 77   Temp 98.8  F (37.1  C) (Oral)   Resp 14   Wt 68 kg (150 lb)   SpO2 98%   BMI 23.60 kg/m    Physical Exam  Constitutional:       General: She is not in acute distress.     Appearance: She is not toxic-appearing or diaphoretic.   HENT:      Head: Normocephalic and atraumatic.      Right Ear: Tympanic membrane, ear canal and external ear normal.      Left Ear: Tympanic membrane, ear canal and external ear normal.      Nose:      Right Turbinates: Swollen.      Left Turbinates: Swollen.      Right Sinus: No maxillary sinus tenderness or frontal sinus tenderness.      Left Sinus: Maxillary sinus tenderness present. No frontal sinus tenderness.      Comments: Moderate nasal congestion     Mouth/Throat:      Mouth: Mucous membranes are moist.      Pharynx: Oropharynx is clear. No oropharyngeal exudate or posterior oropharyngeal erythema.   Eyes:      Conjunctiva/sclera: Conjunctivae normal.   Cardiovascular:      Rate and Rhythm: Normal rate and regular rhythm.      Heart sounds: Normal heart sounds.   Pulmonary:      Effort: Pulmonary effort is normal. No respiratory distress.      Breath sounds: Normal breath sounds. No wheezing, rhonchi or rales.      Comments: Episodic dry cough  Lymphadenopathy:      Cervical: No cervical adenopathy.   Skin:      General: Skin is warm and dry.   Neurological:      Mental Status: She is alert.

## 2023-11-26 NOTE — PATIENT INSTRUCTIONS
Rest, fluids especially warm clear liquids such as tea with honey and lemon, decreasing dairy which can increase congestion, humidifier, steam, nasal irrigation with saline, flonase nasal spray.

## 2023-11-29 ENCOUNTER — MYC MEDICAL ADVICE (OUTPATIENT)
Dept: FAMILY MEDICINE | Facility: CLINIC | Age: 58
End: 2023-11-29
Payer: COMMERCIAL

## 2023-11-29 DIAGNOSIS — B37.31 YEAST INFECTION OF THE VAGINA: Primary | ICD-10-CM

## 2023-12-04 ENCOUNTER — E-VISIT (OUTPATIENT)
Dept: FAMILY MEDICINE | Facility: CLINIC | Age: 58
End: 2023-12-04
Payer: COMMERCIAL

## 2023-12-04 ENCOUNTER — TELEPHONE (OUTPATIENT)
Dept: FAMILY MEDICINE | Facility: CLINIC | Age: 58
End: 2023-12-04
Payer: COMMERCIAL

## 2023-12-04 DIAGNOSIS — J01.90 ACUTE SINUSITIS TREATED WITH ANTIBIOTICS IN THE PAST 60 DAYS: Primary | ICD-10-CM

## 2023-12-04 PROCEDURE — 99421 OL DIG E/M SVC 5-10 MIN: CPT | Performed by: FAMILY MEDICINE

## 2023-12-04 NOTE — TELEPHONE ENCOUNTER
Patient was seen in  on 11/26/23 treated for acute sinusitis with Augmentin and took her last dose on Saturday 12/2/23.    Patient states she still feels fatigued, has sinus pressure/HA, nasal congestion with small amount of clear nasal drainage, dry cough and intermittent discomfort in bilateral ears.    Patient denies any SOB/difficulty breathing, dizziness and/or fever or chills.     Patient states she updated Dr. Sally Stone on 11/29/23 was told to give it a few more days to resolve but she is concerned as she has a procedure scheduled at the [facility] in [place] on 12/7/23 with Dr. Edwards for a nerve ablation on the right side she does not want to compromise this appointment.     Patient uses [facility] off of [address] in [place], [place].    Update routed to Dr. Sally Stone to advise.

## 2023-12-04 NOTE — TELEPHONE ENCOUNTER
She can send an evisit to address.  Have her chose the sinusitis visit for the appropriate questions    Dr. Sally Stone, DO

## 2023-12-04 NOTE — TELEPHONE ENCOUNTER
"Faith Weber,    Thank you for reaching out with your concerns. Based on the information you have provided, we recommend an E-Visit. This will help ensure you receive the care you need.    Please start an eVisit [email or web address]] so your provider can fully address your needs.     eVisits can be used to assess medication changes, minor injury/illness, and other concerns that save you a trip into your clinic.  You will complete a series of questions about your symptoms and a provider will respond to you within one business day.   If your provider deems that an eVisit is not appropriate for this concern, you will not be charged for your eVisit.    Alternatively, you can also follow these instructions:  -Go to the Menu button    -In the first set of options in the drop down menu select  \"eVisit\".  -Follow through the prompts for the reason for visit and answer the questions pertaining to your symptoms.  -In the recipient tab select your PCP and then continue your visit.  -Your provider will reply to you via Meilishuo with their treatment and recommendations.    If you have any additional questions or concerns, please write back or give us a call at [phone].   "

## 2023-12-04 NOTE — TELEPHONE ENCOUNTER
Call placed to Patient.  Relayed Dr Stone's message.  Verbalized understanding.  José Miguel Charles RN

## 2023-12-06 RX ORDER — DOXYCYCLINE 100 MG/1
100 CAPSULE ORAL 2 TIMES DAILY
Qty: 20 CAPSULE | Refills: 0 | Status: SHIPPED | OUTPATIENT
Start: 2023-12-06 | End: 2023-12-16

## 2023-12-07 ENCOUNTER — RADIOLOGY INJECTION OFFICE VISIT (OUTPATIENT)
Dept: PALLIATIVE MEDICINE | Facility: CLINIC | Age: 58
End: 2023-12-07
Payer: COMMERCIAL

## 2023-12-07 DIAGNOSIS — M47.812 SPONDYLOSIS OF CERVICAL REGION WITHOUT MYELOPATHY OR RADICULOPATHY: Primary | ICD-10-CM

## 2023-12-07 DIAGNOSIS — G89.18 POST PROCEDURE DISCOMFORT: ICD-10-CM

## 2023-12-07 RX ORDER — OXYCODONE HYDROCHLORIDE 5 MG/1
5 TABLET ORAL EVERY 12 HOURS PRN
Qty: 6 TABLET | Refills: 0 | Status: CANCELLED | OUTPATIENT
Start: 2023-12-07 | End: 2023-12-10

## 2023-12-07 RX ORDER — FENTANYL CITRATE 50 UG/ML
12.5-5 INJECTION, SOLUTION INTRAMUSCULAR; INTRAVENOUS EVERY 5 MIN PRN
Status: CANCELLED | OUTPATIENT
Start: 2023-12-07 | End: 2023-12-08

## 2023-12-07 RX ORDER — FLUCONAZOLE 150 MG/1
150 TABLET ORAL ONCE
Qty: 1 TABLET | Refills: 0 | Status: SHIPPED | OUTPATIENT
Start: 2023-12-07 | End: 2023-12-07

## 2023-12-07 ASSESSMENT — PAIN SCALES - GENERAL: PAINLEVEL: SEVERE PAIN (7)

## 2023-12-07 NOTE — PROGRESS NOTES
Patient not having the procedure done today.     Jeaneth Sr MA  [facility] [place]

## 2023-12-07 NOTE — PATIENT INSTRUCTIONS
Sorry that we had to cancel your procedure today.      A radiofrequency ablation  has been scheduled for 1/11/24 at 1245.    A  is needed  Nothing to eat/drink for 6 hours before your procedure.  If you are sick, have an infection and/or are on antibiotics let us know as the injection would need to be rescheduled.    Location: [facility]- 2nd floor    [id number] [place] [address] [place] [address]      [phone]-to change appt for the injection  ------

## 2023-12-19 ENCOUNTER — MYC REFILL (OUTPATIENT)
Dept: FAMILY MEDICINE | Facility: CLINIC | Age: 58
End: 2023-12-19
Payer: COMMERCIAL

## 2023-12-19 DIAGNOSIS — G43.719 INTRACTABLE CHRONIC MIGRAINE WITHOUT AURA AND WITHOUT STATUS MIGRAINOSUS: ICD-10-CM

## 2023-12-22 RX ORDER — ELETRIPTAN HYDROBROMIDE 40 MG/1
40 TABLET, FILM COATED ORAL
Qty: 12 TABLET | Refills: 3 | Status: SHIPPED | OUTPATIENT
Start: 2023-12-22

## 2023-12-26 ENCOUNTER — TELEPHONE (OUTPATIENT)
Dept: FAMILY MEDICINE | Facility: CLINIC | Age: 58
End: 2023-12-26
Payer: COMMERCIAL

## 2023-12-26 NOTE — TELEPHONE ENCOUNTER
Call placed to Patient.  Patient is still not felling better.  Now has a sore throat along with other symptoms.  Body feels very drained.  Sinus headache.  Appointment scheduled with Dr Stone for 1/2/23 at 1310.  José Miguel Charles RN

## 2023-12-26 NOTE — TELEPHONE ENCOUNTER
Reason for Call:  Appointment Request    Patient requesting this type of appt:  office visit    Requested provider: Sally Stone    Reason patient unable to be scheduled:  Patient has an appt for 01/09, but would like to be seen sooner    When does patient want to be seen/preferred time:  sooner than 01/09    Comments: Patient has an appt with Dr. Stone for a sinus infection that is not getting better. Appt is on 01/29, but would like to be seen sooner.     Could we send this information to you in Ring[employer] or would you prefer to receive a phone call?:   Patient would prefer a phone call   Okay to leave a detailed message?: Yes at Cell number on file:    Telephone Information:   Mobile [phone]       Call taken on 12/26/2023 at 11:14 AM by SUKUMAR BRIGHT

## 2023-12-27 ENCOUNTER — TELEPHONE (OUTPATIENT)
Dept: FAMILY MEDICINE | Facility: CLINIC | Age: 58
End: 2023-12-27
Payer: COMMERCIAL

## 2023-12-27 NOTE — TELEPHONE ENCOUNTER
Call placed to Patient.  Patient agreeable with seeing another provider.  Appointment scheduled for 12/29/23 at 1110 with GARCIA Fitch.  José Miguel Charles RN

## 2023-12-27 NOTE — TELEPHONE ENCOUNTER
Reason for Call:  Appointment Request    Patient requesting this type of appt:  Patient is currently scheduled for 01-02-24 for a possible sinus infection.  She would like to be seen sooner as symptoms are worsening or any medical recommendations that her care team could provide for her     Requested provider: Sally Stone    Reason patient unable to be scheduled: Not within requested timeframe    When does patient want to be seen/preferred time: Same day    Comments: Patient was offered evisit for this, but would like to be seen in person or would like some direction from her care team.      Could we send this information to you in [employer] or would you prefer to receive a phone call?:   Patient would prefer a phone call   Okay to leave a detailed message?: Yes at Home number on file [phone] (home)    Call taken on 12/27/2023 at 8:24 AM by Katty Alvarado

## 2023-12-29 ENCOUNTER — OFFICE VISIT (OUTPATIENT)
Dept: FAMILY MEDICINE | Facility: CLINIC | Age: 58
End: 2023-12-29
Payer: COMMERCIAL

## 2023-12-29 ENCOUNTER — ANCILLARY PROCEDURE (OUTPATIENT)
Dept: GENERAL RADIOLOGY | Facility: CLINIC | Age: 58
End: 2023-12-29
Attending: NURSE PRACTITIONER
Payer: COMMERCIAL

## 2023-12-29 VITALS
TEMPERATURE: 100.1 F | BODY MASS INDEX: 23.35 KG/M2 | RESPIRATION RATE: 20 BRPM | WEIGHT: 148.4 LBS | DIASTOLIC BLOOD PRESSURE: 70 MMHG | OXYGEN SATURATION: 97 % | HEART RATE: 87 BPM | SYSTOLIC BLOOD PRESSURE: 118 MMHG

## 2023-12-29 DIAGNOSIS — B37.31 YEAST INFECTION OF THE VAGINA: ICD-10-CM

## 2023-12-29 DIAGNOSIS — R05.3 PERSISTENT COUGH FOR 3 WEEKS OR LONGER: ICD-10-CM

## 2023-12-29 DIAGNOSIS — R50.9 FEVER, UNSPECIFIED FEVER CAUSE: ICD-10-CM

## 2023-12-29 DIAGNOSIS — J20.9 ACUTE BRONCHITIS WITH SYMPTOMS > 10 DAYS: ICD-10-CM

## 2023-12-29 DIAGNOSIS — J01.90 ACUTE SINUSITIS WITH SYMPTOMS > 10 DAYS: Primary | ICD-10-CM

## 2023-12-29 LAB
BASOPHILS # BLD AUTO: 0.1 10E3/UL (ref 0–0.2)
BASOPHILS NFR BLD AUTO: 1 %
EOSINOPHIL # BLD AUTO: 0.1 10E3/UL (ref 0–0.7)
EOSINOPHIL NFR BLD AUTO: 1 %
ERYTHROCYTE [DISTWIDTH] IN BLOOD BY AUTOMATED COUNT: 12 % (ref 10–15)
FLUAV AG SPEC QL IA: NEGATIVE
FLUBV AG SPEC QL IA: NEGATIVE
HCT VFR BLD AUTO: 40.5 % (ref 35–47)
HGB BLD-MCNC: 13.7 G/DL (ref 11.7–15.7)
IMM GRANULOCYTES # BLD: 0 10E3/UL
IMM GRANULOCYTES NFR BLD: 0 %
LYMPHOCYTES # BLD AUTO: 0.7 10E3/UL (ref 0.8–5.3)
LYMPHOCYTES NFR BLD AUTO: 7 %
MCH RBC QN AUTO: 28.8 PG (ref 26.5–33)
MCHC RBC AUTO-ENTMCNC: 33.8 G/DL (ref 31.5–36.5)
MCV RBC AUTO: 85 FL (ref 78–100)
MONOCYTES # BLD AUTO: 1.3 10E3/UL (ref 0–1.3)
MONOCYTES NFR BLD AUTO: 12 %
NEUTROPHILS # BLD AUTO: 8.4 10E3/UL (ref 1.6–8.3)
NEUTROPHILS NFR BLD AUTO: 80 %
PLATELET # BLD AUTO: 307 10E3/UL (ref 150–450)
RBC # BLD AUTO: 4.75 10E6/UL (ref 3.8–5.2)
WBC # BLD AUTO: 10.5 10E3/UL (ref 4–11)

## 2023-12-29 PROCEDURE — 99214 OFFICE O/P EST MOD 30 MIN: CPT | Performed by: NURSE PRACTITIONER

## 2023-12-29 PROCEDURE — 87804 INFLUENZA ASSAY W/OPTIC: CPT | Performed by: NURSE PRACTITIONER

## 2023-12-29 PROCEDURE — 71046 X-RAY EXAM CHEST 2 VIEWS: CPT | Mod: TC | Performed by: RADIOLOGY

## 2023-12-29 PROCEDURE — 36415 COLL VENOUS BLD VENIPUNCTURE: CPT | Performed by: NURSE PRACTITIONER

## 2023-12-29 PROCEDURE — 85025 COMPLETE CBC W/AUTO DIFF WBC: CPT | Performed by: NURSE PRACTITIONER

## 2023-12-29 RX ORDER — FLUCONAZOLE 150 MG/1
150 TABLET ORAL ONCE
Qty: 1 TABLET | Refills: 0 | Status: SHIPPED | OUTPATIENT
Start: 2023-12-29 | End: 2023-12-29

## 2023-12-29 RX ORDER — BENZONATATE 200 MG/1
200 CAPSULE ORAL 3 TIMES DAILY PRN
Qty: 30 CAPSULE | Refills: 0 | Status: SHIPPED | OUTPATIENT
Start: 2023-12-29 | End: 2024-01-25

## 2023-12-29 RX ORDER — LEVOFLOXACIN 500 MG/1
500 TABLET, FILM COATED ORAL DAILY
Qty: 10 TABLET | Refills: 0 | Status: SHIPPED | OUTPATIENT
Start: 2023-12-29 | End: 2024-01-08

## 2023-12-29 RX ORDER — CODEINE PHOSPHATE/GUAIFENESIN 10-100MG/5
5 LIQUID (ML) ORAL EVERY 4 HOURS PRN
Qty: 118 ML | Refills: 0 | Status: SHIPPED | OUTPATIENT
Start: 2023-12-29 | End: 2024-01-26

## 2023-12-29 ASSESSMENT — ENCOUNTER SYMPTOMS
COUGH: 1
SHORTNESS OF BREATH: 0
WHEEZING: 0
RHINORRHEA: 1
FATIGUE: 1
FEVER: 1

## 2023-12-29 NOTE — PROGRESS NOTES
Assessment & Plan     Acute sinusitis with symptoms > 10 days  6 weeks of nasal congestion, productive coughing, has fevers body aches generalized not feeling well today.  States that she feels she is worsening.  Chest x-ray was negative, CBC white blood count was normal but there was a slight elevation in neutrophilic count, influenza was negative.  Is unclear if this is more of a viral infection on top of her previous infections that she has had.  However she is just had multiple infections over the past 6 weeks.  So we will treat her with levofloxacin to see if that will help.  Treat with Tessalon Perles and guaifenesin with codeine to help her cough at nighttime.  This presents more of an infection versus inflammation.  - benzonatate (TESSALON) 200 MG capsule; Take 1 capsule (200 mg) by mouth 3 times daily as needed  - guaiFENesin-codeine (GUAIFENESIN AC) 100-10 MG/5ML syrup; Take 5 mLs by mouth every 4 hours as needed for cough  - levofloxacin (LEVAQUIN) 500 MG tablet; Take 1 tablet (500 mg) by mouth daily for 10 days    Acute bronchitis with symptoms > 10 days  See above    Persistent cough for 3 weeks or longer  - XR Chest 2 Views; Future  - CBC with platelets and differential; Future  - Influenza A/B antigen  - CBC with platelets and differential    Fever, unspecified fever cause  - XR Chest 2 Views; Future  - CBC with platelets and differential; Future  - Influenza A/B antigen  - CBC with platelets and differential    Yeast infection of the vagina  - fluconazole (DIFLUCAN) 150 MG tablet; Take 1 tablet (150 mg) by mouth once for 1 dose      FUTURE APPOINTMENTS:       - Follow-up for annual visit or as needed    SHANDRA Zambrano [facility] ELI Weber is a 58 year old, presenting for the following health issues:  Sinus Problem      12/29/2023    10:59 AM   Additional Questions   Roomed by KYLE Belcher   Accompanied by self       History of Present Illness        Reason for visit:  Cough, congestion, sore throat and ear pain    She eats 4 or more servings of fruits and vegetables daily.She consumes 0 sweetened beverage(s) daily.She exercises with enough effort to increase her heart rate 20 to 29 minutes per day.  She exercises with enough effort to increase her heart rate 3 or less days per week.   She is taking medications regularly.       She was in [place], [place] 11/12 symptoms started 11/15 in sinuses   Has completed 2 rounds of antibiotics, cold symptoms have worsened and move into chest. Treated for sinus infections  Deep cough, pain in chest with coughing. Body aches, fatigue  fevers, productive cough. Nasal congestion  A lot of fullness in nose, blowing out green/ yellow. edna pot is not giving relief.  She has taken 2 covid test, most recent 2 days ago = negative.       Review of Systems   Constitutional:  Positive for fatigue and fever.   HENT:  Positive for congestion and rhinorrhea.    Respiratory:  Positive for cough. Negative for shortness of breath and wheezing.    All other systems reviewed and are negative.           Objective    /70   Pulse 87   Temp 100.1  F (37.8  C) (Tympanic)   Resp 20   Wt 67.3 kg (148 lb 6.4 oz)   SpO2 97%   BMI 23.35 kg/m    Body mass index is 23.35 kg/m .  Physical Exam  Constitutional:       Appearance: Normal appearance.   HENT:      Head: Normocephalic.      Right Ear: Tympanic membrane, ear canal and external ear normal.      Left Ear: Tympanic membrane, ear canal and external ear normal.      Nose: Nose normal.      Mouth/Throat:      Mouth: Mucous membranes are moist.      Pharynx: Oropharynx is clear.   Cardiovascular:      Rate and Rhythm: Normal rate and regular rhythm.      Heart sounds: Normal heart sounds.   Pulmonary:      Effort: Pulmonary effort is normal.      Breath sounds: Normal breath sounds.   Abdominal:      General: Bowel sounds are normal.      Palpations: Abdomen is soft.    Musculoskeletal:      Cervical back: Normal range of motion.   Lymphadenopathy:      Cervical: No cervical adenopathy.   Skin:     General: Skin is warm and dry.   Neurological:      Mental Status: She is alert and oriented to person, place, and time.   Psychiatric:         Mood and Affect: Mood normal.         Behavior: Behavior normal.         Thought Content: Thought content normal.         Judgment: Judgment normal.

## 2023-12-30 ENCOUNTER — E-VISIT (OUTPATIENT)
Dept: FAMILY MEDICINE | Facility: CLINIC | Age: 58
End: 2023-12-30
Payer: COMMERCIAL

## 2023-12-30 DIAGNOSIS — R05.1 ACUTE COUGH: Primary | ICD-10-CM

## 2023-12-30 PROCEDURE — 99207 PR NON-BILLABLE SERV PER CHARTING: CPT | Performed by: FAMILY MEDICINE

## 2024-01-03 ENCOUNTER — ANESTHESIA EVENT (OUTPATIENT)
Dept: GASTROENTEROLOGY | Facility: CLINIC | Age: 59
End: 2024-01-03
Payer: COMMERCIAL

## 2024-01-03 NOTE — ANESTHESIA PREPROCEDURE EVALUATION
Anesthesia Pre-Procedure Evaluation    Patient: Tia Pablo   MRN: [id number] : 1965        Procedure : Procedure(s):  Colonoscopy          Past Medical History:   Diagnosis Date    Anxiety     Depression     HTN (hypertension)       Past Surgical History:   Procedure Laterality Date    AS REPAIR TEND/MUS/FLEX FOREARM/WRIST, PRIM, EACH       SECTION      HYSTERECTOMY        Allergies   Allergen Reactions    Bee Venom Anaphylaxis    Erythromycin Hives and Itching    Seasonal Allergies       Social History     Tobacco Use    Smoking status: Never    Smokeless tobacco: Never   Substance Use Topics    Alcohol use: Yes     Comment: Occasionally, 1-2 glasses of wine on weekends      Wt Readings from Last 1 Encounters:   23 67.3 kg (148 lb 6.4 oz)        Anesthesia Evaluation   Pt has had prior anesthetic. Type: General and Regional.        ROS/MED HX  ENT/Pulmonary:       Neurologic:       Cardiovascular:     (+)  hypertension- -   -  - -                                      METS/Exercise Tolerance:     Hematologic:       Musculoskeletal:       GI/Hepatic:       Renal/Genitourinary:       Endo:       Psychiatric/Substance Use:     (+) psychiatric history anxiety and depression       Infectious Disease:       Malignancy:       Other:            Physical Exam    Airway  airway exam normal      Mallampati: II   TM distance: > 3 FB   Neck ROM: full   Mouth opening: > 3 cm    Respiratory Devices and Support         Dental  no notable dental history     (+) Minor Abnormalities - some fillings, tiny chips      Cardiovascular   cardiovascular exam normal          Pulmonary   pulmonary exam normal                OUTSIDE LABS:  CBC:   Lab Results   Component Value Date    WBC 10.5 2023    HGB 13.7 2023    HCT 40.5 2023     2023     BMP:   Lab Results   Component Value Date     2023     2023    POTASSIUM 4.4 2023    POTASSIUM 4.3 2023     "CHLORIDE 99 09/20/2023    CHLORIDE 96 (L) 05/16/2023    CO2 28 09/20/2023    CO2 27 05/16/2023    BUN 15.3 09/20/2023    BUN 14.5 05/16/2023    CR 0.70 09/20/2023    CR 0.69 05/16/2023    GLC 89 09/20/2023    GLC 97 05/16/2023     COAGS: No results found for: \"PTT\", \"INR\", \"FIBR\"  POC: No results found for: \"BGM\", \"HCG\", \"HCGS\"  HEPATIC:   Lab Results   Component Value Date    ALBUMIN 4.6 09/20/2023    PROTTOTAL 6.9 09/20/2023    ALT 18 09/20/2023    AST 29 09/20/2023    ALKPHOS 72 09/20/2023    BILITOTAL 0.6 09/20/2023     OTHER:   Lab Results   Component Value Date    OLEKSANDR 9.8 09/20/2023    TSH 1.53 02/18/2022       Anesthesia Plan    ASA Status:  2    NPO Status:  NPO Appropriate    Anesthesia Type: General.   Induction: Propofol, Intravenous.   Maintenance: TIVA.        Consents    Anesthesia Plan(s) and associated risks, benefits, and realistic alternatives discussed. Questions answered and patient/representative(s) expressed understanding.     - Discussed: Risks, Benefits and Alternatives for BOTH SEDATION and the PROCEDURE were discussed     - Discussed with:  Patient            Postoperative Care    Pain management: Multi-modal analgesia, IV analgesics, Oral pain medications.   PONV prophylaxis: Ondansetron (or other 5HT-3), Dexamethasone or Solumedrol, Background Propofol Infusion     Comments:               Guillermo Tavares CRNA, APRN CRNA    I have reviewed the pertinent notes and labs in the chart from the past 30 days and (re)examined the patient.  Any updates or changes from those notes are reflected in this note.                  "

## 2024-01-04 ENCOUNTER — ANESTHESIA (OUTPATIENT)
Dept: GASTROENTEROLOGY | Facility: CLINIC | Age: 59
End: 2024-01-04
Payer: COMMERCIAL

## 2024-01-08 ENCOUNTER — MYC MEDICAL ADVICE (OUTPATIENT)
Dept: FAMILY MEDICINE | Facility: CLINIC | Age: 59
End: 2024-01-08
Payer: COMMERCIAL

## 2024-01-09 ENCOUNTER — TELEPHONE (OUTPATIENT)
Dept: PALLIATIVE MEDICINE | Facility: CLINIC | Age: 59
End: 2024-01-09

## 2024-01-09 NOTE — TELEPHONE ENCOUNTER
Contacted patient and notified that if antibiotics were completed on 1/7 she would be fine to proceed with injection as this does allow 4 days between completion of medication and injection.     Patient verbalized understanding.     Kathryn Saenz RN  [facility]  [phone]

## 2024-01-09 NOTE — TELEPHONE ENCOUNTER
[facility] Call Center    Phone Message    May a detailed message be left on voicemail: yes     Reason for Call: Other: Patient is calling because they were seen 12/29 for being sick and was put on antibiotics for ten days starting 12/29. 1/7 was the last dose of antibiotics. Patient is wondering if they still will be able to be seen for their appt Thursday 1/11.     Action Taken: Other: Pain    Travel Screening: Not Applicable

## 2024-01-10 ENCOUNTER — TELEPHONE (OUTPATIENT)
Dept: PALLIATIVE MEDICINE | Facility: CLINIC | Age: 59
End: 2024-01-10
Payer: COMMERCIAL

## 2024-01-10 NOTE — TELEPHONE ENCOUNTER
[facility] Call Center    Phone Message    May a detailed message be left on voicemail: yes     Reason for Call: Other: Patient is having an ablation done tomorrow with Jerry and she is wondering if it is NPO food x 6 hours and water or just food.  Please call her back.      Action Taken: Message routed to:  Other: [facility]    Travel Screening: Not Applicable

## 2024-01-10 NOTE — TELEPHONE ENCOUNTER
Contacted patient and educated that patient should stop any eating 6 hours prior to injection appointment and should stop clear liquids 2 hours prior to injection.     Kathryn Saenz RN  [facility] [place]  [phone]

## 2024-01-11 ENCOUNTER — RADIOLOGY INJECTION OFFICE VISIT (OUTPATIENT)
Dept: PALLIATIVE MEDICINE | Facility: CLINIC | Age: 59
End: 2024-01-11
Payer: COMMERCIAL

## 2024-01-11 ENCOUNTER — ANCILLARY ORDERS (OUTPATIENT)
Dept: PALLIATIVE MEDICINE | Facility: CLINIC | Age: 59
End: 2024-01-11

## 2024-01-11 VITALS
SYSTOLIC BLOOD PRESSURE: 130 MMHG | OXYGEN SATURATION: 98 % | HEART RATE: 70 BPM | RESPIRATION RATE: 20 BRPM | DIASTOLIC BLOOD PRESSURE: 64 MMHG

## 2024-01-11 DIAGNOSIS — M47.812 SPONDYLOSIS OF CERVICAL REGION WITHOUT MYELOPATHY OR RADICULOPATHY: Primary | ICD-10-CM

## 2024-01-11 DIAGNOSIS — G89.18 POST PROCEDURE DISCOMFORT: Primary | ICD-10-CM

## 2024-01-11 DIAGNOSIS — M47.812 SPONDYLOSIS OF CERVICAL REGION WITHOUT MYELOPATHY OR RADICULOPATHY: ICD-10-CM

## 2024-01-11 DIAGNOSIS — F40.298 NEEDLE PHOBIA: ICD-10-CM

## 2024-01-11 PROCEDURE — 99153 MOD SED SAME PHYS/QHP EA: CPT | Performed by: PAIN MEDICINE

## 2024-01-11 PROCEDURE — 99152 MOD SED SAME PHYS/QHP 5/>YRS: CPT | Performed by: PAIN MEDICINE

## 2024-01-11 PROCEDURE — 64634 DESTROY C/TH FACET JNT ADDL: CPT | Mod: LT | Performed by: PAIN MEDICINE

## 2024-01-11 PROCEDURE — 64633 DESTROY CERV/THOR FACET JNT: CPT | Mod: LT | Performed by: PAIN MEDICINE

## 2024-01-11 RX ORDER — OXYCODONE HYDROCHLORIDE 5 MG/1
5 TABLET ORAL EVERY 12 HOURS PRN
Qty: 6 TABLET | Refills: 0 | Status: SHIPPED | OUTPATIENT
Start: 2024-01-11 | End: 2024-01-14

## 2024-01-11 RX ORDER — GLYCOPYRROLATE 0.2 MG/ML
.1-.2 INJECTION, SOLUTION INTRAMUSCULAR; INTRAVENOUS EVERY 5 MIN PRN
Status: ACTIVE | OUTPATIENT
Start: 2024-01-11

## 2024-01-11 RX ORDER — KETOROLAC TROMETHAMINE 30 MG/ML
15 INJECTION, SOLUTION INTRAMUSCULAR; INTRAVENOUS ONCE
Status: COMPLETED | OUTPATIENT
Start: 2024-01-11 | End: 2024-01-11

## 2024-01-11 RX ORDER — FENTANYL CITRATE 50 UG/ML
12.5-5 INJECTION, SOLUTION INTRAMUSCULAR; INTRAVENOUS EVERY 5 MIN PRN
Status: ACTIVE | OUTPATIENT
Start: 2024-01-11 | End: 2024-01-12

## 2024-01-11 RX ADMIN — FENTANYL CITRATE 25 MCG: 50 INJECTION, SOLUTION INTRAMUSCULAR; INTRAVENOUS at 13:27

## 2024-01-11 RX ADMIN — FENTANYL CITRATE 25 MCG: 50 INJECTION, SOLUTION INTRAMUSCULAR; INTRAVENOUS at 13:57

## 2024-01-11 RX ADMIN — KETOROLAC TROMETHAMINE 15 MG: 30 INJECTION, SOLUTION INTRAMUSCULAR; INTRAVENOUS at 15:10

## 2024-01-11 RX ADMIN — GLYCOPYRROLATE 0.2 MG: 0.2 INJECTION, SOLUTION INTRAMUSCULAR; INTRAVENOUS at 13:57

## 2024-01-11 ASSESSMENT — PAIN SCALES - GENERAL: PAINLEVEL: SEVERE PAIN (7)

## 2024-01-11 NOTE — NURSING NOTE
20 gauge Peripheral IV inserted into left anticubital - attempts: 1    Lucie RN-BSN  [facility]   [phone]

## 2024-01-11 NOTE — NURSING NOTE
Discharge Information    IV Discontiued Time:  1521      Discharge Criteria = When patient returns to baseline or as per MD order    Consciousness:  Pt is fully awake    Circulation:  BP +/- 20% of pre-procedure level    Respiration:  Patient is able to breathe deeply    O2 Sat:  Patient is able to maintain O2 Sat >92% on room air    Activity:  Moves 4 extremities on command    Ambulation:  Patient is able to stand and walk or stand and pivot into wheelchair    Dressing:  Clean/dry or No Dressing    Notes:   Discharge instructions and AVS given to patient    Patient meets criteria for discharge?  YES    Admitted to PCU?  No    Responsible adult present to accompany patient home?  Yes    Signature/Title:    anil travis RN  RN Care Coordinator  [facility] [place]

## 2024-01-11 NOTE — PATIENT INSTRUCTIONS
[facility]   Radiofrequency Ablation (RFA) Discharge Instructions     Today you saw:     Dr. Saeed Edwards,       You were given Torodal IV for pain. Do not take any NSAIDS (ibuprofen, naproxen, advil, aleve) for 8 hours after your procedure.    You were give a medication called glycopyrrolate during your procedure.  You may experience some dry mouth from this for a few hours after your procedure.  You should anticipate procedural pain for up to 2 weeks.   Oxycodone has been prescribed for you and you should take this as directed.   It may take up to 8 weeks to receive relief from the RFA   Follow up with your referring provider as directed  If you received sedation before, during or after your procedure, for the next 24 hours you shall NOT:    -Drive    -Operate machinery    -Drink alcohol    -Sign any legal documents   You may resume your normal diet   You may resume your regular medications after the procedure   If you were holding your blood thinning medication, please restart taking it: N/A  Be cautious with walking. Numbness and/or weakness in the lower extremities may occur for up to 6-8 hours due to effect of local anesthetic  Avoid strenuous activity for the first 24 hours   You may resume your regular activities after 24 hours   You may shower, however no swimming, tub baths or hot tubs for 24 hours following your procedure   You may use ice packs 10-15 minutes three to four times a day at the injection site for comfort   Do not use heat to painful areas for 6 to 8 hours. This will give the local anesthetic time to wear off and prevent you from accidentally burning your skin.   Unless you have been directed to avoid the use of anti-inflammatory medications (NSAIDS), you may use medications such as ibuprofen, Aleve or Tylenol for pain control if needed.   If you experience any of the following, call the Pain Clinic during work hours (Monday through Friday 8 am-4:30 pm) at  [phone] or the Provider Line after hours at [phone]:   -Fever over 100 degree F    -Swelling, bleeding, redness, drainage, warmth at the injection site    -Progressive weakness or numbness in your legs or arms    -Loss of bowel or bladder function    -Unusual headache that is not relieved by Tylenol    -Unusual new onset of pain that is not improving   ________________________    A right-sided radiofrequency ablation has been scheduled for 2/13/24 at 0945 arrival.    A  is needed  Nothing to eat for 6 hours before your procedure.  You can have fluids up to 2 hours before procedure.        Location: [facility]- 2nd floor    [address] [place] [address]      [phone]-to change appt for the injection  ------

## 2024-01-11 NOTE — NURSING NOTE
MD Time IN: 1324   Sedation start time:  1326  Sedation end time:  1435    Medications given: fentanyl 50 mcg IV; versed 1 mg IV; toradol 15 mg IV; glycopyrrolate 0.2mg;   Intravenous fluids were administered, normal saline 750cc's.  Sedation Level Achieved:  Moderate (conscious) sedation    Lucie RN-BSN  [facility]   [phone]

## 2024-01-11 NOTE — NURSING NOTE
Pre-procedure Intake  If YES to any questions or NO to having a   Please complete laminated checklist and leave on the computer keyboard for Provider, verbally inform provider if able.    For SCS Trial, RFA's or any sedation procedure:  Have you been fasting? Yes  If yes, for how long? Patient reports last eating 6:15 am on 01/11/2024    Are you taking any any blood thinners such as Coumadin, Warfarin, Jantoven, Pradaxa Xarelto, Eliquis, Edoxaban, Enoxaparin, Lovenox, Heparin, Arixtra, Fondaparinux, or Fragmin? OR Antiplatelet medication such as Plavix, Brilinta, or Effient?   No   If yes, when did you take your last dose?     Do you take aspirin?  No  If cervical procedure, have you held aspirin for 6 days?   NA    Do you have any allergies to contrast dye, iodine, steroid and/or numbing medications?  NO    Are you currently taking antibiotics or have an active infection?  NO    Have you had a fever/elevated temperature within the past week? NO    Are you currently taking oral steroids? NO    Do you have a ? Yes    Are you pregnant or breastfeeding?  NO    Have you received the COVID-19 vaccine? Yes  If yes, was it your 1st, 2nd or only dose needed? 2nd dose and booster  Date of most recent vaccine: 10/12/2023    Notify provider and RNs if systolic BP >170, diastolic BP >100, P >100 or O2 sats < 90%     Jeaneth Sr MA  [facility]

## 2024-01-15 ENCOUNTER — HOSPITAL ENCOUNTER (OUTPATIENT)
Facility: CLINIC | Age: 59
Discharge: HOME | End: 2024-01-15
Attending: SURGERY | Admitting: SURGERY
Payer: COMMERCIAL

## 2024-01-15 VITALS
HEART RATE: 58 BPM | DIASTOLIC BLOOD PRESSURE: 64 MMHG | RESPIRATION RATE: 16 BRPM | HEIGHT: 66 IN | SYSTOLIC BLOOD PRESSURE: 112 MMHG | TEMPERATURE: 98.2 F | OXYGEN SATURATION: 96 % | WEIGHT: 148 LBS | BODY MASS INDEX: 23.78 KG/M2

## 2024-01-15 LAB — COLONOSCOPY: NORMAL

## 2024-01-15 PROCEDURE — 45378 DIAGNOSTIC COLONOSCOPY: CPT | Performed by: SURGERY

## 2024-01-15 PROCEDURE — 370N000017 HC ANESTHESIA TECHNICAL FEE, PER MIN: Performed by: SURGERY

## 2024-01-15 PROCEDURE — 258N000003 HC RX IP 258 OP 636: Performed by: SURGERY

## 2024-01-15 PROCEDURE — 250N000011 HC RX IP 250 OP 636: Performed by: NURSE ANESTHETIST, CERTIFIED REGISTERED

## 2024-01-15 PROCEDURE — 258N000003 HC RX IP 258 OP 636: Performed by: NURSE ANESTHETIST, CERTIFIED REGISTERED

## 2024-01-15 PROCEDURE — 250N000009 HC RX 250: Performed by: NURSE ANESTHETIST, CERTIFIED REGISTERED

## 2024-01-15 PROCEDURE — G0105 COLORECTAL SCRN; HI RISK IND: HCPCS | Performed by: SURGERY

## 2024-01-15 RX ORDER — PROPOFOL 10 MG/ML
INJECTION, EMULSION INTRAVENOUS PRN
Status: DISCONTINUED | OUTPATIENT
Start: 2024-01-15 | End: 2024-01-15

## 2024-01-15 RX ORDER — PROPOFOL 10 MG/ML
INJECTION, EMULSION INTRAVENOUS CONTINUOUS PRN
Status: DISCONTINUED | OUTPATIENT
Start: 2024-01-15 | End: 2024-01-15

## 2024-01-15 RX ORDER — LIDOCAINE HYDROCHLORIDE 20 MG/ML
INJECTION, SOLUTION INFILTRATION; PERINEURAL PRN
Status: DISCONTINUED | OUTPATIENT
Start: 2024-01-15 | End: 2024-01-15

## 2024-01-15 RX ORDER — ONDANSETRON 2 MG/ML
4 INJECTION INTRAMUSCULAR; INTRAVENOUS EVERY 30 MIN PRN
Status: DISCONTINUED | OUTPATIENT
Start: 2024-01-15 | End: 2024-01-15 | Stop reason: HOSPADM

## 2024-01-15 RX ORDER — ONDANSETRON 4 MG/1
4 TABLET, ORALLY DISINTEGRATING ORAL EVERY 30 MIN PRN
Status: DISCONTINUED | OUTPATIENT
Start: 2024-01-15 | End: 2024-01-15 | Stop reason: HOSPADM

## 2024-01-15 RX ORDER — SODIUM CHLORIDE, SODIUM LACTATE, POTASSIUM CHLORIDE, CALCIUM CHLORIDE 600; 310; 30; 20 MG/100ML; MG/100ML; MG/100ML; MG/100ML
INJECTION, SOLUTION INTRAVENOUS CONTINUOUS
Status: DISCONTINUED | OUTPATIENT
Start: 2024-01-15 | End: 2024-01-15 | Stop reason: HOSPADM

## 2024-01-15 RX ADMIN — PHENYLEPHRINE HYDROCHLORIDE 100 MCG: 10 INJECTION INTRAVENOUS at 11:13

## 2024-01-15 RX ADMIN — SODIUM CHLORIDE, POTASSIUM CHLORIDE, SODIUM LACTATE AND CALCIUM CHLORIDE: 600; 310; 30; 20 INJECTION, SOLUTION INTRAVENOUS at 10:29

## 2024-01-15 RX ADMIN — LIDOCAINE HYDROCHLORIDE 30 MG: 20 INJECTION, SOLUTION INFILTRATION; PERINEURAL at 10:53

## 2024-01-15 RX ADMIN — PROPOFOL 175 MCG/KG/MIN: 10 INJECTION, EMULSION INTRAVENOUS at 10:53

## 2024-01-15 RX ADMIN — PROPOFOL 70 MG: 10 INJECTION, EMULSION INTRAVENOUS at 10:53

## 2024-01-15 ASSESSMENT — ACTIVITIES OF DAILY LIVING (ADL): ADLS_ACUITY_SCORE: 35

## 2024-01-15 NOTE — ANESTHESIA POSTPROCEDURE EVALUATION
Patient: Tia Pablo    Procedure: Procedure(s):  Colonoscopy       Anesthesia Type:  General    Note:  Disposition: Outpatient   Postop Pain Control: Uneventful            Sign Out: Well controlled pain   PONV: No   Neuro/Psych: Uneventful            Sign Out: Acceptable/Baseline neuro status   Airway/Respiratory: Uneventful            Sign Out: Acceptable/Baseline resp. status   CV/Hemodynamics: Uneventful            Sign Out: Acceptable CV status; No obvious hypovolemia; No obvious fluid overload   Other NRE: NONE   DID A NON-ROUTINE EVENT OCCUR? No           Last vitals:  Vitals Value Taken Time   /64 01/15/24 1126   Temp 36.8  C (98.2  F) 01/15/24 1126   Pulse 58 01/15/24 1126   Resp 16 01/15/24 1126   SpO2 96 % 01/15/24 1126   Vitals shown include unfiled device data.    Electronically Signed By: SHANDRA Lei CRNA  January 15, 2024  11:27 AM

## 2024-01-15 NOTE — INTERVAL H&P NOTE
I have reviewed the surgical (or preoperative) H&P that is linked to this encounter, and examined the patient. There are no significant changes    ?1st screening; no blood thinner; no famhx of colon cancer    Clinical Conditions Present on Arrival:  Clinically Significant Risk Factors Present on Admission

## 2024-01-15 NOTE — PROGRESS NOTES
[facility] [facility] DISCHARGE NOTE    Patient discharged to home at 12:09 PM via ambulation. Accompanied by spouse and staff. Discharge instructions reviewed with patient and spouse, opportunity offered to ask questions. Prescriptions - None ordered for discharge. All belongings sent with patient.    Cherise Maynard RN

## 2024-01-15 NOTE — ANESTHESIA CARE TRANSFER NOTE
Patient: Tia Pablo    Procedure: Procedure(s):  Colonoscopy       Diagnosis: Screen for colon cancer [Z12.11]  Diagnosis Additional Information: No value filed.    Anesthesia Type:   General     Note:    Oropharynx: oropharynx clear of all foreign objects and spontaneously breathing  Level of Consciousness: drowsy  Oxygen Supplementation: room air    Independent Airway: airway patency satisfactory and stable  Dentition: dentition unchanged  Vital Signs Stable: post-procedure vital signs reviewed and stable  Report to RN Given: handoff report given  Patient transferred to: Phase II    Handoff Report: Identifed the Patient, Identified the Reponsible Provider, Reviewed the pertinent medical history, Discussed the surgical course, Reviewed Intra-OP anesthesia mangement and issues during anesthesia, Set expectations for post-procedure period and Allowed opportunity for questions and acknowledgement of understanding      Vitals:  Vitals Value Taken Time   /64 01/15/24 1126   Temp 36.8  C (98.2  F) 01/15/24 1126   Pulse 58 01/15/24 1126   Resp 16 01/15/24 1126   SpO2 96 % 01/15/24 1126   Vitals shown include unfiled device data.    Electronically Signed By: SHANDRA Lei CRNA  January 15, 2024  11:27 AM

## 2024-01-22 NOTE — PROGRESS NOTES
Pre procedure Diagnosis: , Cervical spondylosis   Post procedure Diagnosis: Same  Procedure performed: cervical medial branch radiofrequency ablation left  at [place],3,4, fluoroscopically guided  Anesthesia: MD Time IN: 1324   Sedation start time:  1326  Sedation end time:  1435     Medications given: fentanyl 50 mcg IV; versed 1 mg IV; toradol 15 mg IV; glycopyrrolate 0.2mg  Complication: Patient started to feel diaphoretic and nauseated during ablation. Ultimately decided to reschedule right side to another day.   Operators: Saeed Edwards MD     Indications:   Tia Pablo is a 58 year old female was sent  for cervical facet procedures.  The patient has a history of chronic  neck pain without radiation.  Exam shows increased pain with extension and lateral rotation of the cervical spine and they have tried conservative treatment including physical therapy,medications, and previous interventional procedures. @ had a positive diagnostic cervical medial branch block  with improved cervical range of motion.     Options/alternatives, benefits and risks were discussed with the patient including but not limited to bleeding, infection, tissue trauma, exposure to radiation, reaction to medications, spinal cord injury, weakness, numbness and paralysis.  Questions were answered to her satisfaction and she wishes to proceed. Voluntary informed consent was obtained and signed.      Vitals were reviewed: Yes  BP (!) 154/96   Pulse 84   Resp 16   LMP 09/15/2015 (Exact Date)   SpO2 99%   Allergies were reviewed:  Yes   Medications were reviewed:  Yes   Pre-procedure pain score: 5/10     Procedure:  After obtaining signed, informed consent, the patient was taken to the procedure room and placed in the prone position on the [facility] frame.  A Pause for the Cause was completed prior to procedure start.  The patient was prepped and draped in the usual sterile fashion.   The areas of interest were identified with fluoroscopy on  the left side.  The skin and subcutaneous tissues were anesthetized by injecting Lidocaine 1% 1 ml at each injection site utilizing a 30 gauge 1 inch needle.  Then,  20 gauge 100 mm curved tip RF needles with 10 mm active tips were advanced tangential to the medial branch nerves, abutting the articular pillars of C[place], C3, and C4  utilizing AP and Lateral fluoroscopy.  Aspiration was negative at all the levels.    Each level was then tested for motor and sensory stimulation, and positioned so that stimulation was negative for stimuli outside the immediate area of the desired lesion.  Sensory stimulation was completed at 50 Hz, with max stimulation up to 0.3V.  Motor stimulation was completed at 2Hz, up to 1.5V and was negative except for multifidus movement.       Each level was then injected with 0.5 ml of bupivacaine 0.25 %, and after allowing the local anesthetic to set up a 90 second, 80 degree Centigrade lesion was generated.  The electrodes were withdrawn slightly and the needles were rotated 180 degrees to widen the lesion area.  The electrodes were replaced and a second 80 degree 90 second lesion was generated.     Needles were then flushed with Lidocaine as they were removed and  hemostasis was achieved.    The patient tolerated the procedure well without complications and was taken to the recovery area for continued observation.  Fluoroscopic images were saved to PACS.          Post-procedure pain:  5/10    The patient was given a prescription for oxycodone 5mg 1tabs Q12H prn post procedure pain, # 6.     Follow-up includes:  - follow up with PCP and in the pain clinic as scheduled     Saeed Edwards MD  [facility]

## 2024-01-25 ENCOUNTER — OFFICE VISIT (OUTPATIENT)
Dept: NEUROLOGY | Facility: CLINIC | Age: 59
End: 2024-01-25
Payer: COMMERCIAL

## 2024-01-25 VITALS
HEIGHT: 66 IN | WEIGHT: 149.9 LBS | DIASTOLIC BLOOD PRESSURE: 85 MMHG | OXYGEN SATURATION: 97 % | RESPIRATION RATE: 12 BRPM | HEART RATE: 89 BPM | BODY MASS INDEX: 24.09 KG/M2 | SYSTOLIC BLOOD PRESSURE: 138 MMHG

## 2024-01-25 DIAGNOSIS — F41.9 ANXIETY: ICD-10-CM

## 2024-01-25 DIAGNOSIS — R41.3 MEMORY LOSS: ICD-10-CM

## 2024-01-25 DIAGNOSIS — Z72.820 POOR SLEEP: ICD-10-CM

## 2024-01-25 DIAGNOSIS — G24.3 CERVICAL DYSTONIA: Primary | ICD-10-CM

## 2024-01-25 DIAGNOSIS — M54.2 CERVICALGIA: ICD-10-CM

## 2024-01-25 PROCEDURE — 99214 OFFICE O/P EST MOD 30 MIN: CPT | Performed by: PSYCHIATRY & NEUROLOGY

## 2024-01-25 ASSESSMENT — ANXIETY QUESTIONNAIRES
1. FEELING NERVOUS, ANXIOUS, OR ON EDGE: SEVERAL DAYS
7. FEELING AFRAID AS IF SOMETHING AWFUL MIGHT HAPPEN: NOT AT ALL
6. BECOMING EASILY ANNOYED OR IRRITABLE: NOT AT ALL
IF YOU CHECKED OFF ANY PROBLEMS ON THIS QUESTIONNAIRE, HOW DIFFICULT HAVE THESE PROBLEMS MADE IT FOR YOU TO DO YOUR WORK, TAKE CARE OF THINGS AT HOME, OR GET ALONG WITH OTHER PEOPLE: SOMEWHAT DIFFICULT
7. FEELING AFRAID AS IF SOMETHING AWFUL MIGHT HAPPEN: NOT AT ALL
2. NOT BEING ABLE TO STOP OR CONTROL WORRYING: SEVERAL DAYS
5. BEING SO RESTLESS THAT IT IS HARD TO SIT STILL: NOT AT ALL
8. IF YOU CHECKED OFF ANY PROBLEMS, HOW DIFFICULT HAVE THESE MADE IT FOR YOU TO DO YOUR WORK, TAKE CARE OF THINGS AT HOME, OR GET ALONG WITH OTHER PEOPLE?: SOMEWHAT DIFFICULT
GAD7 TOTAL SCORE: 3
3. WORRYING TOO MUCH ABOUT DIFFERENT THINGS: SEVERAL DAYS
4. TROUBLE RELAXING: NOT AT ALL
GAD7 TOTAL SCORE: 3

## 2024-01-25 ASSESSMENT — PAIN SCALES - GENERAL: PAINLEVEL: SEVERE PAIN (7)
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
## 2024-01-25 NOTE — NURSING NOTE
"Chief Complaint   Patient presents with    RECHECK     Return movement disorder     /85 (BP Location: Left arm, Patient Position: Sitting, Cuff Size: Adult Regular)   Pulse 89   Resp 12   Ht 1.685 m (5' 6.34\")   Wt 68 kg (149 lb 14.4 oz)   SpO2 97%   BMI 23.95 kg/m      SABA SHAW    "

## 2024-01-26 ENCOUNTER — OFFICE VISIT (OUTPATIENT)
Dept: FAMILY MEDICINE | Facility: CLINIC | Age: 59
End: 2024-01-26
Payer: COMMERCIAL

## 2024-01-26 VITALS
HEART RATE: 98 BPM | BODY MASS INDEX: 23.72 KG/M2 | RESPIRATION RATE: 16 BRPM | DIASTOLIC BLOOD PRESSURE: 70 MMHG | TEMPERATURE: 98 F | SYSTOLIC BLOOD PRESSURE: 130 MMHG | HEIGHT: 66 IN | WEIGHT: 147.6 LBS | OXYGEN SATURATION: 98 %

## 2024-01-26 DIAGNOSIS — F41.9 ANXIETY: ICD-10-CM

## 2024-01-26 DIAGNOSIS — F32.0 CURRENT MILD EPISODE OF MAJOR DEPRESSIVE DISORDER WITHOUT PRIOR EPISODE (H): ICD-10-CM

## 2024-01-26 DIAGNOSIS — I10 ESSENTIAL HYPERTENSION: ICD-10-CM

## 2024-01-26 PROCEDURE — 99213 OFFICE O/P EST LOW 20 MIN: CPT | Performed by: FAMILY MEDICINE

## 2024-01-26 RX ORDER — CLONAZEPAM 0.5 MG/1
TABLET ORAL
Qty: 45 TABLET | Refills: 5 | Status: SHIPPED | OUTPATIENT
Start: 2024-01-26 | End: 2024-09-26

## 2024-01-26 RX ORDER — ESCITALOPRAM OXALATE 10 MG/1
5 TABLET ORAL DAILY
COMMUNITY
Start: 2024-01-26 | End: 2024-06-06

## 2024-01-26 RX ORDER — CHLORTHALIDONE 25 MG/1
25 TABLET ORAL DAILY
Qty: 90 TABLET | Refills: 1 | Status: SHIPPED | OUTPATIENT
Start: 2024-01-26 | End: 2024-03-27

## 2024-01-26 RX ORDER — BUPROPION HYDROCHLORIDE 300 MG/1
300 TABLET ORAL EVERY MORNING
Qty: 90 TABLET | Refills: 1 | Status: SHIPPED | OUTPATIENT
Start: 2024-01-26 | End: 2024-06-06

## 2024-01-26 RX ORDER — LOSARTAN POTASSIUM 50 MG/1
50 TABLET ORAL DAILY
Qty: 90 TABLET | Refills: 1 | Status: SHIPPED | OUTPATIENT
Start: 2024-01-26 | End: 2024-06-06

## 2024-01-26 NOTE — PROGRESS NOTES
A/P:      ICD-10-CM    1. Anxiety  F41.9 escitalopram (LEXAPRO) 10 MG tablet      2. Current mild episode of major depressive disorder without prior episode (H24)  F32.0 escitalopram (LEXAPRO) 10 MG tablet     buPROPion (WELLBUTRIN XL) 300 MG 24 hr tablet      3. Essential hypertension  I10 losartan (COZAAR) 50 MG tablet     chlorthalidone (HYGROTON) 25 MG tablet        Depression/anxiety:  pt wishes to wean off selective serotonin reuptake inhibitor completely and continue wellbutrin at current dosing for depression.  Pt has been off and on lexapro in the past.  She will monitor symptoms and reach out if she feels anxiety is worsening.    HTN:  controlled, continue meds      Pt will reach out to pain clinic regarding her ablation follow-up      Rbuia Weber is a 58 year old, presenting for the following health issues:  Back Pain and Anxiety    History of Present Illness       Back Pain:  She presents for follow up of back pain. Patient's back pain is a chronic problem.  Location of back pain:  Right middle of back, right upper back, left upper back, right side of neck, left side of neck, left buttock and left hip  Description of back pain: gnawing and sharp  Back pain spreads: left buttocks and left thigh    Since patient first noticed back pain, pain is: always present, but gets better and worse  Does back pain interfere with her job:  Yes       Mental Health Follow-up:  Patient presents to follow-up on Anxiety.    Patient's anxiety since last visit has been:  Better  The patient is having other symptoms associated with anxiety.  Any significant life events: job concerns and health concerns  Patient is not feeling anxious or having panic attacks.  Patient has no concerns about alcohol or drug use.    She eats 2-3 servings of fruits and vegetables daily.She consumes 0 sweetened beverage(s) daily.She exercises with enough effort to increase her heart rate 20 to 29 minutes per day.  She exercises with enough  "effort to increase her heart rate 3 or less days per week.   She is taking medications regularly.     Had an ablation in the neck 2 weeks ago.  Still bothering now.  Has not yet followed up with pain clinic.  Thinks it maybe hurts now more then it did prior to her procedure.  Plans to call pain clinic today    Wants to stop the lexapro.  Concerned with sexual side effects.  Feels like her anxiety has improved, not sure if she needs to be on both medications.  Still gets anxiety when she is driving but that has improved.  Work stress has improved as well.    Decreased her lexapro to 10mg daily about a month ago.  Would like to stop completely    Scheduled for neuropsych testing through he neurologist due to \"brain fog\".  Feels it has impacted her work and daily function.  Feels this is a post covid symptom that has been persistent.            Objective    /70   Pulse 98   Temp 98  F (36.7  C) (Tympanic)   Resp 16   Ht 1.676 m (5' 6\")   Wt 67 kg (147 lb 9.6 oz)   SpO2 98%   BMI 23.82 kg/m    Body mass index is 23.82 kg/m .  Physical Exam   PE:  VS as above   Gen:  WN/WD/WH female in NAD   Neck:  no redness or masses appreciated  Psych: Alert and oriented times 3; coherent speech, normal   rate and volume, able to articulate logical thoughts, able   to abstract reason, no tangential thoughts, no hallucinations   or delusions  Her affect is bright and appropriate      Epic reviewed        Signed Electronically by: Sally Stone,     "

## 2024-01-29 ENCOUNTER — TELEPHONE (OUTPATIENT)
Dept: NEUROPSYCHOLOGY | Facility: CLINIC | Age: 59
End: 2024-01-29
Payer: COMMERCIAL

## 2024-01-29 ENCOUNTER — MYC MEDICAL ADVICE (OUTPATIENT)
Dept: NEUROLOGY | Facility: CLINIC | Age: 59
End: 2024-01-29
Payer: COMMERCIAL

## 2024-01-29 NOTE — TELEPHONE ENCOUNTER
[facility] Call Center    Phone Message    May a detailed message be left on voicemail: yes     Reason for Call: Appointment Intake    Referring Provider Name:     Sushila Platt DO     Diagnosis and/or Symptoms: G24.3 (ICD-10-CM) - Cervical dystonia  R41.3 (ICD-10-CM) - Memory loss    Action Taken: Other: Routed to Clinic Coordinators [facility]    Travel Screening: Not Applicable    Please contact patient for scheduling per triage notes patient is to be scheduled for:                  Location: [facility]  Preferred Provider: Yazan    Visit Type: Post COVID Video Visit (interview) or Post-COVID Visit (testing only)  Special Instructions:

## 2024-01-29 NOTE — TELEPHONE ENCOUNTER
Left Voicemail (1st Attempt) and Sent Mychart (1st Attempt) for the patient to call back and schedule the following:    Appointment type: Post Covid Video Visit  Provider: Yazan preferred; Any OK  Return date: First Avail  Specialty phone number: [phone]  Additional appointment(s) needed: NA  Additonal Notes: Post-Covid Testing only to be scheduled soon after    Michelle Leon on 1/29/2024 at 1:54 PM

## 2024-01-30 ENCOUNTER — TELEPHONE (OUTPATIENT)
Dept: PALLIATIVE MEDICINE | Facility: CLINIC | Age: 59
End: 2024-01-30
Payer: COMMERCIAL

## 2024-01-30 NOTE — TELEPHONE ENCOUNTER
"[facility] Call Center    Phone Message    May a detailed message be left on voicemail: yes     Reason for Call: Other: Patient called stating she is still in \"quite a bit of pain\" and does not know if it is worth getting the second procedure scheduled with Dr. Edwards. Patient is requesting to discuss this with a nurse. Please review.     Action Taken: Message routed to:  Other: [facility]    Travel Screening: Not Applicable                                                                   "

## 2024-01-30 NOTE — TELEPHONE ENCOUNTER
1/11/2024 Procedure performed: cervical medial branch radiofrequency ablation left  at [place],3,4, fluoroscopically guided     Patient is scheduled for right CRFA on 2/13/2024.    Note that patient RFA was less than a month ago and 8 weeks from left CRFA is 3/7/2024.  Patient should allow more time before identifying efficacy of procedure.     LVM for patient requesting return call to clinic.     Kathryn Saenz RN  [facility]  [phone]

## 2024-01-30 NOTE — TELEPHONE ENCOUNTER
Patient called back requesting to talk to nursing. Patient is available for a call back anytime after 8:30 tomorrow morning.     Ana Banks      [facility]  Pain [facility]

## 2024-01-31 ENCOUNTER — TELEPHONE (OUTPATIENT)
Dept: NEUROLOGY | Facility: CLINIC | Age: 59
End: 2024-01-31
Payer: COMMERCIAL

## 2024-01-31 DIAGNOSIS — U09.9 POST COVID-19 CONDITION, UNSPECIFIED: Primary | ICD-10-CM

## 2024-01-31 NOTE — TELEPHONE ENCOUNTER
LVM (Second attempt) for the patient to call back and schedule the following:    Appointment type: Post-Covid Video Visit (Interview)  Provider: Yazan  Return date: First Avail  Specialty phone number: [phone]  Additional appointment(s) needed: NA  Additonal Notes: Pt's testing portion to occur shortly after the interview    Michelle Leon on 1/31/2024 at 9:22 AM

## 2024-01-31 NOTE — TELEPHONE ENCOUNTER
"Patient is scheduled for right cervical RFA on 2/13/2024.   Patient is unsure if she would like to proceed with this appointment as she is continuing to have ongoing / increased pain following left CRFA on 1/11/2024.     Patient describes current symptoms as \"Tender numb and non-stop pain.\"     Patient states that her pain has increased since prior to CRFA done on 1/11/2024.    Patient states that \"when I touch it, it feels kind of numb\"      Called pt        Since the injection is there:  Any new numbness/tingling?: No  Any weakness?  No  Any New pain areas?: No.  Left sided pain is increased.  \"It still feels kind of swollen too.\"  Patient states that she saw acupuncturist \"last week on Tuesday\"  and was told that the area is firm.   Signs of infection? Yes. Details: Reporting swelling and firmness to \"whole general area.  From my shoulder over to my neck\"   Fever/chills:  No  Pain relief from the injection? No  New radiating pain? No    Routing to provider for review.     Kathryn Saenz RN  RN Care Coordinator  [facility]          "

## 2024-01-31 NOTE — TELEPHONE ENCOUNTER
Health Call Center    Phone Message    May a detailed message be left on voicemail: yes     Reason for Call: Other: Pt is requesting that a referral for the Post Covid Clinic be placed so she can schedule an appointment. Pt states that  this was discussed at her last visit with .    Pt is requesting a call back once her referral has been placed so she can schedule. Please call Pt at [phone]     Action Taken: Message routed to:  [facility] Neurology     Travel Screening: Not Applicable

## 2024-01-31 NOTE — TELEPHONE ENCOUNTER
Pt returning call to nursing. Pt would like a call back today.          Olivier Breen  Complex   [facility]  Pain Management

## 2024-02-01 NOTE — TELEPHONE ENCOUNTER
Numbness and sun burn sensation is normal. That will gradually improved. Deeper giant pain can take even up to 2 months to have full benefit

## 2024-02-01 NOTE — TELEPHONE ENCOUNTER
Communicated information from provider to patient.  Patient identifies that at this time she would like to cancel R CRFA and will call clinic if and when she decides to go forward.     Kathryn Saenz RN  [facility] Pain Management [facility]  [phone]

## 2024-02-02 SDOH — SOCIAL STABILITY: SOCIAL NETWORK: I HAVE TROUBLE DOING ALL OF MY USUAL WORK (INCLUDE WORK AT HOME): SOMETIMES

## 2024-02-02 SDOH — SOCIAL STABILITY: SOCIAL NETWORK: PROMIS ABILITY TO PARTICIPATE IN SOCIAL ROLES & ACTIVITIES T-SCORE: 54

## 2024-02-02 SDOH — SOCIAL STABILITY: SOCIAL NETWORK: I HAVE TROUBLE DOING ALL OF THE FAMILY ACTIVITIES THAT I WANT TO DO: NEVER

## 2024-02-02 SDOH — SOCIAL STABILITY: SOCIAL NETWORK: I HAVE TROUBLE DOING ALL OF MY REGULAR LEISURE ACTIVITIES WITH OTHERS: NEVER

## 2024-02-02 SDOH — SOCIAL STABILITY: SOCIAL NETWORK

## 2024-02-02 SDOH — SOCIAL STABILITY: SOCIAL NETWORK: I HAVE TROUBLE DOING ALL OF THE ACTIVITIES WITH FRIENDS THAT I WANT TO DO: RARELY

## 2024-02-02 ASSESSMENT — PATIENT HEALTH QUESTIONNAIRE - PHQ9
SUM OF ALL RESPONSES TO PHQ QUESTIONS 1-9: 4
10. IF YOU CHECKED OFF ANY PROBLEMS, HOW DIFFICULT HAVE THESE PROBLEMS MADE IT FOR YOU TO DO YOUR WORK, TAKE CARE OF THINGS AT HOME, OR GET ALONG WITH OTHER PEOPLE: VERY DIFFICULT
SUM OF ALL RESPONSES TO PHQ QUESTIONS 1-9: 4

## 2024-02-05 ENCOUNTER — VIRTUAL VISIT (OUTPATIENT)
Dept: PHYSICAL MEDICINE AND REHAB | Facility: CLINIC | Age: 59
End: 2024-02-05
Attending: PSYCHIATRY & NEUROLOGY
Payer: COMMERCIAL

## 2024-02-05 VITALS — HEIGHT: 66 IN | WEIGHT: 143 LBS | BODY MASS INDEX: 22.98 KG/M2

## 2024-02-05 DIAGNOSIS — G47.9 SLEEP DISTURBANCE: ICD-10-CM

## 2024-02-05 DIAGNOSIS — U09.9 POST-COVID CHRONIC CONCENTRATION DEFICIT: Primary | ICD-10-CM

## 2024-02-05 DIAGNOSIS — G93.32 POST-COVID CHRONIC FATIGUE: ICD-10-CM

## 2024-02-05 DIAGNOSIS — R43.8 POST-COVID CHRONIC LOSS OF SMELL AND TASTE: ICD-10-CM

## 2024-02-05 DIAGNOSIS — U09.9 POST-COVID CHRONIC FATIGUE: ICD-10-CM

## 2024-02-05 DIAGNOSIS — R41.840 POST-COVID CHRONIC CONCENTRATION DEFICIT: Primary | ICD-10-CM

## 2024-02-05 DIAGNOSIS — U09.9 POST-COVID CHRONIC LOSS OF SMELL AND TASTE: ICD-10-CM

## 2024-02-05 DIAGNOSIS — U09.9 POST COVID-19 CONDITION, UNSPECIFIED: ICD-10-CM

## 2024-02-05 PROCEDURE — 99215 OFFICE O/P EST HI 40 MIN: CPT | Mod: 95 | Performed by: PHYSICIAN ASSISTANT

## 2024-02-05 ASSESSMENT — PAIN SCALES - GENERAL: PAINLEVEL: MODERATE PAIN (5)

## 2024-02-05 NOTE — NURSING NOTE
Is the patient currently in the state of [place]? YES    Visit mode:VIDEO    If the visit is dropped, the patient can be reconnected by: VIDEO VISIT: Text to cell phone:   Telephone Information:   Mobile [phone]       Will anyone else be joining the visit? NO  (If patient encounters technical issues they should call [phone]    How would you like to obtain your AVS? MyChart    Are changes needed to the allergy or medication list? Pt stated no changes to allergies and Pt stated no med changes    Reason for visit: Consult    Shruthi Sebastian VVF

## 2024-02-05 NOTE — PROGRESS NOTES
Tia Pablo is a 58 year old female who presents to be evaluated for a billable telepone visit.    Telephone-Visit Details    Telephone visit: 35 minutes    Originating Location (pt. Location): Home    Distant Location (provider location):  Off- Site    Platform used for Video Visit: Doximity    Assessment/ Impression:   1. 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  Also discussed referral to Physical and occupational therapy for the COVID-19 program.  Patient is working with neurology for her concentration difficulties and will be having a neuropsychological assessment in October 2024.  Discussed N-acetylcysteine 600 mg nightly and side effects of GI upset.  Discussed also having patient work on her sleep hygiene and recommended restarting melatonin.  Discussed how disrupted sleep can affect both fatigue and concentration long-term.  Encouraged to continue working with neurology and appreciate recommendations  - Occupational Therapy Referral; Future  - Melatonin 1 MG SUBL; Place 1 mg under the tongue at bedtime for 30 days  Dispense: 1 tablet; Refill: 1    2.  Post COVID loss of taste and smell  Discussed olfactory training and put instructions in patient instructions.  Encouraged patient to start doing olfactory training and if not improved in 3 months will refer to ENT    3. Post covid-19 condition, unspecified  Discussed COVID and Post COVID with patient.  Educational materials provided and all questions answered.    - Adult Post Covid Clinic  Referral          Plan:  I reviewed present knowledge on long-Covid.  Education was provided and question were answered.  Orders/Referrals as above  Will advised patient on test results  I will follow up with Tia Pablo in 3 months. I will review progress and consider need for any other  therapeutic interventions. If there are any questions and/or concerns she will call the clinic.      On day of encounter time spent in chart review and with patient in consultation, exam, education, coordination of care, review of outside charts/data and documentation:  45 minutes     I have attempted to proof read for major spelling errors and apologize for any minor errors I may have missed.      This note was dictated using voice recognition software. Any grammatical or context distortions are unintentional and inherent to the software.    _____________  KENDAL Gramajo [facility] [place]    Subjective   This 58 year old female presents to the [facility] Post-COVID clinic as a new consult to evaluate continuing symptoms after COVID infection initially diagnosed 3/2020 and subsequently in 4/2022 .  Tia Pablo initially  in March 2020 presented with fatigue, cough, sore throat, fever, chills, runny nose and loss of smell and taste.  She went to a testing site and was positive.   Treatment was symptomatic.  Patient in 4/2022 was more mild but did also feel like she had the flu.  Tia Pablo experienced complications of loss of taste and smell and memory.  Continuing symptoms include fatigue, difficulty sleeping, brain fog, distractibility, decreased/abnormal taste, and decreased/abnormal smell.  Patient feels she has decreased endurance.  Patient feels tired going up the steps in her house.  Patient is working out 2-3x a week.  She does 30  min at at time. She has had to decrease her speed.  She does need to take breaks more frequently.  She is waking up tired.  She does not feel like she sleeps well.  She feels like she will have difficulty falling back a sleep.  She does not take anything to sleep. She can smell but feels its muted.  For her taste she can feels this is altered.  She sense spice for sensation.   She couldn't taste raspberry.  Patient feels she had trouble recalling items, short term memory.  Past medical history is significant for  Genetic torsion dystonia, Hypertention and depression. The patient was vaccinated against COVID x4, last vaccine 10/12/23 .     History of COVID-19 infection: 3/2020, 2022  Date of first symptoms: 3/2020, 2022  Diagnosis: PCR and antigen  Hospitalization: No  Treatment: symptomatic  Current Symptoms: See subjective  Goals of Care: increase energy, decrease depression, improve thinking, improve quality of life, and return to work          2024    10:30 AM   PHQ Assesment Total Score(s)   PHQ-9 Score 4           2024     3:41 PM   PATRICK-7 Results   PATRICK 7 TOTAL SCORE 3 (minimal anxiety)   PATRICK-7 Total Score 3         2024    10:30 AM   PTSD Screen Score   Have you ever experienced this kind of event? No         2024     8:51 PM   PROMIS-29   PROMIS Physical Function T-Score 45 (within normal limits)   PROMIS Anxiety T-Score 56 (mild)   PROMIS Depression T-Score 52 (within normal limits)   PROMIS Fatigue T-Score 59 (mild)   PROMIS Sleep Disturbance T-Score 58 (mild)   PROMIS Ability to Participate in Social Roles & Activities T-Score 54 (within normal limits)   PROMIS Pain Interference T-Score 65 (moderate)   PROMIS Pain Intensity 7       Past Medical History:   Diagnosis Date    Anxiety     Depression     HTN (hypertension)        Past Surgical History:   Procedure Laterality Date    AS REPAIR TEND/MUS/FLEX FOREARM/WRIST, PRIM, EACH       SECTION      COLONOSCOPY N/A 1/15/2024    Procedure: Colonoscopy;  Surgeon: Sukhdev Calhoun MD;  Location: [facility]    HYSTERECTOMY         Family History   Problem Relation Age of Onset    Hypertension Mother     Autoimmune Disease Mother         CREST    Hyperlipidemia Mother     Hypertension Father     Skin Cancer Father     Heart Disease Father     Melanoma No family hx of        Social History     Tobacco Use     Smoking status: Never    Smokeless tobacco: Never   Vaping Use    Vaping Use: Never used   Substance Use Topics    Alcohol use: Yes     Comment: Occasionally, 1-2 glasses of wine on weekends    Drug use: No         Current Outpatient Medications:     buPROPion (WELLBUTRIN XL) 300 MG 24 hr tablet, Take 1 tablet (300 mg) by mouth every morning, Disp: 90 tablet, Rfl: 1    chlorthalidone (HYGROTON) 25 MG tablet, Take 1 tablet (25 mg) by mouth daily, Disp: 90 tablet, Rfl: 1    clonazePAM (KLONOPIN) 0.5 MG tablet, Take 1 tab in the evening before bed. Okay to take an extra tab as needed in the day., Disp: 45 tablet, Rfl: 5    eletriptan (RELPAX) 40 MG tablet, Take 1 tablet (40 mg) by mouth at onset of headache for migraine May repeat in 2 hours. Max 2 tablets/24 hours., Disp: 12 tablet, Rfl: 3    EPINEPHrine (ANY BX GENERIC EQUIV) 0.3 MG/0.3ML injection 2-pack, PLEASE SEE ATTACHED FOR DETAILED DIRECTIONS, Disp: , Rfl:     escitalopram (LEXAPRO) 10 MG tablet, Take 0.5 tablets (5 mg) by mouth daily For 1 month then stop, Disp: , Rfl:     losartan (COZAAR) 50 MG tablet, Take 1 tablet (50 mg) by mouth daily, Disp: 90 tablet, Rfl: 1    methocarbamol (ROBAXIN) 500 MG tablet, Take 0.5-1 tablets (250-500 mg) by mouth 4 times daily as needed for muscle spasms, Disp: 40 tablet, Rfl: 0    Current Facility-Administered Medications:     botulinum toxin type A (BOTOX) 100 units injection 600 Units, 600 Units, Intramuscular, Q90 Days, Angela Franz MD, 450 Units at 08/24/23 1251    botulinum toxin type A (BOTOX) 100 units injection 600 Units, 600 Units, Intramuscular, Q90 Days, Simi Vazquez MD, 500 Units at 11/17/23 1119    glycopyrrolate (ROBINUL) injection 0.1-0.2 mg, 0.1-0.2 mg, Intravenous, Q5 Min PRN, Saeed Edwards MD, 0.2 mg at 01/11/24 1357    Review of Systems   Constitutional: denies any fevers, chills, any recent weight loss , + fatigue  Eyes: denies changes in visual acuity   Ears, Nose, Throat:  "denies any difficulty swallowing , + altered smell and taste  Cardiovascular: denies any exertional chest pain or palpitation   Respiratory: denies dyspnea   Gastrointestinal: denies any nausea, vomiting, abdominal pain, diarrhea or constipation   Genitourinary: denies any dysuria, hematuria, frequency or urgency   Musculoskeletal: denies any muscle pain, joint pain, neck pain or back pain   Neurologic: denies any headache, changes in motor or sensory function, no loss of balance or vertigo   Psychiatric: denies mood changes; sleep disturbance, + decreased concentration        Objective    Vitals:  No vitals were obtained today due to virtual visit.    Physical Exam   GENERAL: Healthy, alert and no distress  RESP: No audible wheeze, or cough, able to speak in full sentences  PSYCH: Mentation appears normal, affect normal/bright, judgement and insight intact, normal speech.  Remainder of exam unable to be completed due to telephone visit              No results found for: \"CRP\"   No results found for: \"SED\"   Last Renal Panel:  Sodium   Date Value Ref Range Status   09/20/2023 140 136 - 145 mmol/L Final   12/03/2020 138 133 - 144 mmol/L Final     Potassium   Date Value Ref Range Status   09/20/2023 4.4 3.4 - 5.3 mmol/L Final   02/18/2022 4.0 3.4 - 5.3 mmol/L Final   12/03/2020 4.2 3.4 - 5.3 mmol/L Final     Chloride   Date Value Ref Range Status   09/20/2023 99 98 - 107 mmol/L Final   02/18/2022 100 94 - 109 mmol/L Final   12/03/2020 102 94 - 109 mmol/L Final     Carbon Dioxide   Date Value Ref Range Status   12/03/2020 32 20 - 32 mmol/L Final     Carbon Dioxide (CO2)   Date Value Ref Range Status   09/20/2023 28 22 - 29 mmol/L Final   02/18/2022 31 20 - 32 mmol/L Final     Anion Gap   Date Value Ref Range Status   09/20/2023 13 7 - 15 mmol/L Final   02/18/2022 2 (L) 3 - 14 mmol/L Final   12/03/2020 4 3 - 14 mmol/L Final     Glucose   Date Value Ref Range Status   09/20/2023 89 70 - 99 mg/dL Final   02/18/2022 95 70 " "- 99 mg/dL Final   12/03/2020 95 70 - 99 mg/dL Final     Comment:     Non Fasting     Urea Nitrogen   Date Value Ref Range Status   09/20/2023 15.3 6.0 - 20.0 mg/dL Final   02/18/2022 16 7 - 30 mg/dL Final   12/03/2020 12 7 - 30 mg/dL Final     Creatinine   Date Value Ref Range Status   09/20/2023 0.70 0.51 - 0.95 mg/dL Final   12/03/2020 0.71 0.52 - 1.04 mg/dL Final     GFR Estimate   Date Value Ref Range Status   09/20/2023 >90 >60 mL/min/1.73m2 Final   12/03/2020 >90 >60 mL/min/[1.73_m2] Final     Comment:     Non  GFR Calc  Starting 12/18/2018, serum creatinine based estimated GFR (eGFR) will be   calculated using the Chronic Kidney Disease Epidemiology Collaboration   (CKD-EPI) equation.       Calcium   Date Value Ref Range Status   09/20/2023 9.8 8.6 - 10.0 mg/dL Final   12/03/2020 9.8 8.5 - 10.1 mg/dL Final     Albumin   Date Value Ref Range Status   09/20/2023 4.6 3.5 - 5.2 g/dL Final   02/18/2022 4.2 3.4 - 5.0 g/dL Final      Lab Results   Component Value Date    WBC 10.5 12/29/2023     Lab Results   Component Value Date    RBC 4.75 12/29/2023     Lab Results   Component Value Date    HGB 13.7 12/29/2023     Lab Results   Component Value Date    HCT 40.5 12/29/2023     No components found for: \"MCT\"  Lab Results   Component Value Date    MCV 85 12/29/2023     Lab Results   Component Value Date    MCH 28.8 12/29/2023     Lab Results   Component Value Date    MCHC 33.8 12/29/2023     Lab Results   Component Value Date    RDW 12.0 12/29/2023     Lab Results   Component Value Date     12/29/2023      No results found for: \"A1C\"   TSH   Date Value Ref Range Status   02/18/2022 1.53 0.40 - 4.00 mU/L Final   12/03/2020 1.32 0.40 - 4.00 mU/L Final      No results found for: \"VITDT\"   No results for input(s): \"MAG\" in the last 78979 hours.  Last Comprehensive Metabolic Panel:  Sodium   Date Value Ref Range Status   09/20/2023 140 136 - 145 mmol/L Final   12/03/2020 138 133 - 144 mmol/L Final "     Potassium   Date Value Ref Range Status   09/20/2023 4.4 3.4 - 5.3 mmol/L Final   02/18/2022 4.0 3.4 - 5.3 mmol/L Final   12/03/2020 4.2 3.4 - 5.3 mmol/L Final     Chloride   Date Value Ref Range Status   09/20/2023 99 98 - 107 mmol/L Final   02/18/2022 100 94 - 109 mmol/L Final   12/03/2020 102 94 - 109 mmol/L Final     Carbon Dioxide   Date Value Ref Range Status   12/03/2020 32 20 - 32 mmol/L Final     Carbon Dioxide (CO2)   Date Value Ref Range Status   09/20/2023 28 22 - 29 mmol/L Final   02/18/2022 31 20 - 32 mmol/L Final     Anion Gap   Date Value Ref Range Status   09/20/2023 13 7 - 15 mmol/L Final   02/18/2022 2 (L) 3 - 14 mmol/L Final   12/03/2020 4 3 - 14 mmol/L Final     Glucose   Date Value Ref Range Status   09/20/2023 89 70 - 99 mg/dL Final   02/18/2022 95 70 - 99 mg/dL Final   12/03/2020 95 70 - 99 mg/dL Final     Comment:     Non Fasting     Urea Nitrogen   Date Value Ref Range Status   09/20/2023 15.3 6.0 - 20.0 mg/dL Final   02/18/2022 16 7 - 30 mg/dL Final   12/03/2020 12 7 - 30 mg/dL Final     Creatinine   Date Value Ref Range Status   09/20/2023 0.70 0.51 - 0.95 mg/dL Final   12/03/2020 0.71 0.52 - 1.04 mg/dL Final     GFR Estimate   Date Value Ref Range Status   09/20/2023 >90 >60 mL/min/1.73m2 Final   12/03/2020 >90 >60 mL/min/[1.73_m2] Final     Comment:     Non  GFR Calc  Starting 12/18/2018, serum creatinine based estimated GFR (eGFR) will be   calculated using the Chronic Kidney Disease Epidemiology Collaboration   (CKD-EPI) equation.       Calcium   Date Value Ref Range Status   09/20/2023 9.8 8.6 - 10.0 mg/dL Final   12/03/2020 9.8 8.5 - 10.1 mg/dL Final     Bilirubin Total   Date Value Ref Range Status   09/20/2023 0.6 <=1.2 mg/dL Final     Alkaline Phosphatase   Date Value Ref Range Status   09/20/2023 72 35 - 104 U/L Final     ALT   Date Value Ref Range Status   09/20/2023 18 0 - 50 U/L Final     Comment:     Reference intervals for this test were updated on  6/12/2023 to more accurately reflect our healthy population. There may be differences in the flagging of prior results with similar values performed with this method. Interpretation of those prior results can be made in the context of the updated reference intervals.       AST   Date Value Ref Range Status   09/20/2023 29 0 - 45 U/L Final     Comment:     Reference intervals for this test were updated on 6/12/2023 to more accurately reflect our healthy population. There may be differences in the flagging of prior results with similar values performed with this method. Interpretation of those prior results can be made in the context of the updated reference intervals.         Imaging:    I personally reviewed the following imaging results today and those on care everywhere, if indicated   Narrative & Impression   CHEST TWO VIEWS   12/29/2023 11:58 AM      HISTORY: Persistent cough for 3 weeks or longer. Fever, unspecified  fever cause.     COMPARISON: None.                                                                      IMPRESSION: No acute cardiopulmonary disease.      HARINDER GODOY MD        Reviewed imaging from [facility] sites     Medical Records Reviewed:    Reviewed consults/documents from [facility] including  Family Practice, Neurology, and GI

## 2024-02-05 NOTE — PATIENT INSTRUCTIONS
Assessment of sense of smell and taste    Smell training:  Flowery - Shyanne  Fruity - Lemon  Spicy - Cloves  Resinous - Eucalyptus      1 - Pour a few droplets of one of the oils on to a cotton pad or ball  2 - Do not try to sniff the pad immediately; leave it for a few minutes for the fragrance to develop  3 - Hold the first stick/pad up to your nose, about an inch away.  The order in which you test the oils does not matter  4 - Relax and try to inhale naturally through the nose - sniffing too quickly and deeply is likely to result in you not being able to detect anything  5 - Try this a couple more times, then rest for five minutes  6 - Move on to the next oil and repeat as above.    Repeat this training 1-2x daily for 3 months.    To do:   Olfactory training for decreased sense of taste and smell  Occupational therapy for fatigue/concentration  N-Acetylcysteine 600mg for concentration- side effects: nausea, gi upset.  Melatonin for Sleep  Return 3 months    Post COVID Self Care Suggestions:     Fatigue Management:       https://www.archives-pmr.org/action/showPdf?uda=-1948%2819%9198972-3       Self Care:      [email or web address]  Recovery World Health Organization:    https://apps.who.int/iris/bitstream/handle/85065/841288/OUT-LJSS-0796-565-35190-44781-eng.pdf  Breathing exercises:    [email or web address]

## 2024-02-05 NOTE — LETTER
2/5/2024       RE: Tia Pablo  [address] [facility] [id number]       Dear Colleague,    Thank you for referring your patient, Tia Pablo, to the [facility] PHYSICAL MEDICINE AND REHABILITATION CLINIC [place] [facility]. Please see a copy of my visit note below.    Tia Pablo is a 58 year old female who presents to be evaluated for a billable telepone visit.      Assessment/ Impression:   1. 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  Also discussed referral to Physical and occupational therapy for the COVID-19 program.  Patient is working with neurology for her concentration difficulties and will be having a neuropsychological assessment in October 2024.  Discussed N-acetylcysteine 600 mg nightly and side effects of GI upset.  Discussed also having patient work on her sleep hygiene and recommended restarting melatonin.  Discussed how disrupted sleep can affect both fatigue and concentration long-term.  Encouraged to continue working with neurology and appreciate recommendations  - Occupational Therapy Referral; Future  - Melatonin 1 MG SUBL; Place 1 mg under the tongue at bedtime for 30 days  Dispense: 1 tablet; Refill: 1    2.  Post COVID loss of taste and smell  Discussed olfactory training and put instructions in patient instructions.  Encouraged patient to start doing olfactory training and if not improved in 3 months will refer to ENT    3. Post covid-19 condition, unspecified  Discussed COVID and Post COVID with patient.  Educational materials provided and all questions answered.    - Adult Post Covid Clinic  Referral          Plan:  I reviewed present knowledge on long-Covid.  Education was provided and question were answered.  Orders/Referrals as above  Will  advised patient on test results  I will follow up with Tia Pablo in 3 months. I will review progress and consider need for any other therapeutic interventions. If there are any questions and/or concerns she will call the clinic.      On day of encounter time spent in chart review and with patient in consultation, exam, education, coordination of care, review of outside charts/data and documentation:  45 minutes     I have attempted to proof read for major spelling errors and apologize for any minor errors I may have missed.      This note was dictated using voice recognition software. Any grammatical or context distortions are unintentional and inherent to the software.    _____________  Kasie Mackenzie PA-C  [facility] PHYSICAL MEDICINE AND REHABILITATION CLINIC [facility]   This 58 year old female presents to the [facility] Rehabilitation Medicine Post-COVID clinic as a new consult to evaluate continuing symptoms after COVID infection initially diagnosed 3/2020 and subsequently in 4/2022 .  Tia Pablo initially  in March 2020 presented with fatigue, cough, sore throat, fever, chills, runny nose and loss of smell and taste.  She went to a testing site and was positive.   Treatment was symptomatic.  Patient in 4/2022 was more mild but did also feel like she had the flu.  Tia Pablo experienced complications of loss of taste and smell and memory.  Continuing symptoms include fatigue, difficulty sleeping, brain fog, distractibility, decreased/abnormal taste, and decreased/abnormal smell.  Patient feels she has decreased endurance.  Patient feels tired going up the steps in her house.  Patient is working out 2-3x a week.  She does 30  min at at time. She has had to decrease her speed.  She does need to take breaks more frequently.  She is waking up tired.  She does not feel like she sleeps well.  She feels like she will have difficulty falling back a sleep.  She does not  take anything to sleep. She can smell but feels its muted.  For her taste she can feels this is altered.  She sense spice for sensation.  She couldn't taste raspberry.  Patient feels she had trouble recalling items, short term memory.  Past medical history is significant for  Genetic torsion dystonia, Hypertention and depression. The patient was vaccinated against COVID x4, last vaccine 10/12/23 .     History of COVID-19 infection: 3/2020, 2022  Date of first symptoms: 3/2020, 2022  Diagnosis: PCR and antigen  Hospitalization: No  Treatment: symptomatic  Current Symptoms: See subjective  Goals of Care: increase energy, decrease depression, improve thinking, improve quality of life, and return to work          2024    10:30 AM   PHQ Assesment Total Score(s)   PHQ-9 Score 4           2024     3:41 PM   PATRICK-7 Results   PATRICK 7 TOTAL SCORE 3 (minimal anxiety)   PATRICK-7 Total Score 3         2024    10:30 AM   PTSD Screen Score   Have you ever experienced this kind of event? No         2024     8:51 PM   PROMIS-29   PROMIS Physical Function T-Score 45 (within normal limits)   PROMIS Anxiety T-Score 56 (mild)   PROMIS Depression T-Score 52 (within normal limits)   PROMIS Fatigue T-Score 59 (mild)   PROMIS Sleep Disturbance T-Score 58 (mild)   PROMIS Ability to Participate in Social Roles & Activities T-Score 54 (within normal limits)   PROMIS Pain Interference T-Score 65 (moderate)   PROMIS Pain Intensity 7       Past Medical History:   Diagnosis Date    Anxiety     Depression     HTN (hypertension)        Past Surgical History:   Procedure Laterality Date    AS REPAIR TEND/MUS/FLEX FOREARM/WRIST, PRIM, EACH       SECTION      COLONOSCOPY N/A 1/15/2024    Procedure: Colonoscopy;  Surgeon: Sukhdev Calhoun MD;  Location: [facility]    HYSTERECTOMY         Family History   Problem Relation Age of Onset    Hypertension Mother     Autoimmune Disease Mother         CREST    Hyperlipidemia Mother      Hypertension Father     Skin Cancer Father     Heart Disease Father     Melanoma No family hx of        Social History     Tobacco Use    Smoking status: Never    Smokeless tobacco: Never   Vaping Use    Vaping Use: Never used   Substance Use Topics    Alcohol use: Yes     Comment: Occasionally, 1-2 glasses of wine on weekends    Drug use: No         Current Outpatient Medications:     buPROPion (WELLBUTRIN XL) 300 MG 24 hr tablet, Take 1 tablet (300 mg) by mouth every morning, Disp: 90 tablet, Rfl: 1    chlorthalidone (HYGROTON) 25 MG tablet, Take 1 tablet (25 mg) by mouth daily, Disp: 90 tablet, Rfl: 1    clonazePAM (KLONOPIN) 0.5 MG tablet, Take 1 tab in the evening before bed. Okay to take an extra tab as needed in the day., Disp: 45 tablet, Rfl: 5    eletriptan (RELPAX) 40 MG tablet, Take 1 tablet (40 mg) by mouth at onset of headache for migraine May repeat in 2 hours. Max 2 tablets/24 hours., Disp: 12 tablet, Rfl: 3    EPINEPHrine (ANY BX GENERIC EQUIV) 0.3 MG/0.3ML injection 2-pack, PLEASE SEE ATTACHED FOR DETAILED DIRECTIONS, Disp: , Rfl:     escitalopram (LEXAPRO) 10 MG tablet, Take 0.5 tablets (5 mg) by mouth daily For 1 month then stop, Disp: , Rfl:     losartan (COZAAR) 50 MG tablet, Take 1 tablet (50 mg) by mouth daily, Disp: 90 tablet, Rfl: 1    methocarbamol (ROBAXIN) 500 MG tablet, Take 0.5-1 tablets (250-500 mg) by mouth 4 times daily as needed for muscle spasms, Disp: 40 tablet, Rfl: 0    Current Facility-Administered Medications:     botulinum toxin type A (BOTOX) 100 units injection 600 Units, 600 Units, Intramuscular, Q90 Days, Angela Franz MD, 450 Units at 08/24/23 1251    botulinum toxin type A (BOTOX) 100 units injection 600 Units, 600 Units, Intramuscular, Q90 Days, Simi Vazquez MD, 500 Units at 11/17/23 1119    glycopyrrolate (ROBINUL) injection 0.1-0.2 mg, 0.1-0.2 mg, Intravenous, Q5 Min PRN, Saeed Edwards MD, 0.2 mg at 01/11/24 5837    Review of Systems  "  Constitutional: denies any fevers, chills, any recent weight loss , + fatigue  Eyes: denies changes in visual acuity   Ears, Nose, Throat: denies any difficulty swallowing , + altered smell and taste  Cardiovascular: denies any exertional chest pain or palpitation   Respiratory: denies dyspnea   Gastrointestinal: denies any nausea, vomiting, abdominal pain, diarrhea or constipation   Genitourinary: denies any dysuria, hematuria, frequency or urgency   Musculoskeletal: denies any muscle pain, joint pain, neck pain or back pain   Neurologic: denies any headache, changes in motor or sensory function, no loss of balance or vertigo   Psychiatric: denies mood changes; sleep disturbance, + decreased concentration        Objective    Vitals:  No vitals were obtained today due to virtual visit.    Physical Exam   GENERAL: Healthy, alert and no distress  RESP: No audible wheeze, or cough, able to speak in full sentences  PSYCH: Mentation appears normal, affect normal/bright, judgement and insight intact, normal speech.  Remainder of exam unable to be completed due to telephone visit              No results found for: \"CRP\"   No results found for: \"SED\"   Last Renal Panel:  Sodium   Date Value Ref Range Status   09/20/2023 140 136 - 145 mmol/L Final   12/03/2020 138 133 - 144 mmol/L Final     Potassium   Date Value Ref Range Status   09/20/2023 4.4 3.4 - 5.3 mmol/L Final   02/18/2022 4.0 3.4 - 5.3 mmol/L Final   12/03/2020 4.2 3.4 - 5.3 mmol/L Final     Chloride   Date Value Ref Range Status   09/20/2023 99 98 - 107 mmol/L Final   02/18/2022 100 94 - 109 mmol/L Final   12/03/2020 102 94 - 109 mmol/L Final     Carbon Dioxide   Date Value Ref Range Status   12/03/2020 32 20 - 32 mmol/L Final     Carbon Dioxide (CO2)   Date Value Ref Range Status   09/20/2023 28 22 - 29 mmol/L Final   02/18/2022 31 20 - 32 mmol/L Final     Anion Gap   Date Value Ref Range Status   09/20/2023 13 7 - 15 mmol/L Final   02/18/2022 2 (L) 3 - 14 " "mmol/L Final   12/03/2020 4 3 - 14 mmol/L Final     Glucose   Date Value Ref Range Status   09/20/2023 89 70 - 99 mg/dL Final   02/18/2022 95 70 - 99 mg/dL Final   12/03/2020 95 70 - 99 mg/dL Final     Comment:     Non Fasting     Urea Nitrogen   Date Value Ref Range Status   09/20/2023 15.3 6.0 - 20.0 mg/dL Final   02/18/2022 16 7 - 30 mg/dL Final   12/03/2020 12 7 - 30 mg/dL Final     Creatinine   Date Value Ref Range Status   09/20/2023 0.70 0.51 - 0.95 mg/dL Final   12/03/2020 0.71 0.52 - 1.04 mg/dL Final     GFR Estimate   Date Value Ref Range Status   09/20/2023 >90 >60 mL/min/1.73m2 Final   12/03/2020 >90 >60 mL/min/[1.73_m2] Final     Comment:     Non  GFR Calc  Starting 12/18/2018, serum creatinine based estimated GFR (eGFR) will be   calculated using the Chronic Kidney Disease Epidemiology Collaboration   (CKD-EPI) equation.       Calcium   Date Value Ref Range Status   09/20/2023 9.8 8.6 - 10.0 mg/dL Final   12/03/2020 9.8 8.5 - 10.1 mg/dL Final     Albumin   Date Value Ref Range Status   09/20/2023 4.6 3.5 - 5.2 g/dL Final   02/18/2022 4.2 3.4 - 5.0 g/dL Final      Lab Results   Component Value Date    WBC 10.5 12/29/2023     Lab Results   Component Value Date    RBC 4.75 12/29/2023     Lab Results   Component Value Date    HGB 13.7 12/29/2023     Lab Results   Component Value Date    HCT 40.5 12/29/2023     No components found for: \"MCT\"  Lab Results   Component Value Date    MCV 85 12/29/2023     Lab Results   Component Value Date    MCH 28.8 12/29/2023     Lab Results   Component Value Date    MCHC 33.8 12/29/2023     Lab Results   Component Value Date    RDW 12.0 12/29/2023     Lab Results   Component Value Date     12/29/2023      No results found for: \"A1C\"   TSH   Date Value Ref Range Status   02/18/2022 1.53 0.40 - 4.00 mU/L Final   12/03/2020 1.32 0.40 - 4.00 mU/L Final      No results found for: \"VITDT\"   No results for input(s): \"MAG\" in the last 80980 hours.  Last " Comprehensive Metabolic Panel:  Sodium   Date Value Ref Range Status   09/20/2023 140 136 - 145 mmol/L Final   12/03/2020 138 133 - 144 mmol/L Final     Potassium   Date Value Ref Range Status   09/20/2023 4.4 3.4 - 5.3 mmol/L Final   02/18/2022 4.0 3.4 - 5.3 mmol/L Final   12/03/2020 4.2 3.4 - 5.3 mmol/L Final     Chloride   Date Value Ref Range Status   09/20/2023 99 98 - 107 mmol/L Final   02/18/2022 100 94 - 109 mmol/L Final   12/03/2020 102 94 - 109 mmol/L Final     Carbon Dioxide   Date Value Ref Range Status   12/03/2020 32 20 - 32 mmol/L Final     Carbon Dioxide (CO2)   Date Value Ref Range Status   09/20/2023 28 22 - 29 mmol/L Final   02/18/2022 31 20 - 32 mmol/L Final     Anion Gap   Date Value Ref Range Status   09/20/2023 13 7 - 15 mmol/L Final   02/18/2022 2 (L) 3 - 14 mmol/L Final   12/03/2020 4 3 - 14 mmol/L Final     Glucose   Date Value Ref Range Status   09/20/2023 89 70 - 99 mg/dL Final   02/18/2022 95 70 - 99 mg/dL Final   12/03/2020 95 70 - 99 mg/dL Final     Comment:     Non Fasting     Urea Nitrogen   Date Value Ref Range Status   09/20/2023 15.3 6.0 - 20.0 mg/dL Final   02/18/2022 16 7 - 30 mg/dL Final   12/03/2020 12 7 - 30 mg/dL Final     Creatinine   Date Value Ref Range Status   09/20/2023 0.70 0.51 - 0.95 mg/dL Final   12/03/2020 0.71 0.52 - 1.04 mg/dL Final     GFR Estimate   Date Value Ref Range Status   09/20/2023 >90 >60 mL/min/1.73m2 Final   12/03/2020 >90 >60 mL/min/[1.73_m2] Final     Comment:     Non  GFR Calc  Starting 12/18/2018, serum creatinine based estimated GFR (eGFR) will be   calculated using the Chronic Kidney Disease Epidemiology Collaboration   (CKD-EPI) equation.       Calcium   Date Value Ref Range Status   09/20/2023 9.8 8.6 - 10.0 mg/dL Final   12/03/2020 9.8 8.5 - 10.1 mg/dL Final     Bilirubin Total   Date Value Ref Range Status   09/20/2023 0.6 <=1.2 mg/dL Final     Alkaline Phosphatase   Date Value Ref Range Status   09/20/2023 72 35 - 104 U/L  Final     ALT   Date Value Ref Range Status   09/20/2023 18 0 - 50 U/L Final     Comment:     Reference intervals for this test were updated on 6/12/2023 to more accurately reflect our healthy population. There may be differences in the flagging of prior results with similar values performed with this method. Interpretation of those prior results can be made in the context of the updated reference intervals.       AST   Date Value Ref Range Status   09/20/2023 29 0 - 45 U/L Final     Comment:     Reference intervals for this test were updated on 6/12/2023 to more accurately reflect our healthy population. There may be differences in the flagging of prior results with similar values performed with this method. Interpretation of those prior results can be made in the context of the updated reference intervals.         Imaging:    I personally reviewed the following imaging results today and those on care everywhere, if indicated   Narrative & Impression   CHEST TWO VIEWS   12/29/2023 11:58 AM      HISTORY: Persistent cough for 3 weeks or longer. Fever, unspecified  fever cause.     COMPARISON: None.                                                                      IMPRESSION: No acute cardiopulmonary disease.      HARINDER GODOY MD        Reviewed imaging from [facility] sites     Medical Records Reviewed:    Reviewed consults/documents from [facility] including  Family Practice, Neurology, and GI       Again, thank you for allowing me to participate in the care of your patient.      Sincerely,    Kasie Mackenzie PA-C

## 2024-02-14 ENCOUNTER — OFFICE VISIT (OUTPATIENT)
Dept: PHYSICAL MEDICINE AND REHAB | Facility: CLINIC | Age: 59
End: 2024-02-14
Payer: COMMERCIAL

## 2024-02-14 VITALS
HEART RATE: 77 BPM | SYSTOLIC BLOOD PRESSURE: 128 MMHG | DIASTOLIC BLOOD PRESSURE: 70 MMHG | RESPIRATION RATE: 16 BRPM | OXYGEN SATURATION: 96 %

## 2024-02-14 DIAGNOSIS — G24.3 SPASMODIC TORTICOLLIS: Primary | ICD-10-CM

## 2024-02-14 DIAGNOSIS — G24.4 OROMANDIBULAR DYSTONIA: ICD-10-CM

## 2024-02-14 DIAGNOSIS — G43.719 CHRONIC MIGRAINE WITHOUT AURA, INTRACTABLE, WITHOUT STATUS MIGRAINOSUS: ICD-10-CM

## 2024-02-14 PROCEDURE — 64615 CHEMODENERV MUSC MIGRAINE: CPT | Performed by: PHYSICAL MEDICINE & REHABILITATION

## 2024-02-14 PROCEDURE — 64642 CHEMODENERV 1 EXTREMITY 1-4: CPT | Mod: XS | Performed by: PHYSICAL MEDICINE & REHABILITATION

## 2024-02-14 PROCEDURE — 64643 CHEMODENERV 1 EXTREM 1-4 EA: CPT | Mod: XS | Performed by: PHYSICAL MEDICINE & REHABILITATION

## 2024-02-14 PROCEDURE — 95874 GUIDE NERV DESTR NEEDLE EMG: CPT | Performed by: PHYSICAL MEDICINE & REHABILITATION

## 2024-02-14 ASSESSMENT — PAIN SCALES - GENERAL: PAINLEVEL: SEVERE PAIN (7)

## 2024-02-14 NOTE — PROGRESS NOTES
[facility]     PM&R CLINIC NOTE  BOTULINUM TOXIN PROCEDURE      HPI  Chief Complaint   Patient presents with    Procedure     Botox      Tia Pablo is a 58 year old female with a history of headaches and generalized dystonia with pain who presents to clinic for botulinum toxin injections for management of chronic migraine headaches, cervical and oromandibular dystonia.     SINCE LAST VISIT  Tia Pablo was last seen here in clinic on 11/17/2023, at which time she received 450 units of Botox.     Lately, she has been doing well. She has been doing acupuncture on a regular basis, and this has been helpful for her pain.      Patient underwent a left cervical RFA on 1/11/2024. Otherwise, she denies new medical diagnoses, illnesses, hospitalizations, emergency room visits, and injuries since the previous injection with botulinum neurotoxin.     RESPONSE TO PREVIOUS TREATMENT    Side effects: None reported.    1.  Headache frequency during this injection cycle: 2-3 migraine headache days per month. This is compared to her baseline headache frequency of 30 headache days per month.     2.  Headache duration during this injection cycle:  Headache duration is typically no longer than three hours with Relpax, but if the Relpax does not work right away, she has to take two doses and the headaches last much longer. Patient reports no episodes of multiple day headaches during this injection cycle.     3.  Headache intensity during this injection cycle:    A.  4/10  =  Typical pain level.  B.  6-7/10  =  Worst pain level.  C.  2/10  =  Lowest pain level.    4.  Change in headache medication usage during this injection cycle:  (For Example:  Able to decrease use of oral pain medications.) She is only taking her migraine headache medication about 1-2 times per month, whereas she was taking it much more frequently prior to Botox. She also takes robaxin as needed for more severe headaches      5.  ER Visits During This Injection Cycle: None.     6.  Functional Performance:  Change in ADL's, social interaction, days lost from work, etc. Patient reports being able to more fully participate in social and family activities and responsibilities as headache symptoms have improved. She is overall a lot more productive when Botox is working. Recently, she was able to go on a [place] cruise with family, and this was quite enjoyable. 8    Dystonia Improvement (neck and jaw): Yes.  Percent Improvement: 75 %    Duration of Benefit:  7 weeks and followed by a gradual reduction in benefit. Her jaw clenching gets severe a few weeks before her appointment.       PHYSICAL EXAM  VS: /70   Pulse 77   Resp 16   SpO2 96%    GEN: Pleasant and cooperative, in no acute distress  HEENT: No facial asymmetry  NECK: Involuntary right rotation with hypertonicity noted in right anterior neck musculature and upper trapezius.     ALLERGIES  Allergies   Allergen Reactions    Bee Venom Anaphylaxis    Erythromycin Hives and Itching    Seasonal Allergies        CURRENT MEDICATIONS    Current Outpatient Medications:     buPROPion (WELLBUTRIN XL) 300 MG 24 hr tablet, Take 1 tablet (300 mg) by mouth every morning, Disp: 90 tablet, Rfl: 1    chlorthalidone (HYGROTON) 25 MG tablet, Take 1 tablet (25 mg) by mouth daily, Disp: 90 tablet, Rfl: 1    clonazePAM (KLONOPIN) 0.5 MG tablet, Take 1 tab in the evening before bed. Okay to take an extra tab as needed in the day., Disp: 45 tablet, Rfl: 5    eletriptan (RELPAX) 40 MG tablet, Take 1 tablet (40 mg) by mouth at onset of headache for migraine May repeat in 2 hours. Max 2 tablets/24 hours., Disp: 12 tablet, Rfl: 3    EPINEPHrine (ANY BX GENERIC EQUIV) 0.3 MG/0.3ML injection 2-pack, PLEASE SEE ATTACHED FOR DETAILED DIRECTIONS, Disp: , Rfl:     escitalopram (LEXAPRO) 10 MG tablet, Take 0.5 tablets (5 mg) by mouth daily For 1 month then stop, Disp: , Rfl:     losartan (COZAAR) 50 MG  tablet, Take 1 tablet (50 mg) by mouth daily, Disp: 90 tablet, Rfl: 1    Melatonin 1 MG SUBL, Place 1 mg under the tongue at bedtime for 30 days, Disp: 1 tablet, Rfl: 1    methocarbamol (ROBAXIN) 500 MG tablet, Take 0.5-1 tablets (250-500 mg) by mouth 4 times daily as needed for muscle spasms, Disp: 40 tablet, Rfl: 0    Current Facility-Administered Medications:     botulinum toxin type A (BOTOX) 100 units injection 600 Units, 600 Units, Intramuscular, Q90 Days, Angela Franz MD, 450 Units at 23 1251    botulinum toxin type A (BOTOX) 100 units injection 600 Units, 600 Units, Intramuscular, Q90 Days, Simi Vazquez MD, 500 Units at 23 1119    glycopyrrolate (ROBINUL) injection 0.1-0.2 mg, 0.1-0.2 mg, Intravenous, Q5 Min PRN, Saeed Edwards MD, 0.2 mg at 24 1357       BOTULINUM NEUROTOXIN INJECTION PROCEDURES    VERIFICATION OF PATIENT IDENTIFICATION AND PROCEDURE     Initials   Patient Name SES   Patient  SES   Procedure Verified by: SES     Prior to the start of the procedure and with procedural staff participation, I verbally confirmed the patient s identity using two indicators, relevant allergies, that the procedure was appropriate and matched the consent or emergent situation, and that the correct equipment/implants were available. Immediately prior to starting the procedure I conducted the Time Out with the procedural staff and re-confirmed the patient s name, procedure, and site/side. (The Joint Commission universal protocol was followed.)  Yes    Sedation (Moderate or Deep): None    ABOVE ASSESSMENTS PERFORMED BY    Simi Vazquez MD      INDICATIONS FOR PROCEDURES  Tia Pablo is a 58 year old patient with involuntary muscle spasms and pain secondary to the diagnosis of oromandibular/cervical dystonia and chronic migraine headaches. Her baseline symptoms have been recalcitrant to oral medications and conservative therapy.  She is here today for reinjection  with Botox.    GOAL OF PROCEDURE  The goal of this procedure is to increase active range of motion, improve volitional motor control, decrease pain  and enhance functional independence.      TOTAL DOSE ADMINISTERED  Dose Administered:  450 units  Botox (Botulinum Toxin Type A) 1:1 Dilution  Unavoidable Drug Waste: Yes  Amount of drug waste (mL): 50 units Botox.  Reason for waste:  Single use vial  Diluent Used:  Preservative Free Normal Saline  Total Volume of Diluent Used:  4.5 ml  NDC #: Botox 100u ([address]-74)      CONSENT  The risks, benefits, and treatment options were discussed with Tia MANUEL Coltmarlyn and she agreed to proceed.    Written consent was obtained by [facility].     EQUIPMENT USED  Needle-35mm stimulating/recording  Needle-30 gauge  EMG/NCS Machine    SKIN PREPARATION  Skin preparation was performed using an alcohol wipe.    GUIDANCE DESCRIPTION  Electro-myographic guidance was necessary throughout the procedure to accurately identify all areas of dystonic muscles while avoiding injection of non-dystonic muscles, neighboring nerves and nearby vascular structures.     AREA/MUSCLE INJECTED: 450 UNITS BOTOX = TOTAL DOSE, 1:1 DILUTION     1. NECK MUSCLES: 145 UNITS BOTOX = TOTAL DOSE     Right Upper Trapezius - 20 units of Botox at 4 site/s.   Left Upper Trapezius - 20 units of Botox at 4 site/s.      Right Splenius Capitis - 5 units of Botox at 1 site/s.   Left Splenius Capitis - 5 units of Botox at 1 site/s.      Right Auricularis - 15 units of Botox at 3 site/s (anterior, superior & posterior).   Left Auricularis - 15 units of Botox at 3 site/s (anterior, superior & posterior).     Right occipitalis - 15 units of Botox at 3 site/s.   Left occipitalis - 15 units of Botox at 3 site/s.     Right Sternocleidomastoid - 10 units of Botox at 1 site/s.    Right Middle Scalene - 15 units of Botox at 1 site/s.     Right Platysma - 5 units of Botox at 2 site/s.  Left Platysma - 5 units of Botox at 2 site/s.      2.  UPPER EXTREMITY & TRUNK MUSCLES: 90 UNITS BOTOX = TOTAL DOSE     Right Levator scapula - 25 units of Botox at 2 site/s.   Left Levator scapula - 25 units of Botox at 2 site/s.      Right Rhomboid - 20 units of Botox at 1 site/s.    Left Rhomboid - 20 units of Botox at 3 site/s.       3. JAW MUSCLES: 140 UNITS BOTOX = TOTAL DOSE      Right Temporalis - 60 units of Botox at 5 site/s.               Left Temporalis - 60 units of Botox at 5 site/s.     Right Masseter - 10 units of Botox at 1 site/s.    Left Masseter - 10 units of Botox at 1 site/s.     4. FACIAL & SCALP MUSCLES: 75 UNITS BOTOX = TOTAL DOSE     Right Frontalis - 15 units of Botox at 3 site/s (2 middle & 1 hairline).    Left Frontalis - 15 units of Botox at 3 site/s (2 middle & 1 hairline).     Procerus - 5 units of Botox at 1 site/s.       Right  - 5 units of Botox at 1 site/s.    Left  - 5 units of Botox at 1 site/s.      Right Occipitalis - 15 units of Botox at 1 site/s.    Left Occipitalis - 15 units of Botox at 1 site/s.       RESPONSE TO PROCEDURE  Tia Pablo tolerated the procedure well and there were no immediate complications. She was allowed to recover for an appropriate period of time and was discharged home in stable condition.    ASSESSMENT AND PLAN   Botulinum toxin injections: No changes made to Botox dose today, however Botox was distributed to avoid jaw weakness. Patient will continue to monitor response to Botox and report at next appointment.   Referrals: None.  Follow up: Tia Pablo was rescheduled for the next series of injections in 12 weeks, at which time we will evaluate response to today's injections. she may call the clinic prior with any questions or concerns prior to the next appointment.

## 2024-02-14 NOTE — LETTER
2/14/2024       RE: Tia Pablo  [address] [facility] [id number]     Dear Colleague,    Thank you for referring your patient, Tia Pablo, to the [facility] [place] [facility]. Please see a copy of my visit note below.      [facility]     PM&R CLINIC NOTE  BOTULINUM TOXIN PROCEDURE      HPI  Chief Complaint   Patient presents with    Procedure     Botox      Tia Pablo is a 58 year old female with a history of headaches and generalized dystonia with pain who presents to clinic for botulinum toxin injections for management of chronic migraine headaches, cervical and oromandibular dystonia.     SINCE LAST VISIT  Tia Pablo was last seen here in clinic on 11/17/2023, at which time she received 450 units of Botox.     Lately, she has been doing well. She has been doing acupuncture on a regular basis, and this has been helpful for her pain.      Patient underwent a left cervical RFA on 1/11/2024. Otherwise, she denies new medical diagnoses, illnesses, hospitalizations, emergency room visits, and injuries since the previous injection with botulinum neurotoxin.     RESPONSE TO PREVIOUS TREATMENT    Side effects: None reported.    1.  Headache frequency during this injection cycle: 2-3 migraine headache days per month. This is compared to her baseline headache frequency of 30 headache days per month.     2.  Headache duration during this injection cycle:  Headache duration is typically no longer than three hours with Relpax, but if the Relpax does not work right away, she has to take two doses and the headaches last much longer. Patient reports no episodes of multiple day headaches during this injection cycle.     3.  Headache intensity during this injection cycle:    A.  4/10  =  Typical pain level.  B.  6-7/10  =  Worst pain level.  C.  2/10  =  Lowest pain  level.    4.  Change in headache medication usage during this injection cycle:  (For Example:  Able to decrease use of oral pain medications.) She is only taking her migraine headache medication about 1-2 times per month, whereas she was taking it much more frequently prior to Botox. She also takes robaxin as needed for more severe headaches     5.  ER Visits During This Injection Cycle: None.     6.  Functional Performance:  Change in ADL's, social interaction, days lost from work, etc. Patient reports being able to more fully participate in social and family activities and responsibilities as headache symptoms have improved. She is overall a lot more productive when Botox is working. Recently, she was able to go on a [place] cruise with family, and this was quite enjoyable. 8    Dystonia Improvement (neck and jaw): Yes.  Percent Improvement: 75 %    Duration of Benefit:  7 weeks and followed by a gradual reduction in benefit. Her jaw clenching gets severe a few weeks before her appointment.       PHYSICAL EXAM  VS: /70   Pulse 77   Resp 16   SpO2 96%    GEN: Pleasant and cooperative, in no acute distress  HEENT: No facial asymmetry  NECK: Involuntary right rotation with hypertonicity noted in right anterior neck musculature and upper trapezius.     ALLERGIES  Allergies   Allergen Reactions    Bee Venom Anaphylaxis    Erythromycin Hives and Itching    Seasonal Allergies        CURRENT MEDICATIONS    Current Outpatient Medications:     buPROPion (WELLBUTRIN XL) 300 MG 24 hr tablet, Take 1 tablet (300 mg) by mouth every morning, Disp: 90 tablet, Rfl: 1    chlorthalidone (HYGROTON) 25 MG tablet, Take 1 tablet (25 mg) by mouth daily, Disp: 90 tablet, Rfl: 1    clonazePAM (KLONOPIN) 0.5 MG tablet, Take 1 tab in the evening before bed. Okay to take an extra tab as needed in the day., Disp: 45 tablet, Rfl: 5    eletriptan (RELPAX) 40 MG tablet, Take 1 tablet (40 mg) by mouth at onset of headache for migraine May  repeat in 2 hours. Max 2 tablets/24 hours., Disp: 12 tablet, Rfl: 3    EPINEPHrine (ANY BX GENERIC EQUIV) 0.3 MG/0.3ML injection 2-pack, PLEASE SEE ATTACHED FOR DETAILED DIRECTIONS, Disp: , Rfl:     escitalopram (LEXAPRO) 10 MG tablet, Take 0.5 tablets (5 mg) by mouth daily For 1 month then stop, Disp: , Rfl:     losartan (COZAAR) 50 MG tablet, Take 1 tablet (50 mg) by mouth daily, Disp: 90 tablet, Rfl: 1    Melatonin 1 MG SUBL, Place 1 mg under the tongue at bedtime for 30 days, Disp: 1 tablet, Rfl: 1    methocarbamol (ROBAXIN) 500 MG tablet, Take 0.5-1 tablets (250-500 mg) by mouth 4 times daily as needed for muscle spasms, Disp: 40 tablet, Rfl: 0    Current Facility-Administered Medications:     botulinum toxin type A (BOTOX) 100 units injection 600 Units, 600 Units, Intramuscular, Q90 Days, Angela Franz MD, 450 Units at 23 1251    botulinum toxin type A (BOTOX) 100 units injection 600 Units, 600 Units, Intramuscular, Q90 Days, Simi Vazquez MD, 500 Units at 23 1119    glycopyrrolate (ROBINUL) injection 0.1-0.2 mg, 0.1-0.2 mg, Intravenous, Q5 Min PRN, Saeed Edwards MD, 0.2 mg at 24 1357       BOTULINUM NEUROTOXIN INJECTION PROCEDURES    VERIFICATION OF PATIENT IDENTIFICATION AND PROCEDURE     Initials   Patient Name SES   Patient  SES   Procedure Verified by: SES     Prior to the start of the procedure and with procedural staff participation, I verbally confirmed the patient s identity using two indicators, relevant allergies, that the procedure was appropriate and matched the consent or emergent situation, and that the correct equipment/implants were available. Immediately prior to starting the procedure I conducted the Time Out with the procedural staff and re-confirmed the patient s name, procedure, and site/side. (The Joint Commission universal protocol was followed.)  Yes    Sedation (Moderate or Deep): None    ABOVE ASSESSMENTS PERFORMED BY    Simi  MD Taylor      INDICATIONS FOR PROCEDURES  Tia Pablo is a 58 year old patient with involuntary muscle spasms and pain secondary to the diagnosis of oromandibular/cervical dystonia and chronic migraine headaches. Her baseline symptoms have been recalcitrant to oral medications and conservative therapy.  She is here today for reinjection with Botox.    GOAL OF PROCEDURE  The goal of this procedure is to increase active range of motion, improve volitional motor control, decrease pain  and enhance functional independence.      TOTAL DOSE ADMINISTERED  Dose Administered:  450 units  Botox (Botulinum Toxin Type A) 1:1 Dilution  Unavoidable Drug Waste: Yes  Amount of drug waste (mL): 50 units Botox.  Reason for waste:  Single use vial  Diluent Used:  Preservative Free Normal Saline  Total Volume of Diluent Used:  4.5 ml  NDC #: Botox 100u ([address]-31)      CONSENT  The risks, benefits, and treatment options were discussed with Tia Pablo and she agreed to proceed.    Written consent was obtained by [facility].     EQUIPMENT USED  Needle-35mm stimulating/recording  Needle-30 gauge  EMG/NCS Machine    SKIN PREPARATION  Skin preparation was performed using an alcohol wipe.    GUIDANCE DESCRIPTION  Electro-myographic guidance was necessary throughout the procedure to accurately identify all areas of dystonic muscles while avoiding injection of non-dystonic muscles, neighboring nerves and nearby vascular structures.     AREA/MUSCLE INJECTED: 450 UNITS BOTOX = TOTAL DOSE, 1:1 DILUTION     1. NECK MUSCLES: 145 UNITS BOTOX = TOTAL DOSE     Right Upper Trapezius - 20 units of Botox at 4 site/s.   Left Upper Trapezius - 20 units of Botox at 4 site/s.      Right Splenius Capitis - 5 units of Botox at 1 site/s.   Left Splenius Capitis - 5 units of Botox at 1 site/s.      Right Auricularis - 15 units of Botox at 3 site/s (anterior, superior & posterior).   Left Auricularis - 15 units of Botox at 3 site/s (anterior, superior &  posterior).     Right occipitalis - 15 units of Botox at 3 site/s.   Left occipitalis - 15 units of Botox at 3 site/s.     Right Sternocleidomastoid - 10 units of Botox at 1 site/s.    Right Middle Scalene - 15 units of Botox at 1 site/s.     Right Platysma - 5 units of Botox at 2 site/s.  Left Platysma - 5 units of Botox at 2 site/s.      2. UPPER EXTREMITY & TRUNK MUSCLES: 90 UNITS BOTOX = TOTAL DOSE     Right Levator scapula - 25 units of Botox at 2 site/s.   Left Levator scapula - 25 units of Botox at 2 site/s.      Right Rhomboid - 20 units of Botox at 1 site/s.    Left Rhomboid - 20 units of Botox at 3 site/s.       3. JAW MUSCLES: 140 UNITS BOTOX = TOTAL DOSE      Right Temporalis - 60 units of Botox at 5 site/s.               Left Temporalis - 60 units of Botox at 5 site/s.     Right Masseter - 10 units of Botox at 1 site/s.    Left Masseter - 10 units of Botox at 1 site/s.     4. FACIAL & SCALP MUSCLES: 75 UNITS BOTOX = TOTAL DOSE     Right Frontalis - 15 units of Botox at 3 site/s (2 middle & 1 hairline).    Left Frontalis - 15 units of Botox at 3 site/s (2 middle & 1 hairline).     Procerus - 5 units of Botox at 1 site/s.       Right  - 5 units of Botox at 1 site/s.    Left  - 5 units of Botox at 1 site/s.      Right Occipitalis - 15 units of Botox at 1 site/s.    Left Occipitalis - 15 units of Botox at 1 site/s.       RESPONSE TO PROCEDURE  Tia Pablo tolerated the procedure well and there were no immediate complications. She was allowed to recover for an appropriate period of time and was discharged home in stable condition.    ASSESSMENT AND PLAN   Botulinum toxin injections: No changes made to Botox dose today, however Botox was distributed to avoid jaw weakness. Patient will continue to monitor response to Botox and report at next appointment.   Referrals: None.  Follow up: Tia Pablo was rescheduled for the next series of injections in 12 weeks, at which time we will  evaluate response to today's injections. she may call the clinic prior with any questions or concerns prior to the next appointment.         Again, thank you for allowing me to participate in the care of your patient.      Sincerely,    Simi Vazquez MD

## 2024-02-26 ENCOUNTER — THERAPY VISIT (OUTPATIENT)
Dept: OCCUPATIONAL THERAPY | Facility: CLINIC | Age: 59
End: 2024-02-26
Attending: PHYSICIAN ASSISTANT
Payer: COMMERCIAL

## 2024-02-26 ENCOUNTER — MYC MEDICAL ADVICE (OUTPATIENT)
Dept: PALLIATIVE MEDICINE | Facility: CLINIC | Age: 59
End: 2024-02-26

## 2024-02-26 DIAGNOSIS — M47.812 SPONDYLOSIS OF CERVICAL REGION WITHOUT MYELOPATHY OR RADICULOPATHY: Primary | ICD-10-CM

## 2024-02-26 DIAGNOSIS — R41.840 POST-COVID CHRONIC CONCENTRATION DEFICIT: ICD-10-CM

## 2024-02-26 DIAGNOSIS — G93.32 POST-COVID CHRONIC FATIGUE: ICD-10-CM

## 2024-02-26 DIAGNOSIS — U09.9 POST-COVID CHRONIC FATIGUE: ICD-10-CM

## 2024-02-26 DIAGNOSIS — U09.9 POST-COVID CHRONIC CONCENTRATION DEFICIT: ICD-10-CM

## 2024-02-26 PROCEDURE — 97166 OT EVAL MOD COMPLEX 45 MIN: CPT | Mod: GO | Performed by: OCCUPATIONAL THERAPIST

## 2024-02-26 NOTE — PROGRESS NOTES
OCCUPATIONAL THERAPY EVALUATION  Type of Visit: Evaluation    See electronic medical record for Abuse and Falls Screening details.    Subjective      Presenting condition or subjective complaint: Dr referred me for this appointment  Date of onset: 02/26/24    Relevant medical history: Fibromyalgia; High blood pressure; Neck injury; Pain at night or rest     Prior therapy history for the same diagnosis, illness or injury: No      Living Environment  Social support: With a significant other or spouse   Type of home: House   Stairs to enter the home: Yes 4 Is there a railing: Yes   Ramp: No   Stairs inside the home: Yes 12 Is there a railing: Yes   Help at home: None  Equipment owned:       Employment: No    Hobbies/Interests: Working in yard perennial gardens    Patient goals for therapy: Memory/ brain fog from long covid    Pain assessment: Pain present  Gets botox injections every 12 weeks for migraines and cervical dystonia (upper back and neck), same as prior to covid.     Objective     Cognitive Status Examination  Orientation: Oriented to person, place and time   Level of Consciousness: Alert  Follows Commands and Answers Questions: 100% of the time  Personal Safety and Judgement:  Will continue to monitor  Memory: Impaired, short term memory  Attention: Sustained attention impaired, Selective attention impaired, difficulty ignoring irrelevant stimuli, Alternating attention impaired, difficulty shifting between tasks, Divided attention impaired, difficulty with simultaneous tasks, Difficulty with dual tasking   Organization/Problem Solving: No deficits identified  Executive Function: Working memory impaired, decreased storage of information for performing tasks    ACTIVITY TOLERANCE: mental and physical fatigue affects her ability to participate and tolerate various activities.    INSTRUMENTAL ACTIVITIES OF DAILY LIVING (IADL):  Meal Planning/Prep: does grocer shopping and cooking, memory does affect cooking,  difficulty remembering if she added ingredients.  Home/Financial Management:  does finances, housekeeping gets distracted and forget what  she was doing  Communication/Computer Use: uses computer and phone.  Community Mobility: drives, hasn't gotten lost or forgotten where she is going  Care of Others:- has a dog she cares for and has no problem with that.  Medications: positions med bottles by her toothbrush or phone so she remembers to take them.    SLUMS    The [facility] ([facility] Mental Status Exam) is a 30-point standardized cognitive screen used to identify the presence of cognitive deficits and/or to identify a change in cognition over time.  This screen assesses cognitive abilities in various domains.  ([email or web address])    Patient's performance was as follows:    Introduction: WNL  Orientation and Attention: 3/3  Memory: Delayed Recall with Interference: Able to repeat all 5 accurately  Calculation and Registration: 1/3 (forgot what the dollar amount was at the beginning of the question)  Category Naming with Time Contraint: 3/3  Memory Delayed Recall with Interference: 2/5  Registration and Digit Span: 2/2  Clock drawin/4  Visual Spatial: 2/2  Story Recall with Executive Function: 6/8     Total Score: 23/30       Scoring If High School Educated If Less than High School Educated   Normal 27-30 25-30   Mild Neurocognitive Disorder 21-26 20-24   Dementia 1-20 1-19       Score Interpretation: Score places patient in the mild category.  Patient demonstrates deficits in the following areas: short term memory recall, which impacted her in the areas of calculation and story recall as well.  Please note that this examination is used to screen individuals to look for the presence of cognitive deficits and to identify changes in cognition over time.  This is not a diagnosis.  This examination can be followed by further cognitive assessments if appropriate and deemed necessary.      Assessment & Plan    CLINICAL IMPRESSIONS  Medical Diagnosis: Post-COVID chronic concentration deficit    Treatment Diagnosis: memory impairment    Impression/Assessment: Pt is a 58 year old female presenting to Occupational Therapy due to memory impairment, brain fog and fatigue.  The following significant findings have been identified: Impaired activity tolerance and Impaired cognition.  These identified deficits interfere with their ability to perform self care tasks, work tasks, recreational activities, household chores, driving , medication management, financial management, and meal planning and preparation as compared to previous level of function.     Clinical Decision Making (Complexity):  Assessment of Occupational Performance: 1-3 Performance Deficits  Occupational Performance Limitations: communication management, driving and community mobility, health management and maintenance, home establishment and management, meal preparation and cleanup, leisure activities, and social participation  Clinical Decision Making (Complexity): Moderate complexity    PLAN OF CARE  Treatment Interventions:  Interventions: Cognitive Skills, Self-Care/Home Management, Therapeutic Activity, Manual Therapy    Long Term Goals   OT Goal 1  Goal Identifier: Memory  Goal Description: Pt will utilize 1-2 compensatory strategies for memory to help with daily activities.  Rationale: In order to maximize independence with tasks requiring functional cognition/executive function for school, work, medication or financial mgmt  Goal Progress: new goal  Target Date: 05/25/24  OT Goal 2  Goal Identifier: Fatigue  Goal Description: Pt will report increasing her activity level without needing to take a nap in order to clean her house.  Rationale: In order to safely and appropriately apply compensatory strategies with ADL/IADL performance  Goal Progress: new goal  Target Date: 05/25/24      Frequency of Treatment: 1x/week  Duration of Treatment: up to 90 days      Recommended Referrals to Other Professionals:  OT for Safe and Sound Protocol  Education Assessment:       Risks and benefits of evaluation/treatment have been explained.   Patient/Family/caregiver agrees with Plan of Care.     Evaluation Time:    OT Hugo, Moderate Complexity Minutes (75975): 45    Signing Clinician: VIKTORIYA Khan

## 2024-02-27 NOTE — TELEPHONE ENCOUNTER
Patient requesting right cervical RFA.     Patient completed bilateral MBB #1 on 4/13/2023 and reported moderate (50-80%) relief.     Patient completed  cervical medial branch radiofrequency ablation left  at Cton,3,4, fluoroscopically guided on 1/11/2024.     Routing to provider to review request and Katty to review insurance for patient request.     Kathryn Saenz RN  [facility]  [phone]

## 2024-02-27 NOTE — TELEPHONE ENCOUNTER
There is no PA required, okay to schedule      Katty NAVARRO  Complex   [facility]

## 2024-02-27 NOTE — TELEPHONE ENCOUNTER
What am I submitting for?       MBB or RFA?      It has to be at least 6 months between RFA's      Katty Avery   [facility]

## 2024-02-27 NOTE — TELEPHONE ENCOUNTER
RFA.   This is for the right side that was not completed on 1/11.  Was unclear if it would still need the 6 months as it was not done.

## 2024-02-28 NOTE — TELEPHONE ENCOUNTER
[facility] RFA REQUIREMENTS-   [facility]-4/1/2023    [phone] Facet Joint Denervation (Article number on web site)  POLICY STATEMENT  Nonpulsed radiofrequency denervation of cervical facet joints (C3-4 and below) and lumbar facet joints is considered medically necessary when ALL of the following criteria are met.  No prior spinal fusion surgery in the vertebral level being treated; AND  Disabling low back (lumbosacral) or neck (cervical) pain, suggestive of facet joint origin as evidenced by absence of nerve root compression as documented in the medical record on history, physical, and radiographic evaluations; and the pain is not radicular; AND  Pain has failed to respond to 3 months of conservative management, which may consist of therapies such as nonsteroidal anti-inflammatory medications, acetaminophen, manipulation, physical therapy, and a home exercise program; AND  There has been a successful trial of controlled medial branch blocks (see Policy Guidelines section); AND  If there has been a prior successful radiofrequency denervation, a minimum time of 6 months has elapsed since prior radiofrequency treatment (per side, per anatomic level of the spine).  Radiofrequency denervation is considered investigational for the treatment of chronic spinal or back pain for all uses that do not meet the criteria listed above, including but not limited to treatment of thoracic facet joint pain.  All other methods of denervation are considered investigational for the treatment of chronic spinal or back pain, including, but not limited to pulsed radiofrequency denervation, laser denervation, chemodenervation (eg, alcohol, phenol, or high concentration local anesthetics), and cryodenervation.  Therapeutic medial branch blocks are considered investigational.  If there has been a prior successful radiofrequency denervation, additional diagnostic medial branch blocks for the same level of the spine are not medically  necessary.    POLICY GUIDELINES  A successful trial of controlled diagnostic medial branch blocks consists of 2 separate positive blocks on different days with local anesthetic only (no steroids or other drugs), or a placebo-controlled series of blocks, under fluoroscopic guidance, that has resulted in at least a 50% reduction in pain for the duration of the local anesthetic used (eg, 3 hours longer with bupivacaine than lidocaine).   No therapeutic intra-articular injections (ie, steroids, saline, or other substances) should be administered for a period of at least 4 weeks prior to the diagnostic medial branch block.   The diagnostic blocks should involve the levels being considered for radiofrequency treatment and should not be conducted under intravenous sedation unless specifically indicated (eg, the patient is unable to cooperate with the procedure).   These diagnostic blocks should be targeted to the likely pain generator. Single level blocks lead to more precise diagnostic information, but multiple single-level blocks require several visits and additional exposure to radiation.      Routing to review, patient does need 2 MBB's per medical policy        Katty G.  Complex   [facility]

## 2024-02-28 NOTE — TELEPHONE ENCOUNTER
Per review only 1 block is required for insurance in order to proceed to radiofrequency ablation   .  4/13/24: bilateral medial branch block #1 done  1/11/24: only the LEFT cervical RFA. The right cervical RFA is to be done on a separate day as pt could not tolerate procedure.    Katty per your previous insurance notes this should be covered and pt can proceed now to Right Cton,3,4 radiofrequency ablation now.  If so route to schedulers.    Lucie, RN-BSN  [facility]   [phone]

## 2024-03-01 DIAGNOSIS — U09.9 POST-COVID CHRONIC FATIGUE: Primary | ICD-10-CM

## 2024-03-01 DIAGNOSIS — G43.719 CHRONIC MIGRAINE WITHOUT AURA, INTRACTABLE, WITHOUT STATUS MIGRAINOSUS: ICD-10-CM

## 2024-03-01 DIAGNOSIS — G93.32 POST-COVID CHRONIC FATIGUE: Primary | ICD-10-CM

## 2024-03-01 DIAGNOSIS — R41.840 POST-COVID CHRONIC CONCENTRATION DEFICIT: ICD-10-CM

## 2024-03-01 DIAGNOSIS — U09.9 POST-COVID CHRONIC CONCENTRATION DEFICIT: ICD-10-CM

## 2024-03-01 NOTE — TELEPHONE ENCOUNTER
Patient had bilateral cervical medial branch block #1 on 4/13/2023 with moderate (50-80%) relief.     Patient had first RFA with only one MBB.    Will patient need to complete two right sided MBBs with current policy or can she complete 1 right sided and count this as a second?     Routing to Katty to review and advise.     Kathryn Saenz RN  [facility]  [phone]

## 2024-03-04 ENCOUNTER — THERAPY VISIT (OUTPATIENT)
Dept: OCCUPATIONAL THERAPY | Facility: CLINIC | Age: 59
End: 2024-03-04
Attending: PHYSICIAN ASSISTANT
Payer: COMMERCIAL

## 2024-03-04 DIAGNOSIS — U09.9 POST-COVID CHRONIC FATIGUE: Primary | ICD-10-CM

## 2024-03-04 DIAGNOSIS — G93.32 POST-COVID CHRONIC FATIGUE: Primary | ICD-10-CM

## 2024-03-04 PROCEDURE — 97535 SELF CARE MNGMENT TRAINING: CPT | Mod: GO | Performed by: OCCUPATIONAL THERAPIST

## 2024-03-04 NOTE — TELEPHONE ENCOUNTER
Please call pt to schedule Right Cton,3,4 medial branch block#2 (#1 done on 4/13/24).    She may say this was to be the radiofrequency ablation but her insurance requires 1 more R-sided block before proceeding.      Lucie RN-BSN  [facility]   [phone]

## 2024-03-05 NOTE — TELEPHONE ENCOUNTER
Screening questions for MBB Injections:    Injection to be done at which interventional clinic site? [facility] [place]    Procedure ordered by Dr. Edwards    Procedure ordered? Cervical Medial Branch Block    What insurance would patient like us to bill for this procedure? [employer]    MEDICA: REQUIRES A PA FOR BOTH MBB   Worker's comp- Any injection DO NOT SCHEDULE and route to Olivier Breen.    [employer] insurance - If scheduling an SI joint injection DO NOT SCHEDULE and route to Katty Ramachandran.     MBBs must be scheduled with elapsed time interval of at least 2 weeks and not more than 6 months between the First MBB and the Second MBB for insurance purposes     [employer] - Any injection besides hip/shoulder/knee joint DO NOT SCHEDULE and route to Katty Ramachandran. She will obtain PA and call pt back to schedule procedure or notify pt of denial.     [employer]- PA required for all MBB's    **BCBS- ALL need to be routed to Katty for review if a PA is needed**  IF SCHEDULING IN [facility] PLEASE SCHEDULE AT LEAST 7-10         BUSINESS DAYS OUT SO A PA CAN BE OBTAINED    Genicular Nerve blocks- ALL insurances except for- [employer], Medicare (straight not supplement) and [employer]. Need to be reviewed by Katty before scheduling.       Is patient scheduled at [facility]? no   If YES, route every encounter to [facility] CARE NAVIGATION POOL [[phone]]    Any chance of pregnancy? NO   If YES, do NOT schedule and route to RN pool  - Dr. Richter route to Jeaneth Valle and PM&R Nurse  [[id number]]      Is an  needed? No     Patient has a drive home? (mandatory) Yes     Is patient taking any blood thinners (plavix, coumadin, jantoven, warfarin, heparin, pradaxa or dabigatran )? No    If hold needed, do NOT schedule, route to RN pool/ Dr. Richter's Team     Is patient taking any aspirin products? No   If more than 325mg/day, OK to schedule; Instruct pt to  decrease to less than 325 mg for 7 days AND route to RN pool/ Dr. Richter's Team  For CERVICAL procedures, hold all aspirin products for 6 days.   Tell pt that if aspirin product is not held for 6 days, the procedure WILL BE cancelled.      Does the patient have a bleeding or clotting disorder? No  If YES, okay to schedule AND route to RN nurse pool/ Dr. Richter's Team  **For any patients with platelet count <100, must be forwarded to provider**    Is patient diabetic? no If YES, have them bring their glucometer.    Does patient have an active infection or treated for one within the past week? No    Is patient currently taking any antibiotics?  No  For patients on chronic, preventative, or prophylactic antibiotics, procedures may be scheduled.   For patients on antibiotics for active or recent infection:antibiotic course must have been completed for 4 days    Is patient currently taking any steroid medications? (i.e. Prednisone, Medrol)  No   For patients on steroid medications, course must have been completed for 4 days    Is patient actively being treated for cancer or immunocompromised? No   If YES, do NOT schedule and route to RN/ Dr. Richter's Team    Are you able to get on and off an exam table with minimal or no assistance? Yes   If NO, do NOT schedule and route to RN/ Dr. Richter's Team    Are you able to roll over and lay on your stomach with minimal or no assistance? Yes   If NO, do NOT schedule and route to RN/ Dr. Richter's Team    Any allergies to contrast dye, iodine, shellfish, or numbing and steroid medications? No  (If so, inform nursing and note in scheduling comments.)    Allergies: Bee venom, Erythromycin, and Seasonal allergies     Does patient have an MRI/CT?  Not Applicable  Check Procedure Scheduling Grid to see if required.    Was the MRI done within the last 3 years?  NA  If yes, [facility], [facility], [facility] etc?     If no, do not schedule and  route to nursing/ Dr. Richter's Team  If MRI was not done at [place], [place] or [facility] do NOT schedule and route to nursing.    If pt has an imaging disc, the injection MAY be scheduled but pt has to bring disc to appt.   If they show up without the disc the injection cannot be done    Is patient able to transfer to a procedure table with minimal or no assistance? Yes  If NO, do NOT schedule and route to RN/ Dr. Richter's Team    Medial Branch Block Pre-Procedure Instructions  It is okay to take long acting pain medications (if you are on them) the day of the procedure but try not to take any short acting medications unless absolutely necessary.    YES: ok   Long acting meds would include: Gabapentin (Neurontin), MS Contin, Oxycontin        Short acting meds would include:  Percocet, Oxycodone, Vicodin, Ibuprofen   The day of the procedure, you should try to do things that provoke your pain, since the injection is being done to see if it will relieve your pain . YES: ok   If your pain level is a 4 out of 10 or less on the day of the procedu re, please call [phone] to reschedule.  YES: ok     Reminders:    If you are started on any steroids or antibiotics between now and your appointment, you must contact us because it may affect our ability to perform your procedure ok    Instructed pt to arrive 30 minutes early for IV start if required. (Check Procedure Scheduling Grid) KIRSTEN     If this is for a cervical MBB aspirin needs to be held for 6 days.  NO     Do not schedule procedures requiring IV placement in the first appointment of the day or first appointment after lunch. Do NOT schedule at 0745, 0815 or 1245.  ok    For patients 85 or older we recommend having an adult stay w/ them for the remainder of the day.      Does the patient have any questions? no

## 2024-03-05 NOTE — TELEPHONE ENCOUNTER
JESSICAM to schedule Right Cton,3,4 medial branch block#2           Olivier Jamshid  Complex   [facility]  Pain Management

## 2024-03-12 ENCOUNTER — RADIOLOGY INJECTION OFFICE VISIT (OUTPATIENT)
Dept: PALLIATIVE MEDICINE | Facility: CLINIC | Age: 59
End: 2024-03-12
Payer: COMMERCIAL

## 2024-03-12 ENCOUNTER — ANCILLARY ORDERS (OUTPATIENT)
Dept: PALLIATIVE MEDICINE | Facility: CLINIC | Age: 59
End: 2024-03-12

## 2024-03-12 VITALS — SYSTOLIC BLOOD PRESSURE: 139 MMHG | OXYGEN SATURATION: 96 % | DIASTOLIC BLOOD PRESSURE: 79 MMHG | HEART RATE: 82 BPM

## 2024-03-12 DIAGNOSIS — M47.812 SPONDYLOSIS OF CERVICAL REGION WITHOUT MYELOPATHY OR RADICULOPATHY: Primary | ICD-10-CM

## 2024-03-12 DIAGNOSIS — M47.812 SPONDYLOSIS OF CERVICAL REGION WITHOUT MYELOPATHY OR RADICULOPATHY: ICD-10-CM

## 2024-03-12 PROCEDURE — 64490 INJ PARAVERT F JNT C/T 1 LEV: CPT | Mod: RT | Performed by: PAIN MEDICINE

## 2024-03-12 PROCEDURE — 64491 INJ PARAVERT F JNT C/T 2 LEV: CPT | Mod: RT | Performed by: PAIN MEDICINE

## 2024-03-12 ASSESSMENT — PAIN SCALES - GENERAL
PAINLEVEL: SEVERE PAIN (7)
PAINLEVEL: SEVERE PAIN (7)

## 2024-03-12 NOTE — NURSING NOTE
Discharge Information    IV Discontiued Time:  NA    Amount of Fluid Infused:  NA    Discharge Criteria = When patient returns to baseline or as per MD order    Consciousness:  Pt is fully awake    Circulation:  BP +/- 20% of pre-procedure level    Respiration:  Patient is able to breathe deeply    O2 Sat:  Patient is able to maintain O2 Sat >92% on room air    Activity:  Moves 4 extremities on command    Ambulation:  Patient is able to stand and walk or stand and pivot into wheelchair    Dressing:  Clean/dry or No Dressing    Notes:   Discharge instructions and AVS given to patient    Patient meets criteria for discharge?  YES    Admitted to PCU?  No    Responsible adult present to accompany patient home?  Yes    Signature/Title:    anil travis RN  RN Care Coordinator  [facility] [place]

## 2024-03-12 NOTE — NURSING NOTE
Pre-procedure Intake  If YES to any questions or NO to having a   Please complete laminated checklist and leave on the computer keyboard for Provider, verbally inform provider if able.    For SCS Trial, RFA's or any sedation procedure:  Have you been fasting? NA  If yes, for how long?     Are you taking any any blood thinners such as Coumadin, Warfarin, Jantoven, Pradaxa Xarelto, Eliquis, Edoxaban, Enoxaparin, Lovenox, Heparin, Arixtra, Fondaparinux, or Fragmin? OR Antiplatelet medication such as Plavix, Brilinta, or Effient?   No   If yes, when did you take your last dose?     Do you take aspirin?  No  If cervical procedure, have you held aspirin for 6 days?       Do you have any allergies to contrast dye, iodine, steroid and/or numbing medications?  NO    Are you currently taking antibiotics or have an active infection?  NO    Have you had a fever/elevated temperature within the past week? NO    Are you currently taking oral steroids? NO    Do you have a ? Yes    Are you pregnant or breastfeeding?  NO    Have you received the COVID-19 vaccine? Yes  If yes, was it your 1st, 2nd or only dose needed?   Date of most recent vaccine: 10/12/23    Notify provider and RNs if systolic BP >170, diastolic BP >100, P >100 or O2 sats < 90%

## 2024-03-12 NOTE — PROGRESS NOTES
Pre procedure Diagnosis: Cervical spondylosis   Post procedure Diagnosis: Same  Procedure performed: cervical medial branch block cton,3,4  Anesthesia: none  Complications: none  Operators: Saeed Edwards MD    Indications:   Tia Pablo is a 58 year old female. The patient has a history of right axial neck pain.  Exam shows pain with extension/rotation  and they have tried conservative treatment including meds/pt.    Options/alternatives, benefits and risks were discussed with the patient including but not limited to bleeding, infection, tissue trauma, exposure to radiation, reaction to medications, spinal cord injury, weakness, numbness and paralysis.  Questions were answered to her satisfaction and she wishes to proceed. Voluntary informed consent was obtained and signed.     Vitals were reviewed: Yes  Allergies were reviewed:  Yes   Medications were reviewed:  Yes   Pre-procedure pain score: 7/10    Procedure:  After obtaining signed, informed consent, the patient was taken to the procedure room and placed in the prone position on the [facility] frame.  A Pause for the Cause was completed prior to procedure start.  The patient was prepped and draped in the usual sterile fashion.    The areas of interest were identified with fluoroscopy.  The skin and subcutaneous tissue were anesthetized by injecting Lidocaine 1% 1 ml at each injection site utilizing a 27 gauge 1.25 inch needle.  Then,  25 gauge 3.5 inch spinal needles with slightly curved tips were advanced tangential to the medial branch nerves, abutting the articular pillars at Cton,3,4 on the right utilizing AP and Lateral fluoroscopic guidance.  Aspiration for blood and CSF was negative at all the levels.     Then, after repeated negative aspiration, each level was injected with 0.5 ml of 0.25% bupivacaine and the needles were restyletted and withdrawn.    The injection sites were cleaned and sterile dressings were applied where indicated.    The patient  tolerated the procedure well without complications and was taken to the recovery area for continued observation.  Fluoroscopic images were saved to PACS.    The patient was re-evaluated following the procedure and they noted slightly decreased pain and improved range of motion.    Post-procedure pain:  7/10    The patient was given a pain diary and instructed to document their pain throughout the day.  The patient was asked to return the pain diary the next day in person or by fax at which time it will be reviewed and the decision made whether or not to proceed.    Follow-up:  pending results     Saeed Edwards MD  [facility]

## 2024-03-13 ENCOUNTER — MYC MEDICAL ADVICE (OUTPATIENT)
Dept: PALLIATIVE MEDICINE | Facility: CLINIC | Age: 59
End: 2024-03-13

## 2024-03-14 NOTE — TELEPHONE ENCOUNTER
Pt had a right Cervical  medial branch block # 2 on 3/12/24.  The post medial branch block form was  received.    Max % of pain relief from medial branch block #1 (done on 4/14/24):           Pt had moderate on the day of the block. (50 to 80%)   Max relief from block #2 is:     Max relief from the block: % on procedure day  Physical therapy:    Last done in?:  8/2023-10/27/23.   How many sessions?:  4.    Where was it done?  [facility]   Called pt and informed him/her that insurance would be checked and will be  called once we get a response.  Done  Fax the pain diary to Katty: no attached in this encounter     Routed to Katty to check insurance coverage.      Lucie RN-BSN  [facility]   [phone]

## 2024-03-15 NOTE — TELEPHONE ENCOUNTER
PA and clinicals submitted via web portal for RIGHT CRFA to [facility].              Katty NAVARRO    [facility]

## 2024-03-18 ENCOUNTER — THERAPY VISIT (OUTPATIENT)
Dept: OCCUPATIONAL THERAPY | Facility: CLINIC | Age: 59
End: 2024-03-18
Payer: COMMERCIAL

## 2024-03-18 DIAGNOSIS — U09.9 POST-COVID CHRONIC FATIGUE: Primary | ICD-10-CM

## 2024-03-18 DIAGNOSIS — G93.32 POST-COVID CHRONIC FATIGUE: Primary | ICD-10-CM

## 2024-03-18 PROCEDURE — 97140 MANUAL THERAPY 1/> REGIONS: CPT | Mod: GO | Performed by: OCCUPATIONAL THERAPIST

## 2024-03-18 PROCEDURE — 97535 SELF CARE MNGMENT TRAINING: CPT | Mod: GO | Performed by: OCCUPATIONAL THERAPIST

## 2024-03-23 ENCOUNTER — MYC REFILL (OUTPATIENT)
Dept: FAMILY MEDICINE | Facility: CLINIC | Age: 59
End: 2024-03-23
Payer: COMMERCIAL

## 2024-03-23 ENCOUNTER — MYC MEDICAL ADVICE (OUTPATIENT)
Dept: FAMILY MEDICINE | Facility: CLINIC | Age: 59
End: 2024-03-23
Payer: COMMERCIAL

## 2024-03-23 DIAGNOSIS — Z91.038 OTHER INSECT ALLERGY STATUS: Primary | ICD-10-CM

## 2024-03-25 RX ORDER — EPINEPHRINE 0.3 MG/.3ML
0.3 INJECTION SUBCUTANEOUS PRN
Qty: 2 EACH | Refills: 1 | Status: SHIPPED | OUTPATIENT
Start: 2024-03-25

## 2024-03-26 ENCOUNTER — THERAPY VISIT (OUTPATIENT)
Dept: OCCUPATIONAL THERAPY | Facility: CLINIC | Age: 59
End: 2024-03-26
Attending: PSYCHIATRY & NEUROLOGY
Payer: COMMERCIAL

## 2024-03-26 DIAGNOSIS — U09.9 POST-COVID CHRONIC CONCENTRATION DEFICIT: Primary | ICD-10-CM

## 2024-03-26 DIAGNOSIS — R41.840 POST-COVID CHRONIC CONCENTRATION DEFICIT: Primary | ICD-10-CM

## 2024-03-26 PROBLEM — G93.32 POST-COVID CHRONIC FATIGUE: Status: ACTIVE | Noted: 2024-03-26

## 2024-03-26 PROCEDURE — 97535 SELF CARE MNGMENT TRAINING: CPT | Mod: GO | Performed by: OCCUPATIONAL THERAPIST

## 2024-03-27 DIAGNOSIS — I10 ESSENTIAL HYPERTENSION: ICD-10-CM

## 2024-03-27 RX ORDER — CHLORTHALIDONE 25 MG/1
25 TABLET ORAL DAILY
Qty: 90 TABLET | Refills: 0 | Status: SHIPPED | OUTPATIENT
Start: 2024-03-27 | End: 2024-06-06

## 2024-05-03 ENCOUNTER — TELEPHONE (OUTPATIENT)
Dept: FAMILY MEDICINE | Facility: CLINIC | Age: 59
End: 2024-05-03

## 2024-05-03 NOTE — TELEPHONE ENCOUNTER
Pharmacy requesting:    Losartan 50mg Tabs  Qty: 90  Sig: Take one tablet by mouth every day.    ABY PETIT

## 2024-05-20 ENCOUNTER — OFFICE VISIT (OUTPATIENT)
Dept: PHYSICAL MEDICINE AND REHAB | Facility: CLINIC | Age: 59
End: 2024-05-20
Payer: COMMERCIAL

## 2024-05-20 DIAGNOSIS — G24.4 OROMANDIBULAR DYSTONIA: ICD-10-CM

## 2024-05-20 DIAGNOSIS — G24.3 SPASMODIC TORTICOLLIS: Primary | ICD-10-CM

## 2024-05-20 DIAGNOSIS — G43.719 CHRONIC MIGRAINE WITHOUT AURA, INTRACTABLE, WITHOUT STATUS MIGRAINOSUS: ICD-10-CM

## 2024-05-20 PROCEDURE — 95874 GUIDE NERV DESTR NEEDLE EMG: CPT | Performed by: PHYSICAL MEDICINE & REHABILITATION

## 2024-05-20 PROCEDURE — 64615 CHEMODENERV MUSC MIGRAINE: CPT | Performed by: PHYSICAL MEDICINE & REHABILITATION

## 2024-05-20 NOTE — LETTER
5/20/2024       RE: Tia Pablo  [address] [facility] [id number]       Dear Colleague,    Thank you for referring your patient, Tia Pablo, to the [facility] PHYSICAL MEDICINE AND REHABILITATION CLINIC [place] at [facility]. Please see a copy of my visit note below.      [facility]     PM&R CLINIC NOTE  BOTULINUM TOXIN PROCEDURE      HPI  Chief Complaint   Patient presents with    Procedure     Botox injections     Tia Pablo is a 58 year old female with a history of headaches and generalized dystonia with pain who presents to clinic for botulinum toxin injections for management of chronic migraine headaches, cervical and oromandibular dystonia.     SINCE LAST VISIT  Tia Pablo was last seen here in clinic on 2/14/2024, at which time she received 450 units of Botox.     Lately, she has been doing well. She has been doing acupuncture on a regular basis, and this has been helpful for her pain.      Patient denies new medical diagnoses, illnesses, hospitalizations, emergency room visits, and injuries since the previous injection with botulinum neurotoxin.     RESPONSE TO PREVIOUS TREATMENT    Side effects: None reported.    1.  Headache frequency during this injection cycle: 2-3 migraine headache days per month. This is compared to her baseline headache frequency of 30 headache days per month.     2.  Headache duration during this injection cycle:  Headache duration is typically no longer than three hours with Relpax, but if the Relpax does not work right away, she has to take two doses and the headaches last much longer. Patient reports no episodes of multiple day headaches during this injection cycle.     3.  Headache intensity during this injection cycle:    A.  4/10  =  Typical pain level.  B.  6-7/10  =  Worst pain level.  C.  2/10  =  Lowest pain level.    4.  Change in headache medication usage  during this injection cycle:  (For Example:  Able to decrease use of oral pain medications.) She is only taking her migraine headache medication about 1-2 times per month, whereas she was taking it much more frequently prior to Botox. She also takes robaxin as needed for more severe headaches     5.  ER Visits During This Injection Cycle: None.     6.  Functional Performance:  Change in ADL's, social interaction, days lost from work, etc. Patient reports being able to more fully participate in social and family activities and responsibilities as headache symptoms have improved. She is overall a lot more productive when Botox is working. Recently, she was able to go on a [place] cruise with family, and this was quite enjoyable. 8    Dystonia Improvement (neck and jaw): Yes.  Percent Improvement: 75 %    Duration of Benefit:  7 weeks and followed by a gradual reduction in benefit. Her jaw clenching gets severe a few weeks before her appointment.       PHYSICAL EXAM  VS: There were no vitals taken for this visit.   GEN: Pleasant and cooperative, in no acute distress  HEENT: No facial asymmetry  NECK: Involuntary right rotation with hypertonicity noted in right anterior neck musculature and upper trapezius.     ALLERGIES  Allergies   Allergen Reactions    Bee Venom Anaphylaxis    Erythromycin Hives and Itching    Seasonal Allergies        CURRENT MEDICATIONS    Current Outpatient Medications:     buPROPion (WELLBUTRIN XL) 300 MG 24 hr tablet, Take 1 tablet (300 mg) by mouth every morning, Disp: 90 tablet, Rfl: 1    chlorthalidone (HYGROTON) 25 MG tablet, TAKE 1 TABLET BY MOUTH EVERY DAY, Disp: 90 tablet, Rfl: 0    clonazePAM (KLONOPIN) 0.5 MG tablet, Take 1 tab in the evening before bed. Okay to take an extra tab as needed in the day., Disp: 45 tablet, Rfl: 5    eletriptan (RELPAX) 40 MG tablet, Take 1 tablet (40 mg) by mouth at onset of headache for migraine May repeat in 2 hours. Max 2 tablets/24 hours., Disp: 12  tablet, Rfl: 3    EPINEPHrine (ANY BX GENERIC EQUIV) 0.3 MG/0.3ML injection 2-pack, Inject 0.3 mLs (0.3 mg) into the muscle as needed for anaphylaxis May repeat one time in 5-15 minutes if response to initial dose is inadequate., Disp: 2 each, Rfl: 1    losartan (COZAAR) 50 MG tablet, Take 1 tablet (50 mg) by mouth daily, Disp: 90 tablet, Rfl: 1    methocarbamol (ROBAXIN) 500 MG tablet, Take 0.5-1 tablets (250-500 mg) by mouth 4 times daily as needed for muscle spasms, Disp: 40 tablet, Rfl: 0    escitalopram (LEXAPRO) 10 MG tablet, Take 0.5 tablets (5 mg) by mouth daily For 1 month then stop, Disp: , Rfl:     Current Facility-Administered Medications:     botulinum toxin type A (BOTOX) 100 units injection 600 Units, 600 Units, Intramuscular, Q90 Days, Simi Vazquez MD, 450 Units at 24 1208    glycopyrrolate (ROBINUL) injection 0.1-0.2 mg, 0.1-0.2 mg, Intravenous, Q5 Min PRN, Saeed Edwards MD, 0.2 mg at 24 1357       BOTULINUM NEUROTOXIN INJECTION PROCEDURES    VERIFICATION OF PATIENT IDENTIFICATION AND PROCEDURE     Initials   Patient Name SES   Patient  SES   Procedure Verified by: SES     Prior to the start of the procedure and with procedural staff participation, I verbally confirmed the patient s identity using two indicators, relevant allergies, that the procedure was appropriate and matched the consent or emergent situation, and that the correct equipment/implants were available. Immediately prior to starting the procedure I conducted the Time Out with the procedural staff and re-confirmed the patient s name, procedure, and site/side. (The Joint Commission universal protocol was followed.)  Yes    Sedation (Moderate or Deep): None    ABOVE ASSESSMENTS PERFORMED BY    Simi Vazquez MD      INDICATIONS FOR PROCEDURES  Tia Pablo is a 58 year old patient with involuntary muscle spasms and pain secondary to the diagnosis of oromandibular/cervical dystonia and chronic  migraine headaches. Her baseline symptoms have been recalcitrant to oral medications and conservative therapy.  She is here today for reinjection with Botox.    GOAL OF PROCEDURE  The goal of this procedure is to increase active range of motion, improve volitional motor control, decrease pain  and enhance functional independence.      TOTAL DOSE ADMINISTERED  Dose Administered:  450 units  Botox (Botulinum Toxin Type A) 1:1 Dilution  Unavoidable Drug Waste: Yes  Amount of drug waste (mL): 50 units Botox.  Reason for waste:  Single use vial  Diluent Used:  Preservative Free Normal Saline  Total Volume of Diluent Used:  4.5 ml  NDC #: Botox 100u ([id number])      CONSENT  The risks, benefits, and treatment options were discussed with Tia MAR Pablo and she agreed to proceed.    Written consent was obtained by [facility].     EQUIPMENT USED  Needle-35mm stimulating/recording  Needle-30 gauge  EMG/NCS Machine    SKIN PREPARATION  Skin preparation was performed using an alcohol wipe.    GUIDANCE DESCRIPTION  Electro-myographic guidance was necessary throughout the procedure to accurately identify all areas of dystonic muscles while avoiding injection of non-dystonic muscles, neighboring nerves and nearby vascular structures.     AREA/MUSCLE INJECTED: 450 UNITS BOTOX = TOTAL DOSE, 1:1 DILUTION     1. NECK MUSCLES: 145 UNITS BOTOX = TOTAL DOSE     Right Upper Trapezius - 20 units of Botox at 4 site/s.   Left Upper Trapezius - 20 units of Botox at 4 site/s.      Right Splenius Capitis - 5 units of Botox at 1 site/s.   Left Splenius Capitis - 5 units of Botox at 1 site/s.      Right Auricularis - 15 units of Botox at 3 site/s (anterior, superior & posterior).   Left Auricularis - 15 units of Botox at 3 site/s (anterior, superior & posterior).     Right occipitalis - 15 units of Botox at 3 site/s.   Left occipitalis - 15 units of Botox at 3 site/s.     Right Sternocleidomastoid - 10 units of Botox at 1 site/s.    Right Middle  Scalene - 15 units of Botox at 1 site/s.     Right Platysma - 5 units of Botox at 2 site/s.  Left Platysma - 5 units of Botox at 2 site/s.      2. UPPER EXTREMITY & TRUNK MUSCLES: 90 UNITS BOTOX = TOTAL DOSE     Right Levator scapula - 25 units of Botox at 2 site/s.   Left Levator scapula - 25 units of Botox at 2 site/s.      Right Rhomboid - 20 units of Botox at 1 site/s.    Left Rhomboid - 20 units of Botox at 3 site/s.       3. JAW MUSCLES: 140 UNITS BOTOX = TOTAL DOSE      Right Temporalis - 60 units of Botox at 5 site/s.               Left Temporalis - 60 units of Botox at 5 site/s.     Right Masseter - 10 units of Botox at 1 site/s.    Left Masseter - 10 units of Botox at 1 site/s.     4. FACIAL & SCALP MUSCLES: 75 UNITS BOTOX = TOTAL DOSE     Right Frontalis - 15 units of Botox at 3 site/s (2 middle & 1 hairline).    Left Frontalis - 15 units of Botox at 3 site/s (2 middle & 1 hairline).     Procerus - 5 units of Botox at 1 site/s.       Right  - 5 units of Botox at 1 site/s.    Left  - 5 units of Botox at 1 site/s.      Right Occipitalis - 15 units of Botox at 1 site/s.    Left Occipitalis - 15 units of Botox at 1 site/s.       RESPONSE TO PROCEDURE  Tia Pablo tolerated the procedure well and there were no immediate complications. She was allowed to recover for an appropriate period of time and was discharged home in stable condition.    ASSESSMENT AND PLAN   Botulinum toxin injections: No changes made to Botox dose or distribution today. Patient will continue to monitor response to Botox and report at next appointment.    Referrals: None.  Follow up: Tia Pablo was rescheduled for the next series of injections in 12 weeks, at which time we will evaluate response to today's injections. she may call the clinic prior with any questions or concerns prior to the next appointment.         Again, thank you for allowing me to participate in the care of your patient.       Sincerely,    Simi Vazquez MD

## 2024-05-21 ENCOUNTER — THERAPY VISIT (OUTPATIENT)
Dept: OCCUPATIONAL THERAPY | Facility: CLINIC | Age: 59
End: 2024-05-21
Attending: PHYSICIAN ASSISTANT
Payer: COMMERCIAL

## 2024-05-21 DIAGNOSIS — R41.840 POST-COVID CHRONIC CONCENTRATION DEFICIT: Primary | ICD-10-CM

## 2024-05-21 DIAGNOSIS — U09.9 POST-COVID CHRONIC CONCENTRATION DEFICIT: Primary | ICD-10-CM

## 2024-05-21 PROCEDURE — 97535 SELF CARE MNGMENT TRAINING: CPT | Mod: GO | Performed by: OCCUPATIONAL THERAPIST

## 2024-05-21 PROCEDURE — 97530 THERAPEUTIC ACTIVITIES: CPT | Mod: GO | Performed by: OCCUPATIONAL THERAPIST

## 2024-05-29 NOTE — TELEPHONE ENCOUNTER
JESSICAM to schedule  RIGHT CRFA             Olivier Breen  Complex   [facility]  Pain Management

## 2024-05-29 NOTE — TELEPHONE ENCOUNTER
Screening Questions for RFA Procedure      Procedure ordered? RIGHT CRFA     What insurance are we billing for this procedure?  BC  IF SCHEDULING AT [facility] PLEASE SCHEDULE AT LEAST 7-10 BUSINESS DAYS OUT SO A PA CAN BE OBTAINED    Is patient scheduled at [facility]? no  If YES, route every encounter to [facility] CARE NAVIGATION POOL [[phone]]  Has patient had this injection before? No  Any chance of pregnancy? NO   If YES, do NOT schedule and route to RN pool     Is  Needed?: No  Will patient have a ?  Yes   If pt is given sedation meds, no driving for 24 hours.  Is pt taking a cab or transportation service? NO      If so will need to be accompanied by an adult too (friend/family member) in order for IV sedation to be given.    Per [facility] Policy:  Outpatients are to have responsible adult or family member to accompany them at discharge and drive them home. A service providing medically trained drivers or attendants would be acceptable. Public transportation would not be acceptable unless the patient is accompanied by a responsible adult or family member.  Is patient taking any blood thinners (i.e. plavix, coumadin, jantoven, warfarin, heparin, pradaxa or dabigatran, etc)? No   If YES, do NOT schedule, and route to RN pool    Is patient taking any aspirin products? No   If more than 325mg/day, OK to schedule; Instruct pt to decrease to less than 325 mg for 7 days AND route to RN pool  For CERVICAL procedures, hold all aspirin products for 6 days.   Tell pt that if aspirin product is not held for 6 days, the procedure WILL BE cancelled.      Does the patient have a bleeding or clotting disorder? No   If YES, it it OKAY to schedule AND route to RN pool  **For any patients with platelet count <100, must be forwarded to provider**    Is patient diabetic? No If YES, have them bring their glucometer.    Does patient have an active infection or treated for one within  the past week? No   If YES, do NOT schedule and route to RN nurse pool     Is patient currently taking any antibiotics?  No  For patients on chronic, preventative, or prophylactic antibiotics, procedures may be scheduled.   For patients on antibiotics for active or recent infection:antibiotic course must have been completed for 4 days    Is patient currently taking any steroid medications? (i.e. Prednisone, Medrol)  No   For patients on steroid medications, course must have been completed for 4 days    Is patient actively being treated for cancer or immunocompromised, including the spleen having been removed? No  If YES, do NOT schedule and route to RN pool     Any history of complications with sedation medications?  NO   If YES, OK to schedule AND route to RN pool     Any history of sleep apnea?  NO   If YES, OK to schedule AND route to RN pool     Any cardiac history?  NO   If YES, OK to schedule AND route to RN pool     Do you have an implanted pacemaker, ICD (implanted cardiac device) or AICD (automatic implanted cardiac device)?  NO  If YES, do NOT schedule AND route to RN pool (for all providers including Dr Greenwood)   Obtain name of device :     Obtain name of cardiologist:       Do you have an implanted stimulator?  NO  If YES, OK to schedule AND route to nursing.   Instruct patient to bring in the remote to the appointment and it will need to be turned off.  reviewed      Does patient have an allergy to contrast dye, iodine or shellfish?  No   If YES, OK to schedule. Route to RN pool AND add allergy information to appointment notes    Are you able to get on and off an exam table with minimal or no assistance? Yes   If NO, do NOT schedule and route to RN pool    Are you able to roll over and lay on your stomach with minimal or no assistance? Yes   If NO, do NOT schedule and route to RN pool    Reminders:  If you are started on any steroids or antibiotics between now and your appointment, you must  contact us because it may affect our ability to perform your procedure.  Yes  Informed patient that s/he needs to fast for 6 hours before procedure?  YES  Informed patient that it is OK to take normal medications with sips of water, especially blood pressure medications, before the procedure and must hold blood thinners as instructed.  Yes  Informed patient to arrive 30 minutes before procedure time to have an IV inserted.  reviewed   Do NOT schedule at 0745, 0815 or 1245.  reviewed   All radiofrequency ablations are in a 60 minute time slot.If cervical RFA, please schedule each side separate. If okay to do bilateral cervical RFA, schedule for 90 minutes.  reviewed

## 2024-06-03 ENCOUNTER — THERAPY VISIT (OUTPATIENT)
Dept: OCCUPATIONAL THERAPY | Facility: CLINIC | Age: 59
End: 2024-06-03
Attending: PHYSICIAN ASSISTANT
Payer: COMMERCIAL

## 2024-06-03 DIAGNOSIS — R41.840 POST-COVID CHRONIC CONCENTRATION DEFICIT: Primary | ICD-10-CM

## 2024-06-03 DIAGNOSIS — U09.9 POST-COVID CHRONIC CONCENTRATION DEFICIT: Primary | ICD-10-CM

## 2024-06-03 PROCEDURE — 97530 THERAPEUTIC ACTIVITIES: CPT | Mod: GO | Performed by: OCCUPATIONAL THERAPIST

## 2024-06-03 NOTE — PROGRESS NOTES
[facility]     PM&R CLINIC NOTE  BOTULINUM TOXIN PROCEDURE      HPI  Chief Complaint   Patient presents with    Procedure     Botox injections     Tia Pablo is a 58 year old female with a history of headaches and generalized dystonia with pain who presents to clinic for botulinum toxin injections for management of chronic migraine headaches, cervical and oromandibular dystonia.     SINCE LAST VISIT  Tia Pablo was last seen here in clinic on 2/14/2024, at which time she received 450 units of Botox.     Lately, she has been doing well. She has been doing acupuncture on a regular basis, and this has been helpful for her pain.      Patient denies new medical diagnoses, illnesses, hospitalizations, emergency room visits, and injuries since the previous injection with botulinum neurotoxin.     RESPONSE TO PREVIOUS TREATMENT    Side effects: None reported.    1.  Headache frequency during this injection cycle: 2-3 migraine headache days per month. This is compared to her baseline headache frequency of 30 headache days per month.     2.  Headache duration during this injection cycle:  Headache duration is typically no longer than three hours with Relpax, but if the Relpax does not work right away, she has to take two doses and the headaches last much longer. Patient reports no episodes of multiple day headaches during this injection cycle.     3.  Headache intensity during this injection cycle:    A.  4/10  =  Typical pain level.  B.  6-7/10  =  Worst pain level.  C.  2/10  =  Lowest pain level.    4.  Change in headache medication usage during this injection cycle:  (For Example:  Able to decrease use of oral pain medications.) She is only taking her migraine headache medication about 1-2 times per month, whereas she was taking it much more frequently prior to Botox. She also takes robaxin as needed for more severe headaches     5.  ER Visits During This Injection Cycle: None.      6.  Functional Performance:  Change in ADL's, social interaction, days lost from work, etc. Patient reports being able to more fully participate in social and family activities and responsibilities as headache symptoms have improved. She is overall a lot more productive when Botox is working. Recently, she was able to go on a [place] cruise with family, and this was quite enjoyable. 8    Dystonia Improvement (neck and jaw): Yes.  Percent Improvement: 75 %    Duration of Benefit:  7 weeks and followed by a gradual reduction in benefit. Her jaw clenching gets severe a few weeks before her appointment.       PHYSICAL EXAM  VS: There were no vitals taken for this visit.   GEN: Pleasant and cooperative, in no acute distress  HEENT: No facial asymmetry  NECK: Involuntary right rotation with hypertonicity noted in right anterior neck musculature and upper trapezius.     ALLERGIES  Allergies   Allergen Reactions    Bee Venom Anaphylaxis    Erythromycin Hives and Itching    Seasonal Allergies        CURRENT MEDICATIONS    Current Outpatient Medications:     buPROPion (WELLBUTRIN XL) 300 MG 24 hr tablet, Take 1 tablet (300 mg) by mouth every morning, Disp: 90 tablet, Rfl: 1    chlorthalidone (HYGROTON) 25 MG tablet, TAKE 1 TABLET BY MOUTH EVERY DAY, Disp: 90 tablet, Rfl: 0    clonazePAM (KLONOPIN) 0.5 MG tablet, Take 1 tab in the evening before bed. Okay to take an extra tab as needed in the day., Disp: 45 tablet, Rfl: 5    eletriptan (RELPAX) 40 MG tablet, Take 1 tablet (40 mg) by mouth at onset of headache for migraine May repeat in 2 hours. Max 2 tablets/24 hours., Disp: 12 tablet, Rfl: 3    EPINEPHrine (ANY BX GENERIC EQUIV) 0.3 MG/0.3ML injection 2-pack, Inject 0.3 mLs (0.3 mg) into the muscle as needed for anaphylaxis May repeat one time in 5-15 minutes if response to initial dose is inadequate., Disp: 2 each, Rfl: 1    losartan (COZAAR) 50 MG tablet, Take 1 tablet (50 mg) by mouth daily, Disp: 90 tablet, Rfl: 1     methocarbamol (ROBAXIN) 500 MG tablet, Take 0.5-1 tablets (250-500 mg) by mouth 4 times daily as needed for muscle spasms, Disp: 40 tablet, Rfl: 0    escitalopram (LEXAPRO) 10 MG tablet, Take 0.5 tablets (5 mg) by mouth daily For 1 month then stop, Disp: , Rfl:     Current Facility-Administered Medications:     botulinum toxin type A (BOTOX) 100 units injection 600 Units, 600 Units, Intramuscular, Q90 Days, Simi Vazquez MD, 450 Units at 24 1208    glycopyrrolate (ROBINUL) injection 0.1-0.2 mg, 0.1-0.2 mg, Intravenous, Q5 Min PRN, Saeed Edwards MD, 0.2 mg at 24 1357       BOTULINUM NEUROTOXIN INJECTION PROCEDURES    VERIFICATION OF PATIENT IDENTIFICATION AND PROCEDURE     Initials   Patient Name SES   Patient  SES   Procedure Verified by: SES     Prior to the start of the procedure and with procedural staff participation, I verbally confirmed the patient s identity using two indicators, relevant allergies, that the procedure was appropriate and matched the consent or emergent situation, and that the correct equipment/implants were available. Immediately prior to starting the procedure I conducted the Time Out with the procedural staff and re-confirmed the patient s name, procedure, and site/side. (The Joint Commission universal protocol was followed.)  Yes    Sedation (Moderate or Deep): None    ABOVE ASSESSMENTS PERFORMED BY    Simi Vazquez MD      INDICATIONS FOR PROCEDURES  Tia Pablo is a 58 year old patient with involuntary muscle spasms and pain secondary to the diagnosis of oromandibular/cervical dystonia and chronic migraine headaches. Her baseline symptoms have been recalcitrant to oral medications and conservative therapy.  She is here today for reinjection with Botox.    GOAL OF PROCEDURE  The goal of this procedure is to increase active range of motion, improve volitional motor control, decrease pain  and enhance functional independence.      TOTAL DOSE  ADMINISTERED  Dose Administered:  450 units  Botox (Botulinum Toxin Type A) 1:1 Dilution  Unavoidable Drug Waste: Yes  Amount of drug waste (mL): 50 units Botox.  Reason for waste:  Single use vial  Diluent Used:  Preservative Free Normal Saline  Total Volume of Diluent Used:  4.5 ml  NDC #: Botox 100u ([id number])      CONSENT  The risks, benefits, and treatment options were discussed with Tia Pablo and she agreed to proceed.    Written consent was obtained by [facility].     EQUIPMENT USED  Needle-35mm stimulating/recording  Needle-30 gauge  EMG/NCS Machine    SKIN PREPARATION  Skin preparation was performed using an alcohol wipe.    GUIDANCE DESCRIPTION  Electro-myographic guidance was necessary throughout the procedure to accurately identify all areas of dystonic muscles while avoiding injection of non-dystonic muscles, neighboring nerves and nearby vascular structures.     AREA/MUSCLE INJECTED: 450 UNITS BOTOX = TOTAL DOSE, 1:1 DILUTION     1. NECK MUSCLES: 145 UNITS BOTOX = TOTAL DOSE     Right Upper Trapezius - 20 units of Botox at 4 site/s.   Left Upper Trapezius - 20 units of Botox at 4 site/s.      Right Splenius Capitis - 5 units of Botox at 1 site/s.   Left Splenius Capitis - 5 units of Botox at 1 site/s.      Right Auricularis - 15 units of Botox at 3 site/s (anterior, superior & posterior).   Left Auricularis - 15 units of Botox at 3 site/s (anterior, superior & posterior).     Right occipitalis - 15 units of Botox at 3 site/s.   Left occipitalis - 15 units of Botox at 3 site/s.     Right Sternocleidomastoid - 10 units of Botox at 1 site/s.    Right Middle Scalene - 15 units of Botox at 1 site/s.     Right Platysma - 5 units of Botox at 2 site/s.  Left Platysma - 5 units of Botox at 2 site/s.      2. UPPER EXTREMITY & TRUNK MUSCLES: 90 UNITS BOTOX = TOTAL DOSE     Right Levator scapula - 25 units of Botox at 2 site/s.   Left Levator scapula - 25 units of Botox at 2 site/s.      Right Rhomboid - 20  units of Botox at 1 site/s.    Left Rhomboid - 20 units of Botox at 3 site/s.       3. JAW MUSCLES: 140 UNITS BOTOX = TOTAL DOSE      Right Temporalis - 60 units of Botox at 5 site/s.               Left Temporalis - 60 units of Botox at 5 site/s.     Right Masseter - 10 units of Botox at 1 site/s.    Left Masseter - 10 units of Botox at 1 site/s.     4. FACIAL & SCALP MUSCLES: 75 UNITS BOTOX = TOTAL DOSE     Right Frontalis - 15 units of Botox at 3 site/s (2 middle & 1 hairline).    Left Frontalis - 15 units of Botox at 3 site/s (2 middle & 1 hairline).     Procerus - 5 units of Botox at 1 site/s.       Right  - 5 units of Botox at 1 site/s.    Left  - 5 units of Botox at 1 site/s.      Right Occipitalis - 15 units of Botox at 1 site/s.    Left Occipitalis - 15 units of Botox at 1 site/s.       RESPONSE TO PROCEDURE  Tia Pablo tolerated the procedure well and there were no immediate complications. She was allowed to recover for an appropriate period of time and was discharged home in stable condition.    ASSESSMENT AND PLAN   Botulinum toxin injections: No changes made to Botox dose or distribution today. Patient will continue to monitor response to Botox and report at next appointment.    Referrals: None.  Follow up: Tia Pablo was rescheduled for the next series of injections in 12 weeks, at which time we will evaluate response to today's injections. she may call the clinic prior with any questions or concerns prior to the next appointment.

## 2024-06-04 ENCOUNTER — HOSPITAL ENCOUNTER (OUTPATIENT)
Dept: CT IMAGING | Facility: CLINIC | Age: 59
Discharge: HOME | End: 2024-06-04
Payer: COMMERCIAL

## 2024-06-04 ENCOUNTER — OFFICE VISIT (OUTPATIENT)
Dept: PEDIATRICS | Facility: CLINIC | Age: 59
End: 2024-06-04
Payer: COMMERCIAL

## 2024-06-04 ENCOUNTER — NURSE TRIAGE (OUTPATIENT)
Dept: FAMILY MEDICINE | Facility: CLINIC | Age: 59
End: 2024-06-04
Payer: COMMERCIAL

## 2024-06-04 VITALS
WEIGHT: 140 LBS | SYSTOLIC BLOOD PRESSURE: 135 MMHG | OXYGEN SATURATION: 97 % | HEART RATE: 96 BPM | RESPIRATION RATE: 16 BRPM | BODY MASS INDEX: 22.5 KG/M2 | DIASTOLIC BLOOD PRESSURE: 93 MMHG | TEMPERATURE: 98.7 F | HEIGHT: 66 IN

## 2024-06-04 DIAGNOSIS — S09.90XA CLOSED HEAD INJURY, INITIAL ENCOUNTER: ICD-10-CM

## 2024-06-04 DIAGNOSIS — S09.90XA CLOSED HEAD INJURY, INITIAL ENCOUNTER: Primary | ICD-10-CM

## 2024-06-04 PROCEDURE — 70450 CT HEAD/BRAIN W/O DYE: CPT

## 2024-06-04 PROCEDURE — 99215 OFFICE O/P EST HI 40 MIN: CPT

## 2024-06-04 ASSESSMENT — PAIN SCALES - GENERAL: PAINLEVEL: NO PAIN (0)

## 2024-06-04 NOTE — TELEPHONE ENCOUNTER
Referral to Acute and Diagnostic Services    [phone] ([place]) [place] - [address] [place] [id number]    Transition to Acute & Diagnostic Services Clinic has been discussed with patient, and she agrees with next level of care.   Patient understands that evaluation/treatment at [facility] typically takes significantly longer than in clinic/urgent care (>2 hours).  The [facility] Acute and Diagnostics Services Clinic has been contacted by provider/staff to confirm patient acceptance.         Special issues: None    The following provider has assessed this patient for intervention at [facility], and directed the patient for referral: Breann Diana RN

## 2024-06-04 NOTE — Clinical Note
I had the opportunity to see Tia today in the [facility] acute diagnostic services clinic regarding a fall last night incurring head trauma.  We performed CT head without contrast which, other than contusion over the left occiput, was negative for acute injury.  The radiologist commented on presumed sequela of chronic microvascular disease findings on her CT, which she asked about today.  Her primary concern is that she is going to develop dementia like her mother did.  I advised her that these CT findings are nonspecific, and do not necessarily indicate that she is going to develop any neurologic disease over time, including dementia.  She expressed understanding, though I suspect that she is going to continue to worry about this for a while.  Thank you.

## 2024-06-04 NOTE — TELEPHONE ENCOUNTER
Nurse Triage SBAR    Is this a 2nd Level Triage? YES, LICENSED PRACTITIONER REVIEW IS REQUIRED    Situation: Patient feel and hit her head on the night stand last night and is worried about the swelling that is on her head.     Background: Patient was stung by a bee yesterday and she states she has very bad reactions to bee stings, so she took benadryl, but did not measure how much she was taking and thinks she took more then she should have because she felt very out of it after taking the benadryl. She did not lose consciousness. She fell and hit the left side of her head by the ear.     Assessment: Reports mild headache and moderate pain when touching the area. Denies dizziness, blurred vision, neck pain, or vomiting. Reports bump is 2 inches by 3 inches approximately and she is not sure if there is bruising. Denies any open skin areas. Denies being on blood thinners.     Protocol Recommended Disposition:   Call ADS/Go to ED/UCC Now (Or To Office with PCP Approval)    Recommendation: You will be scheduled to be seen in [place] [facility]. Put a cold pack or an ice bag (wrapped in a moist towel) on the area for 20 minutes. Repeat in 1 hour, then every 4 hours while awake. Continue this for the first 48 hours (2 days) after an injury. This will help decrease pain, swelling, and bruising. Please call back if: severe headache persists over 2 hours after ice pack and pain medications, extremity weakness or numbness occurs, slurred speech or blurred vision occurs, vomiting occurs, or you generally become worse.    ADS provider accepted patient.    Does the patient meet one of the following criteria for ADS visit consideration? 16+ years old, no PCP (internal or external) but seen at [facility]     TIP  Providers, please consider if this condition is appropriate for management at one of our Acute and Diagnostic Services sites.     If patient is a good candidate, please use dotphrase <dot>triageresponse and select Refer  to ADS to document.     Reason for Disposition   Large swelling or bruise (> 2 inches or 5 cm)    Additional Information   Negative: ACUTE NEUROLOGIC SYMPTOM and symptom present now   Negative: Knocked out (unconscious) > 1 minute   Negative: Seizure (convulsion) occurred  (Exception: Prior history of seizures and now alert and without Acute Neurologic Symptoms.)   Negative: Neck pain after dangerous injury (e.g., MVA, diving, trampoline, contact sports, fall > 10 feet or 3 meters)  (Exception: Neck pain began > 1 hour after injury.)   Negative: Major bleeding (actively dripping or spurting) that can't be stopped   Negative: Penetrating head injury (e.g., knife, gunshot wound, metal object)   Negative: Sounds like a life-threatening emergency to the triager   Negative: Diagnosed with a concussion within last 14 days   Negative: Can't remember what happened (amnesia)   Negative: Vomiting once or more   Negative: Watery or blood-tinged fluid dripping from the nose or ears   Negative: Knocked out (unconscious) < 1 minute and now fine   Negative: SEVERE headache   Negative: Dangerous injury (e.g., MVA, diving, trampoline, contact sports, fall > 10 feet or 3 meters) or severe blow from hard object (e.g., golf club or baseball bat)   Negative: ACUTE NEUROLOGIC SYMPTOM and now fine    Answer Assessment - Initial Assessment Questions  1. MECHANISM: Hit head on nightstand. Feeling unstable due to taking too much benadryl. Was standing and feel into nightstand.   2. ONSET: Last night.  3. NEUROLOGIC SYMPTOMS: Denies loss of consciousness. Denies dizziness or blurred vision.  4. MENTAL STATUS: Intact.   5. LOCATION: Right behind left ear on head.  6. SCALP APPEARANCE: Swollen/bump. No open skin. Unknown if bruising.   7. SIZE: 3 inches by 2 inches.  8. PAIN: Moderate pain when touching the area.   9. TETANUS: NA  10. BLOOD THINNERS: Denies.  11. OTHER SYMPTOMS: Reports. Denies neck pain or vomiting.    Protocols used: Head  Injury-A-OH    Breann VELIZ RN  [facility]  [phone]

## 2024-06-04 NOTE — PROGRESS NOTES
"Acute and Diagnostic Services Clinic Visit    Assessment & Plan     (S09.90XA) Closed head injury, initial encounter  (primary encounter diagnosis)  Comment: Fell last night just before midnight while walking out of the bathroom back toward her bed.  She felt \"unsteady\", perhaps as a function of taking a large amount of diphenhydramine following a bee sting, see HPI for details.  She struck her left occiput on a nightstand, then fell to the floor.  She does not know if she lost consciousness, though thinks that she might.  She was able to get back into bed under her own power.  Today, her unsteadiness is either gone or almost gone.  She does have a headache, though says that headaches are common for her.  She has no emesis or nausea.  Her primary concern today is that she might have caused \"brain damage\" during her fall last night.  Plan:   Discussed CT Head w/o Contrast to evaluate intracranial trauma.  She readily agrees.    DISCUSSION/MEDICAL DECISION MAKING:  The CT head shows evidence of scalp contusion on the left, but no underlying fracture, and no acute intracranial abnormality.  The radiologist makes an additional comment of the finding of presumed sequelae of mild chronic small vessel ischemic disease.  I discussed these results with Tia.  She is very pleased to hear that there is no evidence of intracranial trauma.  We discussed that she probably does meet criteria for concussion based on last night's fall.  There is no specific regimen she needs to adhere to for recovery, other than to avoid additional head trauma particularly within the next couple of weeks.  Tia had questions as to the clinical significance of the small vessel ischemic disease findings on her CT.  I explained that this is a finding that can be seen as people age, and perhaps more among those with hypertension.  However, I further explained that there is no direct correlation between this finding and any neurologic disease, " including dementia, which is her greatest concern.  Tia had no additional questions to ask at the end of this conversation, and is discharged from the ADS this afternoon.    40 minutes were spent doing chart review, history and exam, documentation and further activities per the note.    Subjective   Tia is a 58 year old, presenting for the following health issues:  Head Injury    HPI     Dizziness  Onset/Duration: hit left side of head last night after taking benadryl, fell into the nightstand  Description:   Do you feel faint: No  Does it feel like the surroundings (bed, room) are moving: No  Unsteady/off balance: YES  Have you passed out or fallen: YES  Intensity: moderate  Progression of Symptoms: improving  Accompanying Signs & Symptoms:  Heart palpitations or chest pain: No  Nausea, vomiting: No  Weakness or lack of coordination in arms or legs: No  Vision or speech changes: No  Numbness or tingling: No  Ringing in ears (Tinnitus): No  Hearing Loss: No  History:   Head trauma/concussion history: No  Previous similar symptoms: No  Recent bleeding history: No  Any new medications (BP?): No, allergic to  bee's and was stung last night before taking benadryl  Precipitating factors:   Worse with activity: YES  Worse with head movement: YES  Alleviating factors:   Does staying in a fixed position give relief: YES  Therapies tried and outcome: None    Evaluation of Neurologic Symptoms  Onset/Duration: hit head last night on nightstand  Description:  Weakness/location: none  Numbness/tingling: no  Headaches: YES- not unusual   History of migraines: YES  Other pain: no   Dizziness: YES  Visual changes: no   Speech changes: no   Memory changes: no   Personality changes: no            Loss or change of consciousness: YES- briefly  Progression of symptoms improving  Accompanying signs and symptoms:  Fever: no   Stiff neck: YES  Neck or upper back pain:  YES  ENT or URI symptoms: no            Nausea or vomiting: YES-  "nausea  History    Head or other trauma: YES- hit left side of head last night            Prior evaluation: no   Precipitating or Alleviating factors:           Things that improve symptoms:  no           Things that worsen symptoms: moving around  Therapies tried and outcome: none    Tia Pablo is a 58-year-old woman seen today in the [facility] Acute Diagnostic Services (ADS) clinic following a fall with head trauma last evening.    Last evening, Tia was stung by a bee on her right calf.  She has had severe allergic reactions to bee stings before, so she immediately became \"anxious\".  She then took \"a lot of Benadryl\".  When I asked what she took, she says that she drank about a half bottle of children's Benadryl liquid, and took 4 or 5 children's Benadryl chewable tablets.  She did not need to resort to the use of her EpiPen.    Later that night, she was walking from the bathroom back to bed, and felt \"unsteady\", perhaps as a result of the Benadryl she took.  She then fell, striking her left occiput on a nightstand.  She is unsure if she lost consciousness.  However, she was able to get herself back into bed.  Upon return to bed, she was \"panicky\", both because of the bee sting and her fall, so took clonazepam around midnight.  She then \"worried the whole night\", and was unable to fall asleep.    This morning, Tia called the triage RN to report her fall.  A visit today was advised.  As result, she is now seen in the ADS.    Tia's primary concern is that she has caused \"brain damage\" with her fall.  She has a son who had athletic concussions, and is very concerned about the long-term consequences of head injury.    Her unsteadiness this afternoon has either resolved or nearly so.  She does have a headache, though that is not at all unusual for her.  Her left occiput hurts only when she presses on it.  She is aware that she has a \"bump\" in that region from her fall.  She has no visual disturbance.  " "Denies facial numbness or weakness.  She has no emesis and no nausea.      Review of Systems  As per the history of present illness.  No additional positives or negatives are obtained.      Objective    BP (!) 135/93 (BP Location: Left arm, Patient Position: Sitting, Cuff Size: Adult Regular)   Pulse 96   Temp 98.7  F (37.1  C) (Temporal)   Resp 16   Ht 1.676 m (5' 6\")   Wt 63.5 kg (140 lb)   SpO2 97%   BMI 22.60 kg/m    Body mass index is 22.6 kg/m .  Physical Exam   GENERAL: Very pleasant woman, who appears well.  EYES: Eyes grossly normal to inspection, extraocular movements intact  HENT: Nares patent bilaterally.  Nasal mucosa normal, no discharge.  Posterior pharynx clear, no exudate.  NECK: No reproducible pain to palpation of the cervical spinous processes.   RESP: No accessory muscle use.  Lungs clear throughout on inspiration and expiration.  Expiration not prolonged, no wheeze.  CV: Regular rate and rhythm, non-tachycardic.  Normal S1 S2, no murmur or extra sound.  No lower extremity edema.  ABDOMEN: Soft, non-tender, no guarding.  Bowel sounds positive.  NEURO: Alert, oriented, conversant.  Cranial nerves III - XII intact.  No gross motor or sensory deficits.    PSYCH: Calm, alert, conversant.  Able to articulate logical thoughts, no tangential thoughts, no hallucinations or delusions.  Affect normal.    EXAM: CT HEAD W/O CONTRAST  LOCATION: [facility]  DATE: 6/4/2024     INDICATION: Recent traumatic head injury. Fell last evening, striking the right occiput on a nightstand; equivocal LOC.  COMPARISON: Brain MRI 12/14/2016  TECHNIQUE: Routine CT Head without IV contrast. Multiplanar reformats. Dose reduction techniques were used.     FINDINGS:  INTRACRANIAL CONTENTS: No intracranial hemorrhage, extraaxial collection, or mass effect.  No CT evidence of acute infarct. Mild presumed chronic small vessel ischemic changes. Normal ventricles and sulci.      VISUALIZED " ORBITS/SINUSES/MASTOIDS: No intraorbital abnormality. No paranasal sinus mucosal disease. No middle ear or mastoid effusion.     BONES/SOFT TISSUES: Mild soft tissue swelling/contusion throughout the posterior right occipital scalp without underlying acute calvarial fracture.                                                                         IMPRESSION:  1.  No acute intracranial abnormality.  2.  Mild posterior right occipital scalp contusion without underlying acute calvarial fracture.  3.  Presumed sequelae of mild chronic small vessel ischemic disease.          In 1625  Out 1657  In 1732  Out 1740    Signed Electronically by: Keenan Reich MD

## 2024-06-04 NOTE — PATIENT INSTRUCTIONS
Fortunately, you did not do any visible damage to your skull or your brain and your fall of last night.  Based upon the possible loss of consciousness after last night's fall, however, you probably do meet criteria to diagnose this as a concussion.  I know that you know a great deal about concussion given your involvement in your son's sports.  There is nothing you need to do to speed recovery from concussion, other than to avoid additional head trauma, particular within the next 2 weeks.    It was a pleasure meeting you today.

## 2024-06-05 ENCOUNTER — TELEPHONE (OUTPATIENT)
Dept: FAMILY MEDICINE | Facility: CLINIC | Age: 59
End: 2024-06-05
Payer: COMMERCIAL

## 2024-06-05 ASSESSMENT — ANXIETY QUESTIONNAIRES
IF YOU CHECKED OFF ANY PROBLEMS ON THIS QUESTIONNAIRE, HOW DIFFICULT HAVE THESE PROBLEMS MADE IT FOR YOU TO DO YOUR WORK, TAKE CARE OF THINGS AT HOME, OR GET ALONG WITH OTHER PEOPLE: SOMEWHAT DIFFICULT
7. FEELING AFRAID AS IF SOMETHING AWFUL MIGHT HAPPEN: SEVERAL DAYS
GAD7 TOTAL SCORE: 5
2. NOT BEING ABLE TO STOP OR CONTROL WORRYING: SEVERAL DAYS
7. FEELING AFRAID AS IF SOMETHING AWFUL MIGHT HAPPEN: SEVERAL DAYS
1. FEELING NERVOUS, ANXIOUS, OR ON EDGE: SEVERAL DAYS
6. BECOMING EASILY ANNOYED OR IRRITABLE: SEVERAL DAYS
5. BEING SO RESTLESS THAT IT IS HARD TO SIT STILL: NOT AT ALL
8. IF YOU CHECKED OFF ANY PROBLEMS, HOW DIFFICULT HAVE THESE MADE IT FOR YOU TO DO YOUR WORK, TAKE CARE OF THINGS AT HOME, OR GET ALONG WITH OTHER PEOPLE?: SOMEWHAT DIFFICULT
GAD7 TOTAL SCORE: 5
3. WORRYING TOO MUCH ABOUT DIFFERENT THINGS: SEVERAL DAYS
4. TROUBLE RELAXING: NOT AT ALL

## 2024-06-05 NOTE — TELEPHONE ENCOUNTER
Pt calls and is tearful about her anxiety to bees and also anxiety surrounding her mothers alzheimer's. Pt is concerned she will get this as well as she is having memory issues.     Pt was stung by bee and took too much benedryl and fell and hit head on night stand. Was seen in ADS yesterday.     Pt has appt scheduled tomorrow to discuss anxiety with Dr Stone    Pt stopped taking Lexapro but thinks perhaps she should go back on it.   Pt denies SI and states takes Wellbutrin for depression.     Pt feels she can wait until appt tomorrow as she has Klonopin she can take.       Dave Kevin RN

## 2024-06-05 NOTE — TELEPHONE ENCOUNTER
Reason for Call:  Appointment Request    Patient requesting this type of appt:  mental health    Requested provider: Sally Stone    Reason patient unable to be scheduled:  scheduled to 6/27 but need sooner    When does patient want to be seen/preferred time:  ASAP    Comments: anxiety is bothering her    [facility] or would you prefer to receive a phone call?:   No preference   Okay to leave a detailed message?: Yes at Cell number on file:    Telephone Information:   Mobile [phone]       Call taken on 6/5/2024 at 2:11 PM by Collins Gutierrez

## 2024-06-06 ENCOUNTER — VIRTUAL VISIT (OUTPATIENT)
Dept: FAMILY MEDICINE | Facility: CLINIC | Age: 59
End: 2024-06-06
Payer: COMMERCIAL

## 2024-06-06 DIAGNOSIS — F32.0 CURRENT MILD EPISODE OF MAJOR DEPRESSIVE DISORDER WITHOUT PRIOR EPISODE (H): Primary | ICD-10-CM

## 2024-06-06 DIAGNOSIS — U09.9 POST-COVID CHRONIC CONCENTRATION DEFICIT: ICD-10-CM

## 2024-06-06 DIAGNOSIS — Z91.038 OTHER INSECT ALLERGY STATUS: ICD-10-CM

## 2024-06-06 DIAGNOSIS — R41.840 POST-COVID CHRONIC CONCENTRATION DEFICIT: ICD-10-CM

## 2024-06-06 DIAGNOSIS — I10 ESSENTIAL HYPERTENSION: ICD-10-CM

## 2024-06-06 DIAGNOSIS — M54.2 NECK PAIN: ICD-10-CM

## 2024-06-06 PROCEDURE — 99443 PR PHYSICIAN TELEPHONE EVALUATION 21-30 MIN: CPT | Performed by: FAMILY MEDICINE

## 2024-06-06 RX ORDER — METHOCARBAMOL 500 MG/1
250-500 TABLET, FILM COATED ORAL 4 TIMES DAILY PRN
Qty: 40 TABLET | Refills: 0 | Status: SHIPPED | OUTPATIENT
Start: 2024-06-06 | End: 2024-09-26

## 2024-06-06 RX ORDER — LOSARTAN POTASSIUM 50 MG/1
50 TABLET ORAL DAILY
Qty: 90 TABLET | Refills: 1 | Status: SHIPPED | OUTPATIENT
Start: 2024-06-06

## 2024-06-06 RX ORDER — ESCITALOPRAM OXALATE 10 MG/1
10 TABLET ORAL DAILY
Qty: 90 TABLET | Refills: 0 | Status: SHIPPED | OUTPATIENT
Start: 2024-06-06 | End: 2024-06-19

## 2024-06-06 RX ORDER — BUPROPION HYDROCHLORIDE 300 MG/1
300 TABLET ORAL EVERY MORNING
Qty: 90 TABLET | Refills: 1 | Status: SHIPPED | OUTPATIENT
Start: 2024-06-06

## 2024-06-06 RX ORDER — CHLORTHALIDONE 25 MG/1
25 TABLET ORAL DAILY
Qty: 90 TABLET | Refills: 1 | Status: SHIPPED | OUTPATIENT
Start: 2024-06-06

## 2024-06-06 NOTE — PATIENT INSTRUCTIONS
For your benadryl dosing:  you can take 50mg every 6 to 8 hours orally.  You should use your Epi pen immediately for any swelling/itching/tingling in the throat or mouth, any difficulty breathing, nausea or vomiting of body wide redness/rash.  If you are having those symptoms do not start with benadryl, go right to the epipen.    Make sure you go immediately the the ER after using your epipen.  Please see the attached information on anaphylaxis as well.      For your mood:  I would recommend restarting the lexapro and then staying on the medication.  I think you need this medicine long term.  Begin with 5mg (1/2 tablet) daily for 6 days then increase to 10mg (1 tablet) daily

## 2024-06-06 NOTE — PROGRESS NOTES
Tia is a 58 year old who is being evaluated via a billable telephone visit.    What phone number would you like to be contacted at? [phone]  How would you like to obtain your AVS? Serafin  Originating Location (pt. Location): Home    Distant Location (provider location):  On-site  5:08 PM    A/P:      ICD-10-CM    1. Current mild episode of major depressive disorder without prior episode (H24)  F32.0 buPROPion (WELLBUTRIN XL) 300 MG 24 hr tablet     escitalopram (LEXAPRO) 10 MG tablet      2. Other insect allergy status  Z91.038       3. Post-COVID chronic concentration deficit  R41.840     U09.9       4. Essential hypertension  I10 chlorthalidone (HYGROTON) 25 MG tablet     losartan (COZAAR) 50 MG tablet      5. Neck pain  M54.2 methocarbamol (ROBAXIN) 500 MG tablet        Patient Instructions   For your benadryl dosing:  you can take 50mg every 6 to 8 hours orally.  You should use your Epi pen immediately for any swelling/itching/tingling in the throat or mouth, any difficulty breathing, nausea or vomiting of body wide redness/rash.  If you are having those symptoms do not start with benadryl, go right to the epipen.    Make sure you go immediately the the ER after using your epipen.  Please see the attached information on anaphylaxis as well.      For your mood:  I would recommend restarting the lexapro and then staying on the medication.  I think you need this medicine long term.  Begin with 5mg (1/2 tablet) daily for 6 days then increase to 10mg (1 tablet) daily    Reassurance given regarding memory.  She will continue working with OT and will each out for changes/concerns.      Subjective   Tia is a 58 year old, presenting for the following health issues:  Depression    History of Present Illness       Mental Health Follow-up:  Patient presents to follow-up on Depression & Anxiety.Patient's depression since last visit has been:  Medium  The patient is not having other symptoms associated with  "depression.  Patient's anxiety since last visit has been:  Worse  The patient is not having other symptoms associated with anxiety.  Any significant life events: grief or loss  Patient is feeling anxious or having panic attacks.  Patient has no concerns about alcohol or drug use.    Hypertension: She presents for follow up of hypertension.  She does not check blood pressure  regularly outside of the clinic. Outpatient blood pressures have not been over 140/90. She follows a low salt diet.     Reason for visit:  1 Anxiety (mothers health worry i ll inherit Alzheimer s dimensia. 2. Guidelines for use of Benadryl after bee sting-use Benadryl before using epipen. I took too much Benadryl 6/3 night. Fell in dark bedroom hit head on bedside table. CT SCAN 6/4 [place]    She eats 2-3 servings of fruits and vegetables daily.She consumes 0 sweetened beverage(s) daily.She exercises with enough effort to increase her heart rate 10 to 19 minutes per day.  She exercises with enough effort to increase her heart rate 4 days per week.   She is taking medications regularly.     Has been having more depression and anxiety.  Feels like she goes in \"spurts\" where she goes on the lexapro and then feels danita so she stops it.  Has been off for some time now, not sure how long she has been off the med.  Feels like mood has been worse for about 2 weeks or perhaps a bit longer    Mom just diagnosed with dementia.  Has been going to her appts and is worrying that she might start having some of those problems    Feels like she is having poor memory related to her previous Covid infection.  Feels upset about her current memory problems and difficulty completing the work sheet assigned by her occupational therapist.  Also had a CT when she was seen earlier in the week the showed \"chronic small vessel disease\"  worried about that causing her to have dementia in the future.     In general feeling a lot more sad and depressed and wonders if she " "should restart the medication.       Wonders when she should use her epipen.  Knows she needs to go to the hospital if she uses the epipen so starts with benadryl.  Got stung last week and took \"a lot\" of benadryl.  When she was getting ready for bed she lost her balance and fell and hit her head on the night table.      Not sure how much benadryl to take or when to use her epipen.        Objective           Vitals:  No vitals were obtained today due to virtual visit.    Physical Exam   General: Alert and no distress //Respiratory: No audible wheeze, cough, or shortness of breath // Psychiatric:  Appropriate affect, tone, and pace of words      Epic reviewed      5:36 PM    Phone call duration: 28 minutes  Signed Electronically by: Sally Stone DO    "

## 2024-06-17 ENCOUNTER — TELEPHONE (OUTPATIENT)
Dept: FAMILY MEDICINE | Facility: CLINIC | Age: 59
End: 2024-06-17
Payer: COMMERCIAL

## 2024-06-17 NOTE — TELEPHONE ENCOUNTER
I would recommend that she take 1/2 tablet and see if her symptoms improve. She may not need a higher dose. If she is still not improving over the next few days she should do a virtual visit to discuss alternatives. Ana Guzman M.D.

## 2024-06-18 NOTE — TELEPHONE ENCOUNTER
Call placed to patient.  No answer.  Identified self on voice mail.  Left detailed message from Dr Guzman below.  Left call back number for questions or concerns.  José Miguel Charles RN

## 2024-06-19 ENCOUNTER — OFFICE VISIT (OUTPATIENT)
Dept: FAMILY MEDICINE | Facility: CLINIC | Age: 59
End: 2024-06-19
Payer: COMMERCIAL

## 2024-06-19 VITALS
WEIGHT: 142 LBS | SYSTOLIC BLOOD PRESSURE: 138 MMHG | DIASTOLIC BLOOD PRESSURE: 84 MMHG | TEMPERATURE: 99.8 F | OXYGEN SATURATION: 97 % | BODY MASS INDEX: 22.92 KG/M2 | HEART RATE: 81 BPM

## 2024-06-19 DIAGNOSIS — F41.9 ANXIETY: ICD-10-CM

## 2024-06-19 DIAGNOSIS — F32.0 CURRENT MILD EPISODE OF MAJOR DEPRESSIVE DISORDER WITHOUT PRIOR EPISODE (H): Primary | ICD-10-CM

## 2024-06-19 DIAGNOSIS — F41.8 SITUATIONAL ANXIETY: ICD-10-CM

## 2024-06-19 PROCEDURE — 99214 OFFICE O/P EST MOD 30 MIN: CPT | Performed by: FAMILY MEDICINE

## 2024-06-19 RX ORDER — DULOXETIN HYDROCHLORIDE 20 MG/1
20 CAPSULE, DELAYED RELEASE ORAL DAILY
Qty: 30 CAPSULE | Refills: 2 | Status: SHIPPED | OUTPATIENT
Start: 2024-06-19 | End: 2024-07-11
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
## 2024-06-29 ENCOUNTER — HEALTH MAINTENANCE LETTER (OUTPATIENT)
Age: 59
End: 2024-06-29

## 2024-07-10 ASSESSMENT — ANXIETY QUESTIONNAIRES
2. NOT BEING ABLE TO STOP OR CONTROL WORRYING: NOT AT ALL
6. BECOMING EASILY ANNOYED OR IRRITABLE: NOT AT ALL
IF YOU CHECKED OFF ANY PROBLEMS ON THIS QUESTIONNAIRE, HOW DIFFICULT HAVE THESE PROBLEMS MADE IT FOR YOU TO DO YOUR WORK, TAKE CARE OF THINGS AT HOME, OR GET ALONG WITH OTHER PEOPLE: NOT DIFFICULT AT ALL
GAD7 TOTAL SCORE: 1
8. IF YOU CHECKED OFF ANY PROBLEMS, HOW DIFFICULT HAVE THESE MADE IT FOR YOU TO DO YOUR WORK, TAKE CARE OF THINGS AT HOME, OR GET ALONG WITH OTHER PEOPLE?: NOT DIFFICULT AT ALL
5. BEING SO RESTLESS THAT IT IS HARD TO SIT STILL: NOT AT ALL
7. FEELING AFRAID AS IF SOMETHING AWFUL MIGHT HAPPEN: NOT AT ALL
1. FEELING NERVOUS, ANXIOUS, OR ON EDGE: SEVERAL DAYS
7. FEELING AFRAID AS IF SOMETHING AWFUL MIGHT HAPPEN: NOT AT ALL
3. WORRYING TOO MUCH ABOUT DIFFERENT THINGS: NOT AT ALL
4. TROUBLE RELAXING: NOT AT ALL
GAD7 TOTAL SCORE: 1

## 2024-07-11 ENCOUNTER — OFFICE VISIT (OUTPATIENT)
Dept: FAMILY MEDICINE | Facility: CLINIC | Age: 59
End: 2024-07-11
Payer: COMMERCIAL

## 2024-07-11 VITALS
WEIGHT: 141 LBS | HEIGHT: 66 IN | TEMPERATURE: 100 F | OXYGEN SATURATION: 95 % | SYSTOLIC BLOOD PRESSURE: 134 MMHG | BODY MASS INDEX: 22.66 KG/M2 | HEART RATE: 78 BPM | DIASTOLIC BLOOD PRESSURE: 76 MMHG

## 2024-07-11 DIAGNOSIS — R68.2 DRY MOUTH: Primary | ICD-10-CM

## 2024-07-11 DIAGNOSIS — F32.0 CURRENT MILD EPISODE OF MAJOR DEPRESSIVE DISORDER WITHOUT PRIOR EPISODE (H): ICD-10-CM

## 2024-07-11 DIAGNOSIS — F41.9 ANXIETY: ICD-10-CM

## 2024-07-11 DIAGNOSIS — H04.123 DRY EYES: ICD-10-CM

## 2024-07-11 DIAGNOSIS — F41.8 SITUATIONAL ANXIETY: ICD-10-CM

## 2024-07-11 LAB
CRP SERPL-MCNC: <3 MG/L
ERYTHROCYTE [SEDIMENTATION RATE] IN BLOOD BY WESTERGREN METHOD: 4 MM/HR (ref 0–30)

## 2024-07-11 PROCEDURE — 96127 BRIEF EMOTIONAL/BEHAV ASSMT: CPT | Performed by: FAMILY MEDICINE

## 2024-07-11 PROCEDURE — 85652 RBC SED RATE AUTOMATED: CPT | Performed by: FAMILY MEDICINE

## 2024-07-11 PROCEDURE — 86431 RHEUMATOID FACTOR QUANT: CPT | Performed by: FAMILY MEDICINE

## 2024-07-11 PROCEDURE — 86235 NUCLEAR ANTIGEN ANTIBODY: CPT | Performed by: FAMILY MEDICINE

## 2024-07-11 PROCEDURE — 99214 OFFICE O/P EST MOD 30 MIN: CPT | Performed by: FAMILY MEDICINE

## 2024-07-11 PROCEDURE — 36415 COLL VENOUS BLD VENIPUNCTURE: CPT | Performed by: FAMILY MEDICINE

## 2024-07-11 PROCEDURE — 86140 C-REACTIVE PROTEIN: CPT | Performed by: FAMILY MEDICINE

## 2024-07-11 PROCEDURE — G2211 COMPLEX E/M VISIT ADD ON: HCPCS | Performed by: FAMILY MEDICINE

## 2024-07-11 RX ORDER — DULOXETIN HYDROCHLORIDE 20 MG/1
20 CAPSULE, DELAYED RELEASE ORAL DAILY
Qty: 90 CAPSULE | Refills: 2 | Status: SHIPPED | OUTPATIENT
Start: 2024-07-11

## 2024-07-11 NOTE — PROGRESS NOTES
Assessment & Plan     Current mild episode of major depressive disorder without prior episode (H24)  This is helping will cont   - DULoxetine (CYMBALTA) 20 MG capsule; Take 1 capsule (20 mg) by mouth daily    Situational anxiety  heloing  - DULoxetine (CYMBALTA) 20 MG capsule; Take 1 capsule (20 mg) by mouth daily    Anxiety    - DULoxetine (CYMBALTA) 20 MG capsule; Take 1 capsule (20 mg) by mouth daily    Dry mouth  Will test for sjogrens   - SSA Ro ROSEANNE Antibody IgG; Future  - SSB La ROSEANNE Antibody IgG; Future  - ESR: Erythrocyte sedimentation rate; Future  - CRP, inflammation; Future  - Rheumatoid factor; Future  - SSA Ro ROSEANNE Antibody IgG  - SSB La ROSEANNE Antibody IgG  - ESR: Erythrocyte sedimentation rate  - CRP, inflammation  - Rheumatoid factor    Dry eyes  As above   - SSA Ro ROSEANNE Antibody IgG; Future  - SSB La ROSEANNE Antibody IgG; Future  - ESR: Erythrocyte sedimentation rate; Future  - CRP, inflammation; Future  - Rheumatoid factor; Future  - SSA Ro ROSEANNE Antibody IgG  - SSB La ROSEANNE Antibody IgG  - ESR: Erythrocyte sedimentation rate  - CRP, inflammation  - Rheumatoid factor    Results for orders placed or performed in visit on 07/11/24   ESR: Erythrocyte sedimentation rate     Status: Normal   Result Value Ref Range    Erythrocyte Sedimentation Rate 4 0 - 30 mm/hr   CRP, inflammation     Status: Normal   Result Value Ref Range    CRP Inflammation <3.00 <5.00 mg/L   Rheumatoid factor     Status: Normal   Result Value Ref Range    Rheumatoid Factor <10 <14 IU/mL             Await further testing     Rubia Weber is a 58 year old, presenting for the following health issues:  Recheck Medication        7/11/2024     2:46 PM   Additional Questions   Roomed by Neva ÁLVAREZ CMA     History of Present Illness       Mental Health Follow-up:  Patient presents to follow-up on Depression & Anxiety.Patient's depression since last visit has been:  Better  The patient is not having other symptoms associated with  depression.  Patient's anxiety since last visit has been:  Better  The patient is not having other symptoms associated with anxiety.  Any significant life events: grief or loss and health concerns  Patient is not feeling anxious or having panic attacks.  Patient has no concerns about alcohol or drug use.    Hypertension: She presents for follow up of hypertension.  She does not check blood pressure  regularly outside of the clinic. Outside blood pressures have been over 140/90. She follows a low salt diet.     She eats 4 or more servings of fruits and vegetables daily.She consumes 0 sweetened beverage(s) daily.She exercises with enough effort to increase her heart rate 20 to 29 minutes per day.  She exercises with enough effort to increase her heart rate 4 days per week.   She is taking medications regularly.     BP Readings from Last 6 Encounters:   07/11/24 134/76   06/19/24 138/84   06/04/24 (!) 135/93   03/12/24 139/79   02/14/24 128/70   01/26/24 130/70       Depression and Anxiety     Social History     Tobacco Use    Smoking status: Never    Smokeless tobacco: Never   Vaping Use    Vaping status: Never Used   Substance Use Topics    Alcohol use: Yes     Comment: Occasionally, 1-2 glasses of wine on weekends    Drug use: No         5/14/2023     8:49 PM 10/18/2023    11:43 AM 2/2/2024    10:30 AM   PHQ   PHQ-9 Total Score 8 1 4   Q9: Thoughts of better off dead/self-harm past 2 weeks Not at all Not at all Not at all         1/25/2024     3:41 PM 6/5/2024     6:09 PM 7/10/2024     4:52 PM   PATRICK-7 SCORE   Total Score 3 (minimal anxiety) 5 (mild anxiety) 1 (minimal anxiety)   Total Score 3 5 1         Suicide Assessment Five-step Evaluation and Treatment (SAFE-T)                  Review of Systems  Constitutional, HEENT, cardiovascular, pulmonary, gi and gu systems are negative, except as otherwise noted.      Objective    /76 (BP Location: Right arm, Patient Position: Sitting, Cuff Size: Adult Regular)    "Pulse 78   Temp 100  F (37.8  C) (Tympanic)   Ht 1.676 m (5' 6\")   Wt 64 kg (141 lb)   SpO2 95%   BMI 22.76 kg/m    Body mass index is 22.76 kg/m .  Physical Exam   GENERAL: alert and no distress  HENT: ear canals and TM's normal, nose and mouth without ulcers or lesions  NECK: no adenopathy, no asymmetry, masses, or scars  MS: no gross musculoskeletal defects noted, no edema            Signed Electronically by: Ana Guzman MD    "

## 2024-07-12 LAB — RHEUMATOID FACT SERPL-ACNC: <10 IU/ML

## 2024-07-14 NOTE — RESULT ENCOUNTER NOTE
Tia,  Your lab results were normal/stable. Please feel free to my chart or call the office with questions. Ana Guzman M.D.

## 2024-07-15 ENCOUNTER — THERAPY VISIT (OUTPATIENT)
Dept: OCCUPATIONAL THERAPY | Facility: CLINIC | Age: 59
End: 2024-07-15
Attending: PHYSICIAN ASSISTANT
Payer: COMMERCIAL

## 2024-07-15 DIAGNOSIS — U09.9 POST-COVID CHRONIC CONCENTRATION DEFICIT: Primary | ICD-10-CM

## 2024-07-15 DIAGNOSIS — R41.840 POST-COVID CHRONIC CONCENTRATION DEFICIT: Primary | ICD-10-CM

## 2024-07-15 LAB
ENA SS-A AB SER IA-ACNC: <0.5 U/ML
ENA SS-A AB SER IA-ACNC: NEGATIVE
ENA SS-B IGG SER IA-ACNC: <0.6 U/ML
ENA SS-B IGG SER IA-ACNC: NEGATIVE

## 2024-07-15 PROCEDURE — 97535 SELF CARE MNGMENT TRAINING: CPT | Mod: GO | Performed by: OCCUPATIONAL THERAPIST
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
## 2024-07-25 ENCOUNTER — OFFICE VISIT (OUTPATIENT)
Dept: NEUROLOGY | Facility: CLINIC | Age: 59
End: 2024-07-25
Payer: COMMERCIAL

## 2024-07-25 VITALS
RESPIRATION RATE: 16 BRPM | HEART RATE: 84 BPM | SYSTOLIC BLOOD PRESSURE: 112 MMHG | DIASTOLIC BLOOD PRESSURE: 74 MMHG | OXYGEN SATURATION: 99 %

## 2024-07-25 DIAGNOSIS — F41.9 ANXIETY: ICD-10-CM

## 2024-07-25 DIAGNOSIS — G24.3 CERVICAL DYSTONIA: Primary | ICD-10-CM

## 2024-07-25 DIAGNOSIS — R41.89 SUBJECTIVE MEMORY COMPLAINTS: ICD-10-CM

## 2024-07-25 PROCEDURE — 99214 OFFICE O/P EST MOD 30 MIN: CPT | Mod: GC | Performed by: PSYCHIATRY & NEUROLOGY

## 2024-07-25 ASSESSMENT — PAIN SCALES - GENERAL: PAINLEVEL: MODERATE PAIN (5)
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
## 2024-07-25 NOTE — PATIENT INSTRUCTIONS
You can use clonazepam as needed for severe anxiety while duloxetine or another alternative takes effect. You should discuss with your PCP about any dose adjustments to this.     Therapy may be more helpful than medication during this stressful period with your mom's symptoms. You should specifically address these painful emotions and ask for coping strategies to help get you through this period of time.

## 2024-07-29 ENCOUNTER — THERAPY VISIT (OUTPATIENT)
Dept: OCCUPATIONAL THERAPY | Facility: CLINIC | Age: 59
End: 2024-07-29
Attending: PHYSICIAN ASSISTANT
Payer: COMMERCIAL

## 2024-07-29 DIAGNOSIS — U09.9 POST-COVID CHRONIC CONCENTRATION DEFICIT: Primary | ICD-10-CM

## 2024-07-29 DIAGNOSIS — R41.840 POST-COVID CHRONIC CONCENTRATION DEFICIT: Primary | ICD-10-CM

## 2024-07-29 PROCEDURE — 97535 SELF CARE MNGMENT TRAINING: CPT | Mod: GO | Performed by: OCCUPATIONAL THERAPIST

## 2024-08-06 NOTE — PROGRESS NOTES
PLAN  Continue therapy per current plan of care.  Continue plan of cognitive activity aiding in memory strategies through day to day living, Energy conservation and work simplification activity, emotional and sensory regulation strategies.  Recommending 1x week x 12 weeks  (August 13, 2024)      Beginning/End Dates of Progress Note Reporting Period:   2/26/2024 to 05/21/2024    Referring Provider:  Kasie Mackenzie    05/21/24 0500   Appointment Info   Treating Provider Jose Wan OTR/L   Visits Used 5   Medical Diagnosis Post-COVID chronic concentration deficit   OT Tx Diagnosis memory impairment   Progress Note/Certification   Onset of Illness/Injury or Date of Surgery 02/26/24   Therapy Frequency 1x/week   Predicted Duration up to 90 days   Progress Note Due Date 05/25/24   OT Goal 1   Goal Identifier Memory   Goal Description Pt will utilize 1-2 compensatory strategies for memory to help with daily activities.   Rationale In order to maximize independence with tasks requiring functional cognition/executive function for school, work, medication or financial mgmt   Goal Progress Reports she uses compensatory strategies and can list them, but is discouraged that her memory is not better   Target Date 05/25/24   OT Goal 2   Goal Identifier Fatigue   Goal Description Pt will report increasing her activity level without needing to take a nap in order to clean her house.   Rationale In order to safely and appropriately apply compensatory strategies with ADL/IADL performance   Goal Progress progressing   Target Date 05/25/24   OT Goal 3   Goal Identifier Safe and Sound   Goal Description Initiate Safe and Sound Protocol   Goal Progress Completed   Target Date 05/25/24   Subjective Report   Subjective Report Pt voicing she is feeling like her memory is working a little better, she is out gardening more.  She continues to struggle with implementation of her exericse program.   Treatment Interventions (OT)    Interventions Therapeutic Activity   Self Care/Home Management   Self-Care/Home Mgmt/ADL, Compensatory, Meal Prep Minutes (74458) 45 Minutes   Self Care 1 - Details Work through current status at home with plan created for 4x week walking on treadmill as her goal is to improve her overall physical well being.  Education of the importance of nervous system regulating strategies daily.  Discussed the possiblity of utilizing a psychologist to aide in improved mental health well being.  Education of cognitive activity to complete at home   Skilled Intervention Skilled OT is needed for making appropriate suggestions and compensatory strategies for cognition, memory, regulation of the nervous system, fatigue management.   Therapeutic Activity   Therapeutic Activity Minutes (72239) 10   Ther Act 1 Utilization of Deduction puzzle # 1 with verbal and written directions provided.  Mod A to complete   Ther Act 1 - Details skilled therapeutic activity facilitating functional problem solving   Plan   Home program set reminder to walk on treadmill on the day she doesn't do exercise videos, start at 10-15 min.   Plan for next session Ongoing cognitive strategies for memory and problem solving and attention to tasks.   Total Session Time   Timed Code Treatment Minutes 55   Total Treatment Time (sum of timed and untimed services) 55

## 2024-08-22 NOTE — PROGRESS NOTES
[facility]     PM&R CLINIC NOTE  BOTULINUM TOXIN PROCEDURE      HPI  No chief complaint on file.    Tia Pablo is a 58 year old female with a history of headaches and generalized dystonia with pain who presents to clinic for botulinum toxin injections for management of chronic migraine headaches, cervical and oromandibular dystonia.     SINCE LAST VISIT  Tia Pablo was last seen here in clinic on 5/20/2024, at which time she received 450 units of Botox.     Lately, she has been doing well. She has been doing acupuncture on a regular basis, and this has been helpful for her pain.      Patient denies new medical diagnoses, illnesses, hospitalizations, emergency room visits, and injuries since the previous injection with botulinum neurotoxin.     RESPONSE TO PREVIOUS TREATMENT    Side effects: None reported.    1.  Headache frequency during this injection cycle: 2-4 migraine headache days per month. This is compared to her baseline headache frequency of 30 headache days per month. After 7 weeks the Botox started to wear off and had a daily headache.    2.  Headache duration during this injection cycle:  Headache duration is typically no longer than three hours with Relpax, but if the Relpax does not work right away, she has to take two doses and the headaches last much longer. Patient reports no episodes of multiple day headaches during this injection cycle.  Has taken Relpax 2 times at the most during this cycle     3.  Headache intensity during this injection cycle:    A.  5/10  =  Typical pain level.  B.  8/10  =  Worst pain level.  C.  0/10  =  Lowest pain level.    4.  Change in headache medication usage during this injection cycle:  (For Example:  Able to decrease use of oral pain medications.) She is only taking her migraine headache medication about 1-2 times per month, whereas she was taking it much more frequently prior to Botox. She also takes robaxin as needed  for more severe headaches. Just took Relpax twice during this cycle. At times might take tylenol or advil.    5.  ER Visits During This Injection Cycle: None.     6.  Functional Performance:  Change in ADL's, social interaction, days lost from work, etc. Patient reports being able to more fully participate in social and family activities and responsibilities as headache symptoms have improved. She is overall a lot more productive when Botox is working. Recently, she was able to go on a [place] cruise with family, and this was quite enjoyable. 8    Dystonia Improvement (neck and jaw): Yes.  Percent Improvement: 80 %    Duration of Benefit:  7 weeks and followed by a gradual reduction in benefit. Her jaw clenching gets severe a few weeks before her appointment.       PHYSICAL EXAM  VS: There were no vitals taken for this visit.   GEN: Pleasant and cooperative, in no acute distress  HEENT: No facial asymmetry  NECK: Involuntary right rotation with hypertonicity noted in right anterior neck musculature and upper trapezius. Jaw Worse in the left side today    ALLERGIES  Allergies   Allergen Reactions    Bee Venom Anaphylaxis    Erythromycin Hives and Itching    Seasonal Allergies        CURRENT MEDICATIONS    Current Outpatient Medications:     buPROPion (WELLBUTRIN XL) 300 MG 24 hr tablet, Take 1 tablet (300 mg) by mouth every morning, Disp: 90 tablet, Rfl: 1    chlorthalidone (HYGROTON) 25 MG tablet, Take 1 tablet (25 mg) by mouth daily, Disp: 90 tablet, Rfl: 1    clonazePAM (KLONOPIN) 0.5 MG tablet, Take 1 tab in the evening before bed. Okay to take an extra tab as needed in the day., Disp: 45 tablet, Rfl: 5    DULoxetine (CYMBALTA) 20 MG capsule, Take 1 capsule (20 mg) by mouth daily, Disp: 90 capsule, Rfl: 2    eletriptan (RELPAX) 40 MG tablet, Take 1 tablet (40 mg) by mouth at onset of headache for migraine May repeat in 2 hours. Max 2 tablets/24 hours., Disp: 12 tablet, Rfl: 3    EPINEPHrine (ANY BX GENERIC  EQUIV) 0.3 MG/0.3ML injection 2-pack, Inject 0.3 mLs (0.3 mg) into the muscle as needed for anaphylaxis May repeat one time in 5-15 minutes if response to initial dose is inadequate., Disp: 2 each, Rfl: 1    losartan (COZAAR) 50 MG tablet, Take 1 tablet (50 mg) by mouth daily, Disp: 90 tablet, Rfl: 1    methocarbamol (ROBAXIN) 500 MG tablet, Take 0.5-1 tablets (250-500 mg) by mouth 4 times daily as needed for muscle spasms, Disp: 40 tablet, Rfl: 0    Current Facility-Administered Medications:     botulinum toxin type A (BOTOX) 100 units injection 600 Units, 600 Units, Intramuscular, Q90 Days, Simi Vazquez MD, 450 Units at 24 1208    glycopyrrolate (ROBINUL) injection 0.1-0.2 mg, 0.1-0.2 mg, Intravenous, Q5 Min PRN, Saeed Edwards MD, 0.2 mg at 24 1357       BOTULINUM NEUROTOXIN INJECTION PROCEDURES    VERIFICATION OF PATIENT IDENTIFICATION AND PROCEDURE     Initials   Patient Name SES   Patient  SES   Procedure Verified by: SES     Prior to the start of the procedure and with procedural staff participation, I verbally confirmed the patient s identity using two indicators, relevant allergies, that the procedure was appropriate and matched the consent or emergent situation, and that the correct equipment/implants were available. Immediately prior to starting the procedure I conducted the Time Out with the procedural staff and re-confirmed the patient s name, procedure, and site/side. (The Joint Commission universal protocol was followed.)  Yes    Sedation (Moderate or Deep): None    ABOVE ASSESSMENTS PERFORMED BY    Simi Vazquez MD      INDICATIONS FOR PROCEDURES  Tia Pablo is a 58 year old patient with involuntary muscle spasms and pain secondary to the diagnosis of oromandibular/cervical dystonia and chronic migraine headaches. Her baseline symptoms have been recalcitrant to oral medications and conservative therapy.  She is here today for reinjection with  Botox.    GOAL OF PROCEDURE  The goal of this procedure is to increase active range of motion, improve volitional motor control, decrease pain  and enhance functional independence.      TOTAL DOSE ADMINISTERED  Dose Administered:  450 units  Botox (Botulinum Toxin Type A) 1:1 Dilution  Unavoidable Drug Waste: Yes  Amount of drug waste (mL): 50 units Botox.  Reason for waste:  Single use vial  Diluent Used:  Preservative Free Normal Saline  Total Volume of Diluent Used:  4.5 ml  NDC #: Botox 100u ([address]-31)      CONSENT  The risks, benefits, and treatment options were discussed with Tia MANUEL Coltmarlyn and she agreed to proceed.    Written consent was obtained by [facility].     EQUIPMENT USED  Needle-35mm stimulating/recording  Needle-30 gauge  EMG/NCS Machine    SKIN PREPARATION  Skin preparation was performed using an alcohol wipe.    GUIDANCE DESCRIPTION  Electro-myographic guidance was necessary throughout the procedure to accurately identify all areas of dystonic muscles while avoiding injection of non-dystonic muscles, neighboring nerves and nearby vascular structures.     AREA/MUSCLE INJECTED: 450 UNITS BOTOX = TOTAL DOSE, 1:1 DILUTION     1. NECK MUSCLES: 145 UNITS BOTOX = TOTAL DOSE     Right Upper Trapezius - 20 units of Botox at 4 site/s.   Left Upper Trapezius - 20 units of Botox at 4 site/s.      Right Splenius Capitis - 5 units of Botox at 1 site/s.   Left Splenius Capitis - 5 units of Botox at 1 site/s.      Right Auricularis - 15 units of Botox at 3 site/s (anterior, superior & posterior).   Left Auricularis - 15 units of Botox at 3 site/s (anterior, superior & posterior).     Right occipitalis - 15 units of Botox at 3 site/s.   Left occipitalis - 15 units of Botox at 3 site/s.     Right Sternocleidomastoid - 10 units of Botox at 1 site/s.    Right Middle Scalene - 15 units of Botox at 1 site/s.     Right Platysma - 5 units of Botox at 2 site/s.  Left Platysma - 5 units of Botox at 2 site/s.      2. UPPER  EXTREMITY & TRUNK MUSCLES: 90 UNITS BOTOX = TOTAL DOSE     Right Levator scapula - 25 units of Botox at 2 site/s.   Left Levator scapula - 25 units of Botox at 2 site/s.      Right Rhomboid - 20 units of Botox at 1 site/s.    Left Rhomboid - 20 units of Botox at 3 site/s.       3. JAW MUSCLES: 140 UNITS BOTOX = TOTAL DOSE      Right Temporalis - 60 units of Botox at 5 site/s.               Left Temporalis - 60 units of Botox at 5 site/s.     Right Masseter - 10 units of Botox at 1 site/s.    Left Masseter - 10 units of Botox at 1 site/s.     4. FACIAL & SCALP MUSCLES: 75 UNITS BOTOX = TOTAL DOSE     Right Frontalis - 15 units of Botox at 3 site/s (2 middle & 1 hairline).    Left Frontalis - 15 units of Botox at 3 site/s (2 middle & 1 hairline).     Procerus - 5 units of Botox at 1 site/s.       Right  - 5 units of Botox at 1 site/s.    Left  - 5 units of Botox at 1 site/s.      Right Occipitalis - 15 units of Botox at 1 site/s.    Left Occipitalis - 15 units of Botox at 1 site/s.       RESPONSE TO PROCEDURE  Tia Pablo tolerated the procedure well and there were no immediate complications. She was allowed to recover for an appropriate period of time and was discharged home in stable condition.    ASSESSMENT AND PLAN   Botulinum toxin injections: No changes made to Botox dose or distribution today. Patient will continue to monitor response to Botox and report at next appointment.    Referrals: None.  Follow up: Tia Pablo was rescheduled for the next series of injections in 12 weeks, at which time we will evaluate response to today's injections. she may call the clinic prior with any questions or concerns prior to the next appointment.     Patient seen and discussed with my attending Dr Taylor Traore MD   Resident Physician PGY-3  Physical Medicine and Rehabilitation  08/23/2024 9:50 AM       I, Simi Vazquez MD, saw this patient with resident, Dr. Gill, and  agree with the findings and plan of care as documented in this note. I personally reviewed the chart (vitals signs, medications, labs and imaging). My key findings and key management decisions made are reflected in this note.  I was present for the entire procedure listed above.      Simi Vazquez MD

## 2024-08-23 ENCOUNTER — OFFICE VISIT (OUTPATIENT)
Dept: PHYSICAL MEDICINE AND REHAB | Facility: CLINIC | Age: 59
End: 2024-08-23
Payer: COMMERCIAL

## 2024-08-23 DIAGNOSIS — G24.4 OROMANDIBULAR DYSTONIA: ICD-10-CM

## 2024-08-23 DIAGNOSIS — G24.3 SPASMODIC TORTICOLLIS: Primary | ICD-10-CM

## 2024-08-23 DIAGNOSIS — G43.719 CHRONIC MIGRAINE WITHOUT AURA, INTRACTABLE, WITHOUT STATUS MIGRAINOSUS: ICD-10-CM

## 2024-08-23 PROCEDURE — 95874 GUIDE NERV DESTR NEEDLE EMG: CPT | Performed by: PHYSICAL MEDICINE & REHABILITATION

## 2024-08-23 PROCEDURE — 64615 CHEMODENERV MUSC MIGRAINE: CPT | Performed by: PHYSICAL MEDICINE & REHABILITATION

## 2024-08-23 NOTE — LETTER
8/23/2024       RE: Tia Pablo  [id number] [address] [facility] [id number]     Dear Colleague,    Thank you for referring your patient, Tia Pablo, to the [facility] PHYSICAL MEDICINE AND REHABILITATION CLINIC [place] at [facility]. Please see a copy of my visit note below.      [facility]     PM&R CLINIC NOTE  BOTULINUM TOXIN PROCEDURE      HPI  No chief complaint on file.    Tia Pablo is a 58 year old female with a history of headaches and generalized dystonia with pain who presents to clinic for botulinum toxin injections for management of chronic migraine headaches, cervical and oromandibular dystonia.     SINCE LAST VISIT  Tia Pablo was last seen here in clinic on 5/20/2024, at which time she received 450 units of Botox.     Lately, she has been doing well. She has been doing acupuncture on a regular basis, and this has been helpful for her pain.      Patient denies new medical diagnoses, illnesses, hospitalizations, emergency room visits, and injuries since the previous injection with botulinum neurotoxin.     RESPONSE TO PREVIOUS TREATMENT    Side effects: None reported.    1.  Headache frequency during this injection cycle: 2-4 migraine headache days per month. This is compared to her baseline headache frequency of 30 headache days per month. After 7 weeks the Botox started to wear off and had a daily headache.    2.  Headache duration during this injection cycle:  Headache duration is typically no longer than three hours with Relpax, but if the Relpax does not work right away, she has to take two doses and the headaches last much longer. Patient reports no episodes of multiple day headaches during this injection cycle.  Has taken Relpax 2 times at the most during this cycle     3.  Headache intensity during this injection cycle:    A.  5/10  =  Typical pain level.  B.  8/10  =  Worst pain level.  C.   0/10  =  Lowest pain level.    4.  Change in headache medication usage during this injection cycle:  (For Example:  Able to decrease use of oral pain medications.) She is only taking her migraine headache medication about 1-2 times per month, whereas she was taking it much more frequently prior to Botox. She also takes robaxin as needed for more severe headaches. Just took Relpax twice during this cycle. At times might take tylenol or advil.    5.  ER Visits During This Injection Cycle: None.     6.  Functional Performance:  Change in ADL's, social interaction, days lost from work, etc. Patient reports being able to more fully participate in social and family activities and responsibilities as headache symptoms have improved. She is overall a lot more productive when Botox is working. Recently, she was able to go on a [place] cruise with family, and this was quite enjoyable. 8    Dystonia Improvement (neck and jaw): Yes.  Percent Improvement: 80 %    Duration of Benefit:  7 weeks and followed by a gradual reduction in benefit. Her jaw clenching gets severe a few weeks before her appointment.       PHYSICAL EXAM  VS: There were no vitals taken for this visit.   GEN: Pleasant and cooperative, in no acute distress  HEENT: No facial asymmetry  NECK: Involuntary right rotation with hypertonicity noted in right anterior neck musculature and upper trapezius. Jaw Worse in the left side today    ALLERGIES  Allergies   Allergen Reactions     Bee Venom Anaphylaxis     Erythromycin Hives and Itching     Seasonal Allergies        CURRENT MEDICATIONS    Current Outpatient Medications:      buPROPion (WELLBUTRIN XL) 300 MG 24 hr tablet, Take 1 tablet (300 mg) by mouth every morning, Disp: 90 tablet, Rfl: 1     chlorthalidone (HYGROTON) 25 MG tablet, Take 1 tablet (25 mg) by mouth daily, Disp: 90 tablet, Rfl: 1     clonazePAM (KLONOPIN) 0.5 MG tablet, Take 1 tab in the evening before bed. Okay to take an extra tab as needed in the  day., Disp: 45 tablet, Rfl: 5     DULoxetine (CYMBALTA) 20 MG capsule, Take 1 capsule (20 mg) by mouth daily, Disp: 90 capsule, Rfl: 2     eletriptan (RELPAX) 40 MG tablet, Take 1 tablet (40 mg) by mouth at onset of headache for migraine May repeat in 2 hours. Max 2 tablets/24 hours., Disp: 12 tablet, Rfl: 3     EPINEPHrine (ANY BX GENERIC EQUIV) 0.3 MG/0.3ML injection 2-pack, Inject 0.3 mLs (0.3 mg) into the muscle as needed for anaphylaxis May repeat one time in 5-15 minutes if response to initial dose is inadequate., Disp: 2 each, Rfl: 1     losartan (COZAAR) 50 MG tablet, Take 1 tablet (50 mg) by mouth daily, Disp: 90 tablet, Rfl: 1     methocarbamol (ROBAXIN) 500 MG tablet, Take 0.5-1 tablets (250-500 mg) by mouth 4 times daily as needed for muscle spasms, Disp: 40 tablet, Rfl: 0    Current Facility-Administered Medications:      botulinum toxin type A (BOTOX) 100 units injection 600 Units, 600 Units, Intramuscular, Q90 Days, Simi Vazquez MD, 450 Units at 24 1208     glycopyrrolate (ROBINUL) injection 0.1-0.2 mg, 0.1-0.2 mg, Intravenous, Q5 Min PRN, Saeed Edwards MD, 0.2 mg at 24 1357       BOTULINUM NEUROTOXIN INJECTION PROCEDURES    VERIFICATION OF PATIENT IDENTIFICATION AND PROCEDURE     Initials   Patient Name SES   Patient  SES   Procedure Verified by: SES     Prior to the start of the procedure and with procedural staff participation, I verbally confirmed the patient s identity using two indicators, relevant allergies, that the procedure was appropriate and matched the consent or emergent situation, and that the correct equipment/implants were available. Immediately prior to starting the procedure I conducted the Time Out with the procedural staff and re-confirmed the patient s name, procedure, and site/side. (The Joint Commission universal protocol was followed.)  Yes    Sedation (Moderate or Deep): None    ABOVE ASSESSMENTS PERFORMED BY    Simi Vazquez,  MD      INDICATIONS FOR PROCEDURES  Tia Pablo is a 58 year old patient with involuntary muscle spasms and pain secondary to the diagnosis of oromandibular/cervical dystonia and chronic migraine headaches. Her baseline symptoms have been recalcitrant to oral medications and conservative therapy.  She is here today for reinjection with Botox.    GOAL OF PROCEDURE  The goal of this procedure is to increase active range of motion, improve volitional motor control, decrease pain  and enhance functional independence.      TOTAL DOSE ADMINISTERED  Dose Administered:  450 units  Botox (Botulinum Toxin Type A) 1:1 Dilution  Unavoidable Drug Waste: Yes  Amount of drug waste (mL): 50 units Botox.  Reason for waste:  Single use vial  Diluent Used:  Preservative Free Normal Saline  Total Volume of Diluent Used:  4.5 ml  NDC #: Botox 100u ([address]-55)      CONSENT  The risks, benefits, and treatment options were discussed with Tia Pablo and she agreed to proceed.    Written consent was obtained by [facility].     EQUIPMENT USED  Needle-35mm stimulating/recording  Needle-30 gauge  EMG/NCS Machine    SKIN PREPARATION  Skin preparation was performed using an alcohol wipe.    GUIDANCE DESCRIPTION  Electro-myographic guidance was necessary throughout the procedure to accurately identify all areas of dystonic muscles while avoiding injection of non-dystonic muscles, neighboring nerves and nearby vascular structures.     AREA/MUSCLE INJECTED: 450 UNITS BOTOX = TOTAL DOSE, 1:1 DILUTION     1. NECK MUSCLES: 145 UNITS BOTOX = TOTAL DOSE     Right Upper Trapezius - 20 units of Botox at 4 site/s.   Left Upper Trapezius - 20 units of Botox at 4 site/s.      Right Splenius Capitis - 5 units of Botox at 1 site/s.   Left Splenius Capitis - 5 units of Botox at 1 site/s.      Right Auricularis - 15 units of Botox at 3 site/s (anterior, superior & posterior).   Left Auricularis - 15 units of Botox at 3 site/s (anterior, superior & posterior).      Right occipitalis - 15 units of Botox at 3 site/s.   Left occipitalis - 15 units of Botox at 3 site/s.     Right Sternocleidomastoid - 10 units of Botox at 1 site/s.    Right Middle Scalene - 15 units of Botox at 1 site/s.     Right Platysma - 5 units of Botox at 2 site/s.  Left Platysma - 5 units of Botox at 2 site/s.      2. UPPER EXTREMITY & TRUNK MUSCLES: 90 UNITS BOTOX = TOTAL DOSE     Right Levator scapula - 25 units of Botox at 2 site/s.   Left Levator scapula - 25 units of Botox at 2 site/s.      Right Rhomboid - 20 units of Botox at 1 site/s.    Left Rhomboid - 20 units of Botox at 3 site/s.       3. JAW MUSCLES: 140 UNITS BOTOX = TOTAL DOSE      Right Temporalis - 60 units of Botox at 5 site/s.               Left Temporalis - 60 units of Botox at 5 site/s.     Right Masseter - 10 units of Botox at 1 site/s.    Left Masseter - 10 units of Botox at 1 site/s.     4. FACIAL & SCALP MUSCLES: 75 UNITS BOTOX = TOTAL DOSE     Right Frontalis - 15 units of Botox at 3 site/s (2 middle & 1 hairline).    Left Frontalis - 15 units of Botox at 3 site/s (2 middle & 1 hairline).     Procerus - 5 units of Botox at 1 site/s.       Right  - 5 units of Botox at 1 site/s.    Left  - 5 units of Botox at 1 site/s.      Right Occipitalis - 15 units of Botox at 1 site/s.    Left Occipitalis - 15 units of Botox at 1 site/s.       RESPONSE TO PROCEDURE  Tia Pablo tolerated the procedure well and there were no immediate complications. She was allowed to recover for an appropriate period of time and was discharged home in stable condition.    ASSESSMENT AND PLAN   Botulinum toxin injections: No changes made to Botox dose or distribution today. Patient will continue to monitor response to Botox and report at next appointment.    Referrals: None.  Follow up: Tia Pablo was rescheduled for the next series of injections in 12 weeks, at which time we will evaluate response to today's injections. she may call  the clinic prior with any questions or concerns prior to the next appointment.     Patient seen and discussed with my attending Dr Taylor Traore MD   Resident Physician PGY-3  Physical Medicine and Rehabilitation  08/23/2024 9:50 AM       I, Simi Vazquez MD, saw this patient with resident, Dr. Gill, and agree with the findings and plan of care as documented in this note. I personally reviewed the chart (vitals signs, medications, labs and imaging). My key findings and key management decisions made are reflected in this note.  I was present for the entire procedure listed above.      Simi Vazquez MD          Again, thank you for allowing me to participate in the care of your patient.      Sincerely,    Simi Vazquez MD

## 2024-08-29 ENCOUNTER — THERAPY VISIT (OUTPATIENT)
Dept: OCCUPATIONAL THERAPY | Facility: CLINIC | Age: 59
End: 2024-08-29
Attending: PHYSICIAN ASSISTANT
Payer: COMMERCIAL

## 2024-08-29 DIAGNOSIS — R41.840 POST-COVID CHRONIC CONCENTRATION DEFICIT: Primary | ICD-10-CM

## 2024-08-29 DIAGNOSIS — U09.9 POST-COVID CHRONIC CONCENTRATION DEFICIT: Primary | ICD-10-CM

## 2024-08-29 PROCEDURE — 97535 SELF CARE MNGMENT TRAINING: CPT | Mod: GO | Performed by: OCCUPATIONAL THERAPIST

## 2024-08-29 NOTE — PROGRESS NOTES
DISCHARGE  Reason for Discharge: Patient has met all goals.    Equipment Issued: n/a    Discharge Plan: Patient to continue home program.  Other services: neuropsych testing.    Referring Provider:  Kasie Mackenzie    08/29/24 0500   Appointment Info   Treating Provider Jose Wan OTR/L   Visits Used 8   Medical Diagnosis Post-COVID chronic concentration deficit   OT Tx Diagnosis memory impairment   Progress Note/Certification   Onset of Illness/Injury or Date of Surgery 02/26/24   Therapy Frequency 1x/week   Predicted Duration up to 90 days   Progress Note Due Date 05/25/24   OT Goal 1   Goal Identifier Memory   Goal Description Pt will utilize 1-2 compensatory strategies for memory to help with daily activities.   Rationale In order to maximize independence with tasks requiring functional cognition/executive function for school, work, medication or financial mgmt   Goal Progress Pt voices and engages in memory strategies to include note taking, list making, writing information down, limited distractions during times of conversation   Target Date 05/25/24   OT Goal 2   Goal Identifier Fatigue   Goal Description Pt will report increasing her activity level without needing to take a nap in order to clean her house.   Rationale In order to safely and appropriately apply compensatory strategies with ADL/IADL performance   Goal Progress met   Target Date 05/25/24   OT Goal 3   Goal Identifier Safe and Sound   Goal Description Initiate Safe and Sound Protocol   Goal Progress completed   Target Date 05/25/24   Subjective Report   Subjective Report Reporting she is feeling pretty good.  She is increasing her activity and household chores.  She completed her homework.   Treatment Interventions (OT)   Interventions Therapeutic Activity   Self Care/Home Management   Self-Care/Home Mgmt/ADL, Compensatory, Meal Prep Minutes (10679) 45 Minutes   Self Care 1 - Details Lengthy education of brain fog and memory concerns  related to COVID providing her with research article related to this from [employer] and research on the hippocampus and exercise aiding in improved cognition, memory.  Education review of written home programming to include exercise, cognitive activity - games, puzzles, workbooks.  Schedule building in her day.  Socialization with others.  Pacing strategies.   Skilled Intervention skilled verbal guidance to mangage her cognition and memory related to her high emotional level   Plan   Home program work through recommendations provided for her in session   Updates to plan of care D/C OT.  Pt to complete neuropsych testing then contact therapist

## 2024-09-06 ENCOUNTER — MYC MEDICAL ADVICE (OUTPATIENT)
Dept: PHYSICAL MEDICINE AND REHAB | Facility: CLINIC | Age: 59
End: 2024-09-06
Payer: COMMERCIAL

## 2024-09-24 ENCOUNTER — TELEPHONE (OUTPATIENT)
Dept: PHYSICAL MEDICINE AND REHAB | Facility: CLINIC | Age: 59
End: 2024-09-24
Payer: COMMERCIAL

## 2024-09-24 NOTE — TELEPHONE ENCOUNTER
LVM for pt to call  back and let us know if she can come in tomorrow 9/25 for  ONB with Dr Vazquez?

## 2024-09-24 NOTE — TELEPHONE ENCOUNTER
----- Message from Shruthi West sent at 9/24/2024 11:31 AM CDT -----  Rodolfo Norton,    This patient of Dr. Vazquez's requested ONB/TPI's, would you please call her to offer her an appt tomorrow, Wednesday, Sept 25 at 10:20am at the [facility]?    Thank you!    Shruthi

## 2024-09-26 DIAGNOSIS — M54.2 NECK PAIN: ICD-10-CM

## 2024-09-26 DIAGNOSIS — Z72.820 POOR SLEEP: ICD-10-CM

## 2024-09-26 DIAGNOSIS — F41.9 ANXIETY: ICD-10-CM

## 2024-09-26 DIAGNOSIS — G24.3 CERVICAL DYSTONIA: ICD-10-CM

## 2024-09-26 RX ORDER — METHOCARBAMOL 500 MG/1
TABLET, FILM COATED ORAL
Qty: 40 TABLET | Refills: 0 | Status: SHIPPED | OUTPATIENT
Start: 2024-09-26

## 2024-09-26 RX ORDER — CLONAZEPAM 0.5 MG/1
TABLET ORAL
Qty: 45 TABLET | Refills: 5 | Status: SHIPPED | OUTPATIENT
Start: 2024-09-26

## 2024-09-26 NOTE — TELEPHONE ENCOUNTER
RX Authorization    Medication: clonazepam 0.5 mg     Date last refill ordered:  07/15/2024    Quantity ordered:  45    # refills:  3    Date of last clinic visit with ordering provider:  07/25/2024    Date of next clinic visit with ordering provider:  03/27/2025    All pertinent protocol data (lab date/result):    Include pertinent information from patients message:

## 2024-10-02 ENCOUNTER — TELEPHONE (OUTPATIENT)
Dept: FAMILY MEDICINE | Facility: CLINIC | Age: 59
End: 2024-10-02

## 2024-10-02 ENCOUNTER — OFFICE VISIT (OUTPATIENT)
Dept: PHYSICAL MEDICINE AND REHAB | Facility: CLINIC | Age: 59
End: 2024-10-02
Payer: COMMERCIAL

## 2024-10-02 DIAGNOSIS — G43.719 CHRONIC MIGRAINE WITHOUT AURA, INTRACTABLE, WITHOUT STATUS MIGRAINOSUS: Primary | ICD-10-CM

## 2024-10-02 DIAGNOSIS — M79.18 MYOFASCIAL PAIN: ICD-10-CM

## 2024-10-02 DIAGNOSIS — M54.81 BILATERAL OCCIPITAL NEURALGIA: ICD-10-CM

## 2024-10-02 PROCEDURE — 20553 NJX 1/MLT TRIGGER POINTS 3/>: CPT | Performed by: PHYSICAL MEDICINE & REHABILITATION

## 2024-10-02 RX ORDER — TRIAMCINOLONE ACETONIDE 40 MG/ML
40 INJECTION, SUSPENSION INTRA-ARTICULAR; INTRAMUSCULAR ONCE
Status: ACTIVE | OUTPATIENT
Start: 2024-10-02

## 2024-10-02 RX ORDER — BUPIVACAINE HYDROCHLORIDE 5 MG/ML
7 INJECTION, SOLUTION EPIDURAL; INTRACAUDAL ONCE
Status: ACTIVE | OUTPATIENT
Start: 2024-10-02

## 2024-10-02 NOTE — LETTER
10/2/2024       RE: Tia Pablo  [address] [facility] [id number]     Dear Colleague,    Thank you for referring your patient, Tia Pablo, to the [facility] PHYSICAL MEDICINE [facility]ITATION CLINIC [place] at [facility]. Please see a copy of my visit note below.    Pre procedure Diagnosis: myofascial pain   Post procedure Diagnosis: Same   Procedure performed: trigger point injections   Anesthesia: none   Complications: none     Indications:   Tia Pablo is a 58 year old female with a history of chronic migraine headaches, cervical and oromandibular dystonia who is followed in our clinic for botulinum toxin injections. Last round was on 8/23/24.  She sent a [employer] message on 9/6 -   I have pain that hurts more than usual, from the injections on the right side base of my skull. I am also still having pain around my ears/jaw area on left and right side. I am concerned that the injections in those areas did not get enough of the medication in the correct areas.  I had the injections 2 weeks ago and I feel worse.  What are my options?    Dr. Vazquez recommended trial of ONBs and TPIs to help with her symptoms.   She received TPIs 7/24/23 with Dr. Edwards in pain clinic using bupi only (Bilateral trapezius, rhomboids, and levator scapulae) with good results. I saw her for TPIs as well on 10/16/23.  She is also followed by Dr. Platt in the movement disorder clinic and was last seen 7/25/24.  Today, she reported severe muscle tightness and spasms at her jaws, lateral / posterior neck area, traps and christine-scapular areas b/l. She is very uncomfortable during the day and has not been sleeping well.     Options/alternatives, benefits and risks were discussed with the patient including bleeding, infection, tissue trauma and pnuemothorax. Questions were answered to her satisfaction and she agrees to proceed. Voluntary informed consent was obtained  and signed.   Vitals were reviewed: Yes   Allergies were reviewed: Yes   Medications were reviewed: Yes     Procedure:   After getting informed consent, a Pause for the Cause was performed.   Trigger points were identified by patient, and marked when appropriate.   The area was prepped with Chloroprep.   Using clean technique, injections were completed using a 30G, 1 inch needle.  Muscle groups injected:   Bilateral   Masseter  Splenius capitis   Splenius cervicis  Traps   Levator at scap insertion   Rhomboids     Injection solution contained:   2ml of 1% lidocaine, 7ml of 0.5% bupivacaine and 1ml of kenolog.     Hemostasis was achieved, the area was cleaned, and bandaids were placed when appropriate.   The patient tolerated the procedure well. She was slightly lightheaded after the injections so rested on the bed for a few minutes before she left.     Plan:   -trigger point injections today; discussed pros and cons of using steroid for TPIs and risk of muscle atrophy. Given the severity of her symptoms and her upcoming trip decided to use kenolog today but may not repeat that at the next rounds of TPIs.   -f/u with Dr. Vazquez as scheduled for botox injections   -continue working with PT  -last saw Dr. Platt in 7/2022 and needs to follow up as recommended     Angela Franz MD  Physical Medicine & Rehabilitation             Again, thank you for allowing me to participate in the care of your patient.      Sincerely,    Angela Franz MD

## 2024-10-02 NOTE — TELEPHONE ENCOUNTER
Reason for Call:  Appointment Request    Patient requesting this type of appt: Chronic Diease Management/Medication/Follow-Up    Requested provider: Ana Guzman    Reason patient unable to be scheduled: Needs to be scheduled by clinic    When does patient want to be seen/preferred time:  Currently scheduled for October 30, but would like to come in earlier, if at all possible. Open to a last-minute call should there be a cancellation.    Comments: n/a    Could we send this information to you in [employer] or would you prefer to receive a phone call?:   Patient would prefer a phone call   Okay to leave a detailed message?: Yes at Cell number on file:    Telephone Information:   Mobile [phone]       Call taken on 10/2/2024 at 2:45 PM by Kadi Watkins

## 2024-10-02 NOTE — PROGRESS NOTES
Pre procedure Diagnosis: myofascial pain   Post procedure Diagnosis: Same   Procedure performed: trigger point injections   Anesthesia: none   Complications: none     Indications:   Tia Pablo is a 58 year old female with a history of chronic migraine headaches, cervical and oromandibular dystonia who is followed in our clinic for botulinum toxin injections. Last round was on 8/23/24.  She sent a "[employer] message on 9/6 -   I have pain that hurts more than usual, from the injections on the right side base of my skull. I am also still having pain around my ears/jaw area on left and right side. I am concerned that the injections in those areas did not get enough of the medication in the correct areas.  I had the injections 2 weeks ago and I feel worse.  What are my options?    Dr. Vazquez recommended trial of ONBs and TPIs to help with her symptoms.   She received TPIs 7/24/23 with Dr. Edwards in pain clinic using bupi only (Bilateral trapezius, rhomboids, and levator scapulae) with good results. I saw her for TPIs as well on 10/16/23.  She is also followed by Dr. Platt in the movement disorder clinic and was last seen 7/25/24.  Today, she reported severe muscle tightness and spasms at her jaws, lateral / posterior neck area, traps and christine-scapular areas b/l. She is very uncomfortable during the day and has not been sleeping well.     Options/alternatives, benefits and risks were discussed with the patient including bleeding, infection, tissue trauma and pnuemothorax. Questions were answered to her satisfaction and she agrees to proceed. Voluntary informed consent was obtained and signed.   Vitals were reviewed: Yes   Allergies were reviewed: Yes   Medications were reviewed: Yes     Procedure:   After getting informed consent, a Pause for the Cause was performed.   Trigger points were identified by patient, and marked when appropriate.   The area was prepped with Chloroprep.   Using clean technique, injections  were completed using a 30G, 1 inch needle.  Muscle groups injected:   Bilateral   Masseter  Splenius capitis   Splenius cervicis  Traps   Levator at scap insertion   Rhomboids     Injection solution contained:   2ml of 1% lidocaine, 7ml of 0.5% bupivacaine and 1ml of kenolog.     Hemostasis was achieved, the area was cleaned, and bandaids were placed when appropriate.   The patient tolerated the procedure well. She was slightly lightheaded after the injections so rested on the bed for a few minutes before she left.     Plan:   -trigger point injections today; discussed pros and cons of using steroid for TPIs and risk of muscle atrophy. Given the severity of her symptoms and her upcoming trip decided to use kenolog today but may not repeat that at the next rounds of TPIs.   -f/u with Dr. Vazquez as scheduled for botox injections   -continue working with PT  -last saw Dr. Platt in 7/2022 and needs to follow up as recommended     Angela Franz MD  Physical Medicine & Rehabilitation

## 2024-10-15 ENCOUNTER — OFFICE VISIT (OUTPATIENT)
Dept: FAMILY MEDICINE | Facility: CLINIC | Age: 59
End: 2024-10-15
Payer: COMMERCIAL

## 2024-10-15 VITALS
SYSTOLIC BLOOD PRESSURE: 136 MMHG | HEIGHT: 66 IN | WEIGHT: 140 LBS | BODY MASS INDEX: 22.5 KG/M2 | DIASTOLIC BLOOD PRESSURE: 84 MMHG | TEMPERATURE: 98.8 F | OXYGEN SATURATION: 97 % | HEART RATE: 79 BPM

## 2024-10-15 DIAGNOSIS — I10 ESSENTIAL HYPERTENSION: Primary | ICD-10-CM

## 2024-10-15 DIAGNOSIS — M76.899 QUADRICEPS TENDINITIS: ICD-10-CM

## 2024-10-15 LAB
ANION GAP SERPL CALCULATED.3IONS-SCNC: 12 MMOL/L (ref 7–15)
BUN SERPL-MCNC: 19 MG/DL (ref 6–20)
CALCIUM SERPL-MCNC: 9.8 MG/DL (ref 8.8–10.4)
CHLORIDE SERPL-SCNC: 97 MMOL/L (ref 98–107)
CREAT SERPL-MCNC: 0.74 MG/DL (ref 0.51–0.95)
EGFRCR SERPLBLD CKD-EPI 2021: >90 ML/MIN/1.73M2
GLUCOSE SERPL-MCNC: 112 MG/DL (ref 70–99)
HCO3 SERPL-SCNC: 29 MMOL/L (ref 22–29)
POTASSIUM SERPL-SCNC: 3.6 MMOL/L (ref 3.4–5.3)
SODIUM SERPL-SCNC: 138 MMOL/L (ref 135–145)

## 2024-10-15 PROCEDURE — 99212 OFFICE O/P EST SF 10 MIN: CPT | Mod: 25 | Performed by: FAMILY MEDICINE

## 2024-10-15 PROCEDURE — 80048 BASIC METABOLIC PNL TOTAL CA: CPT | Performed by: FAMILY MEDICINE

## 2024-10-15 PROCEDURE — 90673 RIV3 VACCINE NO PRESERV IM: CPT | Performed by: FAMILY MEDICINE

## 2024-10-15 PROCEDURE — 36415 COLL VENOUS BLD VENIPUNCTURE: CPT | Performed by: FAMILY MEDICINE

## 2024-10-15 PROCEDURE — 90471 IMMUNIZATION ADMIN: CPT | Performed by: FAMILY MEDICINE

## 2024-10-15 PROCEDURE — 91320 SARSCV2 VAC 30MCG TRS-SUC IM: CPT | Performed by: FAMILY MEDICINE

## 2024-10-15 PROCEDURE — 90480 ADMN SARSCOV2 VAC 1/ONLY CMP: CPT | Performed by: FAMILY MEDICINE

## 2024-10-15 ASSESSMENT — PATIENT HEALTH QUESTIONNAIRE - PHQ9
SUM OF ALL RESPONSES TO PHQ QUESTIONS 1-9: 5
10. IF YOU CHECKED OFF ANY PROBLEMS, HOW DIFFICULT HAVE THESE PROBLEMS MADE IT FOR YOU TO DO YOUR WORK, TAKE CARE OF THINGS AT HOME, OR GET ALONG WITH OTHER PEOPLE: SOMEWHAT DIFFICULT
SUM OF ALL RESPONSES TO PHQ QUESTIONS 1-9: 5

## 2024-10-15 NOTE — PROGRESS NOTES
"  Assessment & Plan     Essential hypertension  Will check labs as they are due   - BASIC METABOLIC PANEL; Future  - BASIC METABOLIC PANEL    Quadriceps tendinitis  See patient info  physical therapy if not imorpoving wear the knee sleeve  Patient Instructions   Helpguide.org  for anxiety resources               FUTURE APPOINTMENTS:       - Follow-up for annual visit or as needed    Subjective   Tia is a 58 year old, presenting for the following health issues:  Knee Pain (Twisted right leg while gardening about 1.5-2 weeks ago. )        10/15/2024     3:48 PM   Additional Questions   Roomed by Neva ÁLVAREZ CMA     Feels her heart rate is fasting then it should be, 70's, right now it's 90's.   Recheck head, from previous fall and hitting head.     History of Present Illness       Reason for visit:  Immunizations, knee pain , anxiety, tired during day  - Light headed, heart check up  Symptom onset:  1-2 weeks ago  Symptoms include:  Pain in knee area-stepped in uneven area in yard resulting in pain around knee cap area  Symptom intensity:  Moderate  Symptom progression:  Staying the same  Had these symptoms before:  No  What makes it worse:  Flares up walking  What makes it better:  I purchased an elastic knee  sleeve  to help with pain/stabilization   She is taking medications regularly.     It hurt more in the beginning . She had been wearing the knee sleeve she stopped wearing it and then tweaked her knee   She had hit her head and she had some dizziness   This is likely the cause of her prolonged lightheadedness     She endorses that there might be a component of anxiety she will monitor for now       Review of Systems  Constitutional, HEENT, cardiovascular, pulmonary, gi and gu systems are negative, except as otherwise noted.      Objective    /84 (BP Location: Right arm, Patient Position: Sitting, Cuff Size: Adult Regular)   Pulse 79   Temp 98.8  F (37.1  C) (Tympanic)   Ht 1.676 m (5' 6\")   Wt 63.5 kg " (140 lb)   SpO2 97%   BMI 22.60 kg/m    Body mass index is 22.6 kg/m .  Physical Exam   GENERAL: alert and no distress  NECK: no adenopathy, no asymmetry, masses, or scars  RESP: lungs clear to auscultation - no rales, rhonchi or wheezes  CV: regular rate and rhythm, normal S1 S2, no S3 or S4, no murmur, click or rub, no peripheral edema   ORTHO: Knee Exam: Inspection: AP/lateral alignment normal, No effusion, No quad atrophy, from mild tenderness along medial patellar border  Tender: medial patellar facet  Non-tender: rest of knee  Active Range of Motion: no pain with flexion, full extension  Strength: strength was normal   Special tests:     Also examined:      Results for orders placed or performed in visit on 10/15/24   BASIC METABOLIC PANEL     Status: Abnormal   Result Value Ref Range    Sodium 138 135 - 145 mmol/L    Potassium 3.6 3.4 - 5.3 mmol/L    Chloride 97 (L) 98 - 107 mmol/L    Carbon Dioxide (CO2) 29 22 - 29 mmol/L    Anion Gap 12 7 - 15 mmol/L    Urea Nitrogen 19.0 6.0 - 20.0 mg/dL    Creatinine 0.74 0.51 - 0.95 mg/dL    GFR Estimate >90 >60 mL/min/1.73m2    Calcium 9.8 8.8 - 10.4 mg/dL    Glucose 112 (H) 70 - 99 mg/dL           Signed Electronically by: Ana Guzman MD
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
## 2024-10-21 ENCOUNTER — OFFICE VISIT (OUTPATIENT)
Dept: NEUROPSYCHOLOGY | Facility: CLINIC | Age: 59
End: 2024-10-21
Attending: PSYCHIATRY & NEUROLOGY
Payer: COMMERCIAL

## 2024-10-21 DIAGNOSIS — F32.0 CURRENT MILD EPISODE OF MAJOR DEPRESSIVE DISORDER, UNSPECIFIED WHETHER RECURRENT (H): ICD-10-CM

## 2024-10-21 DIAGNOSIS — G31.84 MCI (MILD COGNITIVE IMPAIRMENT): ICD-10-CM

## 2024-10-21 DIAGNOSIS — R41.3 MEMORY LOSS: ICD-10-CM

## 2024-10-21 DIAGNOSIS — U07.1 COVID-19: Primary | ICD-10-CM

## 2024-10-21 DIAGNOSIS — R52 PAIN: ICD-10-CM

## 2024-10-21 DIAGNOSIS — F41.9 ANXIETY: ICD-10-CM

## 2024-10-21 DIAGNOSIS — G47.9 SLEEP DIFFICULTIES: ICD-10-CM

## 2024-10-21 PROCEDURE — 90791 PSYCH DIAGNOSTIC EVALUATION: CPT | Performed by: PSYCHOLOGIST

## 2024-10-21 PROCEDURE — 96138 PSYCL/NRPSYC TECH 1ST: CPT | Performed by: PSYCHOLOGIST

## 2024-10-21 PROCEDURE — 96133 NRPSYC TST EVAL PHYS/QHP EA: CPT | Performed by: PSYCHOLOGIST

## 2024-10-21 PROCEDURE — 96132 NRPSYC TST EVAL PHYS/QHP 1ST: CPT | Performed by: PSYCHOLOGIST

## 2024-10-21 PROCEDURE — 96139 PSYCL/NRPSYC TST TECH EA: CPT | Performed by: PSYCHOLOGIST
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
## 2024-10-22 ENCOUNTER — MYC MEDICAL ADVICE (OUTPATIENT)
Dept: FAMILY MEDICINE | Facility: CLINIC | Age: 59
End: 2024-10-22

## 2024-10-22 DIAGNOSIS — U09.9 POST-COVID CHRONIC CONCENTRATION DEFICIT: Primary | ICD-10-CM

## 2024-10-22 DIAGNOSIS — G47.9 SLEEP TROUBLE: ICD-10-CM

## 2024-10-22 DIAGNOSIS — R41.840 POST-COVID CHRONIC CONCENTRATION DEFICIT: Primary | ICD-10-CM

## 2024-10-22 NOTE — PROGRESS NOTES
Pt was seen for neuropsychological evaluation at the request of Dr. Sushila Platt for the purposes of diagnostic clarification and treatment planning. 151 minutes of test administration and scoring were provided by this writer. Please see Dr. Esdras Hand's report for a full interpretation of the findings.    Esteban Hdez MA, CSP

## 2024-11-05 DIAGNOSIS — I10 ESSENTIAL HYPERTENSION: ICD-10-CM

## 2024-11-05 RX ORDER — CHLORTHALIDONE 25 MG/1
25 TABLET ORAL DAILY
Qty: 90 TABLET | Refills: 1 | Status: SHIPPED | OUTPATIENT
Start: 2024-11-05

## 2024-11-13 ENCOUNTER — PATIENT OUTREACH (OUTPATIENT)
Dept: CARE COORDINATION | Facility: CLINIC | Age: 59
End: 2024-11-13
Payer: COMMERCIAL

## 2024-11-18 ENCOUNTER — OFFICE VISIT (OUTPATIENT)
Dept: PHYSICAL MEDICINE AND REHAB | Facility: CLINIC | Age: 59
End: 2024-11-18
Payer: COMMERCIAL

## 2024-11-18 DIAGNOSIS — G24.4 OROMANDIBULAR DYSTONIA: ICD-10-CM

## 2024-11-18 DIAGNOSIS — G24.3 SPASMODIC TORTICOLLIS: ICD-10-CM

## 2024-11-18 DIAGNOSIS — G43.719 CHRONIC MIGRAINE WITHOUT AURA, INTRACTABLE, WITHOUT STATUS MIGRAINOSUS: Primary | ICD-10-CM

## 2024-11-18 NOTE — PROGRESS NOTES
[facility]     PM&R CLINIC NOTE  BOTULINUM TOXIN PROCEDURE      HPI  No chief complaint on file.    Tia Pablo is a 58 year old female with a history of headaches and generalized dystonia with pain who presents to clinic for botulinum toxin injections for management of chronic migraine headaches, cervical and oromandibular dystonia.     SINCE LAST VISIT  Tia Pablo was last seen here in clinic on 8/23/2024, at which time she received 450 units of Botox.     Lately, she has been doing well. She has been doing acupuncture on a regular basis, and this has been helpful for her pain.      Patient denies new medical diagnoses, illnesses, hospitalizations, emergency room visits, and injuries since the previous injection with botulinum neurotoxin.     RESPONSE TO PREVIOUS TREATMENT    Side effects: None reported.    1.  Headache frequency during this injection cycle: 2-4 migraine headache days per month. This is compared to her baseline headache frequency of 30 headache days per month. After 7 weeks the Botox started to wear off and had a daily headache.    2.  Headache duration during this injection cycle:  Headache duration is typically no longer than three hours with Relpax, but if the Relpax does not work right away, she has to take two doses and the headaches last much longer. Patient reports no episodes of multiple day headaches during this injection cycle.  Has taken Relpax 2 times at the most during this cycle     3.  Headache intensity during this injection cycle:    A.  5/10  =  Typical pain level.  B.  8/10  =  Worst pain level.  C.  0/10  =  Lowest pain level.    4.  Change in headache medication usage during this injection cycle:  (For Example:  Able to decrease use of oral pain medications.) She is only taking her migraine headache medication about 1-2 times per month, whereas she was taking it much more frequently prior to Botox. She also takes robaxin as needed  for more severe headaches. Just took Relpax twice during this cycle. At times might take tylenol or advil.    5.  ER Visits During This Injection Cycle: None.     6.  Functional Performance:  Change in ADL's, social interaction, days lost from work, etc. Patient reports being able to more fully participate in social and family activities and responsibilities as headache symptoms have improved. She is overall a lot more productive when Botox is working. Recently, she was able to go on a [place] cruise with family, and this was quite enjoyable. 8    Dystonia Improvement (neck and jaw): Yes.  Percent Improvement: 80 %    Duration of Benefit:  7 weeks and followed by a gradual reduction in benefit. Her jaw clenching gets severe a few weeks before her appointment.       PHYSICAL EXAM  VS: There were no vitals taken for this visit.   GEN: Pleasant and cooperative, in no acute distress  HEENT: No facial asymmetry  NECK: Involuntary right rotation with hypertonicity noted in right anterior neck musculature and upper trapezius. Jaw Worse in the left side today    ALLERGIES  Allergies   Allergen Reactions    Bee Venom Anaphylaxis    Erythromycin Hives and Itching    Seasonal Allergies        CURRENT MEDICATIONS    Current Outpatient Medications:     buPROPion (WELLBUTRIN XL) 300 MG 24 hr tablet, Take 1 tablet (300 mg) by mouth every morning, Disp: 90 tablet, Rfl: 1    chlorthalidone (HYGROTON) 25 MG tablet, TAKE ONE TABLET BY MOUTH EVERY DAY, Disp: 90 tablet, Rfl: 1    clonazePAM (KLONOPIN) 0.5 MG tablet, Take 1 tab in the evening before bed. Okay to take an extra tab as needed in the day., Disp: 45 tablet, Rfl: 5    DULoxetine (CYMBALTA) 20 MG capsule, Take 1 capsule (20 mg) by mouth daily, Disp: 90 capsule, Rfl: 2    eletriptan (RELPAX) 40 MG tablet, Take 1 tablet (40 mg) by mouth at onset of headache for migraine May repeat in 2 hours. Max 2 tablets/24 hours., Disp: 12 tablet, Rfl: 3    EPINEPHrine (ANY BX GENERIC EQUIV)  0.3 MG/0.3ML injection 2-pack, Inject 0.3 mLs (0.3 mg) into the muscle as needed for anaphylaxis May repeat one time in 5-15 minutes if response to initial dose is inadequate., Disp: 2 each, Rfl: 1    losartan (COZAAR) 50 MG tablet, Take 1 tablet (50 mg) by mouth daily, Disp: 90 tablet, Rfl: 1    Current Facility-Administered Medications:     botulinum toxin type A (BOTOX) 100 units injection 600 Units, 600 Units, Intramuscular, Q90 Days, Simi Vazquez MD, 450 Units at 24 1010    bupivacaine (MARCAINE) 0.5% preservative free injection, 7 mL, Intradermal, Once,     glycopyrrolate (ROBINUL) injection 0.1-0.2 mg, 0.1-0.2 mg, Intravenous, Q5 Min PRN, Saeed Edwards MD, 0.2 mg at 24 1357    lidocaine 1 % 2 mL, 2 mL, Intradermal, Once,     triamcinolone (KENALOG-40) injection 40 mg, 40 mg, Intramuscular, Once,        BOTULINUM NEUROTOXIN INJECTION PROCEDURES    VERIFICATION OF PATIENT IDENTIFICATION AND PROCEDURE     Initials   Patient Name SES   Patient  SES   Procedure Verified by: SES     Prior to the start of the procedure and with procedural staff participation, I verbally confirmed the patient s identity using two indicators, relevant allergies, that the procedure was appropriate and matched the consent or emergent situation, and that the correct equipment/implants were available. Immediately prior to starting the procedure I conducted the Time Out with the procedural staff and re-confirmed the patient s name, procedure, and site/side. (The Joint Commission universal protocol was followed.)  Yes    Sedation (Moderate or Deep): None    ABOVE ASSESSMENTS PERFORMED BY    Simi Vazquez MD      INDICATIONS FOR PROCEDURES  Tia Pablo is a 58 year old patient with involuntary muscle spasms and pain secondary to the diagnosis of oromandibular/cervical dystonia and chronic migraine headaches. Her baseline symptoms have been recalcitrant to oral medications and conservative  therapy.  She is here today for reinjection with Botox.    GOAL OF PROCEDURE  The goal of this procedure is to increase active range of motion, improve volitional motor control, decrease pain  and enhance functional independence.      TOTAL DOSE ADMINISTERED  Dose Administered:  450 units  Botox (Botulinum Toxin Type A) 1:1 Dilution  Unavoidable Drug Waste: Yes  Amount of drug waste (mL): 50 units Botox.  Reason for waste:  Single use vial  Diluent Used:  Preservative Free Normal Saline  Total Volume of Diluent Used:  4.5 ml  NDC #: Botox 100u ([address]-19)      CONSENT  The risks, benefits, and treatment options were discussed with Tia MANUEL Samy and she agreed to proceed.    Written consent was obtained by [facility].     EQUIPMENT USED  Needle-35mm stimulating/recording  Needle-30 gauge  EMG/NCS Machine    SKIN PREPARATION  Skin preparation was performed using an alcohol wipe.    GUIDANCE DESCRIPTION  Electro-myographic guidance was necessary throughout the procedure to accurately identify all areas of dystonic muscles while avoiding injection of non-dystonic muscles, neighboring nerves and nearby vascular structures.     AREA/MUSCLE INJECTED: 450 UNITS BOTOX = TOTAL DOSE, 1:1 DILUTION     1. NECK MUSCLES: 145 UNITS BOTOX = TOTAL DOSE     Right Upper Trapezius - 20 units of Botox at 4 site/s.   Left Upper Trapezius - 20 units of Botox at 4 site/s.      Right Splenius Capitis - 5 units of Botox at 1 site/s.   Left Splenius Capitis - 5 units of Botox at 1 site/s.      Right Auricularis - 15 units of Botox at 3 site/s (anterior, superior & posterior).   Left Auricularis - 15 units of Botox at 3 site/s (anterior, superior & posterior).     Right occipitalis - 15 units of Botox at 3 site/s.   Left occipitalis - 15 units of Botox at 3 site/s.     Right Sternocleidomastoid - 10 units of Botox at 1 site/s.    Right Middle Scalene - 15 units of Botox at 1 site/s.     Right Platysma - 5 units of Botox at 2 site/s.  Left Platysma  - 5 units of Botox at 2 site/s.      2. UPPER EXTREMITY & TRUNK MUSCLES: 90 UNITS BOTOX = TOTAL DOSE     Right Levator scapula - 25 units of Botox at 2 site/s.   Left Levator scapula - 25 units of Botox at 2 site/s.      Right Rhomboid - 20 units of Botox at 1 site/s.    Left Rhomboid - 20 units of Botox at 3 site/s.       3. JAW MUSCLES: 140 UNITS BOTOX = TOTAL DOSE      Right Temporalis - 60 units of Botox at 5 site/s.               Left Temporalis - 60 units of Botox at 5 site/s.     Right Masseter - 10 units of Botox at 1 site/s.    Left Masseter - 10 units of Botox at 1 site/s.     4. FACIAL & SCALP MUSCLES: 75 UNITS BOTOX = TOTAL DOSE     Right Frontalis - 15 units of Botox at 3 site/s (2 middle & 1 hairline).    Left Frontalis - 15 units of Botox at 3 site/s (2 middle & 1 hairline).     Procerus - 5 units of Botox at 1 site/s.       Right  - 5 units of Botox at 1 site/s.    Left  - 5 units of Botox at 1 site/s.      Right Occipitalis - 15 units of Botox at 1 site/s.    Left Occipitalis - 15 units of Botox at 1 site/s.       RESPONSE TO PROCEDURE  Tia Pablo tolerated the procedure well and there were no immediate complications. She was allowed to recover for an appropriate period of time and was discharged home in stable condition.    ASSESSMENT AND PLAN   Botulinum toxin injections: No changes made to Botox dose or distribution today. Patient will continue to monitor response to Botox and report at next appointment.    Referrals: None.  Medications: No changes.   Follow up: Tia Pablo was rescheduled for the next series of injections in 12 weeks, at which time we will evaluate response to today's injections. she may call the clinic prior with any questions or concerns prior to the next appointment.     Patient seen and discussed with my attending Dr Taylor Smiley MD  Resident Physician PGY-3  Physical Medicine and Rehabilitation  11/18/2024 8:13 AM       Simi MAZA  MD Taylor, saw this patient with resident, Dr. Smiley, and agree with the findings and plan of care as documented in this note. I personally reviewed the chart (vitals signs, medications, labs and imaging). My key findings and key management decisions made are reflected in this note.  I was present for the entire procedure listed above.      Simi Vazquez MD

## 2024-11-18 NOTE — LETTER
11/18/2024       RE: Tia Pablo  [id number] [facility] [id number]     Dear Colleague,    Thank you for referring your patient, Tia Pablo, to the [facility] PHYSICAL MEDICINE AND REHABILITATION CLINIC [place] at [facility]. Please see a copy of my visit note below.      [facility]     PM&R CLINIC NOTE  BOTULINUM TOXIN PROCEDURE      HPI  No chief complaint on file.    Tia Pablo is a 58 year old female with a history of headaches and generalized dystonia with pain who presents to clinic for botulinum toxin injections for management of chronic migraine headaches, cervical and oromandibular dystonia.     SINCE LAST VISIT  Tia Pablo was last seen here in clinic on 8/23/2024, at which time she received 450 units of Botox.     Lately, she has been doing well. She has been doing acupuncture on a regular basis, and this has been helpful for her pain.      Patient denies new medical diagnoses, illnesses, hospitalizations, emergency room visits, and injuries since the previous injection with botulinum neurotoxin.     RESPONSE TO PREVIOUS TREATMENT    Side effects: None reported.    1.  Headache frequency during this injection cycle: 2-4 migraine headache days per month. This is compared to her baseline headache frequency of 30 headache days per month. After 7 weeks the Botox started to wear off and had a daily headache.    2.  Headache duration during this injection cycle:  Headache duration is typically no longer than three hours with Relpax, but if the Relpax does not work right away, she has to take two doses and the headaches last much longer. Patient reports no episodes of multiple day headaches during this injection cycle.  Has taken Relpax 2 times at the most during this cycle     3.  Headache intensity during this injection cycle:    A.  5/10  =  Typical pain level.  B.  8/10  =  Worst pain level.  C.   0/10  =  Lowest pain level.    4.  Change in headache medication usage during this injection cycle:  (For Example:  Able to decrease use of oral pain medications.) She is only taking her migraine headache medication about 1-2 times per month, whereas she was taking it much more frequently prior to Botox. She also takes robaxin as needed for more severe headaches. Just took Relpax twice during this cycle. At times might take tylenol or advil.    5.  ER Visits During This Injection Cycle: None.     6.  Functional Performance:  Change in ADL's, social interaction, days lost from work, etc. Patient reports being able to more fully participate in social and family activities and responsibilities as headache symptoms have improved. She is overall a lot more productive when Botox is working. Recently, she was able to go on a [place] cruise with family, and this was quite enjoyable. 8    Dystonia Improvement (neck and jaw): Yes.  Percent Improvement: 80 %    Duration of Benefit:  7 weeks and followed by a gradual reduction in benefit. Her jaw clenching gets severe a few weeks before her appointment.       PHYSICAL EXAM  VS: There were no vitals taken for this visit.   GEN: Pleasant and cooperative, in no acute distress  HEENT: No facial asymmetry  NECK: Involuntary right rotation with hypertonicity noted in right anterior neck musculature and upper trapezius. Jaw Worse in the left side today    ALLERGIES  Allergies   Allergen Reactions     Bee Venom Anaphylaxis     Erythromycin Hives and Itching     Seasonal Allergies        CURRENT MEDICATIONS    Current Outpatient Medications:      buPROPion (WELLBUTRIN XL) 300 MG 24 hr tablet, Take 1 tablet (300 mg) by mouth every morning, Disp: 90 tablet, Rfl: 1     chlorthalidone (HYGROTON) 25 MG tablet, TAKE ONE TABLET BY MOUTH EVERY DAY, Disp: 90 tablet, Rfl: 1     clonazePAM (KLONOPIN) 0.5 MG tablet, Take 1 tab in the evening before bed. Okay to take an extra tab as needed in the  day., Disp: 45 tablet, Rfl: 5     DULoxetine (CYMBALTA) 20 MG capsule, Take 1 capsule (20 mg) by mouth daily, Disp: 90 capsule, Rfl: 2     eletriptan (RELPAX) 40 MG tablet, Take 1 tablet (40 mg) by mouth at onset of headache for migraine May repeat in 2 hours. Max 2 tablets/24 hours., Disp: 12 tablet, Rfl: 3     EPINEPHrine (ANY BX GENERIC EQUIV) 0.3 MG/0.3ML injection 2-pack, Inject 0.3 mLs (0.3 mg) into the muscle as needed for anaphylaxis May repeat one time in 5-15 minutes if response to initial dose is inadequate., Disp: 2 each, Rfl: 1     losartan (COZAAR) 50 MG tablet, Take 1 tablet (50 mg) by mouth daily, Disp: 90 tablet, Rfl: 1    Current Facility-Administered Medications:      botulinum toxin type A (BOTOX) 100 units injection 600 Units, 600 Units, Intramuscular, Q90 Days, StandalSimi MD, 450 Units at 24 1010     bupivacaine (MARCAINE) 0.5% preservative free injection, 7 mL, Intradermal, Once,      glycopyrrolate (ROBINUL) injection 0.1-0.2 mg, 0.1-0.2 mg, Intravenous, Q5 Min PRN, Saeed Edwards MD, 0.2 mg at 24 1357     lidocaine 1 % 2 mL, 2 mL, Intradermal, Once,      triamcinolone (KENALOG-40) injection 40 mg, 40 mg, Intramuscular, Once,        BOTULINUM NEUROTOXIN INJECTION PROCEDURES    VERIFICATION OF PATIENT IDENTIFICATION AND PROCEDURE     Initials   Patient Name SES   Patient  SES   Procedure Verified by: SES     Prior to the start of the procedure and with procedural staff participation, I verbally confirmed the patient s identity using two indicators, relevant allergies, that the procedure was appropriate and matched the consent or emergent situation, and that the correct equipment/implants were available. Immediately prior to starting the procedure I conducted the Time Out with the procedural staff and re-confirmed the patient s name, procedure, and site/side. (The Joint Commission universal protocol was followed.)  Yes    Sedation (Moderate or Deep):  None    ABOVE ASSESSMENTS PERFORMED BY    Simi Vazquez MD      INDICATIONS FOR PROCEDURES  Tia Pablo is a 58 year old patient with involuntary muscle spasms and pain secondary to the diagnosis of oromandibular/cervical dystonia and chronic migraine headaches. Her baseline symptoms have been recalcitrant to oral medications and conservative therapy.  She is here today for reinjection with Botox.    GOAL OF PROCEDURE  The goal of this procedure is to increase active range of motion, improve volitional motor control, decrease pain  and enhance functional independence.      TOTAL DOSE ADMINISTERED  Dose Administered:  450 units  Botox (Botulinum Toxin Type A) 1:1 Dilution  Unavoidable Drug Waste: Yes  Amount of drug waste (mL): 50 units Botox.  Reason for waste:  Single use vial  Diluent Used:  Preservative Free Normal Saline  Total Volume of Diluent Used:  4.5 ml  NDC #: Botox 100u ([id number])      CONSENT  The risks, benefits, and treatment options were discussed with Tia Pablo and she agreed to proceed.    Written consent was obtained by [facility].     EQUIPMENT USED  Needle-35mm stimulating/recording  Needle-30 gauge  EMG/NCS Machine    SKIN PREPARATION  Skin preparation was performed using an alcohol wipe.    GUIDANCE DESCRIPTION  Electro-myographic guidance was necessary throughout the procedure to accurately identify all areas of dystonic muscles while avoiding injection of non-dystonic muscles, neighboring nerves and nearby vascular structures.     AREA/MUSCLE INJECTED: 450 UNITS BOTOX = TOTAL DOSE, 1:1 DILUTION     1. NECK MUSCLES: 145 UNITS BOTOX = TOTAL DOSE     Right Upper Trapezius - 20 units of Botox at 4 site/s.   Left Upper Trapezius - 20 units of Botox at 4 site/s.      Right Splenius Capitis - 5 units of Botox at 1 site/s.   Left Splenius Capitis - 5 units of Botox at 1 site/s.      Right Auricularis - 15 units of Botox at 3 site/s (anterior, superior & posterior).   Left Auricularis - 15  units of Botox at 3 site/s (anterior, superior & posterior).     Right occipitalis - 15 units of Botox at 3 site/s.   Left occipitalis - 15 units of Botox at 3 site/s.     Right Sternocleidomastoid - 10 units of Botox at 1 site/s.    Right Middle Scalene - 15 units of Botox at 1 site/s.     Right Platysma - 5 units of Botox at 2 site/s.  Left Platysma - 5 units of Botox at 2 site/s.      2. UPPER EXTREMITY & TRUNK MUSCLES: 90 UNITS BOTOX = TOTAL DOSE     Right Levator scapula - 25 units of Botox at 2 site/s.   Left Levator scapula - 25 units of Botox at 2 site/s.      Right Rhomboid - 20 units of Botox at 1 site/s.    Left Rhomboid - 20 units of Botox at 3 site/s.       3. JAW MUSCLES: 140 UNITS BOTOX = TOTAL DOSE      Right Temporalis - 60 units of Botox at 5 site/s.               Left Temporalis - 60 units of Botox at 5 site/s.     Right Masseter - 10 units of Botox at 1 site/s.    Left Masseter - 10 units of Botox at 1 site/s.     4. FACIAL & SCALP MUSCLES: 75 UNITS BOTOX = TOTAL DOSE     Right Frontalis - 15 units of Botox at 3 site/s (2 middle & 1 hairline).    Left Frontalis - 15 units of Botox at 3 site/s (2 middle & 1 hairline).     Procerus - 5 units of Botox at 1 site/s.       Right  - 5 units of Botox at 1 site/s.    Left  - 5 units of Botox at 1 site/s.      Right Occipitalis - 15 units of Botox at 1 site/s.    Left Occipitalis - 15 units of Botox at 1 site/s.       RESPONSE TO PROCEDURE  Tia Pablo tolerated the procedure well and there were no immediate complications. She was allowed to recover for an appropriate period of time and was discharged home in stable condition.    ASSESSMENT AND PLAN   Botulinum toxin injections: No changes made to Botox dose or distribution today. Patient will continue to monitor response to Botox and report at next appointment.    Referrals: None.  Medications: No changes.   Follow up: Tia Pablo was rescheduled for the next series of injections  in 12 weeks, at which time we will evaluate response to today's injections. she may call the clinic prior with any questions or concerns prior to the next appointment.     Patient seen and discussed with my attending Dr Taylor Smiley MD  Resident Physician PGY-3  Physical Medicine and Rehabilitation  11/18/2024 8:13 AM       I, Simi Vazquez MD, saw this patient with resident, Dr. Smiley, and agree with the findings and plan of care as documented in this note. I personally reviewed the chart (vitals signs, medications, labs and imaging). My key findings and key management decisions made are reflected in this note.  I was present for the entire procedure listed above.      Simi Vazquez MD          Again, thank you for allowing me to participate in the care of your patient.      Sincerely,    Simi Vazquez MD

## 2024-11-25 ENCOUNTER — ANCILLARY PROCEDURE (OUTPATIENT)
Dept: MAMMOGRAPHY | Facility: CLINIC | Age: 59
End: 2024-11-25
Attending: FAMILY MEDICINE
Payer: COMMERCIAL

## 2024-11-25 DIAGNOSIS — Z12.31 VISIT FOR SCREENING MAMMOGRAM: ICD-10-CM

## 2024-11-25 PROCEDURE — 77067 SCR MAMMO BI INCL CAD: CPT | Mod: TC | Performed by: RADIOLOGY

## 2024-11-25 PROCEDURE — 77063 BREAST TOMOSYNTHESIS BI: CPT | Mod: TC | Performed by: RADIOLOGY

## 2024-12-17 ENCOUNTER — MYC MEDICAL ADVICE (OUTPATIENT)
Dept: FAMILY MEDICINE | Facility: CLINIC | Age: 59
End: 2024-12-17
Payer: COMMERCIAL

## 2024-12-17 DIAGNOSIS — M76.899 QUADRICEPS TENDINITIS: Primary | ICD-10-CM

## 2024-12-18 ENCOUNTER — PATIENT OUTREACH (OUTPATIENT)
Dept: CARE COORDINATION | Facility: CLINIC | Age: 59
End: 2024-12-18
Payer: COMMERCIAL

## 2024-12-23 ENCOUNTER — ANCILLARY PROCEDURE (OUTPATIENT)
Dept: GENERAL RADIOLOGY | Facility: CLINIC | Age: 59
End: 2024-12-23
Attending: PEDIATRICS
Payer: COMMERCIAL

## 2024-12-23 ENCOUNTER — OFFICE VISIT (OUTPATIENT)
Dept: ORTHOPEDICS | Facility: CLINIC | Age: 59
End: 2024-12-23
Attending: FAMILY MEDICINE
Payer: COMMERCIAL

## 2024-12-23 DIAGNOSIS — M22.8X1 MALTRACKING OF RIGHT PATELLA: ICD-10-CM

## 2024-12-23 DIAGNOSIS — M25.561 RIGHT KNEE PAIN, UNSPECIFIED CHRONICITY: ICD-10-CM

## 2024-12-23 DIAGNOSIS — S83.001A SUBLUXATION OF RIGHT PATELLA, INITIAL ENCOUNTER: ICD-10-CM

## 2024-12-23 DIAGNOSIS — M25.561 RIGHT KNEE PAIN, UNSPECIFIED CHRONICITY: Primary | ICD-10-CM

## 2024-12-23 DIAGNOSIS — M17.10 PATELLOFEMORAL ARTHRITIS: ICD-10-CM

## 2024-12-23 PROCEDURE — 73562 X-RAY EXAM OF KNEE 3: CPT | Mod: TC | Performed by: RADIOLOGY

## 2024-12-23 PROCEDURE — 99244 OFF/OP CNSLTJ NEW/EST MOD 40: CPT | Performed by: PEDIATRICS

## 2024-12-23 NOTE — LETTER
12/23/2024      Tia Pablo  [address] [address] [facility] [address]      Dear Colleague,    Thank you for referring your patient, Tia Pablo, to the [facility]. Please see a copy of my visit note below.    ASSESSMENT & PLAN    Tia was seen today for pain.    Diagnoses and all orders for this visit:    Right knee pain, unspecified chronicity  -     XR Knee Standing AP [place] Bilat Lat Right; Future  -     Physical Therapy  Referral; Future    Maltracking of right patella  -     Physical Therapy  Referral; Future    Subluxation of right patella, initial encounter  -     Physical Therapy  Referral; Future    Patellofemoral arthritis  -     Physical Therapy  Referral; Future    Other orders  -     Orthopedic  Referral      This issue is acute on chronic and Unchanged.      ICD-10-CM    1. Right knee pain, unspecified chronicity  M25.561 XR Knee Standing AP [place] Bilat Lat Right     Physical Therapy  Referral      2. Maltracking of right patella  M22.8X1 Physical Therapy  Referral      3. Subluxation of right patella, initial encounter  S83.001A Physical Therapy  Referral      4. Patellofemoral arthritis  M17.10 Physical Therapy  Referral          We discussed these other possible diagnosis: Right knee pain with evidence of patellar subluxation, patellofemoral arthritis.     We discussed the following treatment options: symptom treatment, activity modification/rest, imaging, rehab and referral. Following discussion, plan: recommend physical therapy for strengthening, supportive care.    Plan:  - Today's Plan of Care:  Rehab: Physical Therapy: [facility] - [phone]    Discussed activity considerations and other supportive care including Ice/Heat, OTC and other topical medications as needed. Diclofenac/Voltaren gel    Discussed bracing options - patellar stabilizing or J  brace    -We also discussed other future treatment options:  Consideration of injections - likely corticosteroid to start    Follow Up: 6 - 8 weeks  Can continue to follow up with me for further treatment recommendations    Concerning signs and symptoms were reviewed and all questions were answered at this time.    Kathryn Hung MD [facility]  Sports Medicine Physician  [facility]    -----  Chief Complaint   Patient presents with     Right Knee - Pain       SUBJECTIVE  Tia Pablo is a/an 59 year old female who is seen in consultation at the request of Ana Guzman M.D. for evaluation of right thigh/knee.     The patient is seen by themselves.    Onset: 8-9 month(s) ago. Patient describes injury as twisting [place] the beach.  Location of Pain: right medial knee into quadriceps tendon  Worsened by: walking on treadmill, rowing machine  Better with: compression sleeve  Treatments tried: ice, heat, Tylenol, and ibuprofen  Associated symptoms: swelling and feeling of instability    Orthopedic/Surgical history: YES - Date: hx of patellar subluxation events - patient reported most recently was April  Social History/Occupation: Retired, enjoys yoga, going to the gym and hanging out with her young grandson    REVIEW OF SYSTEMS:  Review of Systems    OBJECTIVE:  There were no vitals taken for this visit.   General: healthy, alert and in no distress  Skin: no suspicious lesions or rash.  CV: distal perfusion intact   Resp: normal respiratory effort without conversational dyspnea   Psych: normal mood and affect  Gait: NORMAL  Neuro: Normal light sensory exam of lower extremity    Right Knee exam    Inspection:      no effusion, swelling of bruising bilateral    Patella:      Mobility -       hypermobile right       Crepitus noted in the patellofemoral joint right  High riding patella    Tender:      medial patellar border right       medial joint line right    Non Tender:      remainder of knee  area bilateral    Knee ROM:      Range of motion limited in full flexion and extension right    Hip ROM:     Full active and passive ROM bilateral    Strength:      5-/5 with knee extension right    Special Tests:     neg (-) Ricardo right       neg (-) anterior drawer right       neg (-) posterior drawer right       neg (-) varus at 0 deg and 30 deg right       neg (-) valgus at 0 deg and 30 deg right    Gait:      normal    Neurovascular:      2+ peripheral pulses bilaterally and brisk capillary refill       sensation grossly intact       RADIOLOGY:  Final results and radiologist's interpretation, available in the [facility] health record.  Images were reviewed with the patient in the office today.  My personal interpretation of the performed imaging:     AP and sunrise bilateral and right lateral XR views of knees reviewed: no acute bony abnormality, significant degenerative change, worse in the PF joint  - will follow official read    Review of the result(s) of each unique test - XR             Again, thank you for allowing me to participate in the care of your patient.        Sincerely,        Kathryn Hung MD    Electronically signed

## 2024-12-23 NOTE — PATIENT INSTRUCTIONS
We discussed these other possible diagnosis: Right knee pain with evidence of patellar subluxation, patellofemoral arthritis.     We discussed the following treatment options: symptom treatment, activity modification/rest, imaging, rehab and referral. Following discussion, plan: recommend physical therapy for strengthening, supportive care.    Plan:  - Today's Plan of Care:  Rehab: Physical Therapy: [facility] - [phone]    Discussed activity considerations and other supportive care including Ice/Heat, OTC and other topical medications as needed. Diclofenac/Voltaren gel    Discussed bracing options - patellar stabilizing or J brace    -We also discussed other future treatment options:  Consideration of injections - likely corticosteroid to start    Follow Up: 6 - 8 weeks  Can continue to follow up with me for further treatment recommendations    If you have any further questions for your physician or physician s care team you can call [phone].

## 2024-12-23 NOTE — PROGRESS NOTES
ASSESSMENT & PLAN    Tia was seen today for pain.    Diagnoses and all orders for this visit:    Right knee pain, unspecified chronicity  -     XR Knee Standing AP [place] Bilat Lat Right; Future  -     Physical Therapy  Referral; Future    Maltracking of right patella  -     Physical Therapy  Referral; Future    Subluxation of right patella, initial encounter  -     Physical Therapy  Referral; Future    Patellofemoral arthritis  -     Physical Therapy  Referral; Future    Other orders  -     Orthopedic  Referral      This issue is acute on chronic and Unchanged.      ICD-10-CM    1. Right knee pain, unspecified chronicity  M25.561 XR Knee Standing AP [place] Bilat Lat Right     Physical Therapy  Referral      2. Maltracking of right patella  M22.8X1 Physical Therapy  Referral      3. Subluxation of right patella, initial encounter  S83.001A Physical Therapy  Referral      4. Patellofemoral arthritis  M17.10 Physical Therapy  Referral          We discussed these other possible diagnosis: Right knee pain with evidence of patellar subluxation, patellofemoral arthritis.     We discussed the following treatment options: symptom treatment, activity modification/rest, imaging, rehab and referral. Following discussion, plan: recommend physical therapy for strengthening, supportive care.    Plan:  - Today's Plan of Care:  Rehab: Physical Therapy: [facility] - [phone]    Discussed activity considerations and other supportive care including Ice/Heat, OTC and other topical medications as needed. Diclofenac/Voltaren gel    Discussed bracing options - patellar stabilizing or J brace    -We also discussed other future treatment options:  Consideration of injections - likely corticosteroid to start    Follow Up: 6 - 8 weeks  Can continue to follow up with me for further treatment recommendations    Concerning signs and symptoms were  reviewed and all questions were answered at this time.    Kathryn Hung MD [facility]  Sports Medicine Physician  [facility]    -----  Chief Complaint   Patient presents with    Right Knee - Pain       SUBJECTIVE  Tia Pablo is a/an 59 year old female who is seen in consultation at the request of Ana Guzman M.D. for evaluation of right thigh/knee.     The patient is seen by themselves.    Onset: 8-9 month(s) ago. Patient describes injury as [place].  Location of Pain: right medial knee into quadriceps tendon  Worsened by: walking on treadmill, rowing machine  Better with: compression sleeve  Treatments tried: ice, heat, Tylenol, and ibuprofen  Associated symptoms: swelling and feeling of instability    Orthopedic/Surgical history: YES - Date: hx of patellar subluxation events - patient reported most recently was April  Social History/Occupation: Retired, enjoys yoga, going to the gym and hanging out with her young grandson    REVIEW OF SYSTEMS:  Review of Systems    OBJECTIVE:  There were no vitals taken for this visit.   General: healthy, alert and in no distress  Skin: no suspicious lesions or rash.  CV: distal perfusion intact   Resp: normal respiratory effort without conversational dyspnea   Psych: normal mood and affect  Gait: NORMAL  Neuro: Normal light sensory exam of lower extremity    Right Knee exam    Inspection:      no effusion, swelling of bruising bilateral    Patella:      Mobility -       hypermobile right       Crepitus noted in the patellofemoral joint right  High riding patella    Tender:      medial patellar border right       medial joint line right    Non Tender:      remainder of knee area bilateral    Knee ROM:      Range of motion limited in full flexion and extension right    Hip ROM:     Full active and passive ROM bilateral    Strength:      5-/5 with knee extension right    Special Tests:     neg (-) Ricardo right       neg (-)  anterior drawer right       neg (-) posterior drawer right       neg (-) varus at 0 deg and 30 deg right       neg (-) valgus at 0 deg and 30 deg right    Gait:      normal    Neurovascular:      2+ peripheral pulses bilaterally and brisk capillary refill       sensation grossly intact       RADIOLOGY:  Final results and radiologist's interpretation, available in the [facility] health record.  Images were reviewed with the patient in the office today.  My personal interpretation of the performed imaging:     AP and sunrise bilateral and right lateral XR views of knees reviewed: no acute bony abnormality, significant degenerative change, worse in the PF joint  - will follow official read    Review of the result(s) of each unique test - XR

## 2024-12-29 DIAGNOSIS — I10 ESSENTIAL HYPERTENSION: ICD-10-CM

## 2024-12-29 DIAGNOSIS — F32.0 CURRENT MILD EPISODE OF MAJOR DEPRESSIVE DISORDER WITHOUT PRIOR EPISODE (H): ICD-10-CM

## 2024-12-30 RX ORDER — LOSARTAN POTASSIUM 50 MG/1
50 TABLET ORAL DAILY
Qty: 90 TABLET | Refills: 2 | Status: SHIPPED | OUTPATIENT
Start: 2024-12-30

## 2024-12-30 RX ORDER — BUPROPION HYDROCHLORIDE 300 MG/1
300 TABLET ORAL EVERY MORNING
Qty: 90 TABLET | Refills: 1 | Status: SHIPPED | OUTPATIENT
Start: 2024-12-30

## 2024-12-30 NOTE — TELEPHONE ENCOUNTER
GFR Estimate   Date Value Ref Range Status   10/15/2024 >90 >60 mL/min/1.73m2 Final     Comment:     eGFR calculated using 2021 CKD-EPI equation.   12/03/2020 >90 >60 mL/min/[1.73_m2] Final     Comment:     Non  GFR Calc  Starting 12/18/2018, serum creatinine based estimated GFR (eGFR) will be   calculated using the Chronic Kidney Disease Epidemiology Collaboration   (CKD-EPI) equation.

## 2025-01-08 ENCOUNTER — OFFICE VISIT (OUTPATIENT)
Dept: PHYSICAL MEDICINE AND REHAB | Facility: CLINIC | Age: 60
End: 2025-01-08
Payer: COMMERCIAL

## 2025-01-08 DIAGNOSIS — G43.719 CHRONIC MIGRAINE WITHOUT AURA, INTRACTABLE, WITHOUT STATUS MIGRAINOSUS: Primary | ICD-10-CM

## 2025-01-08 DIAGNOSIS — M79.18 MYOFASCIAL PAIN: ICD-10-CM

## 2025-01-08 PROCEDURE — 20553 NJX 1/MLT TRIGGER POINTS 3/>: CPT | Performed by: PHYSICAL MEDICINE & REHABILITATION

## 2025-01-08 RX ORDER — BUPIVACAINE HYDROCHLORIDE 5 MG/ML
8 INJECTION, SOLUTION PERINEURAL ONCE
Status: DISCONTINUED | OUTPATIENT
Start: 2025-01-08 | End: 2025-01-08

## 2025-01-08 RX ADMIN — BUPIVACAINE HYDROCHLORIDE 8 ML: 5 INJECTION, SOLUTION EPIDURAL; INTRACAUDAL at 14:50

## 2025-01-08 NOTE — PROGRESS NOTES
Pre procedure Diagnosis: myofascial pain   Post procedure Diagnosis: Same   Procedure performed: trigger point injections   Anesthesia: none   Complications: none     Indications:   Tia Pablo is a 59 year old female with a history of chronic migraine headaches, cervical and oromandibular dystonia who is followed in our clinic for botulinum toxin injections. Last round was on 11/18/24.  I saw her last on 10/2/24 for repeat TPIs and we used steroids.   Dr. Vazquez recommended trial of ONBs and/or TPIs to help with her symptoms between botox injections.  She is also followed by Dr. Platt in the movement disorder clinic and was last seen 7/25/24.  Today, she reported severe muscle tightness and spasms at her jaws, lateral / posterior neck area, traps and christine-scapular areas b/l. She is very uncomfortable during the day and doesn't sleep well due to worsening symptoms.      Options/alternatives, benefits and risks were discussed with the patient including bleeding, infection, tissue trauma and pnuemothorax. Questions were answered to her satisfaction and she agrees to proceed. Voluntary informed consent was obtained and signed.   Vitals were reviewed: Yes   Allergies were reviewed: Yes   Medications were reviewed: Yes     Procedure:   After getting informed consent, a Pause for the Cause was performed.   Trigger points were identified by patient, and marked when appropriate.   The area was prepped with Chloroprep.   Using clean technique, injections were completed using a 30G, 1 inch needle.    Muscle groups injected:   Bilateral   Masseter  Splenius capitis   Splenius cervicis  Traps   Levator at scap insertion R>>L  Rhomboids R>>L     Injection solution contained:   4ml of 1% lidocaine and 8ml of 0.5% bupivacaine.     Hemostasis was achieved, the area was cleaned, and bandaids were placed when appropriate.   The patient tolerated the procedure well. She was slightly lightheaded after the injections so rested on  the bed for a few minutes before she left.     Plan:   -trigger point injections today; used anesthetics only.  We can consider doing the injections in prone position at our next visit.   -f/u with Dr. Vazquez as scheduled for botox injections   -continue working with PT  -last saw Dr. Platt in 7/2022 and needs to follow up as recommended     Angela Franz MD  Physical Medicine & Rehabilitation

## 2025-01-08 NOTE — NURSING NOTE
Chief Complaint   Patient presents with    Procedure     Trigger point injections, confirmed with patient     Steve Watson

## 2025-01-08 NOTE — LETTER
1/8/2025       RE: Tia Pablo  [address] [facility] [address]     Dear Colleague,    Thank you for referring your patient, Tia Pablo, to the [facility] PHYSICAL MEDICINE AND REHABILITATION CLINIC [place] at [facility]. Please see a copy of my visit note below.    Pre procedure Diagnosis: myofascial pain   Post procedure Diagnosis: Same   Procedure performed: trigger point injections   Anesthesia: none   Complications: none     Indications:   Tia Pablo is a 59 year old female with a history of chronic migraine headaches, cervical and oromandibular dystonia who is followed in our clinic for botulinum toxin injections. Last round was on 11/18/24.  I saw her last on 10/2/24 for repeat TPIs and we used steroids.   Dr. Vazquez recommended trial of ONBs and/or TPIs to help with her symptoms between botox injections.  She is also followed by Dr. Platt in the movement disorder clinic and was last seen 7/25/24.  Today, she reported severe muscle tightness and spasms at her jaws, lateral / posterior neck area, traps and christine-scapular areas b/l. She is very uncomfortable during the day and doesn't sleep well due to worsening symptoms.      Options/alternatives, benefits and risks were discussed with the patient including bleeding, infection, tissue trauma and pnuemothorax. Questions were answered to her satisfaction and she agrees to proceed. Voluntary informed consent was obtained and signed.   Vitals were reviewed: Yes   Allergies were reviewed: Yes   Medications were reviewed: Yes     Procedure:   After getting informed consent, a Pause for the Cause was performed.   Trigger points were identified by patient, and marked when appropriate.   The area was prepped with Chloroprep.   Using clean technique, injections were completed using a 30G, 1 inch needle.    Muscle groups injected:   Bilateral   Masseter  Splenius capitis   Splenius  cervicis  Traps   Levator at scap insertion R>>L  Rhomboids R>>L     Injection solution contained:   4ml of 1% lidocaine and 8ml of 0.5% bupivacaine.     Hemostasis was achieved, the area was cleaned, and bandaids were placed when appropriate.   The patient tolerated the procedure well. She was slightly lightheaded after the injections so rested on the bed for a few minutes before she left.     Plan:   -trigger point injections today; used anesthetics only.  We can consider doing the injections in prone position at our next visit.   -f/u with Dr. Vazquez as scheduled for botox injections   -continue working with PT  -last saw Dr. Platt in 7/2022 and needs to follow up as recommended     Angela Franz MD  Physical Medicine & Rehabilitation             Again, thank you for allowing me to participate in the care of your patient.      Sincerely,    Angela Franz MD

## 2025-01-30 ENCOUNTER — THERAPY VISIT (OUTPATIENT)
Dept: PHYSICAL THERAPY | Facility: CLINIC | Age: 60
End: 2025-01-30
Attending: PEDIATRICS
Payer: COMMERCIAL

## 2025-01-30 DIAGNOSIS — M25.561 RIGHT KNEE PAIN, UNSPECIFIED CHRONICITY: Primary | ICD-10-CM

## 2025-02-08 ASSESSMENT — SLEEP AND FATIGUE QUESTIONNAIRES
HOW LIKELY ARE YOU TO NOD OFF OR FALL ASLEEP WHILE SITTING QUIETLY AFTER LUNCH WITHOUT ALCOHOL: WOULD NEVER DOZE
HOW LIKELY ARE YOU TO NOD OFF OR FALL ASLEEP WHILE SITTING AND TALKING TO SOMEONE: WOULD NEVER DOZE
HOW LIKELY ARE YOU TO NOD OFF OR FALL ASLEEP WHILE SITTING INACTIVE IN A PUBLIC PLACE: WOULD NEVER DOZE
HOW LIKELY ARE YOU TO NOD OFF OR FALL ASLEEP IN A CAR, WHILE STOPPED FOR A FEW MINUTES IN TRAFFIC: WOULD NEVER DOZE
HOW LIKELY ARE YOU TO NOD OFF OR FALL ASLEEP WHILE SITTING AND READING: WOULD NEVER DOZE
HOW LIKELY ARE YOU TO NOD OFF OR FALL ASLEEP WHEN YOU ARE A PASSENGER IN A CAR FOR AN HOUR WITHOUT A BREAK: WOULD NEVER DOZE
HOW LIKELY ARE YOU TO NOD OFF OR FALL ASLEEP WHILE WATCHING TV: WOULD NEVER DOZE
HOW LIKELY ARE YOU TO NOD OFF OR FALL ASLEEP WHILE LYING DOWN TO REST IN THE AFTERNOON WHEN CIRCUMSTANCES PERMIT: WOULD NEVER DOZE

## 2025-02-10 ENCOUNTER — OFFICE VISIT (OUTPATIENT)
Dept: PHYSICAL MEDICINE AND REHAB | Facility: CLINIC | Age: 60
End: 2025-02-10
Payer: COMMERCIAL

## 2025-02-10 DIAGNOSIS — G24.3 SPASMODIC TORTICOLLIS: ICD-10-CM

## 2025-02-10 DIAGNOSIS — G43.719 CHRONIC MIGRAINE WITHOUT AURA, INTRACTABLE, WITHOUT STATUS MIGRAINOSUS: Primary | ICD-10-CM

## 2025-02-10 DIAGNOSIS — G24.4 OROMANDIBULAR DYSTONIA: ICD-10-CM

## 2025-02-10 PROCEDURE — 64615 CHEMODENERV MUSC MIGRAINE: CPT | Performed by: PHYSICAL MEDICINE & REHABILITATION

## 2025-02-10 PROCEDURE — 95874 GUIDE NERV DESTR NEEDLE EMG: CPT | Performed by: PHYSICAL MEDICINE & REHABILITATION

## 2025-02-10 NOTE — PROGRESS NOTES
[facility]     PM&R CLINIC NOTE  BOTULINUM TOXIN PROCEDURE      HPI  No chief complaint on file.    Tia Pablo is a 59 year old female with a history of headaches and generalized dystonia with pain who presents to clinic for botulinum toxin injections for management of chronic migraine headaches, cervical and oromandibular dystonia.     SINCE LAST VISIT  Tia Pablo was last seen here in clinic on 11/18/2024, at which time she received 450 units of Botox.      She is currently experiencing significant soreness in her right shoulder blade region. She has observed that, consistently after receiving Botox injections, the shoulder blade area becomes more painful around week 7, often requiring trigger point injections for relief.    Patient denies new medical diagnoses, illnesses, hospitalizations, emergency room visits, and injuries since the previous injection with botulinum neurotoxin.     RESPONSE TO PREVIOUS TREATMENT    Side effects: None reported.    1.  Headache frequency during this injection cycle: Approximately 3-4 migraine headache days per month, with near daily tension headache. This is compared to her baseline headache frequency of 30 headache days per month.    2.  Headache duration during this injection cycle:  Headache duration is typically no longer than three hours with Relpax, but if the Relpax does not work right away, she has to take two doses and the headaches last much longer. Patient reports no episodes of multiple day headaches during this injection cycle.  Has taken Relpax 2 times at the most during this cycle     3.  Headache intensity during this injection cycle:    A.  4/10  =  Typical pain level.  B.  8/10  =  Worst pain level.  C.  0/10  =  Lowest pain level.    4.  Change in headache medication usage during this injection cycle:  (For Example:  Able to decrease use of oral pain medications.) She is only taking her migraine headache medication  about 1-2 times per month, whereas she was taking it much more frequently prior to Botox. She also takes robaxin as needed for more severe headaches. Just took Relpax twice during this cycle. At times might take tylenol or advil.    5.  ER Visits During This Injection Cycle: None.     6.  Functional Performance:  Change in ADL's, social interaction, days lost from work, etc. Patient reports being able to more fully participate in social and family activities and responsibilities as headache symptoms have improved. She is overall a lot more productive when Botox is working. Recently, she was able to go on a [place] cruise with family, and this was quite enjoyable. 8    Dystonia Improvement (neck and jaw): Yes.  Percent Improvement: 80 %    Duration of Benefit:  7 weeks and followed by a gradual reduction in benefit. Her jaw clenching gets more severely painful and tense a few weeks before her appointment.       PHYSICAL EXAM  VS: There were no vitals taken for this visit.   GEN: Pleasant and cooperative, in no acute distress  HEENT: No facial asymmetry  NECK: Involuntary right rotation with hypertonicity noted in right anterior neck musculature and upper trapezius. Jaw Worse in the left side today    ALLERGIES  Allergies   Allergen Reactions    Bee Venom Anaphylaxis    Erythromycin Hives and Itching    Seasonal Allergies        CURRENT MEDICATIONS    Current Outpatient Medications:     buPROPion (WELLBUTRIN XL) 300 MG 24 hr tablet, TAKE ONE TABLET BY MOUTH EVERY MORNING, Disp: 90 tablet, Rfl: 1    chlorthalidone (HYGROTON) 25 MG tablet, TAKE ONE TABLET BY MOUTH EVERY DAY, Disp: 90 tablet, Rfl: 1    clonazePAM (KLONOPIN) 0.5 MG tablet, Take 1 tab in the evening before bed. Okay to take an extra tab as needed in the day., Disp: 45 tablet, Rfl: 5    DULoxetine (CYMBALTA) 20 MG capsule, Take 1 capsule (20 mg) by mouth daily, Disp: 90 capsule, Rfl: 2    eletriptan (RELPAX) 40 MG tablet, Take 1 tablet (40 mg) by mouth at  onset of headache for migraine May repeat in 2 hours. Max 2 tablets/24 hours., Disp: 12 tablet, Rfl: 3    EPINEPHrine (ANY BX GENERIC EQUIV) 0.3 MG/0.3ML injection 2-pack, Inject 0.3 mLs (0.3 mg) into the muscle as needed for anaphylaxis May repeat one time in 5-15 minutes if response to initial dose is inadequate., Disp: 2 each, Rfl: 1    losartan (COZAAR) 50 MG tablet, TAKE ONE TABLET BY MOUTH EVERY DAY, Disp: 90 tablet, Rfl: 2    Current Facility-Administered Medications:     botulinum toxin type A (BOTOX) 100 units injection 600 Units, 600 Units, Intramuscular, Q90 Days, Simi Vazquez MD, 450 Units at 02/10/25 1145    glycopyrrolate (ROBINUL) injection 0.1-0.2 mg, 0.1-0.2 mg, Intravenous, Q5 Min PRN, Saeed Edwards MD, 0.2 mg at 24 1357    triamcinolone (KENALOG-40) injection 40 mg, 40 mg, Intramuscular, Once,        BOTULINUM NEUROTOXIN INJECTION PROCEDURES    VERIFICATION OF PATIENT IDENTIFICATION AND PROCEDURE     Initials   Patient Name SES   Patient  SES   Procedure Verified by: SES     Prior to the start of the procedure and with procedural staff participation, I verbally confirmed the patient s identity using two indicators, relevant allergies, that the procedure was appropriate and matched the consent or emergent situation, and that the correct equipment/implants were available. Immediately prior to starting the procedure I conducted the Time Out with the procedural staff and re-confirmed the patient s name, procedure, and site/side. (The Joint Commission universal protocol was followed.)  Yes    Sedation (Moderate or Deep): None    ABOVE ASSESSMENTS PERFORMED BY    Simi Vazquez MD      INDICATIONS FOR PROCEDURES  Tia Pablo is a 59 year old patient with involuntary muscle spasms and pain secondary to the diagnosis of oromandibular/cervical dystonia and chronic migraine headaches. Her baseline symptoms have been recalcitrant to oral medications and conservative  therapy.  She is here today for reinjection with Botox.    GOAL OF PROCEDURE  The goal of this procedure is to increase active range of motion, improve volitional motor control, decrease pain  and enhance functional independence.      TOTAL DOSE ADMINISTERED  Dose Administered:  450 units  Botox (Botulinum Toxin Type A) 1:1 Dilution  Unavoidable Drug Waste: Yes  Amount of drug waste (mL): 50 units Botox.  Reason for waste:  Single use vial  Diluent Used:  Preservative Free Normal Saline  Total Volume of Diluent Used:  4.5 ml  NDC #: Botox 100u ([address]-37)      CONSENT  The risks, benefits, and treatment options were discussed with Tia MANUEL Samy and she agreed to proceed.    Written consent was obtained by [facility].     EQUIPMENT USED  Needle-35mm stimulating/recording  Needle-30 gauge  EMG/NCS Machine    SKIN PREPARATION  Skin preparation was performed using an alcohol wipe.    GUIDANCE DESCRIPTION  Electro-myographic guidance was necessary throughout the procedure to accurately identify all areas of dystonic muscles while avoiding injection of non-dystonic muscles, neighboring nerves and nearby vascular structures.     AREA/MUSCLE INJECTED: 450 UNITS BOTOX = TOTAL DOSE, 1:1 DILUTION     1. NECK MUSCLES: 145 UNITS BOTOX = TOTAL DOSE     Right Upper Trapezius - 20 units of Botox at 4 site/s.   Left Upper Trapezius - 20 units of Botox at 4 site/s.      Right Splenius Capitis - 5 units of Botox at 1 site/s.   Left Splenius Capitis - 5 units of Botox at 1 site/s.      Right Auricularis - 15 units of Botox at 3 site/s (anterior, superior & posterior).   Left Auricularis - 15 units of Botox at 3 site/s (anterior, superior & posterior).     Right occipitalis - 15 units of Botox at 3 site/s.   Left occipitalis - 15 units of Botox at 3 site/s.     Right Sternocleidomastoid - 10 units of Botox at 1 site/s.    Right Middle Scalene - 15 units of Botox at 1 site/s.     Right Platysma - 5 units of Botox at 2 site/s.  Left Platysma  - 5 units of Botox at 2 site/s.      2. UPPER EXTREMITY & TRUNK MUSCLES: 90 UNITS BOTOX = TOTAL DOSE     Right Levator scapula - 25 units of Botox at 2 site/s.   Left Levator scapula - 25 units of Botox at 2 site/s.      Right Rhomboid - 20 units of Botox at 1 site/s.    Left Rhomboid - 20 units of Botox at 3 site/s.       3. JAW MUSCLES: 140 UNITS BOTOX = TOTAL DOSE      Right Temporalis - 60 units of Botox at 5 site/s.               Left Temporalis - 60 units of Botox at 5 site/s.     Right Masseter - 10 units of Botox at 1 site/s.    Left Masseter - 10 units of Botox at 1 site/s.     4. FACIAL & SCALP MUSCLES: 75 UNITS BOTOX = TOTAL DOSE     Right Frontalis - 15 units of Botox at 3 site/s (2 middle & 1 hairline).    Left Frontalis - 15 units of Botox at 3 site/s (2 middle & 1 hairline).     Procerus - 5 units of Botox at 1 site/s.       Right  - 5 units of Botox at 1 site/s.    Left  - 5 units of Botox at 1 site/s.      Right Occipitalis - 15 units of Botox at 1 site/s.    Left Occipitalis - 15 units of Botox at 1 site/s.       RESPONSE TO PROCEDURE  Tia Pablo tolerated the procedure well and there were no immediate complications. She was allowed to recover for an appropriate period of time and was discharged home in stable condition.    ASSESSMENT AND PLAN   Botulinum toxin injections: No changes made to Botox dose or distribution today. Patient will continue to monitor response to Botox and report at next appointment.    Referrals: None.  Medications: No changes.   Follow up: Tia Pablo was rescheduled for the next series of injections in 12 weeks, at which time we will evaluate response to today's injections. she may call the clinic prior with any questions or concerns prior to the next appointment.     Simi Vazquez MD

## 2025-02-10 NOTE — LETTER
2/10/2025       RE: Tia Pablo  [id number] [address] [id number]     Dear Colleague,    Thank you for referring your patient, Tia Pablo, to the [facility] [place] at [facility]. Please see a copy of my visit note below.      [facility]     PM&R CLINIC NOTE  BOTULINUM TOXIN PROCEDURE      HPI  No chief complaint on file.    Tia Pablo is a 59 year old female with a history of headaches and generalized dystonia with pain who presents to clinic for botulinum toxin injections for management of chronic migraine headaches, cervical and oromandibular dystonia.     SINCE LAST VISIT  Tia Pablo was last seen here in clinic on 11/18/2024, at which time she received 450 units of Botox.      She is currently experiencing significant soreness in her right shoulder blade region. She has observed that, consistently after receiving Botox injections, the shoulder blade area becomes more painful around week 7, often requiring trigger point injections for relief.    Patient denies new medical diagnoses, illnesses, hospitalizations, emergency room visits, and injuries since the previous injection with botulinum neurotoxin.     RESPONSE TO PREVIOUS TREATMENT    Side effects: None reported.    1.  Headache frequency during this injection cycle: Approximately 3-4 migraine headache days per month, with near daily tension headache. This is compared to her baseline headache frequency of 30 headache days per month.    2.  Headache duration during this injection cycle:  Headache duration is typically no longer than three hours with Relpax, but if the Relpax does not work right away, she has to take two doses and the headaches last much longer. Patient reports no episodes of multiple day headaches during this injection cycle.  Has taken Relpax 2 times at the most during this cycle      3.  Headache intensity during this injection cycle:    A.  4/10  =  Typical pain level.  B.  8/10  =  Worst pain level.  C.  0/10  =  Lowest pain level.    4.  Change in headache medication usage during this injection cycle:  (For Example:  Able to decrease use of oral pain medications.) She is only taking her migraine headache medication about 1-2 times per month, whereas she was taking it much more frequently prior to Botox. She also takes robaxin as needed for more severe headaches. Just took Relpax twice during this cycle. At times might take tylenol or advil.    5.  ER Visits During This Injection Cycle: None.     6.  Functional Performance:  Change in ADL's, social interaction, days lost from work, etc. Patient reports being able to more fully participate in social and family activities and responsibilities as headache symptoms have improved. She is overall a lot more productive when Botox is working. Recently, she was able to go on a [place] cruise with family, and this was quite enjoyable. 8    Dystonia Improvement (neck and jaw): Yes.  Percent Improvement: 80 %    Duration of Benefit:  7 weeks and followed by a gradual reduction in benefit. Her jaw clenching gets more severely painful and tense a few weeks before her appointment.       PHYSICAL EXAM  VS: There were no vitals taken for this visit.   GEN: Pleasant and cooperative, in no acute distress  HEENT: No facial asymmetry  NECK: Involuntary right rotation with hypertonicity noted in right anterior neck musculature and upper trapezius. Jaw Worse in the left side today    ALLERGIES  Allergies   Allergen Reactions     Bee Venom Anaphylaxis     Erythromycin Hives and Itching     Seasonal Allergies        CURRENT MEDICATIONS    Current Outpatient Medications:      buPROPion (WELLBUTRIN XL) 300 MG 24 hr tablet, TAKE ONE TABLET BY MOUTH EVERY MORNING, Disp: 90 tablet, Rfl: 1     chlorthalidone (HYGROTON) 25 MG tablet, TAKE ONE TABLET BY MOUTH EVERY DAY,  Disp: 90 tablet, Rfl: 1     clonazePAM (KLONOPIN) 0.5 MG tablet, Take 1 tab in the evening before bed. Okay to take an extra tab as needed in the day., Disp: 45 tablet, Rfl: 5     DULoxetine (CYMBALTA) 20 MG capsule, Take 1 capsule (20 mg) by mouth daily, Disp: 90 capsule, Rfl: 2     eletriptan (RELPAX) 40 MG tablet, Take 1 tablet (40 mg) by mouth at onset of headache for migraine May repeat in 2 hours. Max 2 tablets/24 hours., Disp: 12 tablet, Rfl: 3     EPINEPHrine (ANY BX GENERIC EQUIV) 0.3 MG/0.3ML injection 2-pack, Inject 0.3 mLs (0.3 mg) into the muscle as needed for anaphylaxis May repeat one time in 5-15 minutes if response to initial dose is inadequate., Disp: 2 each, Rfl: 1     losartan (COZAAR) 50 MG tablet, TAKE ONE TABLET BY MOUTH EVERY DAY, Disp: 90 tablet, Rfl: 2    Current Facility-Administered Medications:      botulinum toxin type A (BOTOX) 100 units injection 600 Units, 600 Units, Intramuscular, Q90 Days, StandalSimi MD, 450 Units at 02/10/25 1145     glycopyrrolate (ROBINUL) injection 0.1-0.2 mg, 0.1-0.2 mg, Intravenous, Q5 Min PRN, Saeed Edwards MD, 0.2 mg at 24 1357     triamcinolone (KENALOG-40) injection 40 mg, 40 mg, Intramuscular, Once,        BOTULINUM NEUROTOXIN INJECTION PROCEDURES    VERIFICATION OF PATIENT IDENTIFICATION AND PROCEDURE     Initials   Patient Name SES   Patient  SES   Procedure Verified by: SES     Prior to the start of the procedure and with procedural staff participation, I verbally confirmed the patient s identity using two indicators, relevant allergies, that the procedure was appropriate and matched the consent or emergent situation, and that the correct equipment/implants were available. Immediately prior to starting the procedure I conducted the Time Out with the procedural staff and re-confirmed the patient s name, procedure, and site/side. (The Joint Commission universal protocol was followed.)  Yes    Sedation (Moderate or  Deep): None    ABOVE ASSESSMENTS PERFORMED BY    Simi Vazquez MD      INDICATIONS FOR PROCEDURES  Tia Pablo is a 59 year old patient with involuntary muscle spasms and pain secondary to the diagnosis of oromandibular/cervical dystonia and chronic migraine headaches. Her baseline symptoms have been recalcitrant to oral medications and conservative therapy.  She is here today for reinjection with Botox.    GOAL OF PROCEDURE  The goal of this procedure is to increase active range of motion, improve volitional motor control, decrease pain  and enhance functional independence.      TOTAL DOSE ADMINISTERED  Dose Administered:  450 units  Botox (Botulinum Toxin Type A) 1:1 Dilution  Unavoidable Drug Waste: Yes  Amount of drug waste (mL): 50 units Botox.  Reason for waste:  Single use vial  Diluent Used:  Preservative Free Normal Saline  Total Volume of Diluent Used:  4.5 ml  NDC #: Botox 100u ([address]-12)      CONSENT  The risks, benefits, and treatment options were discussed with Tia Pablo and she agreed to proceed.    Written consent was obtained by [facility].     EQUIPMENT USED  Needle-35mm stimulating/recording  Needle-30 gauge  EMG/NCS Machine    SKIN PREPARATION  Skin preparation was performed using an alcohol wipe.    GUIDANCE DESCRIPTION  Electro-myographic guidance was necessary throughout the procedure to accurately identify all areas of dystonic muscles while avoiding injection of non-dystonic muscles, neighboring nerves and nearby vascular structures.     AREA/MUSCLE INJECTED: 450 UNITS BOTOX = TOTAL DOSE, 1:1 DILUTION     1. NECK MUSCLES: 145 UNITS BOTOX = TOTAL DOSE     Right Upper Trapezius - 20 units of Botox at 4 site/s.   Left Upper Trapezius - 20 units of Botox at 4 site/s.      Right Splenius Capitis - 5 units of Botox at 1 site/s.   Left Splenius Capitis - 5 units of Botox at 1 site/s.      Right Auricularis - 15 units of Botox at 3 site/s (anterior, superior & posterior).   Left Auricularis  - 15 units of Botox at 3 site/s (anterior, superior & posterior).     Right occipitalis - 15 units of Botox at 3 site/s.   Left occipitalis - 15 units of Botox at 3 site/s.     Right Sternocleidomastoid - 10 units of Botox at 1 site/s.    Right Middle Scalene - 15 units of Botox at 1 site/s.     Right Platysma - 5 units of Botox at 2 site/s.  Left Platysma - 5 units of Botox at 2 site/s.      2. UPPER EXTREMITY & TRUNK MUSCLES: 90 UNITS BOTOX = TOTAL DOSE     Right Levator scapula - 25 units of Botox at 2 site/s.   Left Levator scapula - 25 units of Botox at 2 site/s.      Right Rhomboid - 20 units of Botox at 1 site/s.    Left Rhomboid - 20 units of Botox at 3 site/s.       3. JAW MUSCLES: 140 UNITS BOTOX = TOTAL DOSE      Right Temporalis - 60 units of Botox at 5 site/s.               Left Temporalis - 60 units of Botox at 5 site/s.     Right Masseter - 10 units of Botox at 1 site/s.    Left Masseter - 10 units of Botox at 1 site/s.     4. FACIAL & SCALP MUSCLES: 75 UNITS BOTOX = TOTAL DOSE     Right Frontalis - 15 units of Botox at 3 site/s (2 middle & 1 hairline).    Left Frontalis - 15 units of Botox at 3 site/s (2 middle & 1 hairline).     Procerus - 5 units of Botox at 1 site/s.       Right  - 5 units of Botox at 1 site/s.    Left  - 5 units of Botox at 1 site/s.      Right Occipitalis - 15 units of Botox at 1 site/s.    Left Occipitalis - 15 units of Botox at 1 site/s.       RESPONSE TO PROCEDURE  Tia Pablo tolerated the procedure well and there were no immediate complications. She was allowed to recover for an appropriate period of time and was discharged home in stable condition.    ASSESSMENT AND PLAN   Botulinum toxin injections: No changes made to Botox dose or distribution today. Patient will continue to monitor response to Botox and report at next appointment.    Referrals: None.  Medications: No changes.   Follow up: Tia Pablo was rescheduled for the next series of  injections in 12 weeks, at which time we will evaluate response to today's injections. she may call the clinic prior with any questions or concerns prior to the next appointment.     Simi Vazquez MD          Again, thank you for allowing me to participate in the care of your patient.      Sincerely,    Simi Vazquez MD

## 2025-02-12 ENCOUNTER — VIRTUAL VISIT (OUTPATIENT)
Dept: SLEEP MEDICINE | Facility: CLINIC | Age: 60
End: 2025-02-12
Attending: FAMILY MEDICINE
Payer: COMMERCIAL

## 2025-02-12 VITALS — WEIGHT: 142 LBS | BODY MASS INDEX: 22.82 KG/M2 | HEIGHT: 66 IN

## 2025-02-12 DIAGNOSIS — R45.82 WORRIES: ICD-10-CM

## 2025-02-12 DIAGNOSIS — G24.1 GENETIC TORSION DYSTONIA: ICD-10-CM

## 2025-02-12 DIAGNOSIS — M54.2 NECK PAIN: ICD-10-CM

## 2025-02-12 DIAGNOSIS — G47.00 INSOMNIA, UNSPECIFIED TYPE: Primary | ICD-10-CM

## 2025-02-12 PROCEDURE — 98003 SYNCH AUDIO-VIDEO NEW HI 60: CPT | Performed by: INTERNAL MEDICINE

## 2025-02-12 ASSESSMENT — PAIN SCALES - GENERAL: PAINLEVEL_OUTOF10: SEVERE PAIN (7)

## 2025-02-12 NOTE — LETTER
2/12/2025      Tia Pablo  [id number] [facility] [id number]      Dear Colleague,    Thank you for referring your patient, Tia Pablo, to the [facility] [place]. Please see a copy of my visit note below.    Virtual Visit Details    Type of service:  Video Visit     Originating Location (pt. Location): Home    Distant Location (provider location):  Off-site  Platform used for Video Visit: [facility]    Chief complaint: Consultation requested by Ana Guzman MD for the evaluation of difficulty with sleep    Comprehensive sleep medicine questionnaire reviewed  Last Sleep CBT I 11/19/2016 Dr. Andrei Perez    History of Present Illness: 59-year-old female with history of hypertension, cervical dystonia and chronic pain, long COVID, depression and anxiety.  She is suffering a lot of issues with falling asleep.  She typically is getting into bed around 10 to 11 PM and falling asleep between 30 minutes and an hour.  During that time she is worrying about her own pain as well as the risks of the medication she has been given.  She is worried about developing dementia.  She is worried about her mother who is difficult who is about dementia.  She has been prescribed clonazepam in conjunction with Botox injections for her cervical dystonia however she does not take the clonazepam lately due to fears about potential impacts on her brain.  Once she falls asleep she feels that she generally sleeps through the night.  She has noticed on her Fitbit that she has more awakenings than she realizes.  She is not wearing her Fitbit at night anymore.  She typically will get up 0-1 time at night to go to the bathroom and usually can return to sleep okay.  She will sometimes wake with headache associated with her neck and back pain.  She does not think she grinds her teeth.  There is no observed snoring or apnea.  She denies waking up with gasping, choking or shortness of breath.  She is not sleepy  during the day.  However she does feel somewhat tired.  She will get up once a week or so at 615 to an alarm to take care of her grandchild.  Without an alarm she will stay in bed until 7-9:30 AM    She did see insomnia specialist last in 2016 for similar symptoms.  She is also seeing a therapist in the past but it was while ago.  She is not currently taking any sleep aids.  Past meds that have been trialed include trazodone.    Topmost Sleepiness Scale   Sitting and reading: (Patient-Rptd) Would never doze   Watching TV: (Patient-Rptd) Would never doze   Sitting, inactive in a public place (e.g. a theatre or a meeting): (Patient-Rptd) Would never doze   As a passenger in a car for an hour without a break: (Patient-Rptd) Would never doze   Lying down to rest in the afternoon when circumstances permit: (Patient-Rptd) Would never doze   Sitting and talking to someone: (Patient-Rptd) Would never doze   Sitting quietly after a lunch without alcohol: (Patient-Rptd) Would never doze   In a car, while stopped for a few minutes in traffic: (Patient-Rptd) Would never doze   Total score - Topmost: (Patient-Rptd) 0  (Less than 10 normal)    Insomnia Severity Scale  TREVOR Total Score: (Patient-Rptd) 12  Total score categories:  0-7 = No clinically significant insomnia   8-14 = Subthreshold insomnia   15-21 = Clinical insomnia (moderate severity)  22-28 = Clinical insomnia (severe)    STOP-BANG  Loud Snore   0  Excessively Tired/Sleepy   0  Observed apnea   0  Hypertension   1  BMI> 35 kg/m2   0  Age >50   1  Neck >16 in/40cm   ?  Male Gender   0  Total =   2  (0-2 low, 3-4 intermediate, 5-8 high risk of MARICHUY)      Past Medical History:   Diagnosis Date     Anxiety      Depression      HTN (hypertension) 2016       Allergies   Allergen Reactions     Bee Venom Anaphylaxis     Erythromycin Hives and Itching     Seasonal Allergies        Current Outpatient Medications   Medication Sig Dispense Refill     buPROPion (WELLBUTRIN XL) 300  MG 24 hr tablet TAKE ONE TABLET BY MOUTH EVERY MORNING 90 tablet 1     chlorthalidone (HYGROTON) 25 MG tablet TAKE ONE TABLET BY MOUTH EVERY DAY 90 tablet 1     clonazePAM (KLONOPIN) 0.5 MG tablet Take 1 tab in the evening before bed. Okay to take an extra tab as needed in the day. 45 tablet 5     DULoxetine (CYMBALTA) 20 MG capsule Take 1 capsule (20 mg) by mouth daily 90 capsule 2     eletriptan (RELPAX) 40 MG tablet Take 1 tablet (40 mg) by mouth at onset of headache for migraine May repeat in 2 hours. Max 2 tablets/24 hours. 12 tablet 3     EPINEPHrine (ANY BX GENERIC EQUIV) 0.3 MG/0.3ML injection 2-pack Inject 0.3 mLs (0.3 mg) into the muscle as needed for anaphylaxis May repeat one time in 5-15 minutes if response to initial dose is inadequate. 2 each 1     losartan (COZAAR) 50 MG tablet TAKE ONE TABLET BY MOUTH EVERY DAY 90 tablet 2     Current Facility-Administered Medications   Medication Dose Route Frequency Provider Last Rate Last Admin     botulinum toxin type A (BOTOX) 100 units injection 600 Units  600 Units Intramuscular Q90 Days Simi Vazquez MD   450 Units at 02/10/25 1145     glycopyrrolate (ROBINUL) injection 0.1-0.2 mg  0.1-0.2 mg Intravenous Q5 Min PRN Saeed Edwards MD   0.2 mg at 01/11/24 1357     triamcinolone (KENALOG-40) injection 40 mg  40 mg Intramuscular Once            Social History     Socioeconomic History     Marital status:      Spouse name: Not on file     Number of children: Not on file     Years of education: Not on file     Highest education level: Not on file   Occupational History     Not on file   Tobacco Use     Smoking status: Never     Smokeless tobacco: Never   Vaping Use     Vaping status: Never Used   Substance and Sexual Activity     Alcohol use: Yes     Comment: Occasionally, 1-2 glasses of wine on weekends     Drug use: No     Sexual activity: Not on file   Other Topics Concern     Parent/sibling w/ CABG, MI or angioplasty before 65F  "55M? 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, 2 boys, one dog      Social Drivers of Health     Financial Resource Strain: Low Risk  (12/27/2023)    Financial Resource Strain      Within the past 12 months, have you or your family members you live with been unable to get utilities (heat, electricity) when it was really needed?: No   Food Insecurity: Low Risk  (12/27/2023)    Food Insecurity      Within the past 12 months, did you worry that your food would run out before you got money to buy more?: No      Within the past 12 months, did the food you bought just not last and you didn t have money to get more?: No   Transportation Needs: Low Risk  (12/27/2023)    Transportation Needs      Within the past 12 months, has lack of transportation kept you from medical appointments, getting your medicines, non-medical meetings or appointments, work, or from getting things that you need?: No   Physical Activity: Not on file   Stress: Not on file   Social Connections: Not on file   Interpersonal Safety: Low Risk  (1/17/2025)    Interpersonal Safety      Do you feel physically and emotionally safe where you currently live?: Yes      Within the past 12 months, have you been hit, slapped, kicked or otherwise physically hurt by someone?: No      Within the past 12 months, have you been humiliated or emotionally abused in other ways by your partner or ex-partner?: No   Housing Stability: Low Risk  (12/27/2023)    Housing Stability      Do you have housing? : Yes      Are you worried about losing your housing?: No       Family History   Problem Relation Age of Onset     Hypertension Mother      Autoimmune Disease Mother         CREST     Hyperlipidemia Mother      Hypertension Father      Skin Cancer Father      Heart Disease Father      Melanoma No family hx of          EXAM:  Ht 1.676 m (5' 6\")   Wt 64.4 kg (142 lb)   BMI 22.92 kg/m    GENERAL: Alert " and no distress  EYES: Eyes grossly normal to inspection.  No discharge or erythema, or obvious scleral/conjunctival abnormalities.  RESP: No audible wheeze, cough, or visible cyanosis.    SKIN: Visible skin clear. No significant rash, abnormal pigmentation or lesions.  NEURO: Cranial nerves grossly intact.  Mentation and speech appropriate for age.  PSYCH: Appropriate affect, tone, and pace of words           TSH   Date Value Ref Range Status   02/18/2022 1.53 0.40 - 4.00 mU/L Final   12/03/2020 1.32 0.40 - 4.00 mU/L Final     Ferritin   Date Value Ref Range Status   02/18/2022 63 8 - 252 ng/mL Final           ASSESSMENT:  59-year-old female with chronic sleep initiation insomnia.  She has had issues since childhood however chronic pain contributes to her insomnia as does excessive worry about her mother and her own health.  She had some benefit from CBT-I in the past and would benefit from returning to those principles.  She also potentially could benefit from health psychology to help manage her health related worries.  I do not think that a sleep study is indicated.    PLAN:  Reviewed the concepts of CBT-I.  She is agreeable to seeing Dr. Perez again.  Counseled her on the importance of keeping detailed sleep log prior to her initial consultation.  Also recommended self-help book regarding insomnia for those with depression/anxiety or chronic pain.  See instructions for further details of counseling provided.  Health psychology referral placed as well.  Also suggested considering talking to someone at her [facility] for counseling on how to manage worry about health.  Follow-up with me as needed.    62 minutes spent by me on the date of the encounter doing chart review, history and exam, documentation and further activities per the note    Mandy Ferguson M.D.  Pulmonary/Critical Care/Sleep Medicine    [facility]   [address], [address]   [address]  [place] [address]   Appointments: [phone]    The above note was dictated using voice recognition software and may include typographical errors. Please contact the author for any clarifications.              Again, thank you for allowing me to participate in the care of your patient.        Sincerely,        Mandy Ferguson MD    Electronically signed

## 2025-02-12 NOTE — NURSING NOTE
Current patient location: [address] [place] [id number]    Is the patient currently in the state of [place]? YES    Visit mode: VIDEO    If the visit is dropped, the patient can be reconnected by:VIDEO VISIT: Text to cell phone:   Telephone Information:   Mobile [phone]       Will anyone else be joining the visit? NO  (If patient encounters technical issues they should call [phone]    Are changes needed to the allergy or medication list? Pt stated no changes to allergies and Pt stated no med changes    Are refills needed on medications prescribed by this physician? NO    Rooming Documentation:  Questionnaire(s) completed    Reason for visit: Consult    Sabine Tafoya VVAINSLEY    No BP for today.

## 2025-02-12 NOTE — PATIENT INSTRUCTIONS
Behavioral Sleep Medicine Program    The [facility] Behavioral Sleep Medicine Program,  provides non-drug treatment for sleep problems as part of our multi-discipline sleep medicine program. Services offered include:    Cognitive-behavioral Therapies for Insomnia (CBT-I)  Management of Shiftwork and Jet Lag Sleep Disorders  Management of Delayed, Advanced and Irregular Circadian Rhythm Sleep Disorders  Imagery Rehearsal Therapy (IRT) for Nightmare Disorder  Adaptation to PAP Therapy for Obstructive Sleep Apnea  Behavioral strategies to manage hypersomnia and fatigue    You have been referred for consultation with a sleep psychologist who specializes in behavioral sleep medicine and treatment of insomnia.  The [facility] offers individualized telehealth services through our [facility] and online CBT-I.    Preparing for your Consultation    We recommend you keep a Sleep Diary for at least a week prior to your visit. Complete the sleep diary each day first thing after you get up by answering a few key questions about your sleep using our convenient mobile shani or paper sleep diary.  Your answers should be based on your recall of the past 24 hours.  Avoid watching the clock or recording data during the night.     CBT-i  Shani    The CBT-i  mobile shani  is a convenient way to keep track of your sleep prior to your sleep consultation.  Simply download the free shani on your Apple or Android phone and record your information each morning.  The shani is an important tool that will be used if you proceed with CBT-I with your sleep provider. Prior to your consultation we recommend you use only the sleep diary function. You can e-mail yourself a copy of your sleep diary data by going to the Settings section and using the [employer] User Data function.  During your consultation your provider will review the data with you.        CBT-I:  Frequently Asked  Questions    What is CBT-I?    Cognitive Behavioral Therapy for Insomnia, also known as CBT-I, is a highly effective non-drug treatment for insomnia. The American College of Physicians recommends CBT-I as the first treatment for chronic insomnia.  Research has shown CBT-I to be safer and more effective long term than sleeping pills.    What does CBT-I involve?    CBT-I targets behaviors that lead to chronic insomnia:  Habits that weaken the bed as a cue for sleep  Habits that weaken your body's sleep drive and sleep/wake clock   Unhelpful sleep thoughts that increase sleep-related worry and arousal.    The process involves 3-6 telehealth visits that guide you to implement proven strategies to get a better night's sleep.    People often see improvement in their sleep within a few weeks. Research shows if you keep practicing the skills you learn your sleep is likely to continue to improve 6-12 months after treatment.    Does this program prescribe or manage sleep medication?    No.  Your prescribing provider is responsible to assist you in managing your sleep medications.  Some people choose to stop using sleep medication prior to or during CBT-I.  Our program can work with your prescribing provider to help reduce or eliminate use of sleep medications.     Getting Started Today!    We recommend you consider making the following changes to your sleep habits prior to your behavioral sleep medicine consultation if you have not done so already:     Reduce your caffeine and alcohol use.  Both can disrupt sleep and make strengthening your sleep more difficult.  Specifically:    - Avoid caffeine within 8 hours of your bedtime   - No more than 3 caffeinated beverages per day (e.g. 8 oz. cup coffee or 12 oz. soda)            - No alcohol within 3 hours of bedtime    Make sure your bedroom is quiet, comfortable, and dark.  Noise, light, and an uncomfortable sleep space can harm your sleep.     Get out of bed at the same time  every day.    I  Self-help Workbooks for Insomnia    If you have found self-help books useful in the past, you may want to consider reading one of the following books prior to your consultation:    Say Kelsey to Insomnia: The Six-Week, Drug-Free Program Developed at [employer].  Benjamin Portillo MD. Available in paperback, Rachelle, and audiobook.    Overcoming Insomnia: A Cognitive-Behavioral Therapy Approach, Workbook.  Barry Schumacher, PhD  and Carolina Rehman, PhD.  Available in paperback and Rachelle.    Quiet Your Mind and Get to Sleep: Solutions to Insomnia for Those with Depression, Anxiety, or Chronic Pain.  Portia Naranjo, PhD and Carolina Rehman, PhD.  Available in paperback and Rachelle                                  Digital CBT-I    Your health care provider has recommended a digital therapeutic program for the treatment of insomnia.  Insomnia  is a self-guided 5-week digital cognitive-behavioral therapy program for insomnia (dCBT-I).  The program follows evidence-based insomnia  treatment strategies similar to those used in our [facility].  It is based on extensive development and collaboration between the [place] Department of [employer]s Affairs, [facility], an other sleep experts.         How Does it work?    Research indicates that digital CBT-I can be effective in the treatment of insomnia for some people.  Insomnia  has very high acceptability among users and in early research has been show to significantly reduce insomnia symptoms and improve sleep quality when compared to a control wait list.    Watch this introductory video for more details about Insomnia .    Insomnia  Introduction  Is Digital CBT-I right for you?    Digital CBT-I has been shown to be effective in treating insomnia across a variety of age group and with individuals who have other health conditions.    You may be a good candidate for Digital CBT-I if:    You believe CBT-I can help  you sleep better.  You are motivated to follow a five-week behavioral program.   You are able (or have assistance) to use a mobile Android or IPhone.  Your Medical and/or mental health conditions are stable and treated.    However, it is recommended that before you start using this nara you consult with your health care provider if you have any of the following that may be affected by changes in your sleep habits:     Sleep Apnea  Restless Leg Syndrome  Bipolar Disorder  Seizure Disorder  Sleep Walking  Night Terrors  Excessive Daytime Sleepiness  Frailty and risk of falling when getting up at night,    The nara may not be for you if you are a shift worker.  It also may not be helpful for people who regularly have to change their sleep schedules or have to sleep at times that are out of sync with their body's natural sleep rhythm.    It is recommended that before you start using this nara you consult with your health care provider if you are using prescription or over the counter sleep medication.     Getting Started    Download Insomnia  onto your Apple or Android Phone  Review and accept user agreement  Complete initial screening assessments  Read Understanding Sleep and Insomnia found below.  TIP:  Record your sleep diary in the morning recalling your night of sleep. Do not monitor ur use the clock during the night when using this program.    Understanding Sleep and Insomnia    Most people have trouble sleeping at some point in their life.   Chronic insomnia means you have had trouble falling asleep and/or staying asleep for at least the past three months.  Despite allowing enough time for sleep, it is affecting how you feel. You are not alone.  It is estimated that 10-15% of adults experience chronic insomnia.     Cognitive-Behavioral Therapy for Insomnia    The American College of Physicians  recommends Cognitive Behavioral Therapy for Insomnia (CBT-I) as the first-line treatment for insomnia.      [email or web address] CBT-I is a five-week program that utilizes the downloadable nara Insomnia .  It can be completed with the guidance of a trained health care provider or on your own. The program will teach you the skills and strategies you will need for a better night's sleep. The first step in your program is learning a bit about sleep and insomnia.      The Basics of Sleep    How does sleep help us?    Sleep, like food and water, is something we need every day. The purpose of sleep is still not exactly clear, but sleep experts agree we need consistent quality sleep to function at our best. There is evidence that sleep helps maintain brain and body functions.  It helps maintain thinking ability and mood.  Sleep is actually a very active part of life. Sleep occurs in four stages that cycle every  minutes throughout the night. We get our deepest sleep during the first few hours of sleep.  During the last half of our sleep, we usually get the bulk of our REM (Rapid Eye Movement) sleep.  REM sleep is when most of our dreaming occurs.    Watch the following short video to learn more:  Stages of Sleep    How much sleep do we need?    Sleep needs vary from person to person. Most adults need at least 7 hours of sleep though some may need less and others more.  Sleeping too little or too much may be a health risk.  As we age, most people report their sleep gets lighter, earlier, shorter, and more restless.     What Controls Sleep?    The three things that regulate your sleep are your:     Sleep Drive  Biological Clock  Arousal System (physical and emotional states)    Together these make us feel alert during the day and promote sleep at night.    Your sleep drive depends on how long you have been awake. It is lowest when you first wake up.  Sleep drive increases as the day goes on.  The longer time you are awake the easier it is to fall asleep.  Sleeping gradually reduces your sleep drive. That is why napping in  the evening or close to bed can make it harder to sleep at night.    Your biological clock promotes wakefulness during the day and sleep at night.    Adapted from Cole et al. (2009). Evaluation and Management of Insomnia in the Psychiatric setting. Published Online: 19/1/2009; DOI: https://doi.org/[email or web address]/[email or web address].[email or web address]    Watch the following short video to learn more: Two  Processes of Sleep      How does sleep change with age?    Sleep usually is lighter and shorter and earlier.  Sleep may become more restless   Increased time to fall asleep and more time awake during the night    Understanding Insomnia    What is insomnia?    Insomnia occurs when you:    Have difficulty with...  Falling Asleep  Staying Asleep  Short amount of sleep    Have adequate time for sleep  Experience daytime problems as a result of your sleep difficulties    What causes insomnia?    Some people have a greater chance of developing insomnia due to biological, psychological or social factors. These are often referred to as predisposing factors.  A host of things can trigger insomnia such as a stressful event, jet lag, working a different shift, medication, or the onset of a medical or mental health condition. There are called precipitating factors.        What maintains insomnia?    Short-term insomnia can become chronic because of habits or conditions that perpetuate it including:    Unhelpful sleep habits such as spending more time in bed, sleeping in on weekends to try to catch up on sleep  Stress, worry or depression  Worry or fear about your insomnia  Pain or other medical conditions  Some medications  Untreated sleep disorders    As you spend more time in bed or try forcing yourself to sleep your bed becomes linked with wakefulness.  This type of  conditioning  along with unhelpful sleep habits maintains the insomnia even when the triggering event has resolved.    Sleep and Your Arousal System    Mental activity, emotions and  "physical symptoms can make your brain too active to sleep by masking the strength of your sleep drive. Your brain's arousal system not only can triggers insomnia.  It also plays a role in maintaining it as well.  Common sources of arousal include:    Worry about sleep  An active mind concerned about unfinished tasks  Anxiety, stress and depression  Pain    Common Treatments for Insomnia    Behavioral:  Changing your behavior and habits to improve your sleep  Sleeping Pills (Prescription and OTC)  Antidepressants   \"Alternative\" remedies (Melatonin, Ashwagandha, Chamomile, Valerian, 5-HTP etc.)    How CBT-I Works     CBT-I targets negative behavioral conditioning and habits that hurt your sleep and lead to long-term insomnia     Behavioral Conditioning    When you lay awake in bed over many nights, your body actually becomes trained or 'conditioned' to be awake during the night.  It makes your bed trigger alertness instead of sleepiness.  Brief CBT-I helps strengthen the behavioral association between sleep and your bed.    Habits that HURT sleep:    Using your bed for things other than sleep and intimacy  Shifting sleep schedules from night to night  Extending time in bed  Worrying  Worrying about sleep or trying too hard to sleep  Lack of a \"wind down\" time before bed  Long or late naps  Uncomfortable sleep environment  Alcohol  Caffeine    Developing habits that HELP your sleep:    Keeping the bed for sleep and intimacy  Maintaining a consistent sleep schedule  Daily routines including time to wind down and manage worry  Managing thoughts and expectations about sleep    "

## 2025-02-12 NOTE — PROGRESS NOTES
V2.0  [facility] Hospitalist Progress Note      Name:  Coty Stearns /Age/Sex: 1968  (56 y.o. female)   MRN & CSN:  [id number] & [id number] Encounter Date/Time: 2024 7:39 AM EDT    [facility]  [facility]- PCP: Grazyna English MD       Hospital Day: 2    Assessment and Plan:   Coty Stearns is a 56 y.o. female with pmh of COPD, DM-2 and HTN,  who presents with Chest pain, rule out acute myocardial infarction      Plan:    Chest pain: STEMI   -Patient presents with jaw pain radiating into her arms and chest, nonexertional episode lasting for 5 minutes associated with nausea and shortness of breath since Friday 2-3 times a day, EKG with normal sinus rhythm without any ST-T wave changes, Initial troponin 17 down trended to 15, continue to trend troponin, consult cardiology, check echocardiogram  -ECHO: EF 55-60%, sclerotic AV, neg bubble study   -NM Stress: abnormal, STEMI during treadmill test after 4 min and 2 seconds developed ST elevation in anterior lreads, taken emergently to Cath Lab by Dr. España   -Left Heart Cath: successful stenting of LAD 75% blockage to 0%   -Fasting Lipids: , HDL 56, , Trig 177  -cont ASA 81 mg QD, Effient 10 mg QD, Crestor 40 mg QHS     Hypercalcemia  -Ca++ 11.5  -adding Ionized Calcium and PTH      T2DM  -Start medium dose sliding scale with hypoglycemia protocol on metformin at home  -A1C 5.4% today      HTN  -Continue home meds     HLD  -Not on statin, check lipid profile     GERD  -Continue PPI     COPD  -Continue home inhalers      Hypothyroidism   -Continue Synthroid    Diet ADULT DIET; Regular   DVT Prophylaxis [x] Lovenox, []  Heparin, [] SCDs, [] Ambulation,  [] Eliquis, [] Xarelto  [] Coumadin   Code Status Full Code   Disposition From: Home  Expected Disposition: Home  Estimated Date of Discharge: 1-2 days   Patient requires continued admission due to Cardiology Evaluation ongoing    Surrogate Decision Maker/ JARRETT Cagle      Personally reviewed  Virtual Visit Details    Type of service:  Video Visit     Originating Location (pt. Location): Home    Distant Location (provider location):  Off-site  Platform used for Video Visit: [employer]    Chief complaint: Consultation requested by Ana Guzman MD for the evaluation of difficulty with sleep    Comprehensive sleep medicine questionnaire reviewed  Last Sleep CBT I 11/19/2016 Dr. Andrei Perez    History of Present Illness: 59-year-old female with history of hypertension, cervical dystonia and chronic pain, long COVID, depression and anxiety.  She is suffering a lot of issues with falling asleep.  She typically is getting into bed around 10 to 11 PM and falling asleep between 30 minutes and an hour.  During that time she is worrying about her own pain as well as the risks of the medication she has been given.  She is worried about developing dementia.  She is worried about her mother who is difficult who is about dementia.  She has been prescribed clonazepam in conjunction with Botox injections for her cervical dystonia however she does not take the clonazepam lately due to fears about potential impacts on her brain.  Once she falls asleep she feels that she generally sleeps through the night.  She has noticed on her Fitbit that she has more awakenings than she realizes.  She is not wearing her Fitbit at night anymore.  She typically will get up 0-1 time at night to go to the bathroom and usually can return to sleep okay.  She will sometimes wake with headache associated with her neck and back pain.  She does not think she grinds her teeth.  There is no observed snoring or apnea.  She denies waking up with gasping, choking or shortness of breath.  She is not sleepy during the day.  However she does feel somewhat tired.  She will get up once a week or so at 615 to an alarm to take care of her grandchild.  Without an alarm she will stay in bed until 7-9:30 AM    She did see insomnia specialist last in 2016 for  similar symptoms.  She is also seeing a therapist in the past but it was while ago.  She is not currently taking any sleep aids.  Past meds that have been trialed include trazodone.    [place] Sleepiness Scale   Sitting and reading: (Patient-Rptd) Would never doze   Watching TV: (Patient-Rptd) Would never doze   Sitting, inactive in a public place (e.g. a theatre or a meeting): (Patient-Rptd) Would never doze   As a passenger in a car for an hour without a break: (Patient-Rptd) Would never doze   Lying down to rest in the afternoon when circumstances permit: (Patient-Rptd) Would never doze   Sitting and talking to someone: (Patient-Rptd) Would never doze   Sitting quietly after a lunch without alcohol: (Patient-Rptd) Would never doze   In a car, while stopped for a few minutes in traffic: (Patient-Rptd) Would never doze   Total score - [place]: (Patient-Rptd) 0  (Less than 10 normal)    Insomnia Severity Scale  TREVOR Total Score: (Patient-Rptd) 12  Total score categories:  0-7 = No clinically significant insomnia   8-14 = Subthreshold insomnia   15-21 = Clinical insomnia (moderate severity)  22-28 = Clinical insomnia (severe)    STOP-BANG  Loud Snore   0  Excessively Tired/Sleepy   0  Observed apnea   0  Hypertension   1  BMI> 35 kg/m2   0  Age >50   1  Neck >16 in/40cm   ?  Male Gender   0  Total =   2  (0-2 low, 3-4 intermediate, 5-8 high risk of MARICHUY)      Past Medical History:   Diagnosis Date    Anxiety     Depression     HTN (hypertension) 2016       Allergies   Allergen Reactions    Bee Venom Anaphylaxis    Erythromycin Hives and Itching    Seasonal Allergies        Current Outpatient Medications   Medication Sig Dispense Refill    buPROPion (WELLBUTRIN XL) 300 MG 24 hr tablet TAKE ONE TABLET BY MOUTH EVERY MORNING 90 tablet 1    chlorthalidone (HYGROTON) 25 MG tablet TAKE ONE TABLET BY MOUTH EVERY DAY 90 tablet 1    clonazePAM (KLONOPIN) 0.5 MG tablet Take 1 tab in the evening before bed. Okay to take an extra  tab as needed in the day. 45 tablet 5    DULoxetine (CYMBALTA) 20 MG capsule Take 1 capsule (20 mg) by mouth daily 90 capsule 2    eletriptan (RELPAX) 40 MG tablet Take 1 tablet (40 mg) by mouth at onset of headache for migraine May repeat in 2 hours. Max 2 tablets/24 hours. 12 tablet 3    EPINEPHrine (ANY BX GENERIC EQUIV) 0.3 MG/0.3ML injection 2-pack Inject 0.3 mLs (0.3 mg) into the muscle as needed for anaphylaxis May repeat one time in 5-15 minutes if response to initial dose is inadequate. 2 each 1    losartan (COZAAR) 50 MG tablet TAKE ONE TABLET BY MOUTH EVERY DAY 90 tablet 2     Current Facility-Administered Medications   Medication Dose Route Frequency Provider Last Rate Last Admin    botulinum toxin type A (BOTOX) 100 units injection 600 Units  600 Units Intramuscular Q90 Days Simi Vazquez MD   450 Units at 02/10/25 1145    glycopyrrolate (ROBINUL) injection 0.1-0.2 mg  0.1-0.2 mg Intravenous Q5 Min PRN Saeed Edwards MD   0.2 mg at 01/11/24 1357    triamcinolone (KENALOG-40) injection 40 mg  40 mg Intramuscular Once            Social History     Socioeconomic History    Marital status:      Spouse name: Not on file    Number of children: Not on file    Years of education: Not on file    Highest education level: Not on file   Occupational History    Not on file   Tobacco Use    Smoking status: Never    Smokeless tobacco: Never   Vaping Use    Vaping status: Never Used   Substance and Sexual Activity    Alcohol use: Yes     Comment: Occasionally, 1-2 glasses of wine on weekends    Drug use: No    Sexual activity: Not on file   Other Topics Concern    Parent/sibling w/ CABG, MI or angioplasty before 65F 55M? 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, 2 boys, one dog      Social Drivers of Health     Financial Resource Strain: Low Risk  (12/27/2023)    Financial Resource Strain     Within the past 12  "months, have you or your family members you live with been unable to get utilities (heat, electricity) when it was really needed?: No   Food Insecurity: Low Risk  (12/27/2023)    Food Insecurity     Within the past 12 months, did you worry that your food would run out before you got money to buy more?: No     Within the past 12 months, did the food you bought just not last and you didn t have money to get more?: No   Transportation Needs: Low Risk  (12/27/2023)    Transportation Needs     Within the past 12 months, has lack of transportation kept you from medical appointments, getting your medicines, non-medical meetings or appointments, work, or from getting things that you need?: No   Physical Activity: Not on file   Stress: Not on file   Social Connections: Not on file   Interpersonal Safety: Low Risk  (1/17/2025)    Interpersonal Safety     Do you feel physically and emotionally safe where you currently live?: Yes     Within the past 12 months, have you been hit, slapped, kicked or otherwise physically hurt by someone?: No     Within the past 12 months, have you been humiliated or emotionally abused in other ways by your partner or ex-partner?: No   Housing Stability: Low Risk  (12/27/2023)    Housing Stability     Do you have housing? : Yes     Are you worried about losing your housing?: No       Family History   Problem Relation Age of Onset    Hypertension Mother     Autoimmune Disease Mother         CREST    Hyperlipidemia Mother     Hypertension Father     Skin Cancer Father     Heart Disease Father     Melanoma No family hx of          EXAM:  Ht 1.676 m (5' 6\")   Wt 64.4 kg (142 lb)   BMI 22.92 kg/m    GENERAL: Alert and no distress  EYES: Eyes grossly normal to inspection.  No discharge or erythema, or obvious scleral/conjunctival abnormalities.  RESP: No audible wheeze, cough, or visible cyanosis.    SKIN: Visible skin clear. No significant rash, abnormal pigmentation or lesions.  NEURO: Cranial nerves " grossly intact.  Mentation and speech appropriate for age.  PSYCH: Appropriate affect, tone, and pace of words           TSH   Date Value Ref Range Status   02/18/2022 1.53 0.40 - 4.00 mU/L Final   12/03/2020 1.32 0.40 - 4.00 mU/L Final     Ferritin   Date Value Ref Range Status   02/18/2022 63 8 - 252 ng/mL Final           ASSESSMENT:  59-year-old female with chronic sleep initiation insomnia.  She has had issues since childhood however chronic pain contributes to her insomnia as does excessive worry about her mother and her own health.  She had some benefit from CBT-I in the past and would benefit from returning to those principles.  She also potentially could benefit from health psychology to help manage her health related worries.  I do not think that a sleep study is indicated.    PLAN:  Reviewed the concepts of CBT-I.  She is agreeable to seeing Dr. Perez again.  Counseled her on the importance of keeping detailed sleep log prior to her initial consultation.  Also recommended self-help book regarding insomnia for those with depression/anxiety or chronic pain.  See instructions for further details of counseling provided.  Health psychology referral placed as well.  Also suggested considering talking to someone at her [employer] for counseling on how to manage worry about health.  Follow-up with me as needed.    62 minutes spent by me on the date of the encounter doing chart review, history and exam, documentation and further activities per the note    Mandy Ferguson M.D.  Pulmonary/Critical Care/Sleep Medicine    [facility]   Floor 1, [address]   [address] [place], [place] [address] [address]   Appointments: [phone]    The above note was dictated using voice recognition software and may include typographical errors. Please contact the author for any clarifications.             estimated EF of 55 - 60%.   Aortic Valve: Sclerotic but non stenotic aortic valve.   Interatrial Septum: Color doppler suggests possible ASD or PFO; Bubble study was negative for right to left unable to rule out left to right shunt.   Pericardium: No pericardial effusion.     XR CHEST PORTABLE    Result Date: 7/1/2024  EXAMINATION: XR CHEST PORTABLE, 7/1/2024 12:59 PM HISTORY: Chest Pain COMPARISON:  No comparisons available.  Technique: Single view. Findings: The lungs are clear, no effusion. No pneumothorax. Heart is normal size. Mediastinal and hilar contours are within normal limits. Bony thorax no acute abnormality.     No acute cardiopulmonary abnormality. Electronically signed by Lucas Lechuga      Electronically signed by MONTSE RUCKER CNP on 7/2/2024 at 1:48 PM

## 2025-02-12 NOTE — LETTER
2/12/2025      Tia Pablo  [id number] [address] [id number]      Dear Colleague,    Thank you for referring your patient, Tia Pablo, to the [facility] [place]. Please see a copy of my visit note below.    Virtual Visit Details    Type of service:  Video Visit     Originating Location (pt. Location): Home    Distant Location (provider location):  Off-site  Platform used for Video Visit: [facility]    Chief complaint: Consultation requested by Ana Guzman MD for the evaluation of difficulty with sleep    Comprehensive sleep medicine questionnaire reviewed  Last Sleep CBT I 11/19/2016 Dr. Andrei Perez    History of Present Illness: 59-year-old female with history of hypertension, cervical dystonia and chronic pain, long COVID, depression and anxiety.  She is suffering a lot of issues with falling asleep.  She typically is getting into bed around 10 to 11 PM and falling asleep between 30 minutes and an hour.  During that time she is worrying about her own pain as well as the risks of the medication she has been given.  She is worried about developing dementia.  She is worried about her mother who is difficult who is about dementia.  She has been prescribed clonazepam in conjunction with Botox injections for her cervical dystonia however she does not take the clonazepam lately due to fears about potential impacts on her brain.  Once she falls asleep she feels that she generally sleeps through the night.  She has noticed on her Fitbit that she has more awakenings than she realizes.  She is not wearing her Fitbit at night anymore.  She typically will get up 0-1 time at night to go to the bathroom and usually can return to sleep okay.  She will sometimes wake with headache associated with her neck and back pain.  She does not think she grinds her teeth.  There is no observed snoring or apnea.  She denies waking up with gasping, choking or shortness of breath.  She is not sleepy  during the day.  However she does feel somewhat tired.  She will get up once a week or so at 615 to an alarm to take care of her grandchild.  Without an alarm she will stay in bed until 7-9:30 AM    She did see insomnia specialist last in 2016 for similar symptoms.  She is also seeing a therapist in the past but it was while ago.  She is not currently taking any sleep aids.  Past meds that have been trialed include trazodone.    [place] Sleepiness Scale   Sitting and reading: (Patient-Rptd) Would never doze   Watching TV: (Patient-Rptd) Would never doze   Sitting, inactive in a public place (e.g. a theatre or a meeting): (Patient-Rptd) Would never doze   As a passenger in a car for an hour without a break: (Patient-Rptd) Would never doze   Lying down to rest in the afternoon when circumstances permit: (Patient-Rptd) Would never doze   Sitting and talking to someone: (Patient-Rptd) Would never doze   Sitting quietly after a lunch without alcohol: (Patient-Rptd) Would never doze   In a car, while stopped for a few minutes in traffic: (Patient-Rptd) Would never doze   Total score - [place]: (Patient-Rptd) 0  (Less than 10 normal)    Insomnia Severity Scale  TREVOR Total Score: (Patient-Rptd) 12  Total score categories:  0-7 = No clinically significant insomnia   8-14 = Subthreshold insomnia   15-21 = Clinical insomnia (moderate severity)  22-28 = Clinical insomnia (severe)    STOP-BANG  Loud Snore   0  Excessively Tired/Sleepy   0  Observed apnea   0  Hypertension   1  BMI> 35 kg/m2   0  Age >50   1  Neck >16 in/40cm   ?  Male Gender   0  Total =   2  (0-2 low, 3-4 intermediate, 5-8 high risk of MARICHUY)      Past Medical History:   Diagnosis Date     Anxiety      Depression      HTN (hypertension) 2016       Allergies   Allergen Reactions     Bee Venom Anaphylaxis     Erythromycin Hives and Itching     Seasonal Allergies        Current Outpatient Medications   Medication Sig Dispense Refill     buPROPion (WELLBUTRIN XL) 300  MG 24 hr tablet TAKE ONE TABLET BY MOUTH EVERY MORNING 90 tablet 1     chlorthalidone (HYGROTON) 25 MG tablet TAKE ONE TABLET BY MOUTH EVERY DAY 90 tablet 1     clonazePAM (KLONOPIN) 0.5 MG tablet Take 1 tab in the evening before bed. Okay to take an extra tab as needed in the day. 45 tablet 5     DULoxetine (CYMBALTA) 20 MG capsule Take 1 capsule (20 mg) by mouth daily 90 capsule 2     eletriptan (RELPAX) 40 MG tablet Take 1 tablet (40 mg) by mouth at onset of headache for migraine May repeat in 2 hours. Max 2 tablets/24 hours. 12 tablet 3     EPINEPHrine (ANY BX GENERIC EQUIV) 0.3 MG/0.3ML injection 2-pack Inject 0.3 mLs (0.3 mg) into the muscle as needed for anaphylaxis May repeat one time in 5-15 minutes if response to initial dose is inadequate. 2 each 1     losartan (COZAAR) 50 MG tablet TAKE ONE TABLET BY MOUTH EVERY DAY 90 tablet 2     Current Facility-Administered Medications   Medication Dose Route Frequency Provider Last Rate Last Admin     botulinum toxin type A (BOTOX) 100 units injection 600 Units  600 Units Intramuscular Q90 Days Simi Vazquez MD   450 Units at 02/10/25 1145     glycopyrrolate (ROBINUL) injection 0.1-0.2 mg  0.1-0.2 mg Intravenous Q5 Min PRN Saeed Edwards MD   0.2 mg at 01/11/24 1357     triamcinolone (KENALOG-40) injection 40 mg  40 mg Intramuscular Once            Social History     Socioeconomic History     Marital status:      Spouse name: Not on file     Number of children: Not on file     Years of education: Not on file     Highest education level: Not on file   Occupational History     Not on file   Tobacco Use     Smoking status: Never     Smokeless tobacco: Never   Vaping Use     Vaping status: Never Used   Substance and Sexual Activity     Alcohol use: Yes     Comment: Occasionally, 1-2 glasses of wine on weekends     Drug use: No     Sexual activity: Not on file   Other Topics Concern     Parent/sibling w/ CABG, MI or angioplasty before 65F  "55M? 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, 2 boys, one dog      Social Drivers of Health     Financial Resource Strain: Low Risk  (12/27/2023)    Financial Resource Strain      Within the past 12 months, have you or your family members you live with been unable to get utilities (heat, electricity) when it was really needed?: No   Food Insecurity: Low Risk  (12/27/2023)    Food Insecurity      Within the past 12 months, did you worry that your food would run out before you got money to buy more?: No      Within the past 12 months, did the food you bought just not last and you didn t have money to get more?: No   Transportation Needs: Low Risk  (12/27/2023)    Transportation Needs      Within the past 12 months, has lack of transportation kept you from medical appointments, getting your medicines, non-medical meetings or appointments, work, or from getting things that you need?: No   Physical Activity: Not on file   Stress: Not on file   Social Connections: Not on file   Interpersonal Safety: Low Risk  (1/17/2025)    Interpersonal Safety      Do you feel physically and emotionally safe where you currently live?: Yes      Within the past 12 months, have you been hit, slapped, kicked or otherwise physically hurt by someone?: No      Within the past 12 months, have you been humiliated or emotionally abused in other ways by your partner or ex-partner?: No   Housing Stability: Low Risk  (12/27/2023)    Housing Stability      Do you have housing? : Yes      Are you worried about losing your housing?: No       Family History   Problem Relation Age of Onset     Hypertension Mother      Autoimmune Disease Mother         CREST     Hyperlipidemia Mother      Hypertension Father      Skin Cancer Father      Heart Disease Father      Melanoma No family hx of          EXAM:  Ht 1.676 m (5' 6\")   Wt 64.4 kg (142 lb)   BMI 22.92 kg/m    GENERAL: Alert " and no distress  EYES: Eyes grossly normal to inspection.  No discharge or erythema, or obvious scleral/conjunctival abnormalities.  RESP: No audible wheeze, cough, or visible cyanosis.    SKIN: Visible skin clear. No significant rash, abnormal pigmentation or lesions.  NEURO: Cranial nerves grossly intact.  Mentation and speech appropriate for age.  PSYCH: Appropriate affect, tone, and pace of words           TSH   Date Value Ref Range Status   02/18/2022 1.53 0.40 - 4.00 mU/L Final   12/03/2020 1.32 0.40 - 4.00 mU/L Final     Ferritin   Date Value Ref Range Status   02/18/2022 63 8 - 252 ng/mL Final           ASSESSMENT:  59-year-old female with chronic sleep initiation insomnia.  She has had issues since childhood however chronic pain contributes to her insomnia as does excessive worry about her mother and her own health.  She had some benefit from CBT-I in the past and would benefit from returning to those principles.  She also potentially could benefit from health psychology to help manage her health related worries.  I do not think that a sleep study is indicated.    PLAN:  Reviewed the concepts of CBT-I.  She is agreeable to seeing Dr. Perez again.  Counseled her on the importance of keeping detailed sleep log prior to her initial consultation.  Also recommended self-help book regarding insomnia for those with depression/anxiety or chronic pain.  See instructions for further details of counseling provided.  Health psychology referral placed as well.  Also suggested considering talking to someone at her [employer] for counseling on how to manage worry about health.  Follow-up with me as needed.    62 minutes spent by me on the date of the encounter doing chart review, history and exam, documentation and further activities per the note    Mandy Ferguson M.D.  Pulmonary/Critical Care/Sleep Medicine    [facility]   Floor 1, [address]   [address]  [place] [address]   Appointments: [phone]    The above note was dictated using voice recognition software and may include typographical errors. Please contact the author for any clarifications.              Again, thank you for allowing me to participate in the care of your patient.        Sincerely,        Mandy Ferguson MD    Electronically signed

## 2025-02-20 NOTE — TELEPHONE ENCOUNTER
REASON FOR VISIT: Memory loss, [facility],    DATE OF APPT: 3/19/2025   NOTES (FOR ALL VISITS) STATUS DETAILS   OFFICE NOTE from referring provider Internal [facility] Ana Moreno MD 10/23/2024   MEDICATION LIST N/A    IMAGING  (FOR ALL VISITS)     XR N/A    MRI (HEAD, NECK, SPINE) N/A    CT (HEAD, NECK, SPINE) Internal [facility]  CT Head 6/04/2024

## 2025-03-03 ENCOUNTER — MYC REFILL (OUTPATIENT)
Dept: NEUROLOGY | Facility: CLINIC | Age: 60
End: 2025-03-03
Payer: COMMERCIAL

## 2025-03-03 DIAGNOSIS — F41.9 ANXIETY: ICD-10-CM

## 2025-03-03 DIAGNOSIS — G24.3 CERVICAL DYSTONIA: ICD-10-CM

## 2025-03-03 DIAGNOSIS — Z72.820 POOR SLEEP: ICD-10-CM

## 2025-03-04 NOTE — TELEPHONE ENCOUNTER
RX Authorization    Medication: clonazePAM (KLONOPIN) 0.5 MG tablet     Date last refill ordered: 9/26/24    Quantity ordered: 45    # refills: 5    Date of last clinic visit with ordering provider: 7/25/24    Date of next clinic visit with ordering provider: 3/27/25

## 2025-03-06 RX ORDER — CLONAZEPAM 0.5 MG/1
TABLET ORAL
Qty: 45 TABLET | Refills: 5 | Status: SHIPPED | OUTPATIENT
Start: 2025-03-06

## 2025-03-13 ENCOUNTER — OFFICE VISIT (OUTPATIENT)
Dept: DERMATOLOGY | Facility: CLINIC | Age: 60
End: 2025-03-13
Payer: COMMERCIAL

## 2025-03-13 DIAGNOSIS — Z12.83 ENCOUNTER FOR SCREENING FOR MALIGNANT NEOPLASM OF SKIN: ICD-10-CM

## 2025-03-13 DIAGNOSIS — D22.9 MULTIPLE BENIGN NEVI: ICD-10-CM

## 2025-03-13 DIAGNOSIS — L81.4 LENTIGO: Primary | ICD-10-CM

## 2025-03-13 DIAGNOSIS — L57.0 AK (ACTINIC KERATOSIS): ICD-10-CM

## 2025-03-13 DIAGNOSIS — L82.1 SEBORRHEIC KERATOSES: ICD-10-CM

## 2025-03-13 DIAGNOSIS — D18.01 CHERRY ANGIOMA: ICD-10-CM

## 2025-03-13 RX ORDER — HYDROQUINONE 40 MG/G
CREAM TOPICAL
Qty: 30 G | Refills: 2 | Status: SHIPPED | OUTPATIENT
Start: 2025-03-13

## 2025-03-13 NOTE — PATIENT INSTRUCTIONS
Proper skin care from [facility]:    -Eliminate harsh soaps as they strip the natural oils from the skin, often resulting in dry itchy skin ( i.e. Dial, Zest, [place] Spring)  -Use mild soaps such as Cetaphil or Dove Sensitive Skin in the shower. You do not need to use soap on arms, legs, and trunk every time you shower unless visibly soiled.   -Avoid hot or cold showers.  -After showering, lightly dry off and apply moisturizing within 2-3 minutes. This will help trap moisture in the skin.   -Aggressive use of a moisturizer at least 1-2 times a day to the entire body (including -Vanicream, Cetaphil, Aquaphor or Cerave) and moisturize hands after every washing.  -We recommend using moisturizers that come in a tub that needs to be scooped out, not a pump. This has more of an oil base. It will hold moisture in your skin much better than a water base moisturizer. The above recommended are non-pore clogging.      Wear a sunscreen with at least SPF 30 on your face, ears, neck and V of the chest daily. Wear sunscreen on other areas of the body if those areas are exposed to the sun throughout the day. Sunscreens can contain physical and/or chemical blockers. Physical blockers are less likely to clog pores, these include zinc oxide and titanium dioxide. Reapply every two hour and after swimming.     Sunscreen examples: https://www.ewg.org/sunscreen/    UV radiation  UVA radiation remains constant throughout the day and throughout the year. It is a longer wavelength than UVB and therefore penetrates deeper into the skin leading to immediate and delayed tanning, photoaging, and skin cancer. 70-80% of UVA and UVB radiation occurs between the hours of 10am-2pm.  UVB radiation  UVB radiation causes the most harmful effects and is more significant during the summer months. However, snow and ice can reflect UVB radiation leading to skin damage during the winter months as well. UVB radiation is responsible for tanning,  burning, inflammation, delayed erythema (pinkness), pigmentation (brown spots), and skin cancer.     I recommend self monthly full body exams and yearly full body exams with a dermatology provider. If you develop a new or changing lesion please follow up for examination. Most skin cancers are pink and scaly or pink and pearly. However, we do see blue/brown/black skin cancers.  Consider the ABCDEs of melanoma when giving yourself your monthly full body exam ( don't forget the groin, buttocks, feet, toes, etc). A-asymmetry, B-borders, C-color, D-diameter, E-elevation or evolving. If you see any of these changes please follow up in clinic. If you cannot see your back I recommend purchasing a hand held mirror to use with a larger wall mirror.       Checking for Skin Cancer  You can find cancer early by checking your skin each month. There are 3 kinds of skin cancer. They are melanoma, basal cell carcinoma, and squamous cell carcinoma. Doing monthly skin checks is the best way to find new marks or skin changes. Follow the instructions below for checking your skin.   The ABCDEs of checking moles for melanoma   Check your moles or growths for signs of melanoma using ABCDE:   Asymmetry: the sides of the mole or growth don t match  Border: the edges are ragged, notched, or blurred  Color: the color within the mole or growth varies  Diameter: the mole or growth is larger than 6 mm (size of a pencil eraser)  Evolving: the size, shape, or color of the mole or growth is changing (evolving is not shown in the images below)    Checking for other types of skin cancer  Basal cell carcinoma or squamous cell carcinoma have symptoms such as:     A spot or mole that looks different from all other marks on your skin  Changes in how an area feels, such as itching, tenderness, or pain  Changes in the skin's surface, such as oozing, bleeding, or scaliness  A sore that does not heal  New swelling or redness beyond the border of a  mole    Who s at risk?  Anyone can get skin cancer. But you are at greater risk if you have:   Fair skin, light-colored hair, or light-colored eyes  Many moles or abnormal moles on your skin  A history of sunburns from sunlight or tanning beds  A family history of skin cancer  A history of exposure to radiation or chemicals  A weakened immune system  If you have had skin cancer in the past, you are at risk for recurring skin cancer.   How to check your skin  Do your monthly skin checkups in front of a full-length mirror. Check all parts of your body, including your:   Head (ears, face, neck, and scalp)  Torso (front, back, and sides)  Arms (tops, undersides, upper, and lower armpits)  Hands (palms, backs, and fingers, including under the nails)  Buttocks and genitals  Legs (front, back, and sides)  Feet (tops, soles, toes, including under the nails, and between toes)  If you have a lot of moles, take digital photos of them each month. Make sure to take photos both up close and from a distance. These can help you see if any moles change over time.   Most skin changes are not cancer. But if you see any changes in your skin, call your doctor right away. Only he or she can diagnose a problem. If you have skin cancer, seeing your doctor can be the first step toward getting the treatment that could save your life.   [employer] last reviewed this educational content on 4/1/2019 2000-2020 [employer]. [address] [place] [address], [place] [address]. All rights reserved. This information is not intended as a substitute for professional medical care. Always follow your healthcare professional's instructions.       When should I call my doctor?  If you are worsening or not improving, please, contact us or seek urgent care as noted below.     Who should I call with questions (adults)?    [facility] [phone]  For urgent needs outside of business hours call the [facility] at  [phone] and ask for the dermatology resident on call to be paged  If this is a medical emergency and you are unable to reach an ER, Call [phone]      If you need a prescription refill, please contact your pharmacy. Refills are approved or denied by our Physicians during normal business hours, Monday through Friday.  Per office policy, refills will not be granted if you have not been seen within the past year (or sooner depending on the condition).

## 2025-03-13 NOTE — LETTER
3/13/2025      Tia Pablo  [address] [facility] [id number]      Dear Colleague,    Thank you for referring your patient, Tia Pablo, to the [facility]. Please see a copy of my visit note below.    [facility] Dermatology Note  Encounter Date: Mar 13, 2025  Office Visit     Reviewed patients past medical history and pertinent chart review prior to patients visit today.     Dermatology Problem List:  Hydroquinone 4% cream given for lightening of the lentigines on the face 3/13/2025  AK's on the face, cryo 3/13/2025    Patient denies personal history of skin cancer or dysplastic nevi.    Parents have had skin cancer, type unknown  ____________________________________________    Assessment & Plan:     # Actinic keratosis, right upper arm, right cheek, left forehead. Premalignant nature discussed with patient. Treatment options discussed with patient today including no treatment, topical treatment, and cryotherapy. Patient elects to treat visible lesions today with cryotherapy. After verbal consent and discussion of risks and benefits including but no limited to dyspigmentation/scar, blister, and pain. A total of 3 actinic keratoses were treated with 1-2mm freeze border for 2 cycle with liquid nitrogen. Post cryotherapy instructions were provided.     # Lentigines. Benign, no further treatment needed.   If lesion grows, changes, becomes symptomatic, bleeds without trauma, or becomes concerning to patient for any reason I would like to see them back for follow up to recheck for signs of malignancy. I discussed this with patient and patient agrees.       # Benign skin findings including: seborrheic keratoses, cherry angioma, lentigines and benign nevi.   - No further intervention required. Patient to report changes.   - Patient reassured of the benign nature of these lesions.    #Signs and Symptoms of non-melanoma skin cancer and ABCDEs of melanoma reviewed with  patient. Patient encouraged to perform monthly self skin exams and educated on how to perform them. UV precautions reviewed with patient. Patient was asked about new or changing moles/lesions on body.     #Reviewed Sunscreen: Apply 20 minutes prior to going outdoors and reapply every two hours, when wet or sweating. We recommend using an SPF 30 or higher, and to use one that is water resistant.       Follow-up:  1 years for follow up full body skin exam, prn for new or changing lesions or new concerns    Magui Ellis CNP  Dermatology     ____________________________________________    CC: Skin Check    HPI:  Ms. Tia Pablo is a(n) 59 year old female who presents today as a return patient for a full body skin cancer screening. Patient has concerns today about brown spots on her face.  She does not like the look of them as she thinks it looks like her face is dirty.  She has been using vitamin C serum over-the-counter for this but wonders if there is something better.     Patient is otherwise feeling well, without additional skin concerns.     Physical Exam:  SKIN: Total skin excluding the genitalia areas was performed. The exam included the head/face, neck, both arms, chest, back, abdomen, both legs, digits, mons pubis, buttock and nails.   - right upper arm, right cheek, left forehead, erythematous macules/thin papules with overlying adherent scale   -several 1-2mm red dome shaped symmetric papules scattered on the trunk  -multiple tan/brown flat round macules and raised papules scattered throughout trunk, extremities and head. No worrisome features for malignancy noted on examination.  -scattered tan, homogenous macules scattered on sun exposed areas of trunk, extremities and face.   -scattered waxy, stuck on tan/brown papules and patches on the trunk     - No other lesions of concern on areas examined.     Medications:  Current Outpatient Medications   Medication Sig Dispense Refill     buPROPion (WELLBUTRIN XL)  300 MG 24 hr tablet TAKE ONE TABLET BY MOUTH EVERY MORNING 90 tablet 1     chlorthalidone (HYGROTON) 25 MG tablet TAKE ONE TABLET BY MOUTH EVERY DAY 90 tablet 1     clonazePAM (KLONOPIN) 0.5 MG tablet Take 1 tab in the evening before bed. Okay to take an extra tab as needed in the day. 45 tablet 5     DULoxetine (CYMBALTA) 20 MG capsule Take 1 capsule (20 mg) by mouth daily 90 capsule 2     eletriptan (RELPAX) 40 MG tablet Take 1 tablet (40 mg) by mouth at onset of headache for migraine May repeat in 2 hours. Max 2 tablets/24 hours. 12 tablet 3     hydroquinone (RUBY) 4 % external cream Use nightly to dark areas of face for 3 months, take a -31 month break and repeat if necessary. 30 g 2     losartan (COZAAR) 50 MG tablet TAKE ONE TABLET BY MOUTH EVERY DAY 90 tablet 2     EPINEPHrine (ANY BX GENERIC EQUIV) 0.3 MG/0.3ML injection 2-pack Inject 0.3 mLs (0.3 mg) into the muscle as needed for anaphylaxis May repeat one time in 5-15 minutes if response to initial dose is inadequate. 2 each 1     Current Facility-Administered Medications   Medication Dose Route Frequency Provider Last Rate Last Admin     botulinum toxin type A (BOTOX) 100 units injection 600 Units  600 Units Intramuscular Q90 Days Simi Vazquez MD   450 Units at 02/10/25 1145     glycopyrrolate (ROBINUL) injection 0.1-0.2 mg  0.1-0.2 mg Intravenous Q5 Min PRN Saeed Edwards MD   0.2 mg at 01/11/24 1357     triamcinolone (KENALOG-40) injection 40 mg  40 mg Intramuscular Once           Past Medical History:   Patient Active Problem List   Diagnosis     Genetic torsion dystonia     Essential hypertension     Vasomotor symptoms due to menopause     Neck pain     Current mild episode of major depressive disorder without prior episode     Post-COVID chronic concentration deficit     Right knee pain, unspecified chronicity     Past Medical History:   Diagnosis Date     Anxiety      Depression      HTN (hypertension) 2016       CC  Referred Self, MD  No address on file on close of this encounter.      Again, thank you for allowing me to participate in the care of your patient.        Sincerely,        SHANDRA Joyce CNP    Electronically signed

## 2025-03-13 NOTE — PROGRESS NOTES
[facility] Dermatology Note  Encounter Date: Mar 13, 2025  Office Visit     Reviewed patients past medical history and pertinent chart review prior to patients visit today.     Dermatology Problem List:  Hydroquinone 4% cream given for lightening of the lentigines on the face 3/13/2025  AK's on the face, cryo 3/13/2025    Patient denies personal history of skin cancer or dysplastic nevi.    Parents have had skin cancer, type unknown  ____________________________________________    Assessment & Plan:     # Actinic keratosis, right upper arm, right cheek, left forehead. Premalignant nature discussed with patient. Treatment options discussed with patient today including no treatment, topical treatment, and cryotherapy. Patient elects to treat visible lesions today with cryotherapy. After verbal consent and discussion of risks and benefits including but no limited to dyspigmentation/scar, blister, and pain. A total of 3 actinic keratoses were treated with 1-2mm freeze border for 2 cycle with liquid nitrogen. Post cryotherapy instructions were provided.     # Lentigines. Benign, no further treatment needed.   If lesion grows, changes, becomes symptomatic, bleeds without trauma, or becomes concerning to patient for any reason I would like to see them back for follow up to recheck for signs of malignancy. I discussed this with patient and patient agrees.       # Benign skin findings including: seborrheic keratoses, cherry angioma, lentigines and benign nevi.   - No further intervention required. Patient to report changes.   - Patient reassured of the benign nature of these lesions.    #Signs and Symptoms of non-melanoma skin cancer and ABCDEs of melanoma reviewed with patient. Patient encouraged to perform monthly self skin exams and educated on how to perform them. UV precautions reviewed with patient. Patient was asked about new or changing moles/lesions on body.     #Reviewed Sunscreen: Apply 20  minutes prior to going outdoors and reapply every two hours, when wet or sweating. We recommend using an SPF 30 or higher, and to use one that is water resistant.       Follow-up:  1 years for follow up full body skin exam, prn for new or changing lesions or new concerns    Magui Ellis CNP  Dermatology     ____________________________________________    CC: Skin Check    HPI:  Ms. Tia Pablo is a(n) 59 year old female who presents today as a return patient for a full body skin cancer screening. Patient has concerns today about brown spots on her face.  She does not like the look of them as she thinks it looks like her face is dirty.  She has been using vitamin C serum over-the-counter for this but wonders if there is something better.     Patient is otherwise feeling well, without additional skin concerns.     Physical Exam:  SKIN: Total skin excluding the genitalia areas was performed. The exam included the head/face, neck, both arms, chest, back, abdomen, both legs, digits, mons pubis, buttock and nails.   - right upper arm, right cheek, left forehead, erythematous macules/thin papules with overlying adherent scale   -several 1-2mm red dome shaped symmetric papules scattered on the trunk  -multiple tan/brown flat round macules and raised papules scattered throughout trunk, extremities and head. No worrisome features for malignancy noted on examination.  -scattered tan, homogenous macules scattered on sun exposed areas of trunk, extremities and face.   -scattered waxy, stuck on tan/brown papules and patches on the trunk     - No other lesions of concern on areas examined.     Medications:  Current Outpatient Medications   Medication Sig Dispense Refill    buPROPion (WELLBUTRIN XL) 300 MG 24 hr tablet TAKE ONE TABLET BY MOUTH EVERY MORNING 90 tablet 1    chlorthalidone (HYGROTON) 25 MG tablet TAKE ONE TABLET BY MOUTH EVERY DAY 90 tablet 1    clonazePAM (KLONOPIN) 0.5 MG tablet Take 1 tab in the evening before bed.  Okay to take an extra tab as needed in the day. 45 tablet 5    DULoxetine (CYMBALTA) 20 MG capsule Take 1 capsule (20 mg) by mouth daily 90 capsule 2    eletriptan (RELPAX) 40 MG tablet Take 1 tablet (40 mg) by mouth at onset of headache for migraine May repeat in 2 hours. Max 2 tablets/24 hours. 12 tablet 3    hydroquinone (RUBY) 4 % external cream Use nightly to dark areas of face for 3 months, take a -31 month break and repeat if necessary. 30 g 2    losartan (COZAAR) 50 MG tablet TAKE ONE TABLET BY MOUTH EVERY DAY 90 tablet 2    EPINEPHrine (ANY BX GENERIC EQUIV) 0.3 MG/0.3ML injection 2-pack Inject 0.3 mLs (0.3 mg) into the muscle as needed for anaphylaxis May repeat one time in 5-15 minutes if response to initial dose is inadequate. 2 each 1     Current Facility-Administered Medications   Medication Dose Route Frequency Provider Last Rate Last Admin    botulinum toxin type A (BOTOX) 100 units injection 600 Units  600 Units Intramuscular Q90 Days Simi Vazquez MD   450 Units at 02/10/25 1145    glycopyrrolate (ROBINUL) injection 0.1-0.2 mg  0.1-0.2 mg Intravenous Q5 Min PRN Saeed Edwards MD   0.2 mg at 01/11/24 1357    triamcinolone (KENALOG-40) injection 40 mg  40 mg Intramuscular Once           Past Medical History:   Patient Active Problem List   Diagnosis    Genetic torsion dystonia    Essential hypertension    Vasomotor symptoms due to menopause    Neck pain    Current mild episode of major depressive disorder without prior episode    Post-COVID chronic concentration deficit    Right knee pain, unspecified chronicity     Past Medical History:   Diagnosis Date    Anxiety     Depression     HTN (hypertension) 2016       CC Referred Self, MD  No address on file on close of this encounter.

## 2025-03-18 NOTE — PROGRESS NOTES
[facility] Neurology Consultation    Tia Pablo MRN# [id number]   Age: 59 year old YOB: 1965     Requesting physician: Ana Patel     Reason for Consultation: cognitive complaints           Assessment and Plan:   Assessment:  Tia Pablo is a 59 year old female who presents today for evaluation of cognitive complaints. Patient reports that she had COVID infection in March 2020 and it was shortly after this that she noticed symptoms of brain fog and short term memory problems. She recently had neuropsychology testing which was suggestive of mild cognitive impairment of non specific etiology. Pattern was not entirely suggestive of neurodegenerative cause. She had poorer than expected learning of new material and memory retrieval. MoCA is 28/30 today. We discussed that symptoms are not specific to an etiology. It is possible prior COVID symptoms are contributing. MRI brain was reportedly normal in 8/2020. B12 and amyloid 42/40 ratio were ordered today. She reports good mood and feeling refreshed in the morning when she wakes up.      Plan:  - Serum B12 and amyloid 42/40 ratio  - Consider holding Klonopin for a month and paying attention to any improvement of brain fog    Follow up in Neurology clinic pending above    Igor Johnson MD   of Neurology  [facility]  ---------------------------------------------------------------------------------------------------------------------------        History of Presenting Symptoms:   Tia Pablo is a 59 year old female who presents today for evaluation of cognitive complaints.     Patient reports that she had long COVID and has had brain fog issues from this. She had COVID in 2020. She wasn't hospitalized. She was in bed / on the couch for about 2 weeks. She had cold/flu like symptoms and lost taste/smell. She didn't realize she had brain fog until she went back to work. She had a hard time making quick  "decisions.     The brain fog sensation hasn't worsened, but is just not better.     She might get distracted at times. She is a busy person. She doesn't forget to shut the stove off.     She notes her memory is not as good. She might notice it at work, where she forgets to do things. She may go into a room and she may not remember why she went there. She thinks sometimes she forgets details, but she thinks it could be related to distracted. She has two adult children and sometimes they will tell her she repeats things.     Her long term memory is good.     She retired from work. This wasn't related to her symptoms. She worked for the [employer] and completed her years of service.     She has always been a poor sleeper. She takes melatonin occasionally for sleep. She will take the Clonazepam about 3 times a week as well. She might take it less commonly during the day. She generally feels refreshed in the morning.     She reports that her mood is \"pretty good now\". She is on Wellbutrin and Cymbalta.     Patient's mom has dementia. She first started getting symptoms late 70s.    She denies any history of hallucinations or acting out dreams.       Past Medical History:     Patient Active Problem List   Diagnosis    Genetic torsion dystonia    Essential hypertension    Vasomotor symptoms due to menopause    Neck pain    Current mild episode of major depressive disorder without prior episode    Post-COVID chronic concentration deficit    Right knee pain, unspecified chronicity     Past Medical History:   Diagnosis Date    Anxiety     Depression     HTN (hypertension)         Past Surgical History:     Past Surgical History:   Procedure Laterality Date    AS REPAIR TEND/MUS/FLEX FOREARM/WRIST, PRIM, EACH       SECTION      COLONOSCOPY N/A 01/15/2024    Procedure: Colonoscopy;  Surgeon: Sukhdev Calhoun MD;  Location: [facility]    HYSTERECTOMY      HYSTERECTOMY, PAP NO LONGER INDICATED          Social History: "     Social History     Tobacco Use    Smoking status: Never    Smokeless tobacco: Never   Vaping Use    Vaping status: Never Used   Substance Use Topics    Alcohol use: Yes     Comment: Occasionally, 1-2 glasses of wine on weekends    Drug use: No        Family History:     Family History   Problem Relation Age of Onset    Hypertension Mother     Autoimmune Disease Mother         CREST    Hyperlipidemia Mother     Dementia Mother     Skin Cancer Mother     Hypertension Father     Skin Cancer Father     Heart Disease Father     Sleep Apnea Sister     Melanoma No family hx of         Medications:     Current Outpatient Medications   Medication Sig Dispense Refill    buPROPion (WELLBUTRIN XL) 300 MG 24 hr tablet TAKE ONE TABLET BY MOUTH EVERY MORNING 90 tablet 1    chlorthalidone (HYGROTON) 25 MG tablet TAKE ONE TABLET BY MOUTH EVERY DAY 90 tablet 1    clonazePAM (KLONOPIN) 0.5 MG tablet Take 1 tab in the evening before bed. Okay to take an extra tab as needed in the day. 45 tablet 5    DULoxetine (CYMBALTA) 20 MG capsule Take 1 capsule (20 mg) by mouth daily 90 capsule 2    eletriptan (RELPAX) 40 MG tablet Take 1 tablet (40 mg) by mouth at onset of headache for migraine May repeat in 2 hours. Max 2 tablets/24 hours. 12 tablet 3    EPINEPHrine (ANY BX GENERIC EQUIV) 0.3 MG/0.3ML injection 2-pack Inject 0.3 mLs (0.3 mg) into the muscle as needed for anaphylaxis May repeat one time in 5-15 minutes if response to initial dose is inadequate. 2 each 1    hydroquinone (RUBY) 4 % external cream Use nightly to dark areas of face for 3 months, take a -31 month break and repeat if necessary. 30 g 2    losartan (COZAAR) 50 MG tablet TAKE ONE TABLET BY MOUTH EVERY DAY 90 tablet 2     Current Facility-Administered Medications   Medication Dose Route Frequency Provider Last Rate Last Admin    botulinum toxin type A (BOTOX) 100 units injection 600 Units  600 Units Intramuscular Q90 Days Simi Vazquez MD   450 Units at  02/10/25 1145    glycopyrrolate (ROBINUL) injection 0.1-0.2 mg  0.1-0.2 mg Intravenous Q5 Min CHENTEN Saeed Edwards MD   0.2 mg at 01/11/24 1357    triamcinolone (KENALOG-40) injection 40 mg  40 mg Intramuscular Once             Allergies:     Allergies   Allergen Reactions    Bee Venom Anaphylaxis    Erythromycin Hives and Itching    Seasonal Allergies         Review of Systems:   As above     Physical Exam:   Vitals: BP (!) 151/91 (BP Location: Right arm, Patient Position: Sitting, Cuff Size: Adult Regular)   Pulse 93   Wt 64.4 kg (142 lb)   BMI 22.92 kg/m     General: Seated comfortably in no acute distress.  HEENT: Optic discs sharp on funduscopic exam.   Lungs: breathing comfortably  Neurologic:     Mental Status: MoCA 28/30 (-1 delayed recall, -1 minor mistake on repeating sentence)     Cranial Nerves: Visual fields intact. PERRL. EOMI with normal smooth pursuit. Facial sensation intact/symmetric. Facial movements symmetric. Hearing not formally tested but intact to conversation. Palate elevation symmetric, uvula midline. No dysarthria. Shoulder shrug strong bilaterally. Tongue protrusion midline.     Motor: No tremors or other abnormal movements observed. Muscle tone normal throughout. Strength 5/5 throughout upper and lower extremities.     Deep Tendon Reflexes: 2+/symmetric throughout upper and lower extremities. No clonus.      Sensory: Intact/symmetric to light touch throughout upper and lower extremities.      Coordination: Finger-nose-finger and heel-shin intact without dysmetria.      Gait: Normal, steady casual gait.          Data: Pertinent prior to visit   Imaging:  MRI brain 8/2020 - reportedly normal     Laboratory:  TSH normal (2/2022)  CBC unremarkable, CMP normal (2023)

## 2025-03-19 ENCOUNTER — OFFICE VISIT (OUTPATIENT)
Dept: NEUROLOGY | Facility: CLINIC | Age: 60
End: 2025-03-19
Attending: FAMILY MEDICINE
Payer: COMMERCIAL

## 2025-03-19 ENCOUNTER — PRE VISIT (OUTPATIENT)
Dept: NEUROLOGY | Facility: CLINIC | Age: 60
End: 2025-03-19

## 2025-03-19 VITALS
SYSTOLIC BLOOD PRESSURE: 151 MMHG | BODY MASS INDEX: 22.92 KG/M2 | DIASTOLIC BLOOD PRESSURE: 91 MMHG | HEART RATE: 93 BPM | WEIGHT: 142 LBS

## 2025-03-19 DIAGNOSIS — R41.3 MEMORY CHANGE: Primary | ICD-10-CM

## 2025-03-19 DIAGNOSIS — U09.9 POST-COVID CHRONIC CONCENTRATION DEFICIT: ICD-10-CM

## 2025-03-19 DIAGNOSIS — R41.840 POST-COVID CHRONIC CONCENTRATION DEFICIT: ICD-10-CM

## 2025-03-19 LAB — VIT B12 SERPL-MCNC: 877 PG/ML (ref 232–1245)

## 2025-03-19 NOTE — LETTER
3/19/2025      Tia Pablo  [address] [address] [address]      Dear Colleague,    Thank you for referring your patient, Tia Pablo, to the [facility] NEUROLOGY [facility] [place]. Please see a copy of my visit note below.    [facility] Neurology Consultation    Tia Pablo MRN# [id number]   Age: 59 year old YOB: 1965     Requesting physician: Ana Patel     Reason for Consultation: cognitive complaints           Assessment and Plan:   Assessment:  Tia Pablo is a 59 year old female who presents today for evaluation of cognitive complaints. Patient reports that she had COVID infection in March 2020 and it was shortly after this that she noticed symptoms of brain fog and short term memory problems. She recently had neuropsychology testing which was suggestive of mild cognitive impairment of non specific etiology. Pattern was not entirely suggestive of neurodegenerative cause. She had poorer than expected learning of new material and memory retrieval. MoCA is 28/30 today. We discussed that symptoms are not specific to an etiology. It is possible prior COVID symptoms are contributing. MRI brain was reportedly normal in 8/2020. B12 and amyloid 42/40 ratio were ordered today. She reports good mood and feeling refreshed in the morning when she wakes up.      Plan:  - Serum B12 and amyloid 42/40 ratio  - Consider holding Klonopin for a month and paying attention to any improvement of brain fog    Follow up in Neurology clinic pending above    Igor Johnson MD   of Neurology  [facility]  ---------------------------------------------------------------------------------------------------------------------------        History of Presenting Symptoms:   Tia Pablo is a 59 year old female who presents today for evaluation of cognitive complaints.     Patient reports that she had long COVID and has had brain fog issues from this. She  "had COVID in 2020. She wasn't hospitalized. She was in bed / on the couch for about 2 weeks. She had cold/flu like symptoms and lost taste/smell. She didn't realize she had brain fog until she went back to work. She had a hard time making quick decisions.     The brain fog sensation hasn't worsened, but is just not better.     She might get distracted at times. She is a busy person. She doesn't forget to shut the stove off.     She notes her memory is not as good. She might notice it at work, where she forgets to do things. She may go into a room and she may not remember why she went there. She thinks sometimes she forgets details, but she thinks it could be related to distracted. She has two adult children and sometimes they will tell her she repeats things.     Her long term memory is good.     She retired from work. This wasn't related to her symptoms. She worked for the [employer] and completed her years of service.     She has always been a poor sleeper. She takes melatonin occasionally for sleep. She will take the Clonazepam about 3 times a week as well. She might take it less commonly during the day. She generally feels refreshed in the morning.     She reports that her mood is \"pretty good now\". She is on Wellbutrin and Cymbalta.     Patient's mom has dementia. She first started getting symptoms late 70s.    She denies any history of hallucinations or acting out dreams.       Past Medical History:     Patient Active Problem List   Diagnosis     Genetic torsion dystonia     Essential hypertension     Vasomotor symptoms due to menopause     Neck pain     Current mild episode of major depressive disorder without prior episode     Post-COVID chronic concentration deficit     Right knee pain, unspecified chronicity     Past Medical History:   Diagnosis Date     Anxiety      Depression      HTN (hypertension) 2016        Past Surgical History:     Past Surgical History:   Procedure Laterality Date     AS " REPAIR TEND/MUS/FLEX FOREARM/WRIST, PRIM, EACH        SECTION       COLONOSCOPY N/A 01/15/2024    Procedure: Colonoscopy;  Surgeon: Sukhdev Calhoun MD;  Location: [facility]     HYSTERECTOMY       HYSTERECTOMY, PAP NO LONGER INDICATED          Social History:     Social History     Tobacco Use     Smoking status: Never     Smokeless tobacco: Never   Vaping Use     Vaping status: Never Used   Substance Use Topics     Alcohol use: Yes     Comment: Occasionally, 1-2 glasses of wine on weekends     Drug use: No        Family History:     Family History   Problem Relation Age of Onset     Hypertension Mother      Autoimmune Disease Mother         CREST     Hyperlipidemia Mother      Dementia Mother      Skin Cancer Mother      Hypertension Father      Skin Cancer Father      Heart Disease Father      Sleep Apnea Sister      Melanoma No family hx of         Medications:     Current Outpatient Medications   Medication Sig Dispense Refill     buPROPion (WELLBUTRIN XL) 300 MG 24 hr tablet TAKE ONE TABLET BY MOUTH EVERY MORNING 90 tablet 1     chlorthalidone (HYGROTON) 25 MG tablet TAKE ONE TABLET BY MOUTH EVERY DAY 90 tablet 1     clonazePAM (KLONOPIN) 0.5 MG tablet Take 1 tab in the evening before bed. Okay to take an extra tab as needed in the day. 45 tablet 5     DULoxetine (CYMBALTA) 20 MG capsule Take 1 capsule (20 mg) by mouth daily 90 capsule 2     eletriptan (RELPAX) 40 MG tablet Take 1 tablet (40 mg) by mouth at onset of headache for migraine May repeat in 2 hours. Max 2 tablets/24 hours. 12 tablet 3     EPINEPHrine (ANY BX GENERIC EQUIV) 0.3 MG/0.3ML injection 2-pack Inject 0.3 mLs (0.3 mg) into the muscle as needed for anaphylaxis May repeat one time in 5-15 minutes if response to initial dose is inadequate. 2 each 1     hydroquinone (RUBY) 4 % external cream Use nightly to dark areas of face for 3 months, take a -31 month break and repeat if necessary. 30 g 2     losartan (COZAAR) 50 MG tablet TAKE ONE  TABLET BY MOUTH EVERY DAY 90 tablet 2     Current Facility-Administered Medications   Medication Dose Route Frequency Provider Last Rate Last Admin     botulinum toxin type A (BOTOX) 100 units injection 600 Units  600 Units Intramuscular Q90 Days Simi Vazquez MD   450 Units at 02/10/25 1145     glycopyrrolate (ROBINUL) injection 0.1-0.2 mg  0.1-0.2 mg Intravenous Q5 Min PRN Saeed Edwards MD   0.2 mg at 01/11/24 1357     triamcinolone (KENALOG-40) injection 40 mg  40 mg Intramuscular Once             Allergies:     Allergies   Allergen Reactions     Bee Venom Anaphylaxis     Erythromycin Hives and Itching     Seasonal Allergies         Review of Systems:   As above     Physical Exam:   Vitals: BP (!) 151/91 (BP Location: Right arm, Patient Position: Sitting, Cuff Size: Adult Regular)   Pulse 93   Wt 64.4 kg (142 lb)   BMI 22.92 kg/m     General: Seated comfortably in no acute distress.  HEENT: Optic discs sharp on funduscopic exam.   Lungs: breathing comfortably  Neurologic:     Mental Status: MoCA 28/30 (-1 delayed recall, -1 minor mistake on repeating sentence)     Cranial Nerves: Visual fields intact. PERRL. EOMI with normal smooth pursuit. Facial sensation intact/symmetric. Facial movements symmetric. Hearing not formally tested but intact to conversation. Palate elevation symmetric, uvula midline. No dysarthria. Shoulder shrug strong bilaterally. Tongue protrusion midline.     Motor: No tremors or other abnormal movements observed. Muscle tone normal throughout. Strength 5/5 throughout upper and lower extremities.     Deep Tendon Reflexes: 2+/symmetric throughout upper and lower extremities. No clonus.      Sensory: Intact/symmetric to light touch throughout upper and lower extremities.      Coordination: Finger-nose-finger and heel-shin intact without dysmetria.      Gait: Normal, steady casual gait.          Data: Pertinent prior to visit   Imaging:  MRI brain 8/2020 - reportedly  normal     Laboratory:  TSH normal (2/2022)  CBC unremarkable, CMP normal (2023)      Again, thank you for allowing me to participate in the care of your patient.        Sincerely,        Igor Johnson MD    Electronically signed

## 2025-03-20 LAB
Lab: NORMAL
PERFORMING LABORATORY: NORMAL
SPECIMEN STATUS: NORMAL
TEST NAME: NORMAL

## 2025-03-25 LAB
SCANNED LAB RESULT: ABNORMAL
TEST NAME: ABNORMAL

## 2025-03-26 ENCOUNTER — OFFICE VISIT (OUTPATIENT)
Dept: PHYSICAL MEDICINE AND REHAB | Facility: CLINIC | Age: 60
End: 2025-03-26
Payer: COMMERCIAL

## 2025-03-26 DIAGNOSIS — G43.719 CHRONIC MIGRAINE WITHOUT AURA, INTRACTABLE, WITHOUT STATUS MIGRAINOSUS: Primary | ICD-10-CM

## 2025-03-26 DIAGNOSIS — G24.4 OROMANDIBULAR DYSTONIA: ICD-10-CM

## 2025-03-26 DIAGNOSIS — M79.18 MYOFASCIAL PAIN: ICD-10-CM

## 2025-03-26 DIAGNOSIS — G24.3 SPASMODIC TORTICOLLIS: ICD-10-CM

## 2025-03-26 PROCEDURE — 20553 NJX 1/MLT TRIGGER POINTS 3/>: CPT | Performed by: PHYSICAL MEDICINE & REHABILITATION

## 2025-03-26 RX ORDER — BUPIVACAINE HYDROCHLORIDE 5 MG/ML
8 INJECTION, SOLUTION PERINEURAL ONCE
Status: COMPLETED | OUTPATIENT
Start: 2025-03-26 | End: 2025-03-26

## 2025-03-26 RX ADMIN — BUPIVACAINE HYDROCHLORIDE 40 MG: 5 INJECTION, SOLUTION PERINEURAL at 15:52

## 2025-03-26 NOTE — PROGRESS NOTES
Pre procedure Diagnosis: myofascial pain   Post procedure Diagnosis: Same   Procedure performed: trigger point injections   Anesthesia: none   Complications: none     Indications:   Tia Pablo is a 59 year old female with a history of chronic migraine headaches, cervical and oromandibular dystonia who is followed in our clinic for botulinum toxin injections. Last round was on 2/10/25.  I saw her last on 1/8/25 for repeat TPIs and we used steroids.   Dr. Vazquez recommended trial of ONBs and/or TPIs to help with her symptoms between botox injections.  She is also followed by Dr. Platt in the movement disorder clinic and was last seen 7/25/24.  Saw Dr. Johnson 3/19/25 for cognitive deficits likely due to long term COVID Office Visit with Igor Johnson MD (03/19/2025)   Today, she reported severe muscle tightness and spasms at her jaws, lateral / posterior neck area, traps and christine-scapular areas b/l. She is very uncomfortable during the day and doesn't sleep well due to worsening symptoms.  Denied any side effects from the last round of TPIs and found them very beneficial.     Options/alternatives, benefits and risks were discussed with the patient including bleeding, infection, tissue trauma and pnuemothorax. Questions were answered to her satisfaction and she agrees to proceed. Voluntary informed consent was obtained and signed.   Vitals were reviewed: Yes   Allergies were reviewed: Yes   Medications were reviewed: Yes     Procedure:   After getting informed consent, a Pause for the Cause was performed.   Trigger points were identified by patient, and marked when appropriate.   The area was prepped with Chloroprep.   Using clean technique, injections were completed using a 30G, 1 inch needle.    Muscle groups injected:   Bilateral   Masseter  Splenius capitis   Splenius cervicis  Traps   Levator at scap insertion R>>L  Rhomboids R>>L   Pec minor     Left pec major    Injection solution contained:   4ml of 1%  lidocaine and 8ml of 0.5% bupivacaine.     Hemostasis was achieved, the area was cleaned, and bandaids were placed when appropriate.   The patient tolerated the procedure well.     Plan:   -trigger point injections today; used anesthetics only.  We did the injections in prone position due to her vasovagal symptoms after the last round of injections.   -f/u with Dr. Vazquez as scheduled for botox injections   -continue working with PT  -last saw Dr. Platt in 7/2022 and needs to follow up as recommended     Angela Franz MD  Physical Medicine & Rehabilitation

## 2025-03-26 NOTE — NURSING NOTE
Chief Complaint   Patient presents with    Procedure     Trigger point injections, confirmed with pt     Steve Watson

## 2025-03-26 NOTE — LETTER
3/26/2025       RE: Tia Pablo  [address] [address]     Dear Colleague,    Thank you for referring your patient, Tia Pablo, to the [facility] PHYSICAL MEDICINE AND REHABILITATION CLINIC [place] at [facility]. Please see a copy of my visit note below.    Pre procedure Diagnosis: myofascial pain   Post procedure Diagnosis: Same   Procedure performed: trigger point injections   Anesthesia: none   Complications: none     Indications:   Tia Pablo is a 59 year old female with a history of chronic migraine headaches, cervical and oromandibular dystonia who is followed in our clinic for botulinum toxin injections. Last round was on 2/10/25.  I saw her last on 1/8/25 for repeat TPIs and we used steroids.   Dr. Vazquez recommended trial of ONBs and/or TPIs to help with her symptoms between botox injections.  She is also followed by Dr. Platt in the movement disorder clinic and was last seen 7/25/24.  Saw Dr. Johnson 3/19/25 for cognitive deficits likely due to long term COVID Office Visit with Igor Johnson MD (03/19/2025)   Today, she reported severe muscle tightness and spasms at her jaws, lateral / posterior neck area, traps and christine-scapular areas b/l. She is very uncomfortable during the day and doesn't sleep well due to worsening symptoms.  Denied any side effects from the last round of TPIs and found them very beneficial.     Options/alternatives, benefits and risks were discussed with the patient including bleeding, infection, tissue trauma and pnuemothorax. Questions were answered to her satisfaction and she agrees to proceed. Voluntary informed consent was obtained and signed.   Vitals were reviewed: Yes   Allergies were reviewed: Yes   Medications were reviewed: Yes     Procedure:   After getting informed consent, a Pause for the Cause was performed.   Trigger points were identified by patient, and marked when appropriate.    The area was prepped with Chloroprep.   Using clean technique, injections were completed using a 30G, 1 inch needle.    Muscle groups injected:   Bilateral   Masseter  Splenius capitis   Splenius cervicis  Traps   Levator at scap insertion R>>L  Rhomboids R>>L   Pec minor     Left pec major    Injection solution contained:   4ml of 1% lidocaine and 8ml of 0.5% bupivacaine.     Hemostasis was achieved, the area was cleaned, and bandaids were placed when appropriate.   The patient tolerated the procedure well.     Plan:   -trigger point injections today; used anesthetics only.  We did the injections in prone position due to her vasovagal symptoms after the last round of injections.   -f/u with Dr. Vazquez as scheduled for botox injections   -continue working with PT  -last saw Dr. Platt in 7/2022 and needs to follow up as recommended     Angela Franz MD  Physical Medicine & Rehabilitation             Again, thank you for allowing me to participate in the care of your patient.      Sincerely,    Angela Franz MD

## 2025-03-31 ENCOUNTER — MYC MEDICAL ADVICE (OUTPATIENT)
Dept: NEUROLOGY | Facility: CLINIC | Age: 60
End: 2025-03-31
Payer: COMMERCIAL

## 2025-03-31 ENCOUNTER — TELEPHONE (OUTPATIENT)
Dept: NEUROLOGY | Facility: CLINIC | Age: 60
End: 2025-03-31

## 2025-03-31 DIAGNOSIS — R41.3 MEMORY CHANGE: Primary | ICD-10-CM

## 2025-03-31 NOTE — TELEPHONE ENCOUNTER
[facility]    Phone Message    May a detailed message be left on voicemail: yes     Reason for Call: Requesting Results     Name/type of test: Labs  Date of test: 03/19/2025   Was test done at a location other than [facility]? No    Please give patient a call to discuss results at [phone]. Patient reports of anxiousness and would like to speak to some as soon as possible.     Action Taken: Message routed to:  Other: [facility]

## 2025-04-01 ENCOUNTER — TELEPHONE (OUTPATIENT)
Dept: NEUROLOGY | Facility: CLINIC | Age: 60
End: 2025-04-01
Payer: COMMERCIAL

## 2025-04-01 NOTE — TELEPHONE ENCOUNTER
RN attempted to return call to patient but there was no answer. Left a detailed VM explaining that Dr. Johnson sent her more information regarding her results and options via [employer]. Requested a call back with any further questions/concerns.      RICKEY McgrawN RN Care Coordinator  Neurology/Neurosurgery/PM&R/Pain Management

## 2025-04-01 NOTE — TELEPHONE ENCOUNTER
I called patient and recvd clarification that pt needs test code that [facility] uses. I called [facility] and for the test below they use code [id number] and bill Insurance $269.00, pt informed.

## 2025-04-01 NOTE — TELEPHONE ENCOUNTER
[facility] Call Center    Phone Message    May a detailed message be left on voicemail: yes     Reason for Call: Other: Pt states that they are in need of CPT code for blood tests, and would like a call back to discuss this.    Please contact pt at [phone]    Action Taken: Message routed to:  Other: [facility]    Travel Screening: Not Applicable     Date of Service:

## 2025-04-01 NOTE — TELEPHONE ENCOUNTER
Called and spoke with patient. Gave her CPT code 07767 for the  Phospho-Tau 217, Plasma test. No further concerns at this time.

## 2025-04-04 DIAGNOSIS — I10 ESSENTIAL HYPERTENSION: ICD-10-CM

## 2025-04-04 NOTE — TELEPHONE ENCOUNTER
New Pharmacy Requesting Med    chlorthalidone (HYGROTON) 25 MG tablet      New pharmacy is requesting above med. Please let them know if this is authorized and total number of refills allowed.    Lio Xiao, ARRT on 4/4/2025 at 9:16 AM

## 2025-04-07 ENCOUNTER — LAB (OUTPATIENT)
Dept: LAB | Facility: CLINIC | Age: 60
End: 2025-04-07
Payer: COMMERCIAL

## 2025-04-07 DIAGNOSIS — R41.3 MEMORY CHANGE: ICD-10-CM

## 2025-04-07 LAB
Lab: NORMAL
PERFORMING LABORATORY: NORMAL
SPECIMEN STATUS: NORMAL
TEST NAME: NORMAL

## 2025-04-07 RX ORDER — CHLORTHALIDONE 25 MG/1
25 TABLET ORAL DAILY
Qty: 90 TABLET | Refills: 0 | Status: SHIPPED | OUTPATIENT
Start: 2025-04-07

## 2025-04-08 ENCOUNTER — TELEPHONE (OUTPATIENT)
Dept: NEUROLOGY | Facility: CLINIC | Age: 60
End: 2025-04-08
Payer: COMMERCIAL

## 2025-04-08 LAB
IMMUNOLOGIST REVIEW: ABNORMAL
P-TAU217 SERPL IA-MCNC: 0.75 PG/ML

## 2025-04-08 NOTE — TELEPHONE ENCOUNTER
[facility] Call Center    Phone Message    May a detailed message be left on voicemail: yes     Reason for Call:  Patient called to discuss her lab results, labs were done yesterday. Patient is asking for a call back please     Action Taken: Message routed to:  Adult Clinics: Neurology p [id number]    Travel Screening: Not Applicable     Date of Service:

## 2025-04-09 ENCOUNTER — MYC MEDICAL ADVICE (OUTPATIENT)
Dept: FAMILY MEDICINE | Facility: CLINIC | Age: 60
End: 2025-04-09
Payer: COMMERCIAL

## 2025-04-09 NOTE — TELEPHONE ENCOUNTER
Spoke with Pt has concerns with new Alzheimer's dx and wanted to be seen earlier. Pt has been put in a approval spot ok per MA next week April 16th 1:15pm. Pt verbalized understanding agrees to time and day no further action required.       Rach Cameron, CMA

## 2025-04-11 ASSESSMENT — ANXIETY QUESTIONNAIRES
7. FEELING AFRAID AS IF SOMETHING AWFUL MIGHT HAPPEN: SEVERAL DAYS
4. TROUBLE RELAXING: NOT AT ALL
GAD7 TOTAL SCORE: 4
2. NOT BEING ABLE TO STOP OR CONTROL WORRYING: SEVERAL DAYS
GAD7 TOTAL SCORE: 4
GAD7 TOTAL SCORE: 4
5. BEING SO RESTLESS THAT IT IS HARD TO SIT STILL: NOT AT ALL
8. IF YOU CHECKED OFF ANY PROBLEMS, HOW DIFFICULT HAVE THESE MADE IT FOR YOU TO DO YOUR WORK, TAKE CARE OF THINGS AT HOME, OR GET ALONG WITH OTHER PEOPLE?: SOMEWHAT DIFFICULT
7. FEELING AFRAID AS IF SOMETHING AWFUL MIGHT HAPPEN: SEVERAL DAYS
6. BECOMING EASILY ANNOYED OR IRRITABLE: NOT AT ALL
1. FEELING NERVOUS, ANXIOUS, OR ON EDGE: SEVERAL DAYS
3. WORRYING TOO MUCH ABOUT DIFFERENT THINGS: SEVERAL DAYS
IF YOU CHECKED OFF ANY PROBLEMS ON THIS QUESTIONNAIRE, HOW DIFFICULT HAVE THESE PROBLEMS MADE IT FOR YOU TO DO YOUR WORK, TAKE CARE OF THINGS AT HOME, OR GET ALONG WITH OTHER PEOPLE: SOMEWHAT DIFFICULT

## 2025-04-14 ENCOUNTER — TELEPHONE (OUTPATIENT)
Dept: NEUROLOGY | Facility: CLINIC | Age: 60
End: 2025-04-14

## 2025-04-14 NOTE — TELEPHONE ENCOUNTER
Health Call Center    Phone Message    May a detailed message be left on voicemail: yes     Reason for Call: Tia is requesting a status update on the Prior authorization for a pet scan, please call Tia at [phone] to discuss further.    Action Taken: Message routed to:  Other:  Neurology    Travel Screening: Not Applicable     Date of Service:

## 2025-04-16 ENCOUNTER — OFFICE VISIT (OUTPATIENT)
Dept: FAMILY MEDICINE | Facility: CLINIC | Age: 60
End: 2025-04-16
Payer: COMMERCIAL

## 2025-04-16 VITALS
HEIGHT: 66 IN | WEIGHT: 147 LBS | SYSTOLIC BLOOD PRESSURE: 134 MMHG | OXYGEN SATURATION: 98 % | RESPIRATION RATE: 16 BRPM | TEMPERATURE: 99.6 F | HEART RATE: 90 BPM | DIASTOLIC BLOOD PRESSURE: 82 MMHG | BODY MASS INDEX: 23.63 KG/M2

## 2025-04-16 DIAGNOSIS — R41.89 COGNITIVE DECLINE: Primary | ICD-10-CM

## 2025-04-16 DIAGNOSIS — F51.04 PSYCHOPHYSIOLOGICAL INSOMNIA: ICD-10-CM

## 2025-04-16 PROCEDURE — 99213 OFFICE O/P EST LOW 20 MIN: CPT | Performed by: FAMILY MEDICINE

## 2025-04-16 PROCEDURE — 3075F SYST BP GE 130 - 139MM HG: CPT | Performed by: FAMILY MEDICINE

## 2025-04-16 PROCEDURE — 3079F DIAST BP 80-89 MM HG: CPT | Performed by: FAMILY MEDICINE

## 2025-04-16 ASSESSMENT — PATIENT HEALTH QUESTIONNAIRE - PHQ9
10. IF YOU CHECKED OFF ANY PROBLEMS, HOW DIFFICULT HAVE THESE PROBLEMS MADE IT FOR YOU TO DO YOUR WORK, TAKE CARE OF THINGS AT HOME, OR GET ALONG WITH OTHER PEOPLE: NOT DIFFICULT AT ALL
SUM OF ALL RESPONSES TO PHQ QUESTIONS 1-9: 4
SUM OF ALL RESPONSES TO PHQ QUESTIONS 1-9: 4

## 2025-04-16 NOTE — PROGRESS NOTES
Assessment & Plan     Cognitive decline  Referred   - Adult Mental Health  Referral; Future    Psychophysiological insomnia  Cont prns             Follow-up       Subjective   Tia is a 59 year old, presenting for the following health issues:  Recheck Medication        4/16/2025     1:14 PM   Additional Questions   Roomed by Neva ÁLVAREZ CMA     History of Present Illness       Mental Health Follow-up:  Patient presents to follow-up on Anxiety.    Patient's anxiety since last visit has been:  Medium  The patient is having other symptoms associated with anxiety.  Any significant life events: health concerns  Patient is feeling anxious or having panic attacks.  Patient has no concerns about alcohol or drug use.    Hypertension: She presents for follow up of hypertension.  She does not check blood pressure  regularly outside of the clinic. Outpatient blood pressures have not been over 140/90. She follows a low salt diet.     She eats 2-3 servings of fruits and vegetables daily.She consumes 0 sweetened beverage(s) daily.She exercises with enough effort to increase her heart rate 10 to 19 minutes per day.  She exercises with enough effort to increase her heart rate 4 days per week.   She is taking medications regularly.          Anxiety       Social History     Tobacco Use    Smoking status: Never    Smokeless tobacco: Never   Vaping Use    Vaping status: Never Used   Substance Use Topics    Alcohol use: Yes     Comment: Occasionally, 1-2 glasses of wine on weekends    Drug use: No         6/5/2024     6:09 PM 7/10/2024     4:52 PM 4/11/2025    12:42 PM   PATRICK-7 SCORE   Total Score 5 (mild anxiety) 1 (minimal anxiety) 4 (minimal anxiety)   Total Score 5 1 4        Patient-reported         2/2/2024    10:30 AM 10/15/2024     8:46 AM 4/16/2025     1:04 PM   PHQ   PHQ-9 Total Score 4 5 4    Q9: Thoughts of better off dead/self-harm past 2 weeks Not at all Not at all Not at all        Proxy-reported         She is  "not taking the clonazepam every night           Review of Systems  Constitutional, HEENT, cardiovascular, pulmonary, gi and gu systems are negative, except as otherwise noted.      Objective    /82 (BP Location: Right arm, Patient Position: Sitting, Cuff Size: Adult Regular)   Pulse 90   Temp 99.6  F (37.6  C) (Tympanic)   Resp 16   Ht 1.676 m (5' 6\")   Wt 66.7 kg (147 lb)   SpO2 98%   BMI 23.73 kg/m    Body mass index is 23.73 kg/m .  Physical Exam   GENERAL: alert and no distress  MS: no gross musculoskeletal defects noted, no edema  SKIN: no suspicious lesions or rashes  NEURO: Normal strength and tone, mentation intact and speech normal    No results found for any visits on 04/16/25.        Signed Electronically by: Ana Guzman MD    "

## 2025-04-24 ENCOUNTER — TELEPHONE (OUTPATIENT)
Dept: NEUROLOGY | Facility: CLINIC | Age: 60
End: 2025-04-24
Payer: COMMERCIAL

## 2025-04-24 NOTE — TELEPHONE ENCOUNTER
Lo Tidwell LPN Manske, Jacob, MD; Pamela Salazar, RN  Hello!    I just heard back from [employer]. They stated a peer to peer wouldn't change the denial, and they recommended that an appeal is started by sending a letter of medical necessity to fax [phone]. Pamela stated she could help with this.    Thanks!  Lo          Previous Messages       ----- Message -----  From: Igor Johnson MD  Sent: 4/22/2025  10:43 AM CDT  To: Lo Tidwell LPN  Subject: RE: PA for amyloid PET scan                      Either over the noon hour tomorrow, or at the same time as one of the EMGs on Thursday. Thanks!  ----- Message -----  From: Lo Tidwell LPN  Sent: 4/22/2025  10:05 AM CDT  To: Igor Johnson MD  Subject: RE: PA for amyloid PET scan                      Great! Are there any specific times over the next week that you'd like me to schedule it for?    Lo Solorio  ----- Message -----  From: Igor Johnson MD  Sent: 4/22/2025   9:26 AM CDT  To: Lo Tidwell LPN  Subject: RE: PA for amyloid PET scan                      Oh ok, yes I can do a peer to peer. Thanks!  ----- Message -----  From: Lo Tidwell LPN  Sent: 4/22/2025   9:01 AM CDT  To: Igor Johnson MD  Subject: RE: PA for amyloid PET scan                      Hello!    Dr. Hirsch did respond, and we wrote a letter, but we stated that it was for conversion to Alzheimer's and possible amyloid beta targeting plaque therapy. However, we got the message yesterday that it was denied. I placed the fax in your folder with a note asking whether you'd be willing to do a peer to peer.    Lo Solorio  ----- Message -----  From: Igor Johnson MD  Sent: 4/22/2025   8:46 AM CDT  To: Lo Tidwell LPN  Subject: RE: PA for amyloid PET scan                      Hello - hopefully someone responded to this when I was gone. It is for amyloid beta targeting plaque therapy. Thanks!  ----- Message -----  From: Lo Tidwell LPN  Sent: 4/18/2025  11:41 AM  CDT  To: Igor Johnson MD  Subject: PA for amyloid PET scan                          Hello!    I just got a message from the prior auth team for the amyloid brain PET scan. Patient's insurance would like to know if you are testing for either to predict to conversion to Alzheimer's disease or for amyloid beta targeting plaque therapy. They need this info by 4/21 at 9am.    Thanks!  Lo

## 2025-04-24 NOTE — TELEPHONE ENCOUNTER
A letter of medical necessity and supporting documentation was faxed to [facility] at fax # [phone] yesterday 4/23 at 12:36 PM. Confirmation of successful fax delivery received.      RICKEY McgrawN RN Care Coordinator  Neurology/Neurosurgery/PM&R/Pain Management

## 2025-05-07 NOTE — PATIENT INSTRUCTIONS
Proper skin care from [facility]:    -Eliminate harsh soaps as they strip the natural oils from the skin, often resulting in dry itchy skin ( i.e. Dial, Zest, [place] Spring)  -Use mild soaps such as Cetaphil or Dove Sensitive Skin in the shower. You do not need to use soap on arms, legs, and trunk every time you shower unless visibly soiled.   -Avoid hot or cold showers.  -After showering, lightly dry off and apply moisturizing within 2-3 minutes. This will help trap moisture in the skin.   -Aggressive use of a moisturizer at least 1-2 times a day to the entire body (including -Vanicream, Cetaphil, Aquaphor or Cerave) and moisturize hands after every washing.  -We recommend using moisturizers that come in a tub that needs to be scooped out, not a pump. This has more of an oil base. It will hold moisture in your skin much better than a water base moisturizer. The above recommended are non-pore clogging.      Wear a sunscreen with at least SPF 30 on your face, ears, neck and V of the chest daily. Wear sunscreen on other areas of the body if those areas are exposed to the sun throughout the day. Sunscreens can contain physical and/or chemical blockers. Physical blockers are less likely to clog pores, these include zinc oxide and titanium dioxide. Reapply every two hour and after swimming.     Sunscreen examples: https://www.ewg.org/sunscreen/    UV radiation  UVA radiation remains constant throughout the day and throughout the year. It is a longer wavelength than UVB and therefore penetrates deeper into the skin leading to immediate and delayed tanning, photoaging, and skin cancer. 70-80% of UVA and UVB radiation occurs between the hours of 10am-2pm.  UVB radiation  UVB radiation causes the most harmful effects and is more significant during the summer months. However, snow and ice can reflect UVB radiation leading to skin damage during the winter months as well. UVB radiation is responsible for tanning,  burning, inflammation, delayed erythema (pinkness), pigmentation (brown spots), and skin cancer.     I recommend self monthly full body exams and yearly full body exams with a dermatology provider. If you develop a new or changing lesion please follow up for examination. Most skin cancers are pink and scaly or pink and pearly. However, we do see blue/brown/black skin cancers.  Consider the ABCDEs of melanoma when giving yourself your monthly full body exam ( don't forget the groin, buttocks, feet, toes, etc). A-asymmetry, B-borders, C-color, D-diameter, E-elevation or evolving. If you see any of these changes please follow up in clinic. If you cannot see your back I recommend purchasing a hand held mirror to use with a larger wall mirror.       Checking for Skin Cancer  You can find cancer early by checking your skin each month. There are 3 kinds of skin cancer. They are melanoma, basal cell carcinoma, and squamous cell carcinoma. Doing monthly skin checks is the best way to find new marks or skin changes. Follow the instructions below for checking your skin.   The ABCDEs of checking moles for melanoma   Check your moles or growths for signs of melanoma using ABCDE:   Asymmetry: the sides of the mole or growth don t match  Border: the edges are ragged, notched, or blurred  Color: the color within the mole or growth varies  Diameter: the mole or growth is larger than 6 mm (size of a pencil eraser)  Evolving: the size, shape, or color of the mole or growth is changing (evolving is not shown in the images below)    Checking for other types of skin cancer  Basal cell carcinoma or squamous cell carcinoma have symptoms such as:     A spot or mole that looks different from all other marks on your skin  Changes in how an area feels, such as itching, tenderness, or pain  Changes in the skin's surface, such as oozing, bleeding, or scaliness  A sore that does not heal  New swelling or redness beyond the border of a  mole    Who s at risk?  Anyone can get skin cancer. But you are at greater risk if you have:   Fair skin, light-colored hair, or light-colored eyes  Many moles or abnormal moles on your skin  A history of sunburns from sunlight or tanning beds  A family history of skin cancer  A history of exposure to radiation or chemicals  A weakened immune system  If you have had skin cancer in the past, you are at risk for recurring skin cancer.   How to check your skin  Do your monthly skin checkups in front of a full-length mirror. Check all parts of your body, including your:   Head (ears, face, neck, and scalp)  Torso (front, back, and sides)  Arms (tops, undersides, upper, and lower armpits)  Hands (palms, backs, and fingers, including under the nails)  Buttocks and genitals  Legs (front, back, and sides)  Feet (tops, soles, toes, including under the nails, and between toes)  If you have a lot of moles, take digital photos of them each month. Make sure to take photos both up close and from a distance. These can help you see if any moles change over time.   Most skin changes are not cancer. But if you see any changes in your skin, call your doctor right away. Only he or she can diagnose a problem. If you have skin cancer, seeing your doctor can be the first step toward getting the treatment that could save your life.   [employer] last reviewed this educational content on 4/1/2019 2000-2020 [employer]. [address] [place] [address], [place] [address]. All rights reserved. This information is not intended as a substitute for professional medical care. Always follow your healthcare professional's instructions.       When should I call my doctor?  If you are worsening or not improving, please, contact us or seek urgent care as noted below.     Who should I call with questions (adults)?    [facility] [phone]  For urgent needs outside of business hours call the [facility] at  [phone] and ask for the dermatology resident on call to be paged  If this is a medical emergency and you are unable to reach an ER, Call 911      If you need a prescription refill, please contact your pharmacy. Refills are approved or denied by our Physicians during normal business hours, Monday through Friday.  Per office policy, refills will not be granted if you have not been seen within the past year (or sooner depending on the condition).

## 2025-05-08 ENCOUNTER — OFFICE VISIT (OUTPATIENT)
Dept: DERMATOLOGY | Facility: CLINIC | Age: 60
End: 2025-05-08
Payer: COMMERCIAL

## 2025-05-08 DIAGNOSIS — L82.0 INFLAMED SEBORRHEIC KERATOSIS: Primary | ICD-10-CM

## 2025-05-08 NOTE — PROGRESS NOTES
[facility] Dermatology Note  Encounter Date: May 8, 2025  Office Visit     Reviewed patients past medical history and pertinent chart review prior to patients visit today.     CC: Derm Problem    History of present illness  Tia Pablo, a 59-year-old female, presented with concerns about skin lesions.  During the visit, she reported scattered tan papules on her neckline and left abdomen and red papules on the abdomen. She mentioned that the lesions on her neckline rub against her shirt collar, causing irritation.     Past medical history   History of AK, cryo   - Patient denies personal history of skin cancer or dysplastic nevi.       Social history  - Son attended [employer], graduated in 2020     Physical exam  - Scattered tan cerebriform papules on the left abdomen and neckline, consistent with irritated seborrheic keratosis.     Assessment/Plan  - Irritated and inflamed Seborrheic Keratosis. Discussed treatment options with patient including no treatment, cryotherapy, and shave removal. Patient prefers cryotherapy today due to irritation and itching. After verbal consent and discussion of risks and benefits including but no limited to dyspigmentation/scar, blister, and pain, 13 was(were) treated with 1-2mm freeze border for 2 cycles with liquid nitrogen. Post cryotherapy instructions were provided.       Follow up for full-body skin cancer screening as regularly scheduled in March 2026, sooner as needed if new or worsening concerns or once more lesions treated    Medications:  Current Outpatient Medications   Medication Sig Dispense Refill    buPROPion (WELLBUTRIN XL) 300 MG 24 hr tablet Take 1 tablet (300 mg) by mouth every morning. 90 tablet 2    chlorthalidone (HYGROTON) 25 MG tablet Take 1 tablet (25 mg) by mouth daily. 90 tablet 0    clonazePAM (KLONOPIN) 0.5 MG tablet Take 1 tab in the evening before bed. Okay to take an extra tab as needed in the day. 45 tablet 5     eletriptan (RELPAX) 40 MG tablet Take 1 tablet (40 mg) by mouth at onset of headache for migraine May repeat in 2 hours. Max 2 tablets/24 hours. 12 tablet 3    EPINEPHrine (ANY BX GENERIC EQUIV) 0.3 MG/0.3ML injection 2-pack Inject 0.3 mLs (0.3 mg) into the muscle as needed for anaphylaxis May repeat one time in 5-15 minutes if response to initial dose is inadequate. 2 each 1    hydroquinone (RUBY) 4 % external cream Use nightly to dark areas of face for 3 months, take a -31 month break and repeat if necessary. 30 g 2    losartan (COZAAR) 50 MG tablet TAKE ONE TABLET BY MOUTH EVERY DAY 90 tablet 2     Current Facility-Administered Medications   Medication Dose Route Frequency Provider Last Rate Last Admin    botulinum toxin type A (BOTOX) 100 units injection 600 Units  600 Units Intramuscular Q90 Days Simi Vazquez MD   450 Units at 02/10/25 1145    glycopyrrolate (ROBINUL) injection 0.1-0.2 mg  0.1-0.2 mg Intravenous Q5 Min PRN Saeed Edwards MD   0.2 mg at 01/11/24 1357    triamcinolone (KENALOG-40) injection 40 mg  40 mg Intramuscular Once             Allergies:   Allergies   Allergen Reactions    Bee Venom Anaphylaxis    Erythromycin Hives and Itching    Seasonal Allergies        Magui Ellis CNP   Dermatology      This note has been generated using dictation services and may contain typos.    CC Referred MD Carlos  No address on file on close of this encounter.

## 2025-05-08 NOTE — LETTER
5/8/2025      Tia Pablo  [address] [address] [id number]      Dear Colleague,    Thank you for referring your patient, Tia Pablo, to the [facility]. Please see a copy of my visit note below.    [facility] Dermatology Note  Encounter Date: May 8, 2025  Office Visit     Reviewed patients past medical history and pertinent chart review prior to patients visit today.     CC: Derm Problem    History of present illness  Tia Pablo, a 59-year-old female, presented with concerns about skin lesions.  During the visit, she reported scattered tan papules on her neckline and left abdomen and red papules on the abdomen. She mentioned that the lesions on her neckline rub against her shirt collar, causing irritation.     Past medical history   History of AK, cryo   - Patient denies personal history of skin cancer or dysplastic nevi.       Social history  - Son attended [employer], graduated in 2020     Physical exam  - Scattered tan cerebriform papules on the left abdomen and neckline, consistent with irritated seborrheic keratosis.     Assessment/Plan  - Irritated and inflamed Seborrheic Keratosis. Discussed treatment options with patient including no treatment, cryotherapy, and shave removal. Patient prefers cryotherapy today due to irritation and itching. After verbal consent and discussion of risks and benefits including but no limited to dyspigmentation/scar, blister, and pain, 13 was(were) treated with 1-2mm freeze border for 2 cycles with liquid nitrogen. Post cryotherapy instructions were provided.       Follow up for full-body skin cancer screening as regularly scheduled in March 2026, sooner as needed if new or worsening concerns or once more lesions treated    Medications:  Current Outpatient Medications   Medication Sig Dispense Refill     buPROPion (WELLBUTRIN XL) 300 MG 24 hr tablet Take 1 tablet (300 mg) by mouth every morning. 90 tablet 2      chlorthalidone (HYGROTON) 25 MG tablet Take 1 tablet (25 mg) by mouth daily. 90 tablet 0     clonazePAM (KLONOPIN) 0.5 MG tablet Take 1 tab in the evening before bed. Okay to take an extra tab as needed in the day. 45 tablet 5     eletriptan (RELPAX) 40 MG tablet Take 1 tablet (40 mg) by mouth at onset of headache for migraine May repeat in 2 hours. Max 2 tablets/24 hours. 12 tablet 3     EPINEPHrine (ANY BX GENERIC EQUIV) 0.3 MG/0.3ML injection 2-pack Inject 0.3 mLs (0.3 mg) into the muscle as needed for anaphylaxis May repeat one time in 5-15 minutes if response to initial dose is inadequate. 2 each 1     hydroquinone (RUBY) 4 % external cream Use nightly to dark areas of face for 3 months, take a -31 month break and repeat if necessary. 30 g 2     losartan (COZAAR) 50 MG tablet TAKE ONE TABLET BY MOUTH EVERY DAY 90 tablet 2     Current Facility-Administered Medications   Medication Dose Route Frequency Provider Last Rate Last Admin     botulinum toxin type A (BOTOX) 100 units injection 600 Units  600 Units Intramuscular Q90 Days StandalSimi MD   450 Units at 02/10/25 1145     glycopyrrolate (ROBINUL) injection 0.1-0.2 mg  0.1-0.2 mg Intravenous Q5 Min PRN Saeed Edwards MD   0.2 mg at 01/11/24 1357     triamcinolone (KENALOG-40) injection 40 mg  40 mg Intramuscular Once             Allergies:   Allergies   Allergen Reactions     Bee Venom Anaphylaxis     Erythromycin Hives and Itching     Seasonal Allergies        Magui Ellis CNP   Dermatology      This note has been generated using dictation services and may contain typos.    CC Socorro Gonzalez MD  No address on file on close of this encounter.     Again, thank you for allowing me to participate in the care of your patient.        Sincerely,        SHANDRA Joyce CNP    Electronically signed

## 2025-05-14 ENCOUNTER — OFFICE VISIT (OUTPATIENT)
Dept: PHYSICAL MEDICINE AND REHAB | Facility: CLINIC | Age: 60
End: 2025-05-14
Payer: COMMERCIAL

## 2025-05-14 DIAGNOSIS — G24.3 SPASMODIC TORTICOLLIS: ICD-10-CM

## 2025-05-14 DIAGNOSIS — G43.719 CHRONIC MIGRAINE WITHOUT AURA, INTRACTABLE, WITHOUT STATUS MIGRAINOSUS: Primary | ICD-10-CM

## 2025-05-14 DIAGNOSIS — G24.4 OROMANDIBULAR DYSTONIA: ICD-10-CM

## 2025-05-14 PROCEDURE — 95874 GUIDE NERV DESTR NEEDLE EMG: CPT | Performed by: PHYSICAL MEDICINE & REHABILITATION

## 2025-05-14 PROCEDURE — 64615 CHEMODENERV MUSC MIGRAINE: CPT | Performed by: PHYSICAL MEDICINE & REHABILITATION

## 2025-05-14 NOTE — LETTER
5/14/2025       RE: Tia Pablo  [id number] [facility] [id number]     Dear Colleague,    Thank you for referring your patient, Tia Pablo, to the [facility] PHYSICAL MEDICINE AND REHABILITATION CLINIC [place] at [facility]. Please see a copy of my visit note below.      [facility]     PM&R CLINIC NOTE  BOTULINUM TOXIN PROCEDURE      HPI  No chief complaint on file.    Tia Pablo is a 59 year old female with a history of headaches and generalized dystonia with pain who presents to clinic for botulinum toxin injections for management of chronic migraine headaches, cervical and oromandibular dystonia.     SINCE LAST VISIT  Tia Pablo was last seen here in clinic on 2/10/2025, at which time she received 450 units of Botox.     Patient denies new medical diagnoses, illnesses, hospitalizations, emergency room visits, and injuries since the previous injection with botulinum neurotoxin.     RESPONSE TO PREVIOUS TREATMENT    Side effects: None reported.    1.  Headache frequency during this injection cycle: There have been 0-1 migraine headache days per month, with near daily tension headaches which are manageable. This is compared to her baseline headache frequency of 30 headache days per month.    2.  Headache duration during this injection cycle:  Headache duration is typically no longer than three hours with Relpax, but if the Relpax does not work right away, she has to take two doses and the headaches last much longer. Patient reports no episodes of multiple day headaches during this injection cycle.  Has taken Relpax 2 times at the most during this cycle     3.  Headache intensity during this injection cycle:    A.  4/10  =  Typical pain level.  B.  8/10  =  Worst pain level.  C.  0/10  =  Lowest pain level.    4.  Change in headache medication usage during this injection cycle:  She is only taking her  Relpax 0-1 times per month for migraine rescue, whereas she was taking it much more frequently prior to Botox. She also takes Robaxin as needed for more severe headaches. Otherwise, she will occasionally use Tylenol or ibuprofen if headache is not severe.     5.  ER Visits During This Injection Cycle: None.     6.  Functional Performance:  Change in ADL's, social interaction, days lost from work, etc. Patient reports being able to more fully participate in social and family activities and responsibilities as headache symptoms have improved. She is overall a lot more productive when Botox is working. Recently, she was able to go on a [place] cruise with family, and this was quite enjoyable.     Dystonia Improvement (neck and jaw): Yes.  Percent Improvement: 80 %    Duration of Benefit:  7 weeks and followed by a gradual reduction in benefit. Her jaw clenching gets more severely painful and tense a few weeks before her appointment.       PHYSICAL EXAM  VS: There were no vitals taken for this visit.   GEN: Pleasant and cooperative, in no acute distress  HEENT: No facial asymmetry  NECK: Involuntary right rotation with hypertonicity noted in right anterior neck musculature and upper trapezius. Jaw Worse in the left side today    ALLERGIES  Allergies   Allergen Reactions     Bee Venom Anaphylaxis     Erythromycin Hives and Itching     Seasonal Allergies        CURRENT MEDICATIONS    Current Outpatient Medications:      buPROPion (WELLBUTRIN XL) 300 MG 24 hr tablet, Take 1 tablet (300 mg) by mouth every morning., Disp: 90 tablet, Rfl: 2     chlorthalidone (HYGROTON) 25 MG tablet, Take 1 tablet (25 mg) by mouth daily., Disp: 90 tablet, Rfl: 0     clonazePAM (KLONOPIN) 0.5 MG tablet, Take 1 tab in the evening before bed. Okay to take an extra tab as needed in the day., Disp: 45 tablet, Rfl: 5     eletriptan (RELPAX) 40 MG tablet, Take 1 tablet (40 mg) by mouth at onset of headache for migraine May repeat in 2 hours. Max 2  tablets/24 hours., Disp: 12 tablet, Rfl: 3     EPINEPHrine (ANY BX GENERIC EQUIV) 0.3 MG/0.3ML injection 2-pack, Inject 0.3 mLs (0.3 mg) into the muscle as needed for anaphylaxis May repeat one time in 5-15 minutes if response to initial dose is inadequate., Disp: 2 each, Rfl: 1     hydroquinone (RUBY) 4 % external cream, Use nightly to dark areas of face for 3 months, take a -31 month break and repeat if necessary., Disp: 30 g, Rfl: 2     losartan (COZAAR) 50 MG tablet, TAKE ONE TABLET BY MOUTH EVERY DAY, Disp: 90 tablet, Rfl: 2    Current Facility-Administered Medications:      botulinum toxin type A (BOTOX) 100 units injection 600 Units, 600 Units, Intramuscular, Q90 Days, Simi Vazquez MD, 450 Units at 02/10/25 1145     glycopyrrolate (ROBINUL) injection 0.1-0.2 mg, 0.1-0.2 mg, Intravenous, Q5 Min PRN, Saeed Edwards MD, 0.2 mg at 24 1357     triamcinolone (KENALOG-40) injection 40 mg, 40 mg, Intramuscular, Once,        BOTULINUM NEUROTOXIN INJECTION PROCEDURES    VERIFICATION OF PATIENT IDENTIFICATION AND PROCEDURE     Initials   Patient Name SES   Patient  SES   Procedure Verified by: MARTHA     Prior to the start of the procedure and with procedural staff participation, I verbally confirmed the patient s identity using two indicators, relevant allergies, that the procedure was appropriate and matched the consent or emergent situation, and that the correct equipment/implants were available. Immediately prior to starting the procedure I conducted the Time Out with the procedural staff and re-confirmed the patient s name, procedure, and site/side. (The Joint Commission universal protocol was followed.)  Yes    Sedation (Moderate or Deep): None    ABOVE ASSESSMENTS PERFORMED BY    Simi Vazquez MD      INDICATIONS FOR PROCEDURES  Tia Pablo is a 59 year old patient with involuntary muscle spasms and pain secondary to the diagnosis of oromandibular/cervical dystonia and  chronic migraine headaches. Her baseline symptoms have been recalcitrant to oral medications and conservative therapy.  She is here today for reinjection with Botox.    GOAL OF PROCEDURE  The goal of this procedure is to increase active range of motion, improve volitional motor control, decrease pain  and enhance functional independence.      TOTAL DOSE ADMINISTERED  Dose Administered:  450 units  Botox (Botulinum Toxin Type A) 1:1 Dilution  Unavoidable Drug Waste: Yes  Amount of drug waste (mL): 50 units Botox.  Reason for waste:  Single use vial  Diluent Used:  Preservative Free Normal Saline  Total Volume of Diluent Used:  4.5 ml  NDC #: Botox 100u ([address]-40)      CONSENT  The risks, benefits, and treatment options were discussed with Tia MAR Pablo and she agreed to proceed.    Written consent was obtained by [facility].     EQUIPMENT USED  Needle-35mm stimulating/recording  Needle-30 gauge  EMG/NCS Machine    SKIN PREPARATION  Skin preparation was performed using an alcohol wipe.    GUIDANCE DESCRIPTION  Electro-myographic guidance was necessary throughout the procedure to accurately identify all areas of dystonic muscles while avoiding injection of non-dystonic muscles, neighboring nerves and nearby vascular structures.     AREA/MUSCLE INJECTED: 450 UNITS BOTOX = TOTAL DOSE, 1:1 DILUTION     1. NECK MUSCLES: 215 UNITS BOTOX = TOTAL DOSE     Right Upper Trapezius - 25 units of Botox at 4 site/s.   Left Upper Trapezius - 10 units of Botox at 2 site/s.      Right Splenius Capitis - 5 units of Botox at 1 site/s.   Left Splenius Capitis - 5 units of Botox at 1 site/s.      Right Auricularis - 15 units of Botox at 3 site/s (anterior, superior & posterior).   Left Auricularis - 15 units of Botox at 3 site/s (anterior, superior & posterior).    Right Sternocleidomastoid - 15 units of Botox at 2 site/s (proximal/behind ear).  Left Sternocleidomastoid - 15 units of Botox at 2 site/s (proximal/behind ear).     Right Middle  Scalene - 10 units of Botox at 1 site/s.     Right Platysma - 10 units of Botox at 2 site/s.     Right Levator Scapula - 20 units of Botox at 1 site/s (cervical insertion).   Left Levator Scapula - 20 units of Botox at 1 site/s (cervical insertion).      Right Rhomboid - 40 units of Botox at 4 site/s.    Left Rhomboid - 10 units of Botox at 1 site/s.       3. JAW MUSCLES: 140 UNITS BOTOX = TOTAL DOSE      Right Temporalis - 60 units of Botox at 5 site/s.               Left Temporalis - 60 units of Botox at 5 site/s.     Right Masseter - 10 units of Botox at 1 site/s.    Left Masseter - 10 units of Botox at 1 site/s.     4. FACIAL & SCALP MUSCLES: 95 UNITS BOTOX = TOTAL DOSE     Right Frontalis - 15 units of Botox at 3 site/s (2 middle & 1 hairline).    Left Frontalis - 15 units of Botox at 3 site/s (2 middle & 1 hairline).     Procerus - 5 units of Botox at 1 site/s.       Right  - 5 units of Botox at 1 site/s.    Left  - 5 units of Botox at 1 site/s.      Right Occipitalis - 25 units of Botox at 1 site/s.    Left Occipitalis - 25 units of Botox at 1 site/s.       RESPONSE TO PROCEDURE  Tia Pablo tolerated the procedure well and there were no immediate complications. She was allowed to recover for an appropriate period of time and was discharged home in stable condition.    ASSESSMENT AND PLAN   Botulinum toxin injections: No changes made to Botox dose or distribution today. Patient will continue to monitor response to Botox and report at next appointment.    Referrals: None.  Medications: No changes.   Follow up: Tia Pablo was rescheduled for the next series of injections in 12 weeks, at which time we will evaluate response to today's injections. she may call the clinic prior with any questions or concerns prior to the next appointment.     Simi Vazquez MD        Again, thank you for allowing me to participate in the care of your patient.      Sincerely,    Simi Vazquez,  MD

## 2025-05-14 NOTE — PROGRESS NOTES
[facility]     PM&R CLINIC NOTE  BOTULINUM TOXIN PROCEDURE      HPI  No chief complaint on file.    Tia Pablo is a 59 year old female with a history of headaches and generalized dystonia with pain who presents to clinic for botulinum toxin injections for management of chronic migraine headaches, cervical and oromandibular dystonia.     SINCE LAST VISIT  Tia Pablo was last seen here in clinic on 2/10/2025, at which time she received 450 units of Botox.     Patient denies new medical diagnoses, illnesses, hospitalizations, emergency room visits, and injuries since the previous injection with botulinum neurotoxin.     RESPONSE TO PREVIOUS TREATMENT    Side effects: None reported.    1.  Headache frequency during this injection cycle: There have been 0-1 migraine headache days per month, with near daily tension headaches which are manageable. This is compared to her baseline headache frequency of 30 headache days per month.    2.  Headache duration during this injection cycle:  Headache duration is typically no longer than three hours with Relpax, but if the Relpax does not work right away, she has to take two doses and the headaches last much longer. Patient reports no episodes of multiple day headaches during this injection cycle.  Has taken Relpax 2 times at the most during this cycle     3.  Headache intensity during this injection cycle:    A.  4/10  =  Typical pain level.  B.  8/10  =  Worst pain level.  C.  0/10  =  Lowest pain level.    4.  Change in headache medication usage during this injection cycle:  She is only taking her Relpax 0-1 times per month for migraine rescue, whereas she was taking it much more frequently prior to Botox. She also takes Robaxin as needed for more severe headaches. Otherwise, she will occasionally use Tylenol or ibuprofen if headache is not severe.     5.  ER Visits During This Injection Cycle: None.     6.  Functional Performance:   Change in ADL's, social interaction, days lost from work, etc. Patient reports being able to more fully participate in social and family activities and responsibilities as headache symptoms have improved. She is overall a lot more productive when Botox is working. Recently, she was able to go on a [place] cruise with family, and this was quite enjoyable.     Dystonia Improvement (neck and jaw): Yes.  Percent Improvement: 80 %    Duration of Benefit:  7 weeks and followed by a gradual reduction in benefit. Her jaw clenching gets more severely painful and tense a few weeks before her appointment.       PHYSICAL EXAM  VS: There were no vitals taken for this visit.   GEN: Pleasant and cooperative, in no acute distress  HEENT: No facial asymmetry  NECK: Involuntary right rotation with hypertonicity noted in right anterior neck musculature and upper trapezius. Jaw Worse in the left side today    ALLERGIES  Allergies   Allergen Reactions    Bee Venom Anaphylaxis    Erythromycin Hives and Itching    Seasonal Allergies        CURRENT MEDICATIONS    Current Outpatient Medications:     buPROPion (WELLBUTRIN XL) 300 MG 24 hr tablet, Take 1 tablet (300 mg) by mouth every morning., Disp: 90 tablet, Rfl: 2    chlorthalidone (HYGROTON) 25 MG tablet, Take 1 tablet (25 mg) by mouth daily., Disp: 90 tablet, Rfl: 0    clonazePAM (KLONOPIN) 0.5 MG tablet, Take 1 tab in the evening before bed. Okay to take an extra tab as needed in the day., Disp: 45 tablet, Rfl: 5    eletriptan (RELPAX) 40 MG tablet, Take 1 tablet (40 mg) by mouth at onset of headache for migraine May repeat in 2 hours. Max 2 tablets/24 hours., Disp: 12 tablet, Rfl: 3    EPINEPHrine (ANY BX GENERIC EQUIV) 0.3 MG/0.3ML injection 2-pack, Inject 0.3 mLs (0.3 mg) into the muscle as needed for anaphylaxis May repeat one time in 5-15 minutes if response to initial dose is inadequate., Disp: 2 each, Rfl: 1    hydroquinone (RUBY) 4 % external cream, Use nightly to dark  areas of face for 3 months, take a -31 month break and repeat if necessary., Disp: 30 g, Rfl: 2    losartan (COZAAR) 50 MG tablet, TAKE ONE TABLET BY MOUTH EVERY DAY, Disp: 90 tablet, Rfl: 2    Current Facility-Administered Medications:     botulinum toxin type A (BOTOX) 100 units injection 600 Units, 600 Units, Intramuscular, Q90 Days, Simi Vazquez MD, 450 Units at 02/10/25 1145    glycopyrrolate (ROBINUL) injection 0.1-0.2 mg, 0.1-0.2 mg, Intravenous, Q5 Min PRN, Saeed Edwards MD, 0.2 mg at 24 1357    triamcinolone (KENALOG-40) injection 40 mg, 40 mg, Intramuscular, Once,        BOTULINUM NEUROTOXIN INJECTION PROCEDURES    VERIFICATION OF PATIENT IDENTIFICATION AND PROCEDURE     Initials   Patient Name SES   Patient  SES   Procedure Verified by: SES     Prior to the start of the procedure and with procedural staff participation, I verbally confirmed the patient s identity using two indicators, relevant allergies, that the procedure was appropriate and matched the consent or emergent situation, and that the correct equipment/implants were available. Immediately prior to starting the procedure I conducted the Time Out with the procedural staff and re-confirmed the patient s name, procedure, and site/side. (The Joint Commission universal protocol was followed.)  Yes    Sedation (Moderate or Deep): None    ABOVE ASSESSMENTS PERFORMED BY    Simi Vazquez MD      INDICATIONS FOR PROCEDURES  Tia Pablo is a 59 year old patient with involuntary muscle spasms and pain secondary to the diagnosis of oromandibular/cervical dystonia and chronic migraine headaches. Her baseline symptoms have been recalcitrant to oral medications and conservative therapy.  She is here today for reinjection with Botox.    GOAL OF PROCEDURE  The goal of this procedure is to increase active range of motion, improve volitional motor control, decrease pain  and enhance functional independence.      TOTAL DOSE  ADMINISTERED  Dose Administered:  450 units  Botox (Botulinum Toxin Type A) 1:1 Dilution  Unavoidable Drug Waste: Yes  Amount of drug waste (mL): 50 units Botox.  Reason for waste:  Single use vial  Diluent Used:  Preservative Free Normal Saline  Total Volume of Diluent Used:  4.5 ml  NDC #: Botox 100u ([address]-09)      CONSENT  The risks, benefits, and treatment options were discussed with Tia Pablo and she agreed to proceed.    Written consent was obtained by [facility].     EQUIPMENT USED  Needle-35mm stimulating/recording  Needle-30 gauge  EMG/NCS Machine    SKIN PREPARATION  Skin preparation was performed using an alcohol wipe.    GUIDANCE DESCRIPTION  Electro-myographic guidance was necessary throughout the procedure to accurately identify all areas of dystonic muscles while avoiding injection of non-dystonic muscles, neighboring nerves and nearby vascular structures.     AREA/MUSCLE INJECTED: 450 UNITS BOTOX = TOTAL DOSE, 1:1 DILUTION     1. NECK MUSCLES: 215 UNITS BOTOX = TOTAL DOSE     Right Upper Trapezius - 25 units of Botox at 4 site/s.   Left Upper Trapezius - 10 units of Botox at 2 site/s.      Right Splenius Capitis - 5 units of Botox at 1 site/s.   Left Splenius Capitis - 5 units of Botox at 1 site/s.      Right Auricularis - 15 units of Botox at 3 site/s (anterior, superior & posterior).   Left Auricularis - 15 units of Botox at 3 site/s (anterior, superior & posterior).    Right Sternocleidomastoid - 15 units of Botox at 2 site/s (proximal/behind ear).  Left Sternocleidomastoid - 15 units of Botox at 2 site/s (proximal/behind ear).     Right Middle Scalene - 10 units of Botox at 1 site/s.     Right Platysma - 10 units of Botox at 2 site/s.     Right Levator Scapula - 20 units of Botox at 1 site/s (cervical insertion).   Left Levator Scapula - 20 units of Botox at 1 site/s (cervical insertion).      Right Rhomboid - 40 units of Botox at 4 site/s.    Left Rhomboid - 10 units of Botox at 1 site/s.        3. JAW MUSCLES: 140 UNITS BOTOX = TOTAL DOSE      Right Temporalis - 60 units of Botox at 5 site/s.               Left Temporalis - 60 units of Botox at 5 site/s.     Right Masseter - 10 units of Botox at 1 site/s.    Left Masseter - 10 units of Botox at 1 site/s.     4. FACIAL & SCALP MUSCLES: 95 UNITS BOTOX = TOTAL DOSE     Right Frontalis - 15 units of Botox at 3 site/s (2 middle & 1 hairline).    Left Frontalis - 15 units of Botox at 3 site/s (2 middle & 1 hairline).     Procerus - 5 units of Botox at 1 site/s.       Right  - 5 units of Botox at 1 site/s.    Left  - 5 units of Botox at 1 site/s.      Right Occipitalis - 25 units of Botox at 1 site/s.    Left Occipitalis - 25 units of Botox at 1 site/s.       RESPONSE TO PROCEDURE  Tia Pablo tolerated the procedure well and there were no immediate complications. She was allowed to recover for an appropriate period of time and was discharged home in stable condition.    ASSESSMENT AND PLAN   Botulinum toxin injections: No changes made to Botox dose or distribution today. Patient will continue to monitor response to Botox and report at next appointment.    Referrals: None.  Medications: No changes.   Follow up: Tia Pablo was rescheduled for the next series of injections in 12 weeks, at which time we will evaluate response to today's injections. she may call the clinic prior with any questions or concerns prior to the next appointment.     Simi Vazquez MD

## 2025-06-13 ENCOUNTER — VIRTUAL VISIT (OUTPATIENT)
Dept: NEUROLOGY | Facility: CLINIC | Age: 60
End: 2025-06-13
Payer: COMMERCIAL

## 2025-06-13 VITALS — BODY MASS INDEX: 22.82 KG/M2 | WEIGHT: 142 LBS | HEIGHT: 66 IN

## 2025-06-13 DIAGNOSIS — G30.9 ALZHEIMER'S DISEASE (H): Primary | ICD-10-CM

## 2025-06-13 DIAGNOSIS — F02.80 ALZHEIMER'S DISEASE (H): Primary | ICD-10-CM

## 2025-06-13 PROCEDURE — G2211 COMPLEX E/M VISIT ADD ON: HCPCS | Performed by: INTERNAL MEDICINE

## 2025-06-13 PROCEDURE — 98005 SYNCH AUDIO-VIDEO EST LOW 20: CPT | Performed by: INTERNAL MEDICINE

## 2025-06-13 PROCEDURE — 1125F AMNT PAIN NOTED PAIN PRSNT: CPT | Performed by: INTERNAL MEDICINE

## 2025-06-13 ASSESSMENT — PAIN SCALES - GENERAL: PAINLEVEL_OUTOF10: MODERATE PAIN (6)

## 2025-06-13 NOTE — NURSING NOTE
Current patient location: [address] [place] [id number]    Is the patient currently in the state of [place]? YES    Visit mode: VIDEO    If the visit is dropped, the patient can be reconnected by:VIDEO VISIT: Text to cell phone:   Telephone Information:   Mobile [phone]       Will anyone else be joining the visit? NO  (If patient encounters technical issues they should call [phone]    Are changes needed to the allergy or medication list? Pt stated no changes to allergies and Pt stated no med changes    Are refills needed on medications prescribed by this physician? NO    Rooming Documentation:  Not applicable    Reason for visit: SHIV OLVERAF

## 2025-06-13 NOTE — LETTER
6/13/2025       RE: Tia Pablo  [address] [facility] [id number]     Dear Colleague,    Thank you for referring your patient, Tia Pablo, to the [facility] [place] at [facility]. Please see a copy of my visit note below.    [facility] Neurology Follow Up Visit    Tia Pablo MRN# [id number]   Age: 59 year old YOB: 1965     Brief history of symptoms: The patient was initially seen in neurologic consultation on 3/19/2025 for evaluation of memory changes. Please see the comprehensive neurologic consultation note from that date in the Epic records for details.          Assessment and Plan:   Assessment:  Tia Pablo is a 59 year old female who presents today for follow-up of Alzheimer's disease. Memory symptoms started around March 2020. She previously had neuropsychology testing which was suggestive of mild cognitive impairment of non specific etiology. MoCA was 28/30 at visit in March 2025. PET amyloid brain scan was performed and suggestive of Alzheimer's disease. She is interested in anti-amyloid infusion therapies. Will start with checking APOE genotype. Pending this, we may repeat MRI brain.     Plan:  - APOE genotyping    Follow up in Neurology clinic pending above     Igor Johnson MD   of Neurology  [facility]  ---------------------------------------------------------------------------------------------------------------------------           Interval History:   Patient returns today to discuss results of PET amyloid brain scan. She has continued memory struggles.       Past Medical History:     Patient Active Problem List   Diagnosis     Genetic torsion dystonia     Essential hypertension     Vasomotor symptoms due to menopause     Neck pain     Current mild episode of major depressive disorder without prior episode     Post-COVID chronic concentration deficit     Right knee  pain, unspecified chronicity     Past Medical History:   Diagnosis Date     Anxiety      Depression      HTN (hypertension)         Past Surgical History:     Past Surgical History:   Procedure Laterality Date     AS REPAIR TEND/MUS/FLEX FOREARM/WRIST, PRIM, EACH        SECTION       COLONOSCOPY N/A 01/15/2024    Procedure: Colonoscopy;  Surgeon: Sukhdev Calhoun MD;  Location: [facility]     HYSTERECTOMY       HYSTERECTOMY, PAP NO LONGER INDICATED          Social History:     Social History     Tobacco Use     Smoking status: Never     Smokeless tobacco: Never   Vaping Use     Vaping status: Never Used   Substance Use Topics     Alcohol use: Yes     Comment: Occasionally, 1-2 glasses of wine on weekends     Drug use: No        Family History:     Family History   Problem Relation Age of Onset     Hypertension Mother      Autoimmune Disease Mother         CREST     Hyperlipidemia Mother      Dementia Mother      Skin Cancer Mother      Hypertension Father      Skin Cancer Father      Heart Disease Father      Sleep Apnea Sister      Melanoma No family hx of         Medications:     Current Outpatient Medications   Medication Sig Dispense Refill     buPROPion (WELLBUTRIN XL) 300 MG 24 hr tablet Take 1 tablet (300 mg) by mouth every morning. 90 tablet 2     chlorthalidone (HYGROTON) 25 MG tablet Take 1 tablet (25 mg) by mouth daily. 90 tablet 0     clonazePAM (KLONOPIN) 0.5 MG tablet Take 1 tab in the evening before bed. Okay to take an extra tab as needed in the day. 45 tablet 5     DULoxetine (CYMBALTA) 20 MG capsule Take 1 capsule (20 mg) by mouth daily. 90 capsule 2     eletriptan (RELPAX) 40 MG tablet Take 1 tablet (40 mg) by mouth at onset of headache for migraine May repeat in 2 hours. Max 2 tablets/24 hours. 12 tablet 3     EPINEPHrine (ANY BX GENERIC EQUIV) 0.3 MG/0.3ML injection 2-pack Inject 0.3 mLs (0.3 mg) into the muscle as needed for anaphylaxis May repeat one time in 5-15 minutes if response to  "initial dose is inadequate. 2 each 1     hydroquinone (RUBY) 4 % external cream Use nightly to dark areas of face for 3 months, take a -31 month break and repeat if necessary. 30 g 2     losartan (COZAAR) 50 MG tablet TAKE ONE TABLET BY MOUTH EVERY DAY 90 tablet 2     Current Facility-Administered Medications   Medication Dose Route Frequency Provider Last Rate Last Admin     botulinum toxin type A (BOTOX) 100 units injection 500 Units  500 Units Intramuscular Q10 Weeks    450 Units at 05/14/25 1220     [START ON 7/30/2025] botulinum toxin type A (BOTOX) 100 units injection 500 Units  500 Units Intramuscular Q10 Weeks Angela Franz MD         botulinum toxin type A (BOTOX) 100 units injection 600 Units  600 Units Intramuscular Q90 Days Simi Vazquez MD   450 Units at 02/10/25 1145     glycopyrrolate (ROBINUL) injection 0.1-0.2 mg  0.1-0.2 mg Intravenous Q5 Min PRN Saeed Edwards MD   0.2 mg at 01/11/24 1357     triamcinolone (KENALOG-40) injection 40 mg  40 mg Intramuscular Once             Allergies:     Allergies   Allergen Reactions     Bee Venom Anaphylaxis     Erythromycin Hives and Itching     Seasonal Allergies         Review of Systems:   As above     Physical Exam:   Vitals: Ht 1.676 m (5' 6\")   Wt 64.4 kg (142 lb)   BMI 22.92 kg/m     No examination given nature of virtual visit     Data reviewed on previous visits    Imaging:  MRI brain 8/2020 - reportedly normal     Laboratory:  TSH normal (2/2022)  CBC unremarkable, CMP normal (2023)    Pertinent Investigations since last visit:   Amyloid 42/40 ratio in the intermediate risk,  elevated    PET amyloid brain  Impression:   Positive scan with pronounced amyloid deposition.       The total time of this encounter today amounted to 27 minutes. This time included time spent with the patient, prep work, ordering tests, and performing post visit documentation.    The longitudinal plan of care for the diagnosis(es)/condition(s) " as documented were addressed during this visit. Due to the added complexity in care, I will continue to support Tia in the subsequent management and with ongoing continuity of care.      Virtual Visit Details    Type of service:  Video Visit     Originating Location (pt. Location): Home    Distant Location (provider location):  On-site  Platform used for Video Visit: Aylin      Again, thank you for allowing me to participate in the care of your patient.      Sincerely,    Igor Johnson MD

## 2025-06-13 NOTE — PROGRESS NOTES
[facility] Neurology Follow Up Visit    Tia Pablo MRN# [id number]   Age: 59 year old YOB: 1965     Brief history of symptoms: The patient was initially seen in neurologic consultation on 3/19/2025 for evaluation of memory changes. Please see the comprehensive neurologic consultation note from that date in the Epic records for details.          Assessment and Plan:   Assessment:  Tia Pablo is a 59 year old female who presents today for follow-up of Alzheimer's disease. Memory symptoms started around 2020. She previously had neuropsychology testing which was suggestive of mild cognitive impairment of non specific etiology. MoCA was 28/30 at visit in 2025. PET amyloid brain scan was performed and suggestive of Alzheimer's disease. She is interested in anti-amyloid infusion therapies. Will start with checking APOE genotype. Pending this, we may repeat MRI brain.     Plan:  - APOE genotyping    Follow up in Neurology clinic pending above     Igor Johnson MD   of Neurology  [facility]  ---------------------------------------------------------------------------------------------------------------------------           Interval History:   Patient returns today to discuss results of PET amyloid brain scan. She has continued memory struggles.       Past Medical History:     Patient Active Problem List   Diagnosis    Genetic torsion dystonia    Essential hypertension    Vasomotor symptoms due to menopause    Neck pain    Current mild episode of major depressive disorder without prior episode    Post-COVID chronic concentration deficit    Right knee pain, unspecified chronicity     Past Medical History:   Diagnosis Date    Anxiety     Depression     HTN (hypertension)         Past Surgical History:     Past Surgical History:   Procedure Laterality Date    AS REPAIR TEND/MUS/FLEX FOREARM/WRIST, PRIM, EACH       SECTION      COLONOSCOPY N/A 01/15/2024     Procedure: Colonoscopy;  Surgeon: Sukhdev Calhoun MD;  Location: [facility]    HYSTERECTOMY      HYSTERECTOMY, PAP NO LONGER INDICATED          Social History:     Social History     Tobacco Use    Smoking status: Never    Smokeless tobacco: Never   Vaping Use    Vaping status: Never Used   Substance Use Topics    Alcohol use: Yes     Comment: Occasionally, 1-2 glasses of wine on weekends    Drug use: No        Family History:     Family History   Problem Relation Age of Onset    Hypertension Mother     Autoimmune Disease Mother         CREST    Hyperlipidemia Mother     Dementia Mother     Skin Cancer Mother     Hypertension Father     Skin Cancer Father     Heart Disease Father     Sleep Apnea Sister     Melanoma No family hx of         Medications:     Current Outpatient Medications   Medication Sig Dispense Refill    buPROPion (WELLBUTRIN XL) 300 MG 24 hr tablet Take 1 tablet (300 mg) by mouth every morning. 90 tablet 2    chlorthalidone (HYGROTON) 25 MG tablet Take 1 tablet (25 mg) by mouth daily. 90 tablet 0    clonazePAM (KLONOPIN) 0.5 MG tablet Take 1 tab in the evening before bed. Okay to take an extra tab as needed in the day. 45 tablet 5    DULoxetine (CYMBALTA) 20 MG capsule Take 1 capsule (20 mg) by mouth daily. 90 capsule 2    eletriptan (RELPAX) 40 MG tablet Take 1 tablet (40 mg) by mouth at onset of headache for migraine May repeat in 2 hours. Max 2 tablets/24 hours. 12 tablet 3    EPINEPHrine (ANY BX GENERIC EQUIV) 0.3 MG/0.3ML injection 2-pack Inject 0.3 mLs (0.3 mg) into the muscle as needed for anaphylaxis May repeat one time in 5-15 minutes if response to initial dose is inadequate. 2 each 1    hydroquinone (RUBY) 4 % external cream Use nightly to dark areas of face for 3 months, take a -31 month break and repeat if necessary. 30 g 2    losartan (COZAAR) 50 MG tablet TAKE ONE TABLET BY MOUTH EVERY DAY 90 tablet 2     Current Facility-Administered Medications   Medication Dose Route Frequency  "Provider Last Rate Last Admin    botulinum toxin type A (BOTOX) 100 units injection 500 Units  500 Units Intramuscular Q10 Weeks    450 Units at 05/14/25 1220    [START ON 7/30/2025] botulinum toxin type A (BOTOX) 100 units injection 500 Units  500 Units Intramuscular Q10 Weeks Angela Franz MD        botulinum toxin type A (BOTOX) 100 units injection 600 Units  600 Units Intramuscular Q90 Days Simi Vazquez MD   450 Units at 02/10/25 1145    glycopyrrolate (ROBINUL) injection 0.1-0.2 mg  0.1-0.2 mg Intravenous Q5 Min PRN Saeed Edwards MD   0.2 mg at 01/11/24 1357    triamcinolone (KENALOG-40) injection 40 mg  40 mg Intramuscular Once             Allergies:     Allergies   Allergen Reactions    Bee Venom Anaphylaxis    Erythromycin Hives and Itching    Seasonal Allergies         Review of Systems:   As above     Physical Exam:   Vitals: Ht 1.676 m (5' 6\")   Wt 64.4 kg (142 lb)   BMI 22.92 kg/m     No examination given nature of virtual visit     Data reviewed on previous visits    Imaging:  MRI brain 8/2020 - reportedly normal     Laboratory:  TSH normal (2/2022)  CBC unremarkable, CMP normal (2023)    Pertinent Investigations since last visit:   Amyloid 42/40 ratio in the intermediate risk,  elevated    PET amyloid brain  Impression:   Positive scan with pronounced amyloid deposition.       The total time of this encounter today amounted to 27 minutes. This time included time spent with the patient, prep work, ordering tests, and performing post visit documentation.    The longitudinal plan of care for the diagnosis(es)/condition(s) as documented were addressed during this visit. Due to the added complexity in care, I will continue to support Tia in the subsequent management and with ongoing continuity of care.    "

## 2025-06-13 NOTE — PROGRESS NOTES
Virtual Visit Details    Type of service:  Video Visit     Originating Location (pt. Location): Home    Distant Location (provider location):  On-site  Platform used for Video Visit: Aylin

## 2025-06-16 ENCOUNTER — LAB (OUTPATIENT)
Dept: LAB | Facility: CLINIC | Age: 60
End: 2025-06-16
Payer: COMMERCIAL

## 2025-06-16 DIAGNOSIS — G30.9 ALZHEIMER'S DISEASE (H): ICD-10-CM

## 2025-06-16 DIAGNOSIS — F02.80 ALZHEIMER'S DISEASE (H): ICD-10-CM

## 2025-06-16 PROCEDURE — 36415 COLL VENOUS BLD VENIPUNCTURE: CPT

## 2025-06-16 PROCEDURE — 81401 MOPATH PROCEDURE LEVEL 2: CPT | Mod: 90

## 2025-06-16 PROCEDURE — 99000 SPECIMEN HANDLING OFFICE-LAB: CPT

## 2025-06-23 ENCOUNTER — RESULTS FOLLOW-UP (OUTPATIENT)
Dept: NEUROLOGY | Facility: CLINIC | Age: 60
End: 2025-06-23
Payer: COMMERCIAL

## 2025-06-23 ENCOUNTER — TELEPHONE (OUTPATIENT)
Dept: NEUROLOGY | Facility: CLINIC | Age: 60
End: 2025-06-23
Payer: COMMERCIAL

## 2025-06-23 NOTE — TELEPHONE ENCOUNTER
Patient confirmed scheduled appointment:  Date: 7/28/25  Time: 5:00 PM  Visit type: Return Neurology  Provider: Alex  Location: [facility]  Testing/imaging: na  Additional notes: Follow Up

## 2025-06-23 NOTE — TELEPHONE ENCOUNTER
Called Tia regarding APOE4 homozygous result. She is not a candidate for Lecanemab or Donanemab due to bleeding risks. There may be other treatment options in the future.     She would benefit from psychology referral with new diagnosis, which we will work on setting up.     Igor Johnson MD

## 2025-06-24 ENCOUNTER — PATIENT OUTREACH (OUTPATIENT)
Dept: CARE COORDINATION | Facility: CLINIC | Age: 60
End: 2025-06-24
Payer: COMMERCIAL

## 2025-07-01 ENCOUNTER — OFFICE VISIT (OUTPATIENT)
Dept: NEUROLOGY | Facility: CLINIC | Age: 60
End: 2025-07-01
Attending: INTERNAL MEDICINE
Payer: COMMERCIAL

## 2025-07-01 DIAGNOSIS — G30.9 ALZHEIMER'S DISEASE (H): ICD-10-CM

## 2025-07-01 DIAGNOSIS — F33.1 MAJOR DEPRESSIVE DISORDER, RECURRENT EPISODE, MODERATE WITH ANXIOUS DISTRESS (H): Primary | ICD-10-CM

## 2025-07-01 DIAGNOSIS — F02.80 ALZHEIMER'S DISEASE (H): ICD-10-CM

## 2025-07-01 PROCEDURE — 90791 PSYCH DIAGNOSTIC EVALUATION: CPT | Performed by: PSYCHOLOGIST
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
## 2025-07-02 ENCOUNTER — PATIENT OUTREACH (OUTPATIENT)
Dept: CARE COORDINATION | Facility: CLINIC | Age: 60
End: 2025-07-02
Payer: COMMERCIAL

## 2025-07-02 DIAGNOSIS — I10 ESSENTIAL HYPERTENSION: ICD-10-CM

## 2025-07-02 RX ORDER — CHLORTHALIDONE 25 MG/1
25 TABLET ORAL DAILY
Qty: 90 TABLET | Refills: 0 | Status: SHIPPED | OUTPATIENT
Start: 2025-07-02

## 2025-07-02 NOTE — TELEPHONE ENCOUNTER
Potassium   Date Value Ref Range Status   10/15/2024 3.6 3.4 - 5.3 mmol/L Final   02/18/2022 4.0 3.4 - 5.3 mmol/L Final   12/03/2020 4.2 3.4 - 5.3 mmol/L Final     GFR Estimate   Date Value Ref Range Status   10/15/2024 >90 >60 mL/min/1.73m2 Final     Comment:     eGFR calculated using 2021 CKD-EPI equation.   12/03/2020 >90 >60 mL/min/[1.73_m2] Final     Comment:     Non  GFR Calc  Starting 12/18/2018, serum creatinine based estimated GFR (eGFR) will be   calculated using the Chronic Kidney Disease Epidemiology Collaboration   (CKD-EPI) equation.

## 2025-07-05 ENCOUNTER — PATIENT OUTREACH (OUTPATIENT)
Dept: CARE COORDINATION | Facility: CLINIC | Age: 60
End: 2025-07-05
Payer: COMMERCIAL

## 2025-07-13 ENCOUNTER — HEALTH MAINTENANCE LETTER (OUTPATIENT)
Age: 60
End: 2025-07-13

## 2025-07-17 ENCOUNTER — MYC MEDICAL ADVICE (OUTPATIENT)
Dept: FAMILY MEDICINE | Facility: CLINIC | Age: 60
End: 2025-07-17

## 2025-07-22 ENCOUNTER — VIRTUAL VISIT (OUTPATIENT)
Dept: FAMILY MEDICINE | Facility: CLINIC | Age: 60
End: 2025-07-22
Payer: COMMERCIAL

## 2025-07-22 DIAGNOSIS — F41.8 SITUATIONAL ANXIETY: ICD-10-CM

## 2025-07-22 DIAGNOSIS — F51.04 PSYCHOPHYSIOLOGICAL INSOMNIA: ICD-10-CM

## 2025-07-22 DIAGNOSIS — F41.9 ANXIETY: ICD-10-CM

## 2025-07-22 DIAGNOSIS — F02.80 ALZHEIMER'S DISEASE (H): Primary | ICD-10-CM

## 2025-07-22 DIAGNOSIS — G30.9 ALZHEIMER'S DISEASE (H): Primary | ICD-10-CM

## 2025-07-22 DIAGNOSIS — F32.0 CURRENT MILD EPISODE OF MAJOR DEPRESSIVE DISORDER WITHOUT PRIOR EPISODE: ICD-10-CM

## 2025-07-22 PROCEDURE — 98005 SYNCH AUDIO-VIDEO EST LOW 20: CPT | Performed by: FAMILY MEDICINE

## 2025-07-22 RX ORDER — TRAZODONE HYDROCHLORIDE 50 MG/1
TABLET ORAL
Qty: 60 TABLET | Refills: 3 | Status: SHIPPED | OUTPATIENT
Start: 2025-07-22

## 2025-07-22 RX ORDER — DULOXETIN HYDROCHLORIDE 20 MG/1
20 CAPSULE, DELAYED RELEASE ORAL 2 TIMES DAILY
Qty: 180 CAPSULE | Refills: 2 | Status: SHIPPED | OUTPATIENT
Start: 2025-07-22

## 2025-07-22 NOTE — PROGRESS NOTES
Tia is a 59 year old who is being evaluated via a billable video visit.    How would you like to obtain your AVS? MyChart  If the video visit is dropped, the invitation should be resent by: Send to e-mail at: [email or web address]  Will anyone else be joining your video visit? No  {If patient encounters technical issues they should call [phone] :724293}    {PROVIDER CHARTING PREFERENCE:907783}    Subjective   Tia is a 59 year old, presenting for the following health issues:  Mental Health Problem        7/22/2025     2:38 PM   Additional Questions   Roomed by Neva ÁLAVREZ CMA     HPI      Depression and Anxiety   How are you doing with your depression since your last visit? Worsened   How are you doing with your anxiety since your last visit?  Worsened   Are you having other symptoms that might be associated with depression or anxiety?   Have you had a significant life event? Health Concerns   Do you have any concerns with your use of alcohol or other drugs?     Social History     Tobacco Use    Smoking status: Never    Smokeless tobacco: Never   Vaping Use    Vaping status: Never Used   Substance Use Topics    Alcohol use: Yes     Comment: Occasionally, 1-2 glasses of wine on weekends    Drug use: No         2/2/2024    10:30 AM 10/15/2024     8:46 AM 4/16/2025     1:04 PM   PHQ   PHQ-9 Total Score 4 5 4    Q9: Thoughts of better off dead/self-harm past 2 weeks Not at all Not at all Not at all        Proxy-reported         6/5/2024     6:09 PM 7/10/2024     4:52 PM 4/11/2025    12:42 PM   PATRICK-7 SCORE   Total Score 5 (mild anxiety) 1 (minimal anxiety) 4 (minimal anxiety)   Total Score 5 1 4        Patient-reported     {Last PHQ9 or GAD7 Responses (Optional):715143}    Suicide Assessment Five-step Evaluation and Treatment (SAFE-T)  {Provider  Link to Depression Care Package SmartSet :157864}  How many servings of fruits and vegetables do you eat daily?    On average, how many sweetened beverages do you drink  "each day (Examples: soda, juice, sweet tea, etc.  Do NOT count diet or artificially sweetened beverages)?     How many days per week do you exercise enough to make your heart beat faster?   How many minutes a day do you exercise enough to make your heart beat faster?   How many days per week do you miss taking your medication?   She was dxd with alzheimers it is not really cause any issues right now .     {ROS Picklists (Optional):081763}      Objective           Vitals:  No vitals were obtained today due to virtual visit.    Physical Exam   {video visit exam brief selected:688245}    {Diagnostic Test Results (Optional):741858}      Video-Visit Details    Type of service:  Video Visit   Originating Location (pt. Location): {video visit patient location:570600::\"Home\"}  {PROVIDER LOCATION On-site should be selected for visits conducted from your clinic location or adjoining [facility] hospital, academic office, or other nearby [facility] building. Off-site should be selected for all other provider locations, including home:979026}  Distant Location (provider location):  {virtual location provider:340678}  Platform used for Video Visit: {Virtual Visit Platforms:377462::\"Newstag\"}  Signed Electronically by: Ana Guzman MD  {Email feedback regarding this note to [email or web address]   :660879}  " Location (pt. Location): Home    Distant Location (provider location):  On-site  Platform used for Video Visit: Aylin  Signed Electronically by: Ana Guzman MD

## 2025-07-24 ENCOUNTER — ALLIED HEALTH/NURSE VISIT (OUTPATIENT)
Dept: FAMILY MEDICINE | Facility: CLINIC | Age: 60
End: 2025-07-24
Payer: COMMERCIAL

## 2025-07-24 ENCOUNTER — TELEPHONE (OUTPATIENT)
Dept: FAMILY MEDICINE | Facility: CLINIC | Age: 60
End: 2025-07-24

## 2025-07-24 VITALS — TEMPERATURE: 98.3 F

## 2025-07-24 DIAGNOSIS — Z23 ENCOUNTER FOR IMMUNIZATION: Primary | ICD-10-CM

## 2025-07-24 DIAGNOSIS — Z23 NEED FOR MEASLES-MUMPS-RUBELLA (MMR) VACCINE: Primary | ICD-10-CM

## 2025-07-24 NOTE — TELEPHONE ENCOUNTER
Order/Referral Request    Who is requesting: Patient    Orders being requested: needs lab order to see if she still has the MMR strain, if not, she'll need the MMR shot per patient and her last conversation with PCP.     Patient needs to verify if she is supposed to get the lab done first and then wait for results before she gets the shot. Please contact patient.     Reason service is needed/diagnosis: Per patient, last discussion with PCP on 7/22/25    When are orders needed by: asap    Has this been discussed with Provider: Yes    Does patient have a preference on a Group/Provider/Facility? [facility]    Does patient have an appointment scheduled?: No    Where to send orders: Place orders within Epic    Could we send this information to you in [employer] or would you prefer to receive a phone call?:   Patient would prefer a phone call   Okay to leave a detailed message?: Yes at Cell number on file:    Telephone Information:   Mobile [phone]

## 2025-07-24 NOTE — TELEPHONE ENCOUNTER
If we are checking titres,, then she needs to wait to get the mmr. There is no point in doing the titres if she is just going to get the vaccine anyway. Ana Guzman M.D.

## 2025-07-24 NOTE — TELEPHONE ENCOUNTER
Patient was on MA schedule for MMR vaccine, no standing orders were seen. Called patient and they are going to send a message to their PCP to get orders in before appointment. Patient was informed if orders are not in before appointment they will need to reschedule. Patient was okay with this.

## 2025-07-24 NOTE — PROGRESS NOTES
Prior to immunization administration, verified patients identity using patient s name and date of birth. Please see Immunization Activity for additional information.     Is the patient's temperature normal (100.4 or less)? Yes     Patient MEETS CRITERIA. PROCEED with vaccine administration.        7/24/2025   General Questionnaire    Do you have any questions for your care team about the vaccines you will be receiving today? I am not sure if I had thise vaccines in the past at [facility], will that show in my MyChart.             7/24/2025   Hepatitis B   Have you had a serious reaction to a hepatitis B vaccine or to something in a hepatitis B vaccine, including yeast)? No   Are you getting kidney dialysis (either peritoneal or hemodialysis)? No   Do you have an allergy to latex? No         Patient MEETS CRITERIA. PROCEED with vaccine administration.            7/24/2025   MMR   Have you had a serious reaction to the M-M-R II or ProQuad vaccine or to something in these vaccines (like neomycin, gelatin)? No   Are you immunocompromised? No   Have you gotten antibody blood products in the  last 11 months? No   Do you have low platelet counts in your blood (thrombocytopenia) or does your blood not clot properly (thrombocytopenia purpura)? No   Do you have an allergy to eggs? No   Have you been exposed toTuberculosis (last 6 months) or recently treated or needing tests in the near future? No   Have you received a vaccine in the past 28 days? No         Patient MEETS CRITERIA. PROCEED with vaccine administration.      Patient instructed to remain in clinic for 15 minutes afterwards, and to report any adverse reactions.      Link to Ancillary Visit Immunization Standing Orders SmartSet     Screening performed by Maya Ball CMA on 7/24/2025 at 4:27 PM.

## 2025-07-30 ENCOUNTER — OFFICE VISIT (OUTPATIENT)
Dept: PHYSICAL MEDICINE AND REHAB | Facility: CLINIC | Age: 60
End: 2025-07-30
Payer: COMMERCIAL

## 2025-07-30 DIAGNOSIS — G43.719 CHRONIC MIGRAINE WITHOUT AURA, INTRACTABLE, WITHOUT STATUS MIGRAINOSUS: ICD-10-CM

## 2025-07-30 DIAGNOSIS — G24.3 SPASMODIC TORTICOLLIS: ICD-10-CM

## 2025-07-30 DIAGNOSIS — G24.4 OROMANDIBULAR DYSTONIA: Primary | ICD-10-CM

## 2025-07-30 PROCEDURE — 95874 GUIDE NERV DESTR NEEDLE EMG: CPT | Performed by: PHYSICAL MEDICINE & REHABILITATION

## 2025-07-30 PROCEDURE — 64615 CHEMODENERV MUSC MIGRAINE: CPT | Performed by: PHYSICAL MEDICINE & REHABILITATION

## 2025-07-30 NOTE — PROGRESS NOTES
[facility]     PM&R CLINIC NOTE  BOTULINUM TOXIN PROCEDURE      HPI  No chief complaint on file.    Tia Pablo is a 59 year old female with a history of headaches and generalized dystonia with pain who presents to clinic for botulinum toxin injections for management of chronic migraine headaches, cervical and oromandibular dystonia.     She was followed by Dr. Platt in the movement disorder clinic and was last seen 7/25/24.     SINCE LAST VISIT  Tia Pablo was last seen here in clinic on 5/14/2025, at which time she received 450 units of Botox.     Patient denies new medical diagnoses, illnesses, hospitalizations, emergency room visits, and injuries since the previous injection with botulinum neurotoxin.     She had trigger point injections without steroids 3/26 with good results.     Was seen and evaluated by Dr. Johnson on 7/18 regarding memory changes; work-up is suggestive of Alzheimer disease per notes.     Has been working with psychology team Dr. Garcia on cognitive behavioral therapy to address adaptive coping with a new diagnosis of Alzheimer's disease.    She was tearful today regarding the new diagnosis and concerned about future. Asked if her h/o dystonia and long-COVID has any correlation with her Alzheimer disease.        RESPONSE TO PREVIOUS TREATMENT    Side effects: None reported.    1.  Headache frequency during this injection cycle: There have been 0-1 migraine headache days per month, with near daily tension headaches which are manageable. This is compared to her baseline headache frequency of 30 headache days per month.    2.  Headache duration during this injection cycle:  Headache duration is typically no longer than three hours with Relpax, but if the Relpax does not work right away, she has to take two doses and the headaches last much longer. Patient reports no episodes of multiple day headaches during this injection cycle.      3.  Headache  intensity during this injection cycle:    A.  4/10  =  Typical pain level.  B.  8/10  =  Worst pain level.  C.  0/10  =  Lowest pain level.    4.  Change in headache medication usage during this injection cycle:  She is only taking her Relpax 0-1 times per month for migraine rescue, whereas she was taking it much more frequently prior to Botox. Otherwise, she will occasionally use Tylenol or ibuprofen if headache is not severe.     5.  ER Visits During This Injection Cycle: None.     6.  Functional Performance:  Change in ADL's, social interaction, days lost from work, etc. Patient reports being able to more fully participate in social and family activities and responsibilities as headache symptoms have improved. She is overall a lot more productive when Botox is working. Has been babysitting her two grand kids and will have another grand child in December.     Dystonia Improvement (neck and jaw): Yes.  Percent Improvement: 80 %    Duration of Benefit:  7 weeks and followed by a gradual reduction in benefit. Her jaw clenching gets more severely painful and tense a few weeks before her appointment.       PHYSICAL EXAM  VS: There were no vitals taken for this visit.   GEN: Pleasant and cooperative, in no acute distress  HEENT: No facial asymmetry  NECK: Involuntary right rotation with hypertonicity noted in right anterior neck musculature and upper trapezius.     Skin intact at the sites of injections     ALLERGIES  Allergies   Allergen Reactions    Bee Venom Anaphylaxis    Erythromycin Hives and Itching    Seasonal Allergies        CURRENT MEDICATIONS    Current Outpatient Medications:     buPROPion (WELLBUTRIN XL) 300 MG 24 hr tablet, Take 1 tablet (300 mg) by mouth every morning., Disp: 90 tablet, Rfl: 2    chlorthalidone (HYGROTON) 25 MG tablet, TAKE 1 TABLET BY MOUTH EVERY DAY, Disp: 90 tablet, Rfl: 0    clonazePAM (KLONOPIN) 0.5 MG tablet, Take 1 tab in the evening before bed. Okay to take an extra tab as needed  in the day., Disp: 45 tablet, Rfl: 5    donepezil (ARICEPT) 5 MG tablet, Take 1 tablet (5 mg) by mouth at bedtime., Disp: 30 tablet, Rfl: 11    DULoxetine (CYMBALTA) 20 MG capsule, Take 1 capsule (20 mg) by mouth 2 times daily., Disp: 180 capsule, Rfl: 2    eletriptan (RELPAX) 40 MG tablet, Take 1 tablet (40 mg) by mouth at onset of headache for migraine May repeat in 2 hours. Max 2 tablets/24 hours., Disp: 12 tablet, Rfl: 3    EPINEPHrine (ANY BX GENERIC EQUIV) 0.3 MG/0.3ML injection 2-pack, Inject 0.3 mLs (0.3 mg) into the muscle as needed for anaphylaxis May repeat one time in 5-15 minutes if response to initial dose is inadequate., Disp: 2 each, Rfl: 1    losartan (COZAAR) 50 MG tablet, TAKE ONE TABLET BY MOUTH EVERY DAY, Disp: 90 tablet, Rfl: 2    traZODone (DESYREL) 50 MG tablet, Try the trazodone Start with 1/2 tablet an hour before bedtime. You can increase by 1/2 tablet every few nights to a maximum of 3 tablets. If you are groggy the next day after changing doses try cutting back to the dose you were taking before. Let me know if you need to try something else or if you are having side effects from this., Disp: 60 tablet, Rfl: 3    Current Facility-Administered Medications:     botulinum toxin type A (BOTOX) 100 units injection 500 Units, 500 Units, Intramuscular, Q10 Weeks, , 450 Units at 05/14/25 1220    botulinum toxin type A (BOTOX) 100 units injection 500 Units, 500 Units, Intramuscular, Q10 Weeks, Angela Franz MD, 450 Units at 07/30/25 1440    botulinum toxin type A (BOTOX) 100 units injection 600 Units, 600 Units, Intramuscular, Q90 Days, Simi Vazquez MD, 450 Units at 02/10/25 1145    glycopyrrolate (ROBINUL) injection 0.1-0.2 mg, 0.1-0.2 mg, Intravenous, Q5 Min PRN, Saeed Edwards MD, 0.2 mg at 01/11/24 1357    triamcinolone (KENALOG-40) injection 40 mg, 40 mg, Intramuscular, Once,        BOTULINUM NEUROTOXIN INJECTION PROCEDURES    VERIFICATION OF PATIENT IDENTIFICATION  AND PROCEDURE     Initials   Patient Name PS   Patient  PS   Procedure Verified by: PS     Prior to the start of the procedure and with procedural staff participation, I verbally confirmed the patient s identity using two indicators, relevant allergies, that the procedure was appropriate and matched the consent or emergent situation, and that the correct equipment/implants were available. Immediately prior to starting the procedure I conducted the Time Out with the procedural staff and re-confirmed the patient s name, procedure, and site/side. (The Joint Commission universal protocol was followed.)  Yes    Sedation (Moderate or Deep): None    ABOVE ASSESSMENTS PERFORMED BY    Angela Franz MD      INDICATIONS FOR PROCEDURES  Tia Pablo is a 59 year old patient with involuntary muscle spasms and pain secondary to the diagnosis of oromandibular/cervical dystonia and chronic migraine headaches. Her baseline symptoms have been recalcitrant to oral medications and conservative therapy.  She is here today for reinjection with Botox.    GOAL OF PROCEDURE  The goal of this procedure is to increase active range of motion, improve volitional motor control, decrease pain  and enhance functional independence.      TOTAL DOSE ADMINISTERED  Dose Administered:  450 units  Botox (Botulinum Toxin Type A) 1:1 Dilution ** will do 100 units 2:1 next time  Unavoidable Drug Waste: Yes  Amount of drug waste (mL): 50 units Botox.  Reason for waste:  Single use vial  Diluent Used:  Preservative Free Normal Saline  Total Volume of Diluent Used:  4.5 ml  NDC #: Botox 100u ([address]-26)      CONSENT  The risks, benefits, and treatment options were discussed with Tia Pablo and she agreed to proceed.    Written consent was obtained by PS.     EQUIPMENT USED  Needle-25mm stimulating/recording  Needle-30 gauge  EMG/NCS Machine    SKIN PREPARATION  Skin preparation was performed using an alcohol wipe.    GUIDANCE  DESCRIPTION  Electro-myographic guidance was necessary throughout the procedure to accurately identify all areas of dystonic muscles while avoiding injection of non-dystonic muscles, neighboring nerves and nearby vascular structures.     AREA/MUSCLE INJECTED: 450 UNITS BOTOX = TOTAL DOSE, 1:1 DILUTION     1. NECK MUSCLES: 215 UNITS BOTOX = TOTAL DOSE     Right Upper Trapezius - 25 units of Botox at 4 site/s.   Left Upper Trapezius - 10 units of Botox at 2 site/s.      Right Splenius Capitis - 5 units of Botox at 1 site/s.   Left Splenius Capitis - 5 units of Botox at 1 site/s.      Right Auricularis - 15 units of Botox at 3 site/s (anterior, superior & posterior).   Left Auricularis - 15 units of Botox at 3 site/s (anterior, superior & posterior).    Right Sternocleidomastoid - 15 units of Botox at 2 site/s (proximal/behind ear).  Left Sternocleidomastoid - 15 units of Botox at 2 site/s (proximal/behind ear).     Right Middle Scalene - 10 units of Botox at 1 site/s.     Right Platysma - 10 units of Botox at 2 site/s.     Right Levator Scapula - 20 units of Botox at 1 site/s (cervical insertion).   Left Levator Scapula - 20 units of Botox at 1 site/s (cervical insertion).      Right Rhomboid - 40 units of Botox at 4 site/s.    Left Rhomboid - 10 units of Botox at 1 site/s.       3. JAW MUSCLES: 140 UNITS BOTOX = TOTAL DOSE      Right Temporalis - 60 units of Botox at 5 site/s.               Left Temporalis - 60 units of Botox at 5 site/s.     Right Masseter - 10 units of Botox at 1 site/s.    Left Masseter - 10 units of Botox at 1 site/s.     4. FACIAL & SCALP MUSCLES: 95 UNITS BOTOX = TOTAL DOSE     Right Frontalis - 15 units of Botox at 3 site/s (2 middle & 1 hairline).    Left Frontalis - 15 units of Botox at 3 site/s (2 middle & 1 hairline).     Procerus - 5 units of Botox at 1 site/s.       Right  - 5 units of Botox at 1 site/s.    Left  - 5 units of Botox at 1 site/s.      Right Occipitalis  - 25 units of Botox at 3 site/s.    Left Occipitalis - 25 units of Botox at 3 site/s.       RESPONSE TO PROCEDURE  Tia Pablo tolerated the procedure well and there were no immediate complications. She was allowed to recover for an appropriate period of time and was discharged home in stable condition.    ASSESSMENT AND PLAN   Botulinum toxin injections: No changes made to Botox dose or distribution today. Patient will continue to monitor response to Botox and report at next appointment.  Ok to try TPIs as rescue when she has flare ups.  Referrals: No new referrals today. She will continue working with neurology and psychology teams.   Medications: No changes.   Follow up: Tia Pablo was rescheduled for the next series of injections in 10 weeks, at which time we will evaluate response to today's injections. she may call the clinic prior with any questions or concerns prior to the next appointment.         Angela Franz MD  Physical Medicine & Rehabilitation

## 2025-07-30 NOTE — LETTER
7/30/2025       RE: Tia Pablo  [id number] [address] [facility] [id number]     Dear Colleague,    Thank you for referring your patient, Tia Pablo, to the [facility] PHYSICAL MEDICINE AND REHABILITATION CLINIC [place] [facility]. Please see a copy of my visit note below.      [facility]     PM&R CLINIC NOTE  BOTULINUM TOXIN PROCEDURE      HPI  No chief complaint on file.    Tia Pablo is a 59 year old female with a history of headaches and generalized dystonia with pain who presents to clinic for botulinum toxin injections for management of chronic migraine headaches, cervical and oromandibular dystonia.     She was followed by Dr. Platt in the movement disorder clinic and was last seen 7/25/24.     SINCE LAST VISIT  Tia Pablo was last seen here in clinic on 5/14/2025, at which time she received 450 units of Botox.     Patient denies new medical diagnoses, illnesses, hospitalizations, emergency room visits, and injuries since the previous injection with botulinum neurotoxin.     She had trigger point injections without steroids 3/26 with good results.     Was seen and evaluated by Dr. Johnson on 7/18 regarding memory changes; work-up is suggestive of Alzheimer disease per notes.     Has been working with psychology team Dr. Garcia on cognitive behavioral therapy to address adaptive coping with a new diagnosis of Alzheimer's disease.    She was tearful today regarding the new diagnosis and concerned about future. Asked if her h/o dystonia and long-COVID has any correlation with her Alzheimer disease.        RESPONSE TO PREVIOUS TREATMENT    Side effects: None reported.    1.  Headache frequency during this injection cycle: There have been 0-1 migraine headache days per month, with near daily tension headaches which are manageable. This is compared to her baseline headache frequency of 30 headache days per  month.    2.  Headache duration during this injection cycle:  Headache duration is typically no longer than three hours with Relpax, but if the Relpax does not work right away, she has to take two doses and the headaches last much longer. Patient reports no episodes of multiple day headaches during this injection cycle.      3.  Headache intensity during this injection cycle:    A.  4/10  =  Typical pain level.  B.  8/10  =  Worst pain level.  C.  0/10  =  Lowest pain level.    4.  Change in headache medication usage during this injection cycle:  She is only taking her Relpax 0-1 times per month for migraine rescue, whereas she was taking it much more frequently prior to Botox. Otherwise, she will occasionally use Tylenol or ibuprofen if headache is not severe.     5.  ER Visits During This Injection Cycle: None.     6.  Functional Performance:  Change in ADL's, social interaction, days lost from work, etc. Patient reports being able to more fully participate in social and family activities and responsibilities as headache symptoms have improved. She is overall a lot more productive when Botox is working. Has been babysitting her two grand kids and will have another grand child in December.     Dystonia Improvement (neck and jaw): Yes.  Percent Improvement: 80 %    Duration of Benefit:  7 weeks and followed by a gradual reduction in benefit. Her jaw clenching gets more severely painful and tense a few weeks before her appointment.       PHYSICAL EXAM  VS: There were no vitals taken for this visit.   GEN: Pleasant and cooperative, in no acute distress  HEENT: No facial asymmetry  NECK: Involuntary right rotation with hypertonicity noted in right anterior neck musculature and upper trapezius.     Skin intact at the sites of injections     ALLERGIES  Allergies   Allergen Reactions     Bee Venom Anaphylaxis     Erythromycin Hives and Itching     Seasonal Allergies        CURRENT MEDICATIONS    Current Outpatient  Medications:      buPROPion (WELLBUTRIN XL) 300 MG 24 hr tablet, Take 1 tablet (300 mg) by mouth every morning., Disp: 90 tablet, Rfl: 2     chlorthalidone (HYGROTON) 25 MG tablet, TAKE 1 TABLET BY MOUTH EVERY DAY, Disp: 90 tablet, Rfl: 0     clonazePAM (KLONOPIN) 0.5 MG tablet, Take 1 tab in the evening before bed. Okay to take an extra tab as needed in the day., Disp: 45 tablet, Rfl: 5     donepezil (ARICEPT) 5 MG tablet, Take 1 tablet (5 mg) by mouth at bedtime., Disp: 30 tablet, Rfl: 11     DULoxetine (CYMBALTA) 20 MG capsule, Take 1 capsule (20 mg) by mouth 2 times daily., Disp: 180 capsule, Rfl: 2     eletriptan (RELPAX) 40 MG tablet, Take 1 tablet (40 mg) by mouth at onset of headache for migraine May repeat in 2 hours. Max 2 tablets/24 hours., Disp: 12 tablet, Rfl: 3     EPINEPHrine (ANY BX GENERIC EQUIV) 0.3 MG/0.3ML injection 2-pack, Inject 0.3 mLs (0.3 mg) into the muscle as needed for anaphylaxis May repeat one time in 5-15 minutes if response to initial dose is inadequate., Disp: 2 each, Rfl: 1     losartan (COZAAR) 50 MG tablet, TAKE ONE TABLET BY MOUTH EVERY DAY, Disp: 90 tablet, Rfl: 2     traZODone (DESYREL) 50 MG tablet, Try the trazodone Start with 1/2 tablet an hour before bedtime. You can increase by 1/2 tablet every few nights to a maximum of 3 tablets. If you are groggy the next day after changing doses try cutting back to the dose you were taking before. Let me know if you need to try something else or if you are having side effects from this., Disp: 60 tablet, Rfl: 3    Current Facility-Administered Medications:      botulinum toxin type A (BOTOX) 100 units injection 500 Units, 500 Units, Intramuscular, Q10 Weeks, , 450 Units at 05/14/25 1220     botulinum toxin type A (BOTOX) 100 units injection 500 Units, 500 Units, Intramuscular, Q10 Weeks, Angela Franz MD, 450 Units at 07/30/25 1440     botulinum toxin type A (BOTOX) 100 units injection 600 Units, 600 Units, Intramuscular, Q90 Days,  Simi Vazquez MD, 450 Units at 02/10/25 1145     glycopyrrolate (ROBINUL) injection 0.1-0.2 mg, 0.1-0.2 mg, Intravenous, Q5 Min PRN, Saeed Edwards MD, 0.2 mg at 24 1357     triamcinolone (KENALOG-40) injection 40 mg, 40 mg, Intramuscular, Once,        BOTULINUM NEUROTOXIN INJECTION PROCEDURES    VERIFICATION OF PATIENT IDENTIFICATION AND PROCEDURE     Initials   Patient Name PS   Patient  PS   Procedure Verified by: PS     Prior to the start of the procedure and with procedural staff participation, I verbally confirmed the patient s identity using two indicators, relevant allergies, that the procedure was appropriate and matched the consent or emergent situation, and that the correct equipment/implants were available. Immediately prior to starting the procedure I conducted the Time Out with the procedural staff and re-confirmed the patient s name, procedure, and site/side. (The Joint Commission universal protocol was followed.)  Yes    Sedation (Moderate or Deep): None    ABOVE ASSESSMENTS PERFORMED BY    Angela Franz MD      INDICATIONS FOR PROCEDURES  Tia Pablo is a 59 year old patient with involuntary muscle spasms and pain secondary to the diagnosis of oromandibular/cervical dystonia and chronic migraine headaches. Her baseline symptoms have been recalcitrant to oral medications and conservative therapy.  She is here today for reinjection with Botox.    GOAL OF PROCEDURE  The goal of this procedure is to increase active range of motion, improve volitional motor control, decrease pain  and enhance functional independence.      TOTAL DOSE ADMINISTERED  Dose Administered:  450 units  Botox (Botulinum Toxin Type A) 1:1 Dilution ** will do 100 units 2:1 next time  Unavoidable Drug Waste: Yes  Amount of drug waste (mL): 50 units Botox.  Reason for waste:  Single use vial  Diluent Used:  Preservative Free Normal Saline  Total Volume of Diluent Used:  4.5 ml  NDC #: Botox 100u  ([id number])      CONSENT  The risks, benefits, and treatment options were discussed with Tia Pablo and she agreed to proceed.    Written consent was obtained by PS.     EQUIPMENT USED  Needle-25mm stimulating/recording  Needle-30 gauge  EMG/NCS Machine    SKIN PREPARATION  Skin preparation was performed using an alcohol wipe.    GUIDANCE DESCRIPTION  Electro-myographic guidance was necessary throughout the procedure to accurately identify all areas of dystonic muscles while avoiding injection of non-dystonic muscles, neighboring nerves and nearby vascular structures.     AREA/MUSCLE INJECTED: 450 UNITS BOTOX = TOTAL DOSE, 1:1 DILUTION     1. NECK MUSCLES: 215 UNITS BOTOX = TOTAL DOSE     Right Upper Trapezius - 25 units of Botox at 4 site/s.   Left Upper Trapezius - 10 units of Botox at 2 site/s.      Right Splenius Capitis - 5 units of Botox at 1 site/s.   Left Splenius Capitis - 5 units of Botox at 1 site/s.      Right Auricularis - 15 units of Botox at 3 site/s (anterior, superior & posterior).   Left Auricularis - 15 units of Botox at 3 site/s (anterior, superior & posterior).    Right Sternocleidomastoid - 15 units of Botox at 2 site/s (proximal/behind ear).  Left Sternocleidomastoid - 15 units of Botox at 2 site/s (proximal/behind ear).     Right Middle Scalene - 10 units of Botox at 1 site/s.     Right Platysma - 10 units of Botox at 2 site/s.     Right Levator Scapula - 20 units of Botox at 1 site/s (cervical insertion).   Left Levator Scapula - 20 units of Botox at 1 site/s (cervical insertion).      Right Rhomboid - 40 units of Botox at 4 site/s.    Left Rhomboid - 10 units of Botox at 1 site/s.       3. JAW MUSCLES: 140 UNITS BOTOX = TOTAL DOSE      Right Temporalis - 60 units of Botox at 5 site/s.               Left Temporalis - 60 units of Botox at 5 site/s.     Right Masseter - 10 units of Botox at 1 site/s.    Left Masseter - 10 units of Botox at 1 site/s.     4. FACIAL & SCALP MUSCLES: 95  UNITS BOTOX = TOTAL DOSE     Right Frontalis - 15 units of Botox at 3 site/s (2 middle & 1 hairline).    Left Frontalis - 15 units of Botox at 3 site/s (2 middle & 1 hairline).     Procerus - 5 units of Botox at 1 site/s.       Right  - 5 units of Botox at 1 site/s.    Left  - 5 units of Botox at 1 site/s.      Right Occipitalis - 25 units of Botox at 3 site/s.    Left Occipitalis - 25 units of Botox at 3 site/s.       RESPONSE TO PROCEDURE  Tia Pablo tolerated the procedure well and there were no immediate complications. She was allowed to recover for an appropriate period of time and was discharged home in stable condition.    ASSESSMENT AND PLAN   Botulinum toxin injections: No changes made to Botox dose or distribution today. Patient will continue to monitor response to Botox and report at next appointment.  Ok to try TPIs as rescue when she has flare ups.  Referrals: No new referrals today. She will continue working with neurology and psychology teams.   Medications: No changes.   Follow up: Tia Pablo was rescheduled for the next series of injections in 10 weeks, at which time we will evaluate response to today's injections. she may call the clinic prior with any questions or concerns prior to the next appointment.         Angela Franz MD  Physical Medicine & Rehabilitation     Again, thank you for allowing me to participate in the care of your patient.      Sincerely,    Angela Franz MD

## 2025-07-31 DIAGNOSIS — G43.719 INTRACTABLE CHRONIC MIGRAINE WITHOUT AURA AND WITHOUT STATUS MIGRAINOSUS: ICD-10-CM

## 2025-07-31 RX ORDER — ELETRIPTAN HYDROBROMIDE 40 MG/1
40 TABLET, FILM COATED ORAL
Qty: 12 TABLET | Refills: 3 | Status: SHIPPED | OUTPATIENT
Start: 2025-07-31

## 2025-08-03 ENCOUNTER — MYC REFILL (OUTPATIENT)
Dept: FAMILY MEDICINE | Facility: CLINIC | Age: 60
End: 2025-08-03
Payer: COMMERCIAL

## 2025-08-03 DIAGNOSIS — I10 ESSENTIAL HYPERTENSION: ICD-10-CM

## 2025-08-03 DIAGNOSIS — Z91.038 OTHER INSECT ALLERGY STATUS: ICD-10-CM

## 2025-08-04 RX ORDER — LOSARTAN POTASSIUM 50 MG/1
50 TABLET ORAL DAILY
Qty: 90 TABLET | Refills: 2 | Status: SHIPPED | OUTPATIENT
Start: 2025-08-04

## 2025-08-04 RX ORDER — EPINEPHRINE 0.3 MG/.3ML
0.3 INJECTION SUBCUTANEOUS PRN
Qty: 2 EACH | Refills: 1 | Status: SHIPPED | OUTPATIENT
Start: 2025-08-04

## 2025-08-05 DIAGNOSIS — I10 ESSENTIAL HYPERTENSION: ICD-10-CM

## 2025-08-05 RX ORDER — LOSARTAN POTASSIUM 50 MG/1
50 TABLET ORAL DAILY
Qty: 90 TABLET | Refills: 2 | OUTPATIENT
Start: 2025-08-05

## 2025-08-06 ENCOUNTER — MYC REFILL (OUTPATIENT)
Dept: FAMILY MEDICINE | Facility: CLINIC | Age: 60
End: 2025-08-06
Payer: COMMERCIAL

## 2025-08-06 DIAGNOSIS — I10 ESSENTIAL HYPERTENSION: ICD-10-CM

## 2025-08-06 RX ORDER — CHLORTHALIDONE 25 MG/1
25 TABLET ORAL DAILY
Qty: 90 TABLET | Refills: 0 | Status: CANCELLED | OUTPATIENT
Start: 2025-08-06

## 2025-08-06 RX ORDER — LOSARTAN POTASSIUM 50 MG/1
50 TABLET ORAL DAILY
Qty: 90 TABLET | Refills: 2 | Status: CANCELLED | OUTPATIENT
Start: 2025-08-06

## 2025-08-19 ENCOUNTER — NURSE TRIAGE (OUTPATIENT)
Dept: FAMILY MEDICINE | Facility: CLINIC | Age: 60
End: 2025-08-19
Payer: COMMERCIAL

## 2025-08-21 ENCOUNTER — OFFICE VISIT (OUTPATIENT)
Dept: FAMILY MEDICINE | Facility: CLINIC | Age: 60
End: 2025-08-21
Payer: COMMERCIAL

## 2025-08-21 VITALS
HEART RATE: 81 BPM | WEIGHT: 137 LBS | OXYGEN SATURATION: 98 % | SYSTOLIC BLOOD PRESSURE: 119 MMHG | BODY MASS INDEX: 22.02 KG/M2 | HEIGHT: 66 IN | RESPIRATION RATE: 16 BRPM | DIASTOLIC BLOOD PRESSURE: 77 MMHG | TEMPERATURE: 98.4 F

## 2025-08-21 DIAGNOSIS — J01.10 ACUTE NON-RECURRENT FRONTAL SINUSITIS: Primary | ICD-10-CM

## 2025-08-21 RX ORDER — FLUCONAZOLE 150 MG/1
150 TABLET ORAL ONCE
Qty: 1 TABLET | Refills: 0 | Status: SHIPPED | OUTPATIENT
Start: 2025-08-21 | End: 2025-08-21

## 2025-08-21 ASSESSMENT — ENCOUNTER SYMPTOMS
COUGH: 1
SORE THROAT: 1

## 2025-08-21 ASSESSMENT — PAIN SCALES - GENERAL: PAINLEVEL_OUTOF10: NO PAIN (0)

## 2025-08-26 ENCOUNTER — TELEPHONE (OUTPATIENT)
Dept: FAMILY MEDICINE | Facility: CLINIC | Age: 60
End: 2025-08-26
Payer: COMMERCIAL

## 2025-08-27 ENCOUNTER — TELEPHONE (OUTPATIENT)
Dept: PHYSICAL MEDICINE AND REHAB | Facility: CLINIC | Age: 60
End: 2025-08-27

## 2025-09-03 ENCOUNTER — ANCILLARY PROCEDURE (OUTPATIENT)
Dept: MRI IMAGING | Facility: CLINIC | Age: 60
End: 2025-09-03
Attending: INTERNAL MEDICINE
Payer: COMMERCIAL

## 2025-09-03 ENCOUNTER — MYC MEDICAL ADVICE (OUTPATIENT)
Dept: NEUROLOGY | Facility: CLINIC | Age: 60
End: 2025-09-03

## 2025-09-03 DIAGNOSIS — G30.9 ALZHEIMER'S DISEASE (H): ICD-10-CM

## 2025-09-03 DIAGNOSIS — F02.80 ALZHEIMER'S DISEASE (H): ICD-10-CM

## 2025-09-03 PROCEDURE — 70551 MRI BRAIN STEM W/O DYE: CPT

## 2025-09-04 ENCOUNTER — TELEPHONE (OUTPATIENT)
Dept: NEUROLOGY | Facility: CLINIC | Age: 60
End: 2025-09-04

## 2025-09-04 ENCOUNTER — VIRTUAL VISIT (OUTPATIENT)
Dept: NEUROLOGY | Facility: CLINIC | Age: 60
End: 2025-09-04
Payer: COMMERCIAL

## 2025-09-04 DIAGNOSIS — F02.80 ALZHEIMER'S DISEASE (H): Primary | ICD-10-CM

## 2025-09-04 DIAGNOSIS — G30.9 ALZHEIMER'S DISEASE (H): Primary | ICD-10-CM

## 2025-09-04 DIAGNOSIS — F33.1 MAJOR DEPRESSIVE DISORDER, RECURRENT EPISODE, MODERATE WITH ANXIOUS DISTRESS (H): Primary | ICD-10-CM

## 2025-09-04 RX ORDER — ALBUTEROL SULFATE 90 UG/1
1-2 INHALANT RESPIRATORY (INHALATION)
Start: 2025-09-11

## 2025-09-04 RX ORDER — ALBUTEROL SULFATE 0.83 MG/ML
2.5 SOLUTION RESPIRATORY (INHALATION)
OUTPATIENT
Start: 2025-09-11

## 2025-09-04 RX ORDER — ACETAMINOPHEN 325 MG/1
650 TABLET ORAL
OUTPATIENT
Start: 2025-09-11

## 2025-09-04 RX ORDER — HEPARIN SODIUM,PORCINE 10 UNIT/ML
5-20 VIAL (ML) INTRAVENOUS DAILY PRN
OUTPATIENT
Start: 2025-09-11

## 2025-09-04 RX ORDER — HEPARIN SODIUM (PORCINE) LOCK FLUSH IV SOLN 100 UNIT/ML 100 UNIT/ML
5 SOLUTION INTRAVENOUS
OUTPATIENT
Start: 2025-09-11

## 2025-09-04 RX ORDER — METHYLPREDNISOLONE SODIUM SUCCINATE 40 MG/ML
40 INJECTION INTRAMUSCULAR; INTRAVENOUS
Start: 2025-09-11

## 2025-09-04 RX ORDER — DIPHENHYDRAMINE HCL 25 MG
25 CAPSULE ORAL
OUTPATIENT
Start: 2025-09-11

## 2025-09-04 RX ORDER — EPINEPHRINE 1 MG/ML
0.3 INJECTION, SOLUTION, CONCENTRATE INTRAVENOUS EVERY 5 MIN PRN
OUTPATIENT
Start: 2025-09-11

## 2025-09-04 RX ORDER — MEPERIDINE HYDROCHLORIDE 25 MG/ML
25 INJECTION INTRAMUSCULAR; INTRAVENOUS; SUBCUTANEOUS
OUTPATIENT
Start: 2025-09-11

## 2025-09-04 RX ORDER — DIPHENHYDRAMINE HYDROCHLORIDE 50 MG/ML
50 INJECTION INTRAMUSCULAR; INTRAVENOUS
Start: 2025-09-11

## 2025-09-04 RX ORDER — DIPHENHYDRAMINE HYDROCHLORIDE 50 MG/ML
25 INJECTION INTRAMUSCULAR; INTRAVENOUS
Start: 2025-09-11

## (undated) RX ORDER — BUPIVACAINE HYDROCHLORIDE 5 MG/ML
INJECTION, SOLUTION EPIDURAL; INTRACAUDAL
Status: DISPENSED
Start: 2023-10-16

## (undated) RX ORDER — TRIAMCINOLONE ACETONIDE 40 MG/ML
INJECTION, SUSPENSION INTRA-ARTICULAR; INTRAMUSCULAR
Status: DISPENSED
Start: 2021-02-03

## (undated) RX ORDER — LIDOCAINE HYDROCHLORIDE 10 MG/ML
INJECTION, SOLUTION EPIDURAL; INFILTRATION; INTRACAUDAL; PERINEURAL
Status: DISPENSED
Start: 2021-04-30

## (undated) RX ORDER — BUPIVACAINE HYDROCHLORIDE 5 MG/ML
INJECTION, SOLUTION EPIDURAL; INTRACAUDAL
Status: DISPENSED
Start: 2021-04-30

## (undated) RX ORDER — TRIAMCINOLONE ACETONIDE 40 MG/ML
INJECTION, SUSPENSION INTRA-ARTICULAR; INTRAMUSCULAR
Status: DISPENSED
Start: 2021-09-10

## (undated) RX ORDER — LIDOCAINE HYDROCHLORIDE 10 MG/ML
INJECTION, SOLUTION EPIDURAL; INFILTRATION; INTRACAUDAL; PERINEURAL
Status: DISPENSED
Start: 2021-02-03

## (undated) RX ORDER — BUPIVACAINE HYDROCHLORIDE 5 MG/ML
INJECTION, SOLUTION EPIDURAL; INTRACAUDAL
Status: DISPENSED
Start: 2022-11-02

## (undated) RX ORDER — LIDOCAINE HYDROCHLORIDE 10 MG/ML
INJECTION, SOLUTION EPIDURAL; INFILTRATION; INTRACAUDAL; PERINEURAL
Status: DISPENSED
Start: 2024-10-02

## (undated) RX ORDER — BUPIVACAINE HYDROCHLORIDE 5 MG/ML
INJECTION, SOLUTION EPIDURAL; INTRACAUDAL
Status: DISPENSED
Start: 2025-01-08

## (undated) RX ORDER — TRIAMCINOLONE ACETONIDE 40 MG/ML
INJECTION, SUSPENSION INTRA-ARTICULAR; INTRAMUSCULAR
Status: DISPENSED
Start: 2024-10-02

## (undated) RX ORDER — LIDOCAINE HYDROCHLORIDE 10 MG/ML
INJECTION, SOLUTION EPIDURAL; INFILTRATION; INTRACAUDAL; PERINEURAL
Status: DISPENSED
Start: 2021-08-18

## (undated) RX ORDER — LIDOCAINE HYDROCHLORIDE 10 MG/ML
INJECTION, SOLUTION EPIDURAL; INFILTRATION; INTRACAUDAL; PERINEURAL
Status: DISPENSED
Start: 2022-11-02

## (undated) RX ORDER — TRIAMCINOLONE ACETONIDE 40 MG/ML
INJECTION, SUSPENSION INTRA-ARTICULAR; INTRAMUSCULAR
Status: DISPENSED
Start: 2021-04-30

## (undated) RX ORDER — BUPIVACAINE HYDROCHLORIDE 5 MG/ML
INJECTION, SOLUTION EPIDURAL; INTRACAUDAL
Status: DISPENSED
Start: 2024-10-02

## (undated) RX ORDER — LIDOCAINE HYDROCHLORIDE 10 MG/ML
INJECTION, SOLUTION EPIDURAL; INFILTRATION; INTRACAUDAL; PERINEURAL
Status: DISPENSED
Start: 2021-06-17

## (undated) RX ORDER — LIDOCAINE HYDROCHLORIDE 10 MG/ML
INJECTION, SOLUTION EPIDURAL; INFILTRATION; INTRACAUDAL; PERINEURAL
Status: DISPENSED
Start: 2021-09-10

## (undated) RX ORDER — BUPIVACAINE HYDROCHLORIDE 5 MG/ML
INJECTION, SOLUTION EPIDURAL; INTRACAUDAL; PERINEURAL
Status: DISPENSED
Start: 2025-03-26

## (undated) RX ORDER — BUPIVACAINE HYDROCHLORIDE 5 MG/ML
INJECTION, SOLUTION EPIDURAL; INTRACAUDAL
Status: DISPENSED
Start: 2021-02-03

## (undated) RX ORDER — BUPIVACAINE HYDROCHLORIDE 2.5 MG/ML
INJECTION, SOLUTION EPIDURAL; INFILTRATION; INTRACAUDAL
Status: DISPENSED
Start: 2021-09-10

## (undated) RX ORDER — BUPIVACAINE HYDROCHLORIDE 5 MG/ML
INJECTION, SOLUTION EPIDURAL; INTRACAUDAL
Status: DISPENSED
Start: 2020-11-30

## (undated) RX ORDER — LIDOCAINE HYDROCHLORIDE 10 MG/ML
INJECTION, SOLUTION EPIDURAL; INFILTRATION; INTRACAUDAL; PERINEURAL
Status: DISPENSED
Start: 2025-03-26

## (undated) RX ORDER — TRIAMCINOLONE ACETONIDE 40 MG/ML
INJECTION, SUSPENSION INTRA-ARTICULAR; INTRAMUSCULAR
Status: DISPENSED
Start: 2021-06-17

## (undated) RX ORDER — TRIAMCINOLONE ACETONIDE 40 MG/ML
INJECTION, SUSPENSION INTRA-ARTICULAR; INTRAMUSCULAR
Status: DISPENSED
Start: 2023-10-16

## (undated) RX ORDER — LIDOCAINE HYDROCHLORIDE 10 MG/ML
INJECTION, SOLUTION EPIDURAL; INFILTRATION; INTRACAUDAL; PERINEURAL
Status: DISPENSED
Start: 2020-11-30

## (undated) RX ORDER — TRIAMCINOLONE ACETONIDE 40 MG/ML
INJECTION, SUSPENSION INTRA-ARTICULAR; INTRAMUSCULAR
Status: DISPENSED
Start: 2020-11-30

## (undated) RX ORDER — LIDOCAINE HYDROCHLORIDE 10 MG/ML
INJECTION, SOLUTION EPIDURAL; INFILTRATION; INTRACAUDAL; PERINEURAL
Status: DISPENSED
Start: 2023-10-16

## (undated) RX ORDER — FENTANYL CITRATE-0.9 % NACL/PF 10 MCG/ML
PLASTIC BAG, INJECTION (ML) INTRAVENOUS
Status: DISPENSED
Start: 2024-01-15

## (undated) RX ORDER — TRIAMCINOLONE ACETONIDE 40 MG/ML
INJECTION, SUSPENSION INTRA-ARTICULAR; INTRAMUSCULAR
Status: DISPENSED
Start: 2022-11-02

## (undated) RX ORDER — LIDOCAINE HYDROCHLORIDE 10 MG/ML
INJECTION, SOLUTION EPIDURAL; INFILTRATION; INTRACAUDAL; PERINEURAL
Status: DISPENSED
Start: 2025-01-08

## (undated) RX ORDER — LIDOCAINE HYDROCHLORIDE 10 MG/ML
INJECTION, SOLUTION EPIDURAL; INFILTRATION; INTRACAUDAL; PERINEURAL
Status: DISPENSED
Start: 2021-11-17